# Patient Record
Sex: FEMALE | Race: WHITE | Employment: OTHER | URBAN - METROPOLITAN AREA
[De-identification: names, ages, dates, MRNs, and addresses within clinical notes are randomized per-mention and may not be internally consistent; named-entity substitution may affect disease eponyms.]

---

## 2020-08-19 ENCOUNTER — VIRTUAL VISIT (OUTPATIENT)
Dept: FAMILY MEDICINE CLINIC | Age: 80
End: 2020-08-19
Payer: MEDICARE

## 2020-08-19 DIAGNOSIS — I10 BENIGN ESSENTIAL HTN: ICD-10-CM

## 2020-08-19 DIAGNOSIS — E78.00 HYPERCHOLESTEREMIA: ICD-10-CM

## 2020-08-19 DIAGNOSIS — Z79.4 CONTROLLED TYPE 2 DIABETES MELLITUS WITHOUT COMPLICATION, WITH LONG-TERM CURRENT USE OF INSULIN (HCC): Primary | ICD-10-CM

## 2020-08-19 DIAGNOSIS — I25.10 CORONARY ARTERY DISEASE INVOLVING NATIVE HEART WITHOUT ANGINA PECTORIS, UNSPECIFIED VESSEL OR LESION TYPE: ICD-10-CM

## 2020-08-19 DIAGNOSIS — E11.9 CONTROLLED TYPE 2 DIABETES MELLITUS WITHOUT COMPLICATION, WITH LONG-TERM CURRENT USE OF INSULIN (HCC): ICD-10-CM

## 2020-08-19 DIAGNOSIS — E11.9 CONTROLLED TYPE 2 DIABETES MELLITUS WITHOUT COMPLICATION, WITH LONG-TERM CURRENT USE OF INSULIN (HCC): Primary | ICD-10-CM

## 2020-08-19 DIAGNOSIS — Z79.4 CONTROLLED TYPE 2 DIABETES MELLITUS WITHOUT COMPLICATION, WITH LONG-TERM CURRENT USE OF INSULIN (HCC): ICD-10-CM

## 2020-08-19 PROCEDURE — 99203 OFFICE O/P NEW LOW 30 MIN: CPT | Performed by: NURSE PRACTITIONER

## 2020-08-19 PROCEDURE — G8427 DOCREV CUR MEDS BY ELIG CLIN: HCPCS | Performed by: NURSE PRACTITIONER

## 2020-08-19 PROCEDURE — 1101F PT FALLS ASSESS-DOCD LE1/YR: CPT | Performed by: NURSE PRACTITIONER

## 2020-08-19 PROCEDURE — G8400 PT W/DXA NO RESULTS DOC: HCPCS | Performed by: NURSE PRACTITIONER

## 2020-08-19 PROCEDURE — 1090F PRES/ABSN URINE INCON ASSESS: CPT | Performed by: NURSE PRACTITIONER

## 2020-08-19 PROCEDURE — G8432 DEP SCR NOT DOC, RNG: HCPCS | Performed by: NURSE PRACTITIONER

## 2020-08-19 RX ORDER — CLONIDINE HYDROCHLORIDE 0.2 MG/1
0.2 TABLET ORAL 2 TIMES DAILY
COMMUNITY
End: 2021-02-01 | Stop reason: SDUPTHER

## 2020-08-19 RX ORDER — LISINOPRIL 5 MG/1
5 TABLET ORAL DAILY
COMMUNITY
End: 2020-10-19 | Stop reason: SDUPTHER

## 2020-08-19 RX ORDER — ALBUTEROL SULFATE 0.63 MG/3ML
0.63 SOLUTION RESPIRATORY (INHALATION)
COMMUNITY
End: 2022-06-09 | Stop reason: ALTCHOICE

## 2020-08-19 RX ORDER — MONTELUKAST SODIUM 5 MG/1
10 TABLET, CHEWABLE ORAL
COMMUNITY
End: 2020-10-19 | Stop reason: SDUPTHER

## 2020-08-19 RX ORDER — GUAIFENESIN 100 MG/5ML
81 LIQUID (ML) ORAL DAILY
COMMUNITY

## 2020-08-19 RX ORDER — ALLOPURINOL 100 MG/1
100 TABLET ORAL DAILY
COMMUNITY
End: 2021-02-01 | Stop reason: SDUPTHER

## 2020-08-19 RX ORDER — CHOLECALCIFEROL (VITAMIN D3) 50 MCG
CAPSULE ORAL
COMMUNITY
End: 2022-06-09 | Stop reason: ALTCHOICE

## 2020-08-19 RX ORDER — FUROSEMIDE 20 MG/1
20 TABLET ORAL DAILY
COMMUNITY
End: 2020-09-01 | Stop reason: SDUPTHER

## 2020-08-19 RX ORDER — FLUTICASONE PROPIONATE AND SALMETEROL 250; 50 UG/1; UG/1
1 POWDER RESPIRATORY (INHALATION) EVERY 12 HOURS
COMMUNITY
End: 2020-08-26 | Stop reason: SDUPTHER

## 2020-08-19 RX ORDER — INSULIN GLARGINE 100 [IU]/ML
28 INJECTION, SOLUTION SUBCUTANEOUS
COMMUNITY
End: 2020-09-23 | Stop reason: SDUPTHER

## 2020-08-19 RX ORDER — SPIRONOLACTONE 25 MG/1
25 TABLET ORAL DAILY
COMMUNITY
End: 2021-02-13 | Stop reason: SDUPTHER

## 2020-08-19 NOTE — PROGRESS NOTES
Chief Complaint   Patient presents with   [facility].     Pt has a vv to establish care    1. Have you been to the ER, urgent care clinic since your last visit? Hospitalized since your last visit? No    2. Have you seen or consulted any other health care providers outside of the [address] [place] [address] since your last visit? Include any pap smears or colon screening.  No     Pt has no other concerns

## 2020-08-19 NOTE — PROGRESS NOTES
Idania Mehta is a 79 y.o. female who was seen by synchronous (real-time) audio-video technology on 8/19/2020 for Establish Care      I spent at least 25 minutes on this visit with this established patient. Subjective:   Patient is new to the office  Moved from [facility] to live with daughter  pmh DM with insulin, CAD with cath 2014 due to silent MI, no stents required, and hypercholesterol  Med list reviewed, no refills needed at this time  She is also seeing Onc for blood disorder and will need referral for both specialties  Thinks last A1C in 7 range  The FamHx, SocHx, MedHx, Medication, and Allergy lists have been reviewed and updated in the chart. Prior to Admission medications    Medication Sig Start Date End Date Taking? Authorizing Provider   allopurinoL (ZYLOPRIM) 100 mg tablet Take 100 mg by mouth daily. Yes Provider, Historical   atorvastatin calcium (ATORVASTATIN PO) Take 40 mg by mouth. Yes Provider, Historical   lisinopriL (PRINIVIL, ZESTRIL) 5 mg tablet Take 5 mg by mouth daily. Yes Provider, Historical   spironolactone (ALDACTONE) 25 mg tablet Take 25 mg by mouth daily. Yes Provider, Historical   montelukast (SINGULAIR) 5 mg chewable tablet Take 10 mg by mouth nightly. Yes Provider, Historical   furosemide (Lasix) 20 mg tablet Take 20 mg by mouth daily. Yes Provider, Historical   sotalol HCl (SOTALOL PO) Take 80 mg by mouth. Yes Provider, Historical   cloNIDine HCL (CATAPRES) 0.2 mg tablet Take 0.2 mg by mouth two (2) times a day. Yes Provider, Historical   aspirin 81 mg chewable tablet Take 81 mg by mouth daily. Yes Provider, Historical   fluticasone propion-salmeteroL (Advair Diskus) 250-50 mcg/dose diskus inhaler Take 1 Puff by inhalation every twelve (12) hours. Yes Provider, Historical   albuterol (ACCUNEB) 0.63 mg/3 mL nebulizer solution 0.63 mg by Nebulization route every six (6) hours as needed for Wheezing.    Yes Provider, Historical   NOVOLOG U-100 INSULIN ASPART SC 22 Units by SubCUTAneous route. Yes Provider, Historical   exenatide microspheres (Bydureon) 2 mg serr 2 mg by SubCUTAneous route every seven (7) days. Yes Provider, Historical   insulin glargine (Lantus Solostar U-100 Insulin) 100 unit/mL (3 mL) inpn 28 Units by SubCUTAneous route. At night   Yes Provider, Historical   B.infantis-B.ani-B.long-B.bifi (Probiotic 4X) 10-15 mg TbEC Take  by mouth. Yes Provider, Historical     There are no active problems to display for this patient. Current Outpatient Medications   Medication Sig Dispense Refill    allopurinoL (ZYLOPRIM) 100 mg tablet Take 100 mg by mouth daily.  atorvastatin calcium (ATORVASTATIN PO) Take 40 mg by mouth.  lisinopriL (PRINIVIL, ZESTRIL) 5 mg tablet Take 5 mg by mouth daily.  spironolactone (ALDACTONE) 25 mg tablet Take 25 mg by mouth daily.  montelukast (SINGULAIR) 5 mg chewable tablet Take 10 mg by mouth nightly.  furosemide (Lasix) 20 mg tablet Take 20 mg by mouth daily.  sotalol HCl (SOTALOL PO) Take 80 mg by mouth.  cloNIDine HCL (CATAPRES) 0.2 mg tablet Take 0.2 mg by mouth two (2) times a day.  aspirin 81 mg chewable tablet Take 81 mg by mouth daily.  fluticasone propion-salmeteroL (Advair Diskus) 250-50 mcg/dose diskus inhaler Take 1 Puff by inhalation every twelve (12) hours.  albuterol (ACCUNEB) 0.63 mg/3 mL nebulizer solution 0.63 mg by Nebulization route every six (6) hours as needed for Wheezing.  NOVOLOG U-100 INSULIN ASPART SC 22 Units by SubCUTAneous route.  exenatide microspheres (Bydureon) 2 mg serr 2 mg by SubCUTAneous route every seven (7) days.  insulin glargine (Lantus Solostar U-100 Insulin) 100 unit/mL (3 mL) inpn 28 Units by SubCUTAneous route. At night      B.infantis-B.ani-B.long-B.bifi (Probiotic 4X) 10-15 mg TbEC Take  by mouth. History reviewed. No pertinent family history.   Social History     Tobacco Use    Smoking status: Never Smoker    Smokeless tobacco: Never Used   Substance Use Topics    Alcohol use: Never     Frequency: Never       ROS    Objective:   No flowsheet data found. [INSTRUCTIONS:  \"[x]\" Indicates a positive item  \"[]\" Indicates a negative item  -- DELETE ALL ITEMS NOT EXAMINED]    Constitutional: [x] Appears well-developed and well-nourished [x] No apparent distress      [] Abnormal -     Mental status: [x] Alert and awake  [x] Oriented to person/place/time [x] Able to follow commands    [] Abnormal -     Eyes:   EOM    [x]  Normal    [] Abnormal -   Sclera  [x]  Normal    [] Abnormal -          Discharge [x]  None visible   [] Abnormal -     HENT: [x] Normocephalic, atraumatic  [] Abnormal -   [x] Mouth/Throat: Mucous membranes are moist    External Ears [x] Normal  [] Abnormal -    Neck: [x] No visualized mass [] Abnormal -     Pulmonary/Chest: [x] Respiratory effort normal   [x] No visualized signs of difficulty breathing or respiratory distress        [] Abnormal -      Musculoskeletal:   [x] Normal gait with no signs of ataxia         [x] Normal range of motion of neck        [] Abnormal -     Neurological:        [x] No Facial Asymmetry (Cranial nerve 7 motor function) (limited exam due to video visit)          [x] No gaze palsy        [] Abnormal -          Skin:        [x] No significant exanthematous lesions or discoloration noted on facial skin         [] Abnormal -            Psychiatric:       [x] Normal Affect [] Abnormal -        [x] No Hallucinations    Other pertinent observable physical exam findings:- none    Diagnoses and all orders for this visit:    1. Controlled type 2 diabetes mellitus without complication, with long-term current use of insulin (HCC)  -     HEMOGLOBIN A1C WITH EAG; Future  -     MICROALBUMIN, UR, RAND W/ MICROALB/CREAT RATIO; Future    2. Benign essential HTN  -     METABOLIC PANEL, COMPREHENSIVE; Future  -     CBC WITH AUTOMATED DIFF; Future    3.  Coronary artery disease involving native heart without angina pectoris, unspecified vessel or lesion type  -     LIPID PANEL; Future  -     METABOLIC PANEL, COMPREHENSIVE; Future  -     CBC WITH AUTOMATED DIFF; Future    4. Hypercholesteremia  -     LIPID PANEL; Future      All labs updated today to get an idea of control   Will have in office next visit and will discuss med changes/adjustments pending results  Will refer to cardio as well given CAD pmh  And Onc referral as well            We discussed the expected course, resolution and complications of the diagnosis(es) in detail. Medication risks, benefits, costs, interactions, and alternatives were discussed as indicated. I advised her to contact the office if her condition worsens, changes or fails to improve as anticipated. She expressed understanding with the diagnosis(es) and plan. Rowena Tejeda, who was evaluated through a patient-initiated, synchronous (real-time) audio-video encounter, and/or her healthcare decision maker, is aware that it is a billable service, with coverage as determined by her insurance carrier. She provided verbal consent to proceed: Yes, and patient identification was verified. It was conducted pursuant to the emergency declaration under the [address] [place] [address] authority and the [employer] Act. A caregiver was present when appropriate. Ability to conduct physical exam was limited. I was at home. The patient was at home.       Shirlene Samson NP

## 2020-08-20 ENCOUNTER — HOSPITAL ENCOUNTER (OUTPATIENT)
Dept: LAB | Age: 80
Discharge: HOME | End: 2020-08-20
Payer: MEDICARE

## 2020-08-20 PROCEDURE — 80061 LIPID PANEL: CPT

## 2020-08-20 PROCEDURE — 82043 UR ALBUMIN QUANTITATIVE: CPT

## 2020-08-20 PROCEDURE — 83036 HEMOGLOBIN GLYCOSYLATED A1C: CPT

## 2020-08-20 PROCEDURE — 80053 COMPREHEN METABOLIC PANEL: CPT

## 2020-08-20 PROCEDURE — 85025 COMPLETE CBC W/AUTO DIFF WBC: CPT

## 2020-08-25 ENCOUNTER — DOCUMENTATION ONLY (OUTPATIENT)
Dept: FAMILY MEDICINE CLINIC | Age: 80
End: 2020-08-25

## 2020-08-26 RX ORDER — FLUTICASONE PROPIONATE AND SALMETEROL 250; 50 UG/1; UG/1
1 POWDER RESPIRATORY (INHALATION) EVERY 12 HOURS
Qty: 3 INHALER | Refills: 3 | Status: SHIPPED | OUTPATIENT
Start: 2020-08-26 | End: 2020-08-31 | Stop reason: SDUPTHER

## 2020-08-27 LAB
ALBUMIN SERPL-MCNC: 3.8 G/DL (ref 3.7–4.7)
ALBUMIN/CREAT UR: 6 MG/G CREAT (ref 0–29)
ALBUMIN/GLOB SERPL: 1.4 {RATIO} (ref 1.2–2.2)
ALP SERPL-CCNC: 115 IU/L (ref 39–117)
ALT SERPL-CCNC: 15 IU/L (ref 0–32)
AST SERPL-CCNC: 14 IU/L (ref 0–40)
BASOPHILS # BLD AUTO: 0.1 X10E3/UL (ref 0–0.2)
BASOPHILS NFR BLD AUTO: 1 %
BILIRUB SERPL-MCNC: 0.6 MG/DL (ref 0–1.2)
BUN SERPL-MCNC: 26 MG/DL (ref 8–27)
BUN/CREAT SERPL: 21 (ref 12–28)
CALCIUM SERPL-MCNC: 9.6 MG/DL (ref 8.7–10.3)
CHLORIDE SERPL-SCNC: 97 MMOL/L (ref 96–106)
CHOLEST SERPL-MCNC: 169 MG/DL (ref 100–199)
CO2 SERPL-SCNC: 25 MMOL/L (ref 20–29)
CREAT SERPL-MCNC: 1.26 MG/DL (ref 0.57–1)
CREAT UR-MCNC: 126.2 MG/DL
EOSINOPHIL # BLD AUTO: 0.4 X10E3/UL (ref 0–0.4)
EOSINOPHIL NFR BLD AUTO: 3 %
ERYTHROCYTE [DISTWIDTH] IN BLOOD BY AUTOMATED COUNT: 12.9 % (ref 11.7–15.4)
EST. AVERAGE GLUCOSE BLD GHB EST-MCNC: 166 MG/DL
GLOBULIN SER CALC-MCNC: 2.8 G/DL (ref 1.5–4.5)
GLUCOSE SERPL-MCNC: 172 MG/DL (ref 65–99)
HBA1C MFR BLD: 7.4 % (ref 4.8–5.6)
HCT VFR BLD AUTO: 37.9 % (ref 34–46.6)
HDLC SERPL-MCNC: 45 MG/DL
HGB BLD-MCNC: 12.6 G/DL (ref 11.1–15.9)
IMM GRANULOCYTES # BLD AUTO: 0.1 X10E3/UL (ref 0–0.1)
IMM GRANULOCYTES NFR BLD AUTO: 1 %
INTERPRETATION, 910389: NORMAL
INTERPRETATION: NORMAL
LDLC SERPL CALC-MCNC: 93 MG/DL (ref 0–99)
LYMPHOCYTES # BLD AUTO: 2.1 X10E3/UL (ref 0.7–3.1)
LYMPHOCYTES NFR BLD AUTO: 18 %
Lab: NORMAL
MCH RBC QN AUTO: 31.7 PG (ref 26.6–33)
MCHC RBC AUTO-ENTMCNC: 33.2 G/DL (ref 31.5–35.7)
MCV RBC AUTO: 96 FL (ref 79–97)
MICROALBUMIN UR-MCNC: 7.2 UG/ML
MONOCYTES # BLD AUTO: 0.8 X10E3/UL (ref 0.1–0.9)
MONOCYTES NFR BLD AUTO: 7 %
NEUTROPHILS # BLD AUTO: 8.2 X10E3/UL (ref 1.4–7)
NEUTROPHILS NFR BLD AUTO: 70 %
PDF IMAGE, 910387: NORMAL
PLATELET # BLD AUTO: 301 X10E3/UL (ref 150–450)
POTASSIUM SERPL-SCNC: 5.3 MMOL/L (ref 3.5–5.2)
PROT SERPL-MCNC: 6.6 G/DL (ref 6–8.5)
RBC # BLD AUTO: 3.97 X10E6/UL (ref 3.77–5.28)
SODIUM SERPL-SCNC: 135 MMOL/L (ref 134–144)
TRIGL SERPL-MCNC: 155 MG/DL (ref 0–149)
VLDLC SERPL CALC-MCNC: 31 MG/DL (ref 5–40)
WBC # BLD AUTO: 11.6 X10E3/UL (ref 3.4–10.8)

## 2020-08-31 RX ORDER — FLUTICASONE PROPIONATE AND SALMETEROL 250; 50 UG/1; UG/1
1 POWDER RESPIRATORY (INHALATION) EVERY 12 HOURS
Qty: 3 INHALER | Refills: 3 | Status: SHIPPED | OUTPATIENT
Start: 2020-08-31 | End: 2020-09-01 | Stop reason: SDUPTHER

## 2020-08-31 NOTE — PROGRESS NOTES
Hey there, your labs look like you are dehydrated. I dont have labs to compare to so I want to recheck your electrolytes and kidney functions in 2 weeks, please call the office for an appointment. Your sugar is stable but could be slightly lower, watch the diet for pasta, bread, rice and potatoes.  [place]

## 2020-09-01 RX ORDER — FUROSEMIDE 20 MG/1
20 TABLET ORAL DAILY
Qty: 90 TAB | Refills: 1 | Status: SHIPPED | OUTPATIENT
Start: 2020-09-01 | End: 2020-09-01 | Stop reason: SDUPTHER

## 2020-09-01 RX ORDER — FUROSEMIDE 20 MG/1
20 TABLET ORAL DAILY
Qty: 90 TAB | Refills: 1 | Status: SHIPPED | OUTPATIENT
Start: 2020-09-01 | End: 2021-02-12 | Stop reason: SDUPTHER

## 2020-09-01 RX ORDER — FLUTICASONE PROPIONATE AND SALMETEROL 250; 50 UG/1; UG/1
1 POWDER RESPIRATORY (INHALATION) EVERY 12 HOURS
Qty: 3 INHALER | Refills: 3 | Status: SHIPPED | OUTPATIENT
Start: 2020-09-01 | End: 2021-10-26

## 2020-09-23 ENCOUNTER — OFFICE VISIT (OUTPATIENT)
Dept: FAMILY MEDICINE CLINIC | Age: 80
End: 2020-09-23
Payer: MEDICARE

## 2020-09-23 VITALS
HEIGHT: 63 IN | TEMPERATURE: 98.1 F | OXYGEN SATURATION: 96 % | RESPIRATION RATE: 14 BRPM | BODY MASS INDEX: 40.22 KG/M2 | DIASTOLIC BLOOD PRESSURE: 63 MMHG | SYSTOLIC BLOOD PRESSURE: 102 MMHG | HEART RATE: 68 BPM | WEIGHT: 227 LBS

## 2020-09-23 DIAGNOSIS — D47.2 MONOCLONAL GAMMOPATHY OF UNKNOWN SIGNIFICANCE (MGUS): ICD-10-CM

## 2020-09-23 DIAGNOSIS — N18.2 CRF (CHRONIC RENAL FAILURE), STAGE 2 (MILD): Primary | ICD-10-CM

## 2020-09-23 DIAGNOSIS — I25.10 CORONARY ARTERY DISEASE INVOLVING NATIVE HEART WITHOUT ANGINA PECTORIS, UNSPECIFIED VESSEL OR LESION TYPE: ICD-10-CM

## 2020-09-23 PROCEDURE — 1101F PT FALLS ASSESS-DOCD LE1/YR: CPT | Performed by: NURSE PRACTITIONER

## 2020-09-23 PROCEDURE — G8432 DEP SCR NOT DOC, RNG: HCPCS | Performed by: NURSE PRACTITIONER

## 2020-09-23 PROCEDURE — G8536 NO DOC ELDER MAL SCRN: HCPCS | Performed by: NURSE PRACTITIONER

## 2020-09-23 PROCEDURE — 1090F PRES/ABSN URINE INCON ASSESS: CPT | Performed by: NURSE PRACTITIONER

## 2020-09-23 PROCEDURE — G8427 DOCREV CUR MEDS BY ELIG CLIN: HCPCS | Performed by: NURSE PRACTITIONER

## 2020-09-23 PROCEDURE — G8400 PT W/DXA NO RESULTS DOC: HCPCS | Performed by: NURSE PRACTITIONER

## 2020-09-23 PROCEDURE — 99214 OFFICE O/P EST MOD 30 MIN: CPT | Performed by: NURSE PRACTITIONER

## 2020-09-23 PROCEDURE — G8417 CALC BMI ABV UP PARAM F/U: HCPCS | Performed by: NURSE PRACTITIONER

## 2020-09-23 PROCEDURE — G0463 HOSPITAL OUTPT CLINIC VISIT: HCPCS | Performed by: NURSE PRACTITIONER

## 2020-09-23 RX ORDER — INSULIN GLARGINE 100 [IU]/ML
28 INJECTION, SOLUTION SUBCUTANEOUS
Qty: 15 PEN | Refills: 3 | Status: SHIPPED | OUTPATIENT
Start: 2020-09-23 | End: 2021-04-04

## 2020-09-23 NOTE — PROGRESS NOTES
Chief Complaint   Patient presents with   [address]     Pt in office today to establish care  -labs    1. Have you been to the ER, urgent care clinic since your last visit? Hospitalized since your last visit? No    2. Have you seen or consulted any other health care providers outside of the [address] [place] [address] since your last visit? Include any pap smears or colon screening.  No     Pt has no other concerns

## 2020-09-24 LAB
ALBUMIN SERPL-MCNC: 3.2 G/DL (ref 3.5–5)
ALBUMIN/GLOB SERPL: 0.9 {RATIO} (ref 1.1–2.2)
ALP SERPL-CCNC: 113 U/L (ref 45–117)
ALT SERPL-CCNC: 24 U/L (ref 12–78)
ANION GAP SERPL CALC-SCNC: 5 MMOL/L (ref 5–15)
AST SERPL-CCNC: 14 U/L (ref 15–37)
BILIRUB SERPL-MCNC: 0.8 MG/DL (ref 0.2–1)
BUN SERPL-MCNC: 25 MG/DL (ref 6–20)
BUN/CREAT SERPL: 20 (ref 12–20)
CALCIUM SERPL-MCNC: 9.5 MG/DL (ref 8.5–10.1)
CHLORIDE SERPL-SCNC: 101 MMOL/L (ref 97–108)
CO2 SERPL-SCNC: 30 MMOL/L (ref 21–32)
CREAT SERPL-MCNC: 1.22 MG/DL (ref 0.55–1.02)
GLOBULIN SER CALC-MCNC: 3.7 G/DL (ref 2–4)
GLUCOSE SERPL-MCNC: 158 MG/DL (ref 65–100)
POTASSIUM SERPL-SCNC: 4.6 MMOL/L (ref 3.5–5.1)
PROT SERPL-MCNC: 6.9 G/DL (ref 6.4–8.2)
SODIUM SERPL-SCNC: 136 MMOL/L (ref 136–145)

## 2020-09-24 NOTE — PROGRESS NOTES
HISTORY OF PRESENT ILLNESS  Madelyn Briceno is a 79 y.o. female. HPI  New patient to the office  Recent move to live with daughter  She is managed by hem/onc for MGUS, needs referral to see someone here  Also followed by cardio in [facility] for previous MI  No complaints today, did have renal functions that were elevated and followed as well  The FamHx, SocHx, MedHx, Medication, and Allergy lists have been reviewed and updated in the chart. ROS  A comprehensive review of system was obtained and negative except findings in the HPI    Visit Vitals  /63 (BP 1 Location: Left arm, BP Patient Position: Sitting)   Pulse 68   Temp 98.1 °F (36.7 °C) (Oral)   Resp 14   Ht 5' 3\" (1.6 m)   Wt 227 lb (103 kg)   SpO2 96%   BMI 40.21 kg/m²     Physical Exam  Vitals signs and nursing note reviewed. Constitutional:       Appearance: She is well-developed. Comments:      Neck:      Vascular: No JVD. Cardiovascular:      Rate and Rhythm: Normal rate and regular rhythm. Heart sounds: No murmur. No friction rub. No gallop. Pulmonary:      Effort: Pulmonary effort is normal. No respiratory distress. Breath sounds: Normal breath sounds. No wheezing. Skin:     General: Skin is warm. Neurological:      Mental Status: She is alert and oriented to person, place, and time. ASSESSMENT and PLAN  Encounter Diagnoses   Name Primary?     CRF (chronic renal failure), stage 2 (mild) Yes    Coronary artery disease involving native heart without angina pectoris, unspecified vessel or lesion type     Monoclonal gammopathy of unknown significance (MGUS)      Orders Placed This Encounter    METABOLIC PANEL, COMPREHENSIVE    [facility]    [facility]    exenatide microspheres (Bydureon) 2 mg serr    insulin glargine (Lantus Solostar U-100 Insulin) 100 unit/mL (3 mL) inpn     Refills updated  Referrals given as well  Updated renal functions from visit 3 weeks ago on VV  I have discussed the diagnosis with the patient and the intended plan as seen in the above orders. The patient has received an after-visit summary and questions were answered concerning future plans. Patient conveyed understanding of the plan at the time of the visit.     DALE Blanchard, ANP  9/23/2020

## 2020-09-28 NOTE — PROGRESS NOTES
[facility] at [address], [address] [facility] [address] [facility]  Octavio Petit: [phone]  F: [phone]      Reason for Visit:   Fabien Bentley is a 79 y.o. female who is seen in consultation at the request of Cadence Land for evaluation of MGUS. History of Present Illness:   Fabien Bentley is a pleasant 79 y.o. female who presents today for evaluation of MGUS. She was diagnosed with this around 2011 and has been following with hematology there. She has not received any treatment, just surveillance. She recently relocated to this area. I have some limited records from her prior hematologist which indicate a bone marrow biopsy was completed 8/2011 which demonstrated clonal plasma cells of 6-9%, not meeting criteria for myeloma. She was followed with surveillance and there notes indicate no progression, though they did not send me any labs despite several requests. She reports feeling fairly well. Energy a bit low, chronic. No recent or recurring infections. No fevers, chills, night sweats, unintentional weight loss, adenopathy. No bone pain, other than some arthritis in her knee. No family history of hematologic issues. She is accompanied by her daughter today. Past Medical History:   Diagnosis Date    Diabetes ([address]     Heart attack ([address] 2014    Monoclonal gammopathies       Past Surgical History:   Procedure Laterality Date    BREAST SURGERY PROCEDURE UNLISTED        Social History     Tobacco Use    Smoking status: Never Smoker    Smokeless tobacco: Never Used   Substance Use Topics    Alcohol use: Never     Frequency: Never      No family history on file. Current Outpatient Medications   Medication Sig    exenatide microspheres (Bydureon) 2 mg serr 2 mg by SubCUTAneous route every seven (7) days.  insulin glargine (Lantus Solostar U-100 Insulin) 100 unit/mL (3 mL) inpn 28 Units by SubCUTAneous route nightly.  At night    furosemide (Lasix) 20 mg tablet Take 1 Tab by mouth daily.  fluticasone propion-salmeteroL (Advair Diskus) 250-50 mcg/dose diskus inhaler Take 1 Puff by inhalation every twelve (12) hours.  allopurinoL (ZYLOPRIM) 100 mg tablet Take 100 mg by mouth daily.  atorvastatin calcium (ATORVASTATIN PO) Take 40 mg by mouth.  lisinopriL (PRINIVIL, ZESTRIL) 5 mg tablet Take 5 mg by mouth daily.  spironolactone (ALDACTONE) 25 mg tablet Take 25 mg by mouth daily.  montelukast (SINGULAIR) 5 mg chewable tablet Take 10 mg by mouth nightly.  sotalol HCl (SOTALOL PO) Take 80 mg by mouth.  cloNIDine HCL (CATAPRES) 0.2 mg tablet Take 0.2 mg by mouth two (2) times a day.  aspirin 81 mg chewable tablet Take 81 mg by mouth daily.  albuterol (ACCUNEB) 0.63 mg/3 mL nebulizer solution 0.63 mg by Nebulization route every six (6) hours as needed for Wheezing.  NOVOLOG U-100 INSULIN ASPART SC 22 Units by SubCUTAneous route.  B.infantis-B.ani-B.long-B.bifi (Probiotic 4X) 10-15 mg TbEC Take  by mouth. No current facility-administered medications for this visit. Allergies   Allergen Reactions    Codeine Unknown (comments)    Motrin [Ibuprofen] Hives        Review of Systems: A complete review of systems was obtained, negative except as described above. Physical Exam:     Visit Vitals  BP (!) 127/54 (BP 1 Location: Left arm, BP Patient Position: Sitting)   Pulse 79   Temp 97 °F (36.1 °C) (Temporal)   Resp 16   Ht 5' 3\" (1.6 m)   Wt 230 lb (104.3 kg)   SpO2 98%   BMI 40.74 kg/m²     General: No distress  Eyes: PERRLA, anicteric sclerae  HENT: Atraumatic, OP clear  Neck: Supple  Lymphatic: No cervical, supraclavicular, or inguinal adenopathy  Respiratory: CTAB, normal respiratory effort  CV: Normal rate, regular rhythm, no murmurs, no peripheral edema  GI: Soft, nontender, nondistended, no masses, no hepatomegaly, no splenomegaly  MS: Walks with a cane. Normal gait and station.  Digits without clubbing or cyanosis. Skin: No rashes, ecchymoses, or petechiae. Normal temperature, turgor, and texture. Psych: Alert, oriented, appropriate affect, normal judgment/insight    Results:     Lab Results   Component Value Date/Time    WBC 11.6 (H) 08/20/2020 08:22 AM    HGB 12.6 08/20/2020 08:22 AM    HCT 37.9 08/20/2020 08:22 AM    PLATELET 158 86/79/3788 08:22 AM    MCV 96 08/20/2020 08:22 AM    ABS. NEUTROPHILS 8.2 (H) 08/20/2020 08:22 AM     Lab Results   Component Value Date/Time    Sodium 136 09/23/2020 11:00 AM    Potassium 4.6 09/23/2020 11:00 AM    Chloride 101 09/23/2020 11:00 AM    CO2 30 09/23/2020 11:00 AM    Glucose 158 (H) 09/23/2020 11:00 AM    BUN 25 (H) 09/23/2020 11:00 AM    Creatinine 1.22 (H) 09/23/2020 11:00 AM    GFR est AA 52 (L) 09/23/2020 11:00 AM    GFR est non-AA 43 (L) 09/23/2020 11:00 AM    Calcium 9.5 09/23/2020 11:00 AM     Lab Results   Component Value Date/Time    Bilirubin, total 0.8 09/23/2020 11:00 AM    ALT (SGPT) 24 09/23/2020 11:00 AM    Alk. phosphatase 113 09/23/2020 11:00 AM    Protein, total 6.9 09/23/2020 11:00 AM    Albumin 3.2 (L) 09/23/2020 11:00 AM    Globulin 3.7 09/23/2020 11:00 AM       Records reviewed and summarized above. Assessment:   1) MGUS  S/p bone marrow biopsy 8/2011 which reportedly demonstrated 6-9% plasma cells, not meeting criteria for multiple myeloma. She has been followed with surveillance since that time. I unfortunately have not received labs from her prior hematologist, but we will send another request.  I will go ahead and check labs now, though old labs will be needed for a comparison. If labs remain stable, I recommend monitoring yearly. 2) CKD  Secondary to DM. Monitor. 3) DM  PCP managing    4) CAD  She will be seeing cardiology soon to establish care locally.     Plan:     · Labs today: CBC, CMP, LD, SPEP, ELIU, SFLC, UPEP  · Request outside labs and bone marrow biopsy report  · Labs again in 1 year: CBC, renal function panel, SPEP, SFLC, UPEP  · Return to see me in 1 year, or sooner if needed    I appreciate the opportunity to participate in Ms. Vaibhav Zuñiga's care.     Signed By: Abdoul Dockery MD

## 2020-09-29 NOTE — PROGRESS NOTES
Your kidney functions have improved from 1 month ago. We will recheck in 3 months. No changes at this time.  Paul Mahmood

## 2020-10-07 ENCOUNTER — OFFICE VISIT (OUTPATIENT)
Dept: ONCOLOGY | Age: 80
End: 2020-10-07
Payer: MEDICARE

## 2020-10-07 VITALS
RESPIRATION RATE: 16 BRPM | WEIGHT: 230 LBS | SYSTOLIC BLOOD PRESSURE: 127 MMHG | OXYGEN SATURATION: 98 % | HEIGHT: 63 IN | HEART RATE: 79 BPM | DIASTOLIC BLOOD PRESSURE: 54 MMHG | TEMPERATURE: 97 F | BODY MASS INDEX: 40.75 KG/M2

## 2020-10-07 DIAGNOSIS — D47.2 MGUS (MONOCLONAL GAMMOPATHY OF UNKNOWN SIGNIFICANCE): Primary | ICD-10-CM

## 2020-10-07 DIAGNOSIS — D47.2 MGUS (MONOCLONAL GAMMOPATHY OF UNKNOWN SIGNIFICANCE): ICD-10-CM

## 2020-10-07 DIAGNOSIS — E66.01 OBESITY, MORBID (HCC): ICD-10-CM

## 2020-10-07 LAB
ALBUMIN SERPL-MCNC: 3.4 G/DL (ref 3.5–5)
ALBUMIN/GLOB SERPL: 0.9 {RATIO} (ref 1.1–2.2)
ALP SERPL-CCNC: 126 U/L (ref 45–117)
ALT SERPL-CCNC: 28 U/L (ref 12–78)
ANION GAP SERPL CALC-SCNC: 7 MMOL/L (ref 5–15)
AST SERPL-CCNC: 20 U/L (ref 15–37)
BASOPHILS # BLD: 0.1 K/UL (ref 0–0.1)
BASOPHILS NFR BLD: 1 % (ref 0–1)
BILIRUB SERPL-MCNC: 0.6 MG/DL (ref 0.2–1)
BUN SERPL-MCNC: 25 MG/DL (ref 6–20)
BUN/CREAT SERPL: 20 (ref 12–20)
CALCIUM SERPL-MCNC: 9.2 MG/DL (ref 8.5–10.1)
CHLORIDE SERPL-SCNC: 102 MMOL/L (ref 97–108)
CO2 SERPL-SCNC: 28 MMOL/L (ref 21–32)
CREAT SERPL-MCNC: 1.25 MG/DL (ref 0.55–1.02)
DIFFERENTIAL METHOD BLD: ABNORMAL
EOSINOPHIL # BLD: 0.3 K/UL (ref 0–0.4)
EOSINOPHIL NFR BLD: 2 % (ref 0–7)
ERYTHROCYTE [DISTWIDTH] IN BLOOD BY AUTOMATED COUNT: 13.6 % (ref 11.5–14.5)
GLOBULIN SER CALC-MCNC: 3.8 G/DL (ref 2–4)
GLUCOSE SERPL-MCNC: 132 MG/DL (ref 65–100)
HCT VFR BLD AUTO: 40.2 % (ref 35–47)
HGB BLD-MCNC: 12.6 G/DL (ref 11.5–16)
IMM GRANULOCYTES # BLD AUTO: 0.1 K/UL (ref 0–0.04)
IMM GRANULOCYTES NFR BLD AUTO: 1 % (ref 0–0.5)
LDH SERPL L TO P-CCNC: 165 U/L (ref 81–246)
LYMPHOCYTES # BLD: 2.8 K/UL (ref 0.8–3.5)
LYMPHOCYTES NFR BLD: 21 % (ref 12–49)
MCH RBC QN AUTO: 32.3 PG (ref 26–34)
MCHC RBC AUTO-ENTMCNC: 31.3 G/DL (ref 30–36.5)
MCV RBC AUTO: 103.1 FL (ref 80–99)
MONOCYTES # BLD: 1 K/UL (ref 0–1)
MONOCYTES NFR BLD: 8 % (ref 5–13)
NEUTS SEG # BLD: 8.9 K/UL (ref 1.8–8)
NEUTS SEG NFR BLD: 67 % (ref 32–75)
NRBC # BLD: 0 K/UL (ref 0–0.01)
NRBC BLD-RTO: 0 PER 100 WBC
PLATELET # BLD AUTO: 290 K/UL (ref 150–400)
PMV BLD AUTO: 11 FL (ref 8.9–12.9)
POTASSIUM SERPL-SCNC: 4.8 MMOL/L (ref 3.5–5.1)
PROT SERPL-MCNC: 7.2 G/DL (ref 6.4–8.2)
RBC # BLD AUTO: 3.9 M/UL (ref 3.8–5.2)
SODIUM SERPL-SCNC: 137 MMOL/L (ref 136–145)
WBC # BLD AUTO: 13 K/UL (ref 3.6–11)

## 2020-10-07 PROCEDURE — G8417 CALC BMI ABV UP PARAM F/U: HCPCS | Performed by: INTERNAL MEDICINE

## 2020-10-07 PROCEDURE — 1090F PRES/ABSN URINE INCON ASSESS: CPT | Performed by: INTERNAL MEDICINE

## 2020-10-07 PROCEDURE — G0463 HOSPITAL OUTPT CLINIC VISIT: HCPCS | Performed by: INTERNAL MEDICINE

## 2020-10-07 PROCEDURE — G8427 DOCREV CUR MEDS BY ELIG CLIN: HCPCS | Performed by: INTERNAL MEDICINE

## 2020-10-07 PROCEDURE — 99204 OFFICE O/P NEW MOD 45 MIN: CPT | Performed by: INTERNAL MEDICINE

## 2020-10-07 PROCEDURE — G8400 PT W/DXA NO RESULTS DOC: HCPCS | Performed by: INTERNAL MEDICINE

## 2020-10-07 PROCEDURE — 1101F PT FALLS ASSESS-DOCD LE1/YR: CPT | Performed by: INTERNAL MEDICINE

## 2020-10-07 PROCEDURE — G8536 NO DOC ELDER MAL SCRN: HCPCS | Performed by: INTERNAL MEDICINE

## 2020-10-07 PROCEDURE — G8432 DEP SCR NOT DOC, RNG: HCPCS | Performed by: INTERNAL MEDICINE

## 2020-10-14 ENCOUNTER — TELEPHONE (OUTPATIENT)
Dept: ONCOLOGY | Age: 80
End: 2020-10-14

## 2020-10-14 NOTE — TELEPHONE ENCOUNTER
[address] at [place]  [phone]    Outside records reviewed:    Bone marrow biopsy 2011: Normocellular marrow with 6-9% kappa monotypic plasmacytosis. 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 Cytogenetics normal.    2018  Creatinine: 1.0  Calcium: 9.6  WBC: 9.6  HGB: 12.1  PLT: 275  MCV: 100.8  SPEP: M-spike 0.7  Ig  ELIU: IgG monoclonal protein with kappa light chain specificity    2019  SPEP: M-spike 0.7    2019  SPEP: M-spike 0.7  SFLC ratio: 1.52    2020  SPEP: 0.7      Current labs reviewed:  Lab Results   Component Value Date/Time    WBC 13.0 (H) 10/07/2020 03:21 PM    HGB 12.6 10/07/2020 03:21 PM    HCT 40.2 10/07/2020 03:21 PM    PLATELET 420 2149 03:21 PM    .1 (H) 10/07/2020 03:21 PM    ABS. NEUTROPHILS 8.9 (H) 10/07/2020 03:21 PM     Lab Results   Component Value Date/Time    Sodium 137 10/07/2020 03:21 PM    Potassium 4.8 10/07/2020 03:21 PM    Chloride 102 10/07/2020 03:21 PM    CO2 28 10/07/2020 03:21 PM    Glucose 132 (H) 10/07/2020 03:21 PM    BUN 25 (H) 10/07/2020 03:21 PM    Creatinine 1.25 (H) 10/07/2020 03:21 PM    GFR est AA 50 (L) 10/07/2020 03:21 PM    GFR est non-AA 41 (L) 10/07/2020 03:21 PM    Calcium 9.2 10/07/2020 03:21 PM     Lab Results   Component Value Date/Time    Bilirubin, total 0.6 10/07/2020 03:21 PM    ALT (SGPT) 28 10/07/2020 03:21 PM    Alk. phosphatase 126 (H) 10/07/2020 03:21 PM    Protein, total 6.8 10/07/2020 03:21 PM    Protein, total 7.2 10/07/2020 03:21 PM    Albumin 3.4 (L) 10/07/2020 03:21 PM    Globulin 3.8 10/07/2020 03:21 PM     M-SPIKE  Recent Labs     10/07/20  1521   PE6T 0.8*       Free Kappa Light Chains  Recent Labs     10/07/20  1521   KLFL1L 23.6*       Free Lambda Light Chains  Recent Labs     10/07/20  1521   KLFL2L 16.9       Light Chain Ratio  Recent Labs     10/07/20  1521   KLFL3L 1.40         Her labs are overall stable.   I recommend we continue to monitor and repeat labs in 1 year, as discussed at her appt.

## 2020-10-14 NOTE — TELEPHONE ENCOUNTER
[address] [facility] [place]  [phone]    10/14/20- Informed patient of stable lab results, per Dr. Shyann Thornton. She verbalized understanding, no further questions or concerns.

## 2020-10-16 LAB
ALBUMIN 24H MFR UR ELPH: NORMAL %
ALPHA1 GLOB 24H MFR UR ELPH: NORMAL
ALPHA2 GLOB 24H MFR UR ELPH: NORMAL
B-GLOBULIN MFR UR ELPH: NORMAL
GAMMA GLOB 24H MFR UR ELPH: NORMAL
INTERPRETATION UR IFE-IMP: NORMAL
PROT UR-MCNC: NORMAL

## 2020-10-19 RX ORDER — MONTELUKAST SODIUM 5 MG/1
10 TABLET, CHEWABLE ORAL
Qty: 90 TAB | Refills: 3 | Status: SHIPPED | OUTPATIENT
Start: 2020-10-19 | End: 2021-03-31

## 2020-10-19 RX ORDER — LISINOPRIL 5 MG/1
5 TABLET ORAL DAILY
Qty: 90 TAB | Refills: 3 | Status: SHIPPED | OUTPATIENT
Start: 2020-10-19 | End: 2021-10-22 | Stop reason: SDUPTHER

## 2020-10-21 ENCOUNTER — OFFICE VISIT (OUTPATIENT)
Dept: CARDIOLOGY CLINIC | Age: 80
End: 2020-10-21
Payer: MEDICARE

## 2020-10-21 ENCOUNTER — DOCUMENTATION ONLY (OUTPATIENT)
Dept: FAMILY MEDICINE CLINIC | Age: 80
End: 2020-10-21

## 2020-10-21 VITALS
DIASTOLIC BLOOD PRESSURE: 82 MMHG | HEIGHT: 63 IN | SYSTOLIC BLOOD PRESSURE: 126 MMHG | HEART RATE: 73 BPM | WEIGHT: 232.2 LBS | BODY MASS INDEX: 41.14 KG/M2 | OXYGEN SATURATION: 96 %

## 2020-10-21 DIAGNOSIS — I25.10 CORONARY ARTERY DISEASE INVOLVING NATIVE HEART WITHOUT ANGINA PECTORIS, UNSPECIFIED VESSEL OR LESION TYPE: Primary | ICD-10-CM

## 2020-10-21 DIAGNOSIS — I10 HYPERTENSION, UNSPECIFIED TYPE: ICD-10-CM

## 2020-10-21 PROCEDURE — 99204 OFFICE O/P NEW MOD 45 MIN: CPT | Performed by: SPECIALIST

## 2020-10-21 PROCEDURE — 1090F PRES/ABSN URINE INCON ASSESS: CPT | Performed by: SPECIALIST

## 2020-10-21 PROCEDURE — G0463 HOSPITAL OUTPT CLINIC VISIT: HCPCS | Performed by: SPECIALIST

## 2020-10-21 PROCEDURE — 93010 ELECTROCARDIOGRAM REPORT: CPT | Performed by: SPECIALIST

## 2020-10-21 PROCEDURE — 93005 ELECTROCARDIOGRAM TRACING: CPT | Performed by: SPECIALIST

## 2020-10-21 NOTE — PROGRESS NOTES
Pt dropped off DMV form to be completed please. Left in bin up front. Phone number to call when complete is [phone]. Thanks.

## 2020-10-21 NOTE — PROGRESS NOTES
Records requested from:  Dr. Margaret Soares  [phone]    Orders for echo in 6 mos per Dr. Adali Sutherland VO  Dx: CAD

## 2020-10-21 NOTE — PROGRESS NOTES
Fernando Weaver MD. [facility]              Patient: Irineo Zuñiga  : 1940      Today's Date: 10/21/2020            HISTORY OF PRESENT ILLNESS:     History of Present Illness:  Referred for CAD. Moved from [place] in . She has history of MI in  - was placed on Sotalol in  in [place]. Denies any recent problems with chest pain or arm pain. No palpitations or heart racing. She feels fine cardiac wise. PAST MEDICAL HISTORY:     Past Medical History:   Diagnosis Date    CAD (coronary artery disease)     CKD (chronic kidney disease)     [facility]     Dyslipidemia     Heart attack ([facility]     Had cath and treated medically     HTN (hypertension)     Monoclonal gammopathies     Obesity     TARIQ on CPAP     Tachycardia     placed on Sotalol at [place]        Past Surgical History:   Procedure Laterality Date    BREAST SURGERY PROCEDURE UNLISTED             MEDICATIONS:     Current Outpatient Medications   Medication Sig Dispense Refill    lisinopriL (PRINIVIL, ZESTRIL) 5 mg tablet Take 1 Tab by mouth daily. 90 Tab 3    montelukast (SINGULAIR) 5 mg chewable tablet Take 2 Tabs by mouth nightly. 90 Tab 3    exenatide microspheres (Bydureon) 2 mg serr 2 mg by SubCUTAneous route every seven (7) days. 12 Each 3    insulin glargine (Lantus Solostar U-100 Insulin) 100 unit/mL (3 mL) inpn 28 Units by SubCUTAneous route nightly. At night 15 Pen 3    furosemide (Lasix) 20 mg tablet Take 1 Tab by mouth daily. 90 Tab 1    fluticasone propion-salmeteroL (Advair Diskus) 250-50 mcg/dose diskus inhaler Take 1 Puff by inhalation every twelve (12) hours. 3 Inhaler 3    allopurinoL (ZYLOPRIM) 100 mg tablet Take 100 mg by mouth daily.  atorvastatin calcium (ATORVASTATIN PO) Take 40 mg by mouth.  spironolactone (ALDACTONE) 25 mg tablet Take 25 mg by mouth daily.  sotalol HCl (SOTALOL PO) Take 80 mg by mouth.  2.5 twice a day      cloNIDine HCL (CATAPRES) 0.2 mg tablet Take 0.2 mg by mouth two (2) times a day.  aspirin 81 mg chewable tablet Take 81 mg by mouth daily.  albuterol (ACCUNEB) 0.63 mg/3 mL nebulizer solution 0.63 mg by Nebulization route every six (6) hours as needed for Wheezing.  NOVOLOG U-100 INSULIN ASPART SC 22 Units by SubCUTAneous route.  B.infantis-B.ani-B.long-B.bifi (Probiotic 4X) 10-15 mg TbEC Take  by mouth. Allergies   Allergen Reactions    Codeine Unknown (comments)    Motrin [Ibuprofen] Hives           SOCIAL HISTORY:     Social History     Tobacco Use    Smoking status: Former Smoker     Last attempt to quit: 1977     Years since quittin.8    Smokeless tobacco: Never Used   Substance Use Topics    Alcohol use: Not Currently     Frequency: Never    Drug use: Never         FAMILY HISTORY:     Family History   Problem Relation Age of Onset    Heart Attack Sister     Heart Attack Mother                REVIEW OF SYMPTOMS:     Review of Symptoms:  Constitutional: Negative for fever, chills  HEENT: Negative for nosebleeds, tinnitus, and vision changes. Respiratory: Negative for cough, wheezing  Cardiovascular: Negative for orthopnea, claudication, syncope, and PND. Gastrointestinal: Negative for abdominal pain, diarrhea, melena. Genitourinary: Negative for dysuria  Musculoskeletal: Negative for myalgias. Skin: Negative for rash  Heme: No problems bleeding. Neurological: Negative for speech change and focal weakness. PHYSICAL EXAM:     Physical Exam:  Visit Vitals  /82 (BP 1 Location: Left arm, BP Patient Position: Sitting)   Pulse 73   Ht 5' 3\" (1.6 m)   Wt 232 lb 3.2 oz (105.3 kg)   SpO2 96%   BMI 41.13 kg/m²     Patient appears generally well, mood and affect are appropriate and pleasant. HEENT:  Hearing intact, non-icteric, normocephalic, atraumatic. Neck Exam: Supple, No JVD or carotid bruits. Lung Exam: Clear to auscultation, even breath sounds.    Cardiac Exam: Regular rate and rhythm with no murmur or rub  Abdomen: Soft, non-tender, normal bowel sounds. No bruits or masses. Obese  Extremities: Moves all ext well. No lower extremity edema. MSKTL: Overall good ROM ext  Skin: No significant rashes  Vascular: 2+ dorsalis pedis pulses bilaterally. Psych: Appropriate affect  Neuro - Grossly intact          LABS / OTHER STUDIES:     Lab Results   Component Value Date/Time    Sodium 137 10/07/2020 03:21 PM    Potassium 4.8 10/07/2020 03:21 PM    Chloride 102 10/07/2020 03:21 PM    CO2 28 10/07/2020 03:21 PM    Anion gap 7 10/07/2020 03:21 PM    Glucose 132 (H) 10/07/2020 03:21 PM    BUN 25 (H) 10/07/2020 03:21 PM    Creatinine 1.25 (H) 10/07/2020 03:21 PM    BUN/Creatinine ratio 20 10/07/2020 03:21 PM    GFR est AA 50 (L) 10/07/2020 03:21 PM    GFR est non-AA 41 (L) 10/07/2020 03:21 PM    Calcium 9.2 10/07/2020 03:21 PM    Bilirubin, total 0.6 10/07/2020 03:21 PM    Alk.  phosphatase 126 (H) 10/07/2020 03:21 PM    Protein, total 7.2 10/07/2020 03:21 PM    Albumin 3.4 (L) 10/07/2020 03:21 PM    Globulin 3.8 10/07/2020 03:21 PM    A-G Ratio 0.9 (L) 10/07/2020 03:21 PM    ALT (SGPT) 28 10/07/2020 03:21 PM    AST (SGOT) 20 10/07/2020 03:21 PM     Lab Results   Component Value Date/Time    WBC 13.0 (H) 10/07/2020 03:21 PM    HGB 12.6 10/07/2020 03:21 PM    HCT 40.2 10/07/2020 03:21 PM    PLATELET 323 53/48/5392 03:21 PM    .1 (H) 10/07/2020 03:21 PM       Lab Results   Component Value Date/Time    Cholesterol, total 169 08/20/2020 08:22 AM    HDL Cholesterol 45 08/20/2020 08:22 AM    LDL, calculated 93 08/20/2020 08:22 AM    VLDL, calculated 31 08/20/2020 08:22 AM    Triglyceride 155 (H) 08/20/2020 08:22 AM               CARDIAC DIAGNOSTICS:     Cardiac Evaluation Includes:    EKG 10/21/20 - NSR, PRWP         ASSESSMENT AND PLAN:     Assessment and Plan:  1) CAD   - MI in 2014-- distal lesion treated medically per patient   - Will get prior records to review     2) \"Tachycardia\"   - Unsure why Sotalol was started in 2020 in [place]   - will get records to review   - she denies recent heart racing problems     3) HTN  - BP OK - continue meds     4) Dyslipidemia  - cont statin   - prior lipids OK     5) CKD    6) See me in 6 months with an echo. Patient expressed understanding of the plan - questions were answered. Moved from [place] to [place] to be close family (daughter and son). Lives with her  (ex [employer]). Mitesh Sanz MD, [address], [address]  [address] [place] [address] Drive.  [address] [place] [address], [facility]. [address].  [place], [address] [place] [address]  Ph: [phone]   Ph [phone]      ADDENDUM   11/4/2020  Prior records reviewed (from [facility]) and sent to scanning     EKG 6/13/19 - NSR, PRWP   Lexiscan Cardiolite 12/16/16 - no significant ischemia, LVEF 64%  Event Monitor 1/31-3/1/19 - sinus, Normal study -  No Afib   Echo 4/3/19 - mod LVH, LVEF 60-65%, mod-severe LAE, mod JENA, RV mod dilated, Mild AS (ANGELIC 1.4 cm2, mean PG 26 mmHg)    Labs 4/20 - Cr 1.4, K 4.4,

## 2020-10-21 NOTE — PROGRESS NOTES
Mercedes Kelly is a 79 y.o. female    Chief Complaint   Patient presents with    New Patient    Coronary Artery Disease    Hypertension    Cholesterol Problem       Chest pain No    SOB patient states sob is normal     Dizziness No    Swelling patient states normal swelling in her ankles    Refills No    Visit Vitals  /82 (BP 1 Location: Left arm, BP Patient Position: Sitting)   Ht 5' 3\" (1.6 m)   Wt 232 lb 3.2 oz (105.3 kg)   SpO2 96%   BMI 41.13 kg/m²       1. Have you been to the ER, urgent care clinic since your last visit? Hospitalized since your last visit? No    2. Have you seen or consulted any other health care providers outside of the [address] [place] [address] since your last visit? Include any pap smears or colon screening.   No

## 2020-10-22 ENCOUNTER — TELEPHONE (OUTPATIENT)
Dept: FAMILY MEDICINE CLINIC | Age: 80
End: 2020-10-22

## 2021-02-01 RX ORDER — CLONIDINE HYDROCHLORIDE 0.2 MG/1
0.2 TABLET ORAL 2 TIMES DAILY
Qty: 180 TAB | Refills: 3 | Status: SHIPPED | OUTPATIENT
Start: 2021-02-01 | End: 2021-10-27 | Stop reason: SDUPTHER

## 2021-02-01 RX ORDER — ALLOPURINOL 100 MG/1
100 TABLET ORAL DAILY
Qty: 90 TAB | Refills: 3 | Status: SHIPPED | OUTPATIENT
Start: 2021-02-01 | End: 2022-01-04

## 2021-02-13 RX ORDER — FUROSEMIDE 20 MG/1
20 TABLET ORAL DAILY
Qty: 90 TAB | Refills: 1 | Status: SHIPPED | OUTPATIENT
Start: 2021-02-13 | End: 2021-07-21

## 2021-02-13 RX ORDER — ATORVASTATIN CALCIUM 40 MG/1
40 TABLET, FILM COATED ORAL DAILY
Qty: 90 TAB | Refills: 3 | Status: SHIPPED | OUTPATIENT
Start: 2021-02-13 | End: 2022-01-18

## 2021-02-13 RX ORDER — SPIRONOLACTONE 25 MG/1
25 TABLET ORAL DAILY
Qty: 90 TAB | Refills: 3 | Status: SHIPPED | OUTPATIENT
Start: 2021-02-13 | End: 2022-01-17

## 2021-03-31 RX ORDER — MONTELUKAST SODIUM 5 MG/1
TABLET, CHEWABLE ORAL
Qty: 180 TAB | Refills: 3 | Status: SHIPPED | OUTPATIENT
Start: 2021-03-31 | End: 2022-03-07

## 2021-04-01 ENCOUNTER — DOCUMENTATION ONLY (OUTPATIENT)
Dept: FAMILY MEDICINE CLINIC | Age: 81
End: 2021-04-01

## 2021-04-04 RX ORDER — INSULIN GLARGINE 100 [IU]/ML
INJECTION, SOLUTION SUBCUTANEOUS
Qty: 30 ML | Refills: 3 | Status: SHIPPED | OUTPATIENT
Start: 2021-04-04 | End: 2021-09-13 | Stop reason: ALTCHOICE

## 2021-04-08 ENCOUNTER — TELEPHONE (OUTPATIENT)
Dept: FAMILY MEDICINE CLINIC | Age: 81
End: 2021-04-08

## 2021-04-08 RX ORDER — EXENATIDE 2 MG/.85ML
2 INJECTION, SUSPENSION, EXTENDED RELEASE SUBCUTANEOUS
Qty: 12 EACH | Refills: 3 | Status: SHIPPED | OUTPATIENT
Start: 2021-04-08 | End: 2021-10-22 | Stop reason: SDUPTHER

## 2021-04-08 NOTE — TELEPHONE ENCOUNTER
[facility] states that [facility] told her that Byduren 2mg was changed from 2mg seer & now is Byduren 2mg cise. Would like to have 2mg cise 90 day supply to [facility].

## 2021-04-16 ENCOUNTER — TELEPHONE (OUTPATIENT)
Dept: FAMILY MEDICINE CLINIC | Age: 81
End: 2021-04-16

## 2021-04-16 NOTE — TELEPHONE ENCOUNTER
----- Message from Mary Torrez sent at 4/16/2021  2:49 PM EDT -----  Regarding: FADI Jaeger/Telephone  General Message/Vendor Calls    Caller's first and last name: Pt       Reason for call: She states that  med is needing to have a rx for glucose meter and medical records showing that the pt checks her blood sugar level 4 times a day faxed to them to [phone]       Callback required yes/no and why: yes       Best contact number(s): [phone]       Details to clarify the request:N/A      Mary Torrez

## 2021-04-18 NOTE — TELEPHONE ENCOUNTER
Please let her know this was already done by fax that they sent to us. She needs to see if it was received by them. If not she needs to let us know.  Mendel Santee

## 2021-04-19 NOTE — TELEPHONE ENCOUNTER
----- Message from Karlie Ireland sent at 4/19/2021 11:01 AM EDT -----  Regarding: FADI Jaeger/ Telephone  Contact: [phone]  Patient return call    Caller's first and last name and relationship (if not the patient):pt      Best contact number(s):[phone]      Whose call is being returned:unknown      Details to clarify the request:Missed call, Returning call      Karlie Ireland

## 2021-04-19 NOTE — TELEPHONE ENCOUNTER
Spoke with pt informed this was done.  Encouraged to call and check on status if they have not received to give us a call back

## 2021-04-20 ENCOUNTER — TELEPHONE (OUTPATIENT)
Dept: FAMILY MEDICINE CLINIC | Age: 81
End: 2021-04-20

## 2021-04-20 NOTE — TELEPHONE ENCOUNTER
[facility]- sent over paperwork that needs to be comleted and a request for office notes for patient's glucose supplies . [facility] ILCHX:[phone].  Fax: [phone]

## 2021-04-21 ENCOUNTER — TRANSCRIBE ORDER (OUTPATIENT)
Dept: SCHEDULING | Age: 81
End: 2021-04-21

## 2021-04-21 DIAGNOSIS — Z12.31 SCREENING MAMMOGRAM FOR HIGH-RISK PATIENT: Primary | ICD-10-CM

## 2021-04-22 ENCOUNTER — DOCUMENTATION ONLY (OUTPATIENT)
Dept: FAMILY MEDICINE CLINIC | Age: 81
End: 2021-04-22

## 2021-04-26 ENCOUNTER — ANCILLARY PROCEDURE (OUTPATIENT)
Dept: CARDIOLOGY CLINIC | Age: 81
End: 2021-04-26
Payer: MEDICARE

## 2021-04-26 ENCOUNTER — OFFICE VISIT (OUTPATIENT)
Dept: CARDIOLOGY CLINIC | Age: 81
End: 2021-04-26
Payer: MEDICARE

## 2021-04-26 VITALS
WEIGHT: 227 LBS | HEIGHT: 63 IN | DIASTOLIC BLOOD PRESSURE: 62 MMHG | SYSTOLIC BLOOD PRESSURE: 118 MMHG | BODY MASS INDEX: 40.22 KG/M2

## 2021-04-26 VITALS
WEIGHT: 227 LBS | SYSTOLIC BLOOD PRESSURE: 118 MMHG | OXYGEN SATURATION: 98 % | HEART RATE: 70 BPM | BODY MASS INDEX: 40.22 KG/M2 | DIASTOLIC BLOOD PRESSURE: 62 MMHG | HEIGHT: 63 IN

## 2021-04-26 DIAGNOSIS — I25.10 CORONARY ARTERY DISEASE INVOLVING NATIVE HEART WITHOUT ANGINA PECTORIS, UNSPECIFIED VESSEL OR LESION TYPE: ICD-10-CM

## 2021-04-26 DIAGNOSIS — I10 ESSENTIAL HYPERTENSION: ICD-10-CM

## 2021-04-26 DIAGNOSIS — I25.10 CORONARY ARTERY DISEASE INVOLVING NATIVE HEART WITHOUT ANGINA PECTORIS, UNSPECIFIED VESSEL OR LESION TYPE: Primary | ICD-10-CM

## 2021-04-26 DIAGNOSIS — E78.5 DYSLIPIDEMIA: ICD-10-CM

## 2021-04-26 DIAGNOSIS — I10 HYPERTENSION, UNSPECIFIED TYPE: ICD-10-CM

## 2021-04-26 LAB
ECHO AO ROOT DIAM: 3.3 CM
ECHO AV AREA PEAK VELOCITY: 4.13 CM2
ECHO AV AREA VTI: 3.99 CM2
ECHO AV AREA/BSA PEAK VELOCITY: 2 CM2/M2
ECHO AV AREA/BSA VTI: 2 CM2/M2
ECHO AV MEAN GRADIENT: 1.95 MMHG
ECHO AV PEAK GRADIENT: 3.04 MMHG
ECHO AV PEAK VELOCITY: 87.24 CM/S
ECHO AV VTI: 21.34 CM
ECHO LA AREA 4C: 26.36 CM2
ECHO LA MAJOR AXIS: 4.38 CM
ECHO LA MINOR AXIS: 2.15 CM
ECHO LA VOL 4C: 84.81 ML (ref 22–52)
ECHO LA VOLUME INDEX A4C: 41.56 ML/M2 (ref 16–28)
ECHO LV E' LATERAL VELOCITY: 5.92 CM/S
ECHO LV E' SEPTAL VELOCITY: 5.01 CM/S
ECHO LV INTERNAL DIMENSION DIASTOLIC: 4.42 CM (ref 3.9–5.3)
ECHO LV INTERNAL DIMENSION SYSTOLIC: 2.99 CM
ECHO LV IVSD: 1.06 CM (ref 0.6–0.9)
ECHO LV MASS 2D: 170.1 G (ref 67–162)
ECHO LV MASS INDEX 2D: 83.4 G/M2 (ref 43–95)
ECHO LV POSTERIOR WALL DIASTOLIC: 1.14 CM (ref 0.6–0.9)
ECHO LVOT DIAM: 2.15 CM
ECHO LVOT PEAK GRADIENT: 3.96 MMHG
ECHO LVOT PEAK VELOCITY: 99.48 CM/S
ECHO LVOT SV: 85 ML
ECHO LVOT VTI: 23.48 CM
ECHO MV A VELOCITY: 51.14 CM/S
ECHO MV AREA PHT: 3.1 CM2
ECHO MV AREA VTI: 2.46 CM2
ECHO MV E DECELERATION TIME (DT): 244.7 MS
ECHO MV E VELOCITY: 116.96 CM/S
ECHO MV E/A RATIO: 2.29
ECHO MV E/E' LATERAL: 19.76
ECHO MV E/E' RATIO (AVERAGED): 21.55
ECHO MV E/E' SEPTAL: 23.35
ECHO MV MAX VELOCITY: 154.18 CM/S
ECHO MV MEAN GRADIENT: 2.48 MMHG
ECHO MV PEAK GRADIENT: 9.51 MMHG
ECHO MV PRESSURE HALF TIME (PHT): 70.96 MS
ECHO MV VTI: 34.56 CM
ECHO RV TAPSE: 1.88 CM (ref 1.5–2)

## 2021-04-26 PROCEDURE — 93306 TTE W/DOPPLER COMPLETE: CPT | Performed by: SPECIALIST

## 2021-04-26 PROCEDURE — G8417 CALC BMI ABV UP PARAM F/U: HCPCS | Performed by: SPECIALIST

## 2021-04-26 PROCEDURE — G8400 PT W/DXA NO RESULTS DOC: HCPCS | Performed by: SPECIALIST

## 2021-04-26 PROCEDURE — 99214 OFFICE O/P EST MOD 30 MIN: CPT | Performed by: SPECIALIST

## 2021-04-26 PROCEDURE — G8754 DIAS BP LESS 90: HCPCS | Performed by: SPECIALIST

## 2021-04-26 PROCEDURE — G8427 DOCREV CUR MEDS BY ELIG CLIN: HCPCS | Performed by: SPECIALIST

## 2021-04-26 PROCEDURE — G8752 SYS BP LESS 140: HCPCS | Performed by: SPECIALIST

## 2021-04-26 PROCEDURE — G8536 NO DOC ELDER MAL SCRN: HCPCS | Performed by: SPECIALIST

## 2021-04-26 PROCEDURE — G8432 DEP SCR NOT DOC, RNG: HCPCS | Performed by: SPECIALIST

## 2021-04-26 PROCEDURE — G0463 HOSPITAL OUTPT CLINIC VISIT: HCPCS | Performed by: SPECIALIST

## 2021-04-26 PROCEDURE — 1101F PT FALLS ASSESS-DOCD LE1/YR: CPT | Performed by: SPECIALIST

## 2021-04-26 PROCEDURE — 1090F PRES/ABSN URINE INCON ASSESS: CPT | Performed by: SPECIALIST

## 2021-04-26 NOTE — PROGRESS NOTES
Cristela Darden MD. [facility]              Patient: Tiffany Zuñiga  : 1940      Today's Date: 2021            HISTORY OF PRESENT ILLNESS:     History of Present Illness:  Says she feels well. No CP. Breathing OK. Some SOB every now and then due to asthma. Walks with a cane. No dizziness. PAST MEDICAL HISTORY:     Past Medical History:   Diagnosis Date    CAD (coronary artery disease)     CKD (chronic kidney disease)     [facility]     Dyslipidemia     Heart attack ([facility]     Had cath and treated medically     HTN (hypertension)     Monoclonal gammopathies     Obesity     TARIQ on CPAP     Tachycardia     placed on Sotalol at [place]        Past Surgical History:   Procedure Laterality Date    [place] BREAST SURGERY PROCEDURE UNLISTED               MEDICATIONS:     Current Outpatient Medications   Medication Sig Dispense Refill    OTHER CURTURELL FIBER PILLS      exenatide microspheres (Bydureon BCise) 2 mg/0.85 mL atIn 2 mg by SubCUTAneous route every seven (7) days. 12 Each 3    Lantus Solostar U-100 Insulin 100 unit/mL (3 mL) inpn INJECT 28 UNITS UNDER THE SKIN NIGHTLY 30 mL 3    montelukast (SINGULAIR) 5 mg chewable tablet CHEW 2 TABLETS NIGHTLY 180 Tab 3    furosemide (Lasix) 20 mg tablet Take 1 Tab by mouth daily. 90 Tab 1    spironolactone (ALDACTONE) 25 mg tablet Take 1 Tab by mouth daily. 90 Tab 3    atorvastatin (LIPITOR) 40 mg tablet Take 1 Tab by mouth daily. 90 Tab 3    allopurinoL (ZYLOPRIM) 100 mg tablet Take 1 Tab by mouth daily. 90 Tab 3    cloNIDine HCL (CATAPRES) 0.2 mg tablet Take 1 Tab by mouth two (2) times a day. 180 Tab 3    lisinopriL (PRINIVIL, ZESTRIL) 5 mg tablet Take 1 Tab by mouth daily. 90 Tab 3    fluticasone propion-salmeteroL (Advair Diskus) 250-50 mcg/dose diskus inhaler Take 1 Puff by inhalation every twelve (12) hours. 3 Inhaler 3    sotalol HCl (SOTALOL PO) Take 80 mg by mouth.  2.5 twice a day      aspirin 81 mg chewable tablet Take 81 mg by mouth daily.  albuterol (ACCUNEB) 0.63 mg/3 mL nebulizer solution 0.63 mg by Nebulization route every six (6) hours as needed for Wheezing.  NOVOLOG U-100 INSULIN ASPART SC 22 Units by SubCUTAneous route.  B.infantis-B.ani-B.long-B.bifi (Probiotic 4X) 10-15 mg TbEC Take  by mouth. Allergies   Allergen Reactions    Codeine Unknown (comments)    Motrin [Ibuprofen] Hives             SOCIAL HISTORY:     Social History     Tobacco Use    Smoking status: Former Smoker     Quit date: 1977     Years since quittin.3    Smokeless tobacco: Never Used   Substance Use Topics    Alcohol use: Not Currently     Frequency: Never    Drug use: Never         FAMILY HISTORY:     Family History   Problem Relation Age of Onset    Heart Attack Sister     Heart Attack Mother             REVIEW OF SYMPTOMS:      Review of Symptoms:  Constitutional: Negative for fever, chills  HEENT: Negative for nosebleeds, tinnitus, and vision changes. Respiratory: Negative for cough, wheezing  Cardiovascular: Negative for orthopnea, claudication, syncope, and PND. Gastrointestinal: Negative for abdominal pain, diarrhea, melena. Genitourinary: Negative for dysuria  Musculoskeletal: Negative for myalgias. Skin: Negative for rash  Heme: No problems bleeding. Neurological: Negative for speech change and focal weakness.                  PHYSICAL EXAM:      Physical Exam:  Visit Vitals  /62 (BP 1 Location: Left upper arm, BP Patient Position: Sitting, BP Cuff Size: Adult)   Pulse 70   Ht 5' 3\" (1.6 m)   Wt 227 lb (103 kg)   SpO2 98%   BMI 40.21 kg/m²       Patient appears generally well, mood and affect are appropriate and pleasant. HEENT:  Hearing intact, non-icteric, normocephalic, atraumatic. Neck Exam: Supple, No JVD or carotid bruits. Lung Exam: Clear to auscultation, even breath sounds.    Cardiac Exam: Regular rate and rhythm with no murmur or rub  Abdomen: Soft, non-tender, normal bowel sounds. No bruits or masses. Obese  Extremities: Moves all ext well. No lower extremity edema. MSKTL: Overall good ROM ext  Skin: No significant rashes  Vascular: 2+ dorsalis pedis pulses bilaterally. Psych: Appropriate affect  Neuro - Grossly intact              LABS / OTHER STUDIES:      Lab Results   Component Value Date/Time    Sodium 137 10/07/2020 03:21 PM    Potassium 4.8 10/07/2020 03:21 PM    Chloride 102 10/07/2020 03:21 PM    CO2 28 10/07/2020 03:21 PM    Anion gap 7 10/07/2020 03:21 PM    Glucose 132 (H) 10/07/2020 03:21 PM    BUN 25 (H) 10/07/2020 03:21 PM    Creatinine 1.25 (H) 10/07/2020 03:21 PM    BUN/Creatinine ratio 20 10/07/2020 03:21 PM    GFR est AA 50 (L) 10/07/2020 03:21 PM    GFR est non-AA 41 (L) 10/07/2020 03:21 PM    Calcium 9.2 10/07/2020 03:21 PM    Bilirubin, total 0.6 10/07/2020 03:21 PM    Alk.  phosphatase 126 (H) 10/07/2020 03:21 PM    Protein, total 7.2 10/07/2020 03:21 PM    Albumin 3.4 (L) 10/07/2020 03:21 PM    Globulin 3.8 10/07/2020 03:21 PM    A-G Ratio 0.9 (L) 10/07/2020 03:21 PM    ALT (SGPT) 28 10/07/2020 03:21 PM    AST (SGOT) 20 10/07/2020 03:21 PM     Lab Results   Component Value Date/Time    WBC 13.0 (H) 10/07/2020 03:21 PM    HGB 12.6 10/07/2020 03:21 PM    HCT 40.2 10/07/2020 03:21 PM    PLATELET 749 53/53/2322 03:21 PM    .1 (H) 10/07/2020 03:21 PM       Lab Results   Component Value Date/Time    Cholesterol, total 169 08/20/2020 08:22 AM    HDL Cholesterol 45 08/20/2020 08:22 AM    LDL, calculated 93 08/20/2020 08:22 AM    VLDL, calculated 31 08/20/2020 08:22 AM    Triglyceride 155 (H) 08/20/2020 08:22 AM         Labs 4/20 - Cr 1.4, K 4.4,               CARDIAC DIAGNOSTICS:      Cardiac Evaluation Includes:       EKG 6/13/19 - NSR, PRWP   EKG 10/21/20 - NSR, PRWP        Lexiscan Cardiolite 12/16/16 ([place]) - no significant ischemia, LVEF 64%  Event Monitor 1/31-3/1/19 ([place])  - sinus, Normal study -  No Afib   Echo 4/3/19 ([place]) - mod LVH, LVEF 60-65%, mod-severe LAE, mod JENA, RV mod dilated, Mild AS (ANGELIC 1.4 cm2, mean PG 26 mmHg)      Echo 4/26/21 - TDS, LVEF 55-60%, LAE, severe MAC, AV sclerosis             ASSESSMENT AND PLAN:      Assessment and Plan:  1) CAD   - MI in 2014-- distal lesion treated medically per patient   - Doing well without angina   - cont ASA, statin      2) \"Tachycardia\"   - Unsure why Sotalol was started in 2020 in [place] - she says HR would race (palpitations)  so Cardiologist started Sotalol   - Event Monitor 1/31-3/1/19 ([place])  - sinus, Normal study -  No Afib   - she denies recent heart racing problems   - Since she has been stable on Sotalol and feeling better, will continue Sotalol.    3) HTN  - BP OK - continue meds      4) Dyslipidemia  - cont statin   - prior lipids OK      5) CKD     6) See me in 6 months (her preference). Patient expressed understanding of the plan - questions were answered. Moved from [place] to [facility] to be close family (daughter and son). Lives with her  (ex [employer]).  has dementia.             Bianca Valiente MD, [address]     [address]  [address], [address]                 [place], [address] [place] [address]  Ph: [phone]                               Ph [phone]

## 2021-04-26 NOTE — PROGRESS NOTES
Cesar Lion is a 79 y.o. female    Visit Vitals  /62 (BP 1 Location: Left upper arm, BP Patient Position: Sitting, BP Cuff Size: Adult)   Pulse 70   Ht 5' 3\" (1.6 m)   Wt 227 lb (103 kg)   SpO2 98%   BMI 40.21 kg/m²       Chief Complaint   Patient presents with    Coronary Artery Disease    Hypertension       Chest pain NO  SOB NO  Dizziness NO  Swelling NO  Recent hospital visit NO  Refills NO

## 2021-04-28 RX ORDER — INSULIN ASPART 100 [IU]/ML
INJECTION, SOLUTION INTRAVENOUS; SUBCUTANEOUS
Qty: 30 ML | Refills: 3 | Status: SHIPPED | OUTPATIENT
Start: 2021-04-28 | End: 2021-05-17

## 2021-04-29 ENCOUNTER — DOCUMENTATION ONLY (OUTPATIENT)
Dept: FAMILY MEDICINE CLINIC | Age: 81
End: 2021-04-29

## 2021-05-02 RX ORDER — NAPHAZOLINE HYDROCHLORIDE AND POLYETHYLENE GLYCOL 300 .1; 2 MG/ML; MG/ML
SOLUTION/ DROPS OPHTHALMIC
Qty: 200 SYRINGE | Refills: 11 | Status: SHIPPED | OUTPATIENT
Start: 2021-05-02 | End: 2022-06-09 | Stop reason: ALTCHOICE

## 2021-05-05 ENCOUNTER — TELEPHONE (OUTPATIENT)
Dept: FAMILY MEDICINE CLINIC | Age: 81
End: 2021-05-05

## 2021-05-05 NOTE — TELEPHONE ENCOUNTER
Spoke with pt and scheduled an apt for her to come in for blood glucose visit.  Form is on providers book shelf

## 2021-05-13 ENCOUNTER — DOCUMENTATION ONLY (OUTPATIENT)
Dept: FAMILY MEDICINE CLINIC | Age: 81
End: 2021-05-13

## 2021-05-17 ENCOUNTER — DOCUMENTATION ONLY (OUTPATIENT)
Dept: FAMILY MEDICINE CLINIC | Age: 81
End: 2021-05-17

## 2021-05-17 ENCOUNTER — OFFICE VISIT (OUTPATIENT)
Dept: FAMILY MEDICINE CLINIC | Age: 81
End: 2021-05-17
Payer: MEDICARE

## 2021-05-17 VITALS
OXYGEN SATURATION: 97 % | SYSTOLIC BLOOD PRESSURE: 101 MMHG | WEIGHT: 229 LBS | BODY MASS INDEX: 40.57 KG/M2 | TEMPERATURE: 97.9 F | HEIGHT: 63 IN | HEART RATE: 66 BPM | DIASTOLIC BLOOD PRESSURE: 63 MMHG | RESPIRATION RATE: 18 BRPM

## 2021-05-17 DIAGNOSIS — I10 BENIGN ESSENTIAL HTN: ICD-10-CM

## 2021-05-17 DIAGNOSIS — E11.9 CONTROLLED TYPE 2 DIABETES MELLITUS WITHOUT COMPLICATION, WITH LONG-TERM CURRENT USE OF INSULIN (HCC): ICD-10-CM

## 2021-05-17 DIAGNOSIS — E78.00 HYPERCHOLESTEREMIA: ICD-10-CM

## 2021-05-17 DIAGNOSIS — N18.2 CRF (CHRONIC RENAL FAILURE), STAGE 2 (MILD): Primary | ICD-10-CM

## 2021-05-17 DIAGNOSIS — Z79.4 CONTROLLED TYPE 2 DIABETES MELLITUS WITHOUT COMPLICATION, WITH LONG-TERM CURRENT USE OF INSULIN (HCC): ICD-10-CM

## 2021-05-17 PROCEDURE — G8754 DIAS BP LESS 90: HCPCS | Performed by: NURSE PRACTITIONER

## 2021-05-17 PROCEDURE — 99214 OFFICE O/P EST MOD 30 MIN: CPT | Performed by: NURSE PRACTITIONER

## 2021-05-17 PROCEDURE — G0463 HOSPITAL OUTPT CLINIC VISIT: HCPCS | Performed by: NURSE PRACTITIONER

## 2021-05-17 PROCEDURE — G8400 PT W/DXA NO RESULTS DOC: HCPCS | Performed by: NURSE PRACTITIONER

## 2021-05-17 PROCEDURE — 1090F PRES/ABSN URINE INCON ASSESS: CPT | Performed by: NURSE PRACTITIONER

## 2021-05-17 PROCEDURE — 1101F PT FALLS ASSESS-DOCD LE1/YR: CPT | Performed by: NURSE PRACTITIONER

## 2021-05-17 PROCEDURE — G8752 SYS BP LESS 140: HCPCS | Performed by: NURSE PRACTITIONER

## 2021-05-17 PROCEDURE — G8432 DEP SCR NOT DOC, RNG: HCPCS | Performed by: NURSE PRACTITIONER

## 2021-05-17 PROCEDURE — G8417 CALC BMI ABV UP PARAM F/U: HCPCS | Performed by: NURSE PRACTITIONER

## 2021-05-17 PROCEDURE — G8427 DOCREV CUR MEDS BY ELIG CLIN: HCPCS | Performed by: NURSE PRACTITIONER

## 2021-05-17 PROCEDURE — G8536 NO DOC ELDER MAL SCRN: HCPCS | Performed by: NURSE PRACTITIONER

## 2021-05-17 RX ORDER — INSULIN ASPART 100 [IU]/ML
INJECTION, SOLUTION INTRAVENOUS; SUBCUTANEOUS
Qty: 30 ML | Refills: 3 | COMMUNITY
Start: 2021-05-17 | End: 2021-08-23

## 2021-05-17 NOTE — PROGRESS NOTES
HISTORY OF PRESENT ILLNESS  Shaan Khoury is a 79 y.o. female. HPI  Cardiovascular Review:  The patient has hypertension and hyperlipidemia. Diet and Lifestyle: generally follows a low fat low cholesterol diet, generally follows a low sodium diet, sedentary, nonsmoker  Home BP Monitoring: is not measured at home. Pertinent ROS: taking medications as instructed, no medication side effects noted, no TIA's, no chest pain on exertion, no dyspnea on exertion, no swelling of ankles. Diabetes Mellitus:  She has diabetes mellitus, and  hypertension and hyperlipidemia. Diabetic ROS - medication compliance: compliant all of the time, diabetic diet compliance: compliant most of the time, home glucose monitoring: she is requesting a [employer] meter because she is doing 5 shots a day (4 Novolog and 1 Lantus) daily along with Bcise once a week, her avg sugar is 150-200 and has to check her sugar 5 times a day. .   Lab review: orders written for new lab studies as appropriate; see orders. Patient Active Problem List    Diagnosis Date Noted    CAD (coronary artery disease)     CKD (chronic kidney disease)     [facility]     Dyslipidemia     HTN (hypertension)     Obesity, morbid ([facility] 10/07/2020    Heart attack ([facility] 2014     Current Outpatient Medications   Medication Sig Dispense Refill    insulin aspart U-100 (NovoLOG U-100 Insulin aspart) 100 unit/mL injection 22 Units by SubCUTAneous route with each meal and bedtime plus sliding scale if needed 30 mL 3    insulin syringe-needle U-100 (BD Insulin Syringe) 1 mL 29 gauge x 1/2\" syrg Use with insulin daily dx: E11.9 200 Syringe 11    OTHER CURTURELL FIBER PILLS      exenatide microspheres (Bydureon BCise) 2 mg/0.85 mL atIn 2 mg by SubCUTAneous route every seven (7) days.  12 Each 3    Lantus Solostar U-100 Insulin 100 unit/mL (3 mL) inpn INJECT 28 UNITS UNDER THE SKIN NIGHTLY 30 mL 3    montelukast (SINGULAIR) 5 mg chewable tablet CHEW 2 TABLETS NIGHTLY 180 Tab 3    furosemide (Lasix) 20 mg tablet Take 1 Tab by mouth daily. 90 Tab 1    spironolactone (ALDACTONE) 25 mg tablet Take 1 Tab by mouth daily. 90 Tab 3    atorvastatin (LIPITOR) 40 mg tablet Take 1 Tab by mouth daily. 90 Tab 3    allopurinoL (ZYLOPRIM) 100 mg tablet Take 1 Tab by mouth daily. 90 Tab 3    cloNIDine HCL (CATAPRES) 0.2 mg tablet Take 1 Tab by mouth two (2) times a day. 180 Tab 3    lisinopriL (PRINIVIL, ZESTRIL) 5 mg tablet Take 1 Tab by mouth daily. 90 Tab 3    fluticasone propion-salmeteroL (Advair Diskus) 250-50 mcg/dose diskus inhaler Take 1 Puff by inhalation every twelve (12) hours. 3 Inhaler 3    sotalol HCl (SOTALOL PO) Take 80 mg by mouth. 2.5 twice a day      aspirin 81 mg chewable tablet Take 81 mg by mouth daily.  albuterol (ACCUNEB) 0.63 mg/3 mL nebulizer solution 0.63 mg by Nebulization route every six (6) hours as needed for Wheezing.  B.infantis-B.ani-B.long-B.bifi (Probiotic 4X) 10-15 mg TbEC Take  by mouth. Family History   Problem Relation Age of Onset    Heart Attack Sister     Heart Attack Mother      Social History     Tobacco Use    Smoking status: Former Smoker     Quit date: 1977     Years since quittin.4    Smokeless tobacco: Never Used   Substance Use Topics    Alcohol use: Not Currently     Frequency: Never         ROS  A comprehensive review of system was obtained and negative except findings in the HPI    Visit Vitals  /63 (BP 1 Location: Left arm, BP Patient Position: Sitting, BP Cuff Size: Adult)   Pulse 66   Temp 97.9 °F (36.6 °C) (Oral)   Resp 18   Ht 5' 3\" (1.6 m)   Wt 229 lb (103.9 kg)   SpO2 97%   BMI 40.57 kg/m²     Physical Exam  Vitals signs and nursing note reviewed. Constitutional:       Appearance: She is well-developed. Comments:      Neck:      Vascular: No JVD. Cardiovascular:      Rate and Rhythm: Normal rate and regular rhythm. Heart sounds: No murmur. No friction rub. No gallop. Pulmonary:      Effort: Pulmonary effort is normal. No respiratory distress. Breath sounds: Normal breath sounds. No wheezing. Skin:     General: Skin is warm. Neurological:      Mental Status: She is alert and oriented to person, place, and time. ASSESSMENT and PLAN  Encounter Diagnoses   Name Primary?  CRF (chronic renal failure), stage 2 (mild) Yes    Controlled type 2 diabetes mellitus without complication, with long-term current use of insulin (HCC)     Benign essential HTN     Hypercholesteremia      Orders Placed This Encounter    LIPID PANEL    HEMOGLOBIN A1C WITH EAG    MICROALBUMIN, UR, RAND W/ MICROALB/CREAT RATIO    METABOLIC PANEL, COMPREHENSIVE    insulin aspart U-100 (NovoLOG U-100 Insulin aspart) 100 unit/mL injection     Given that she is on 5 shots a day I will complete her Pharmacy request for the [facility] updated as well    Follow-up and Dispositions    · Return for well exam with fasting labs after 8/20/21. I have discussed the diagnosis with the patient and the intended plan as seen in the above orders. The patient has received an after-visit summary and questions were answered concerning future plans. Patient conveyed understanding of the plan at the time of the visit.     [employer], MSN, ANP  5/17/2021

## 2021-05-17 NOTE — PROGRESS NOTES
Chief Complaint   Patient presents with    Labs     discuss blood sugars     Patient presents in office to discuss getting the Ulysses Kunal for her arm so she does not have to stick herself anymore. Patient has no other concerns. 1. Have you been to the ER, urgent care clinic since your last visit? Hospitalized since your last visit? No    2. Have you seen or consulted any other health care providers outside of the [address] [place] [address] since your last visit? Include any pap smears or colon screening.  No

## 2021-05-18 LAB
ALBUMIN SERPL-MCNC: 3.2 G/DL (ref 3.5–5)
ALBUMIN/GLOB SERPL: 0.9 {RATIO} (ref 1.1–2.2)
ALP SERPL-CCNC: 121 U/L (ref 45–117)
ALT SERPL-CCNC: 26 U/L (ref 12–78)
ANION GAP SERPL CALC-SCNC: 4 MMOL/L (ref 5–15)
AST SERPL-CCNC: 14 U/L (ref 15–37)
BILIRUB SERPL-MCNC: 0.7 MG/DL (ref 0.2–1)
BUN SERPL-MCNC: 26 MG/DL (ref 6–20)
BUN/CREAT SERPL: 25 (ref 12–20)
CALCIUM SERPL-MCNC: 9.3 MG/DL (ref 8.5–10.1)
CHLORIDE SERPL-SCNC: 102 MMOL/L (ref 97–108)
CHOLEST SERPL-MCNC: 169 MG/DL
CO2 SERPL-SCNC: 28 MMOL/L (ref 21–32)
CREAT SERPL-MCNC: 1.04 MG/DL (ref 0.55–1.02)
CREAT UR-MCNC: 29.7 MG/DL
EST. AVERAGE GLUCOSE BLD GHB EST-MCNC: 154 MG/DL
GLOBULIN SER CALC-MCNC: 3.6 G/DL (ref 2–4)
GLUCOSE SERPL-MCNC: 172 MG/DL (ref 65–100)
HBA1C MFR BLD: 7 % (ref 4–5.6)
HDLC SERPL-MCNC: 51 MG/DL
HDLC SERPL: 3.3 {RATIO} (ref 0–5)
LDLC SERPL CALC-MCNC: 85.6 MG/DL (ref 0–100)
MICROALBUMIN UR-MCNC: 0.57 MG/DL
MICROALBUMIN/CREAT UR-RTO: 19 MG/G (ref 0–30)
POTASSIUM SERPL-SCNC: 5 MMOL/L (ref 3.5–5.1)
PROT SERPL-MCNC: 6.8 G/DL (ref 6.4–8.2)
SODIUM SERPL-SCNC: 134 MMOL/L (ref 136–145)
TRIGL SERPL-MCNC: 162 MG/DL (ref ?–150)
VLDLC SERPL CALC-MCNC: 32.4 MG/DL

## 2021-05-25 ENCOUNTER — HOSPITAL ENCOUNTER (OUTPATIENT)
Dept: MAMMOGRAPHY | Age: 81
Discharge: HOME | End: 2021-05-25
Attending: NURSE PRACTITIONER
Payer: MEDICARE

## 2021-05-25 DIAGNOSIS — Z12.31 SCREENING MAMMOGRAM FOR HIGH-RISK PATIENT: ICD-10-CM

## 2021-05-25 PROCEDURE — 77067 SCR MAMMO BI INCL CAD: CPT

## 2021-07-21 RX ORDER — FUROSEMIDE 20 MG/1
TABLET ORAL
Qty: 90 TABLET | Refills: 3 | Status: SHIPPED | OUTPATIENT
Start: 2021-07-21 | End: 2022-07-25

## 2021-08-23 RX ORDER — INSULIN ASPART 100 [IU]/ML
22 INJECTION, SOLUTION INTRAVENOUS; SUBCUTANEOUS
Qty: 30 PEN | Refills: 3 | Status: SHIPPED | OUTPATIENT
Start: 2021-08-23

## 2021-09-13 ENCOUNTER — OFFICE VISIT (OUTPATIENT)
Dept: FAMILY MEDICINE CLINIC | Age: 81
End: 2021-09-13
Payer: MEDICARE

## 2021-09-13 VITALS
WEIGHT: 228 LBS | DIASTOLIC BLOOD PRESSURE: 54 MMHG | SYSTOLIC BLOOD PRESSURE: 126 MMHG | HEART RATE: 77 BPM | TEMPERATURE: 98.2 F | BODY MASS INDEX: 40.4 KG/M2 | OXYGEN SATURATION: 96 % | RESPIRATION RATE: 16 BRPM | HEIGHT: 63 IN

## 2021-09-13 DIAGNOSIS — Z79.4 CONTROLLED TYPE 2 DIABETES MELLITUS WITHOUT COMPLICATION, WITH LONG-TERM CURRENT USE OF INSULIN (HCC): ICD-10-CM

## 2021-09-13 DIAGNOSIS — E11.40 CONTROLLED TYPE 2 DIABETES MELLITUS WITH DIABETIC NEUROPATHY, WITH LONG-TERM CURRENT USE OF INSULIN (HCC): ICD-10-CM

## 2021-09-13 DIAGNOSIS — I10 BENIGN ESSENTIAL HTN: ICD-10-CM

## 2021-09-13 DIAGNOSIS — Z79.4 CONTROLLED TYPE 2 DIABETES MELLITUS WITH DIABETIC NEUROPATHY, WITH LONG-TERM CURRENT USE OF INSULIN (HCC): ICD-10-CM

## 2021-09-13 DIAGNOSIS — E11.9 CONTROLLED TYPE 2 DIABETES MELLITUS WITHOUT COMPLICATION, WITH LONG-TERM CURRENT USE OF INSULIN (HCC): ICD-10-CM

## 2021-09-13 DIAGNOSIS — N18.2 CRF (CHRONIC RENAL FAILURE), STAGE 2 (MILD): ICD-10-CM

## 2021-09-13 DIAGNOSIS — Z00.00 WELL ADULT EXAM: Primary | ICD-10-CM

## 2021-09-13 DIAGNOSIS — E66.01 OBESITY, CLASS III, BMI 40-49.9 (MORBID OBESITY) (HCC): ICD-10-CM

## 2021-09-13 DIAGNOSIS — E78.00 HYPERCHOLESTEREMIA: ICD-10-CM

## 2021-09-13 PROCEDURE — G8427 DOCREV CUR MEDS BY ELIG CLIN: HCPCS | Performed by: NURSE PRACTITIONER

## 2021-09-13 PROCEDURE — G8536 NO DOC ELDER MAL SCRN: HCPCS | Performed by: NURSE PRACTITIONER

## 2021-09-13 PROCEDURE — G0439 PPPS, SUBSEQ VISIT: HCPCS | Performed by: NURSE PRACTITIONER

## 2021-09-13 PROCEDURE — 1101F PT FALLS ASSESS-DOCD LE1/YR: CPT | Performed by: NURSE PRACTITIONER

## 2021-09-13 PROCEDURE — G8752 SYS BP LESS 140: HCPCS | Performed by: NURSE PRACTITIONER

## 2021-09-13 PROCEDURE — G8400 PT W/DXA NO RESULTS DOC: HCPCS | Performed by: NURSE PRACTITIONER

## 2021-09-13 PROCEDURE — G8754 DIAS BP LESS 90: HCPCS | Performed by: NURSE PRACTITIONER

## 2021-09-13 PROCEDURE — G8432 DEP SCR NOT DOC, RNG: HCPCS | Performed by: NURSE PRACTITIONER

## 2021-09-13 PROCEDURE — 3051F HG A1C>EQUAL 7.0%<8.0%: CPT | Performed by: NURSE PRACTITIONER

## 2021-09-13 PROCEDURE — 99214 OFFICE O/P EST MOD 30 MIN: CPT | Performed by: NURSE PRACTITIONER

## 2021-09-13 PROCEDURE — G8417 CALC BMI ABV UP PARAM F/U: HCPCS | Performed by: NURSE PRACTITIONER

## 2021-09-13 PROCEDURE — G0463 HOSPITAL OUTPT CLINIC VISIT: HCPCS | Performed by: NURSE PRACTITIONER

## 2021-09-13 PROCEDURE — 1090F PRES/ABSN URINE INCON ASSESS: CPT | Performed by: NURSE PRACTITIONER

## 2021-09-13 NOTE — PROGRESS NOTES
Chief Complaint   Patient presents with    Follow-up    Labs     Pt being seen for fuv  -labs  -blood sugar    1. Have you been to the ER, urgent care clinic since your last visit? Hospitalized since your last visit? No    2. Have you seen or consulted any other health care providers outside of the [address] [place] [address] since your last visit? Include any pap smears or colon screening.  No     Pt has no other concerns

## 2021-09-13 NOTE — PROGRESS NOTES
This is the Subsequent Medicare Annual Wellness Exam, performed 12 months or more after the Initial AWV or the last Subsequent AWV    I have reviewed the patient's medical history in detail and updated the computerized patient record. She is here today for follow up fasting labs  On Insulin as directed QID and once weekly Bcise  Using Adams meter often, avg 134 over the last 2 weeks, highest reading 150 range  No low blood sugar events noted  Also needs to have labs updated for htn and hypercholesterol    Assessment/Plan   Education and counseling provided:  Are appropriate based on today's review and evaluation    1. Well adult exam  -     LIPID PANEL; Future  -     CBC WITH AUTOMATED DIFF; Future  -     METABOLIC PANEL, COMPREHENSIVE; Future  2. CRF (chronic renal failure), stage 2 (mild) - stable   Lab updated today  -     METABOLIC PANEL, COMPREHENSIVE; Future  3. Controlled type 2 diabetes mellitus without complication, with long-term current use of insulin (HCC) - stable   Reviewed DM log today with her during visit, using Freestyle Twin   Avg sugar 137 over the last 2 weeks  -     HEMOGLOBIN A1C WITH EAG; Future  -     MICROALBUMIN, UR, RAND W/ MICROALB/CREAT RATIO; Future  4. Benign essential HTN -stable   Labs updated  -     CBC WITH AUTOMATED DIFF; Future  -     METABOLIC PANEL, COMPREHENSIVE; Future  5. Hypercholesteremia - stable   Labs updated  -     LIPID PANEL; Future  7.  Obesity, Class III, BMI 40-49.9 (morbid obesity) ([facility])       Depression Risk Factor Screening     3 most recent PHQ Screens 9/13/2021   Little interest or pleasure in doing things Not at all   Feeling down, depressed, irritable, or hopeless Not at all   Total Score PHQ 2 0       Alcohol Risk Screen    Do you average more than 1 drink per night or more than 7 drinks a week:  No    On any one occasion in the past three months have you have had more than 3 drinks containing alcohol:  No        Functional Ability and Level of Safety Hearing: Hearing is good. Activities of Daily Living: The home contains: no safety equipment. Patient does total self care      Ambulation: with no difficulty     Fall Risk:  Fall Risk Assessment, last 12 mths 9/13/2021   Able to walk? Yes   Fall in past 12 months? 0   Do you feel unsteady? 0   Are you worried about falling 0   Number of falls in past 12 months -   Fall with injury?  -      Abuse Screen:  Patient is not abused       Cognitive Screening    Has your family/caregiver stated any concerns about your memory: no     Cognitive Screening: Normal - Mini Cog Test    Health Maintenance Due     Health Maintenance Due   Topic Date Due    Foot Exam Q1  Never done    Eye Exam Retinal or Dilated  Never done    DTaP/Tdap/Td series (1 - Tdap) Never done    Shingrix Vaccine Age 50> (1 of 2) Never done    Bone Densitometry (Dexa) Screening  Never done    Pneumococcal 65+ years (1 of 1 - PPSV23) Never done    Flu Vaccine (1) Never done       Patient Care Team   Patient Care Team:  Eboni Garay NP as PCP - General (Family Medicine)  Eboni Garay NP as PCP [facility] Empaneled Provider  Benjamin Ghotra MD (Hematology and Oncology)    History     Patient Active Problem List   Diagnosis Code    Obesity, morbid ([facility] E66.01    CAD (coronary artery disease) I25.10    CKD (chronic kidney disease) N18.9    Controlled type 2 diabetes mellitus with neurologic complication, with long-term current use of insulin (HCC) E11.49, Z79.4    Dyslipidemia E78.5    Heart attack ([address] I21.9    HTN (hypertension) I10     Past Medical History:   Diagnosis Date    CAD (coronary artery disease)     CKD (chronic kidney disease)     Diabetes ([address]     Dyslipidemia     Heart attack ([address] 2014    Had cath and treated medically     HTN (hypertension)     Monoclonal gammopathies     Obesity     TARIQ on CPAP     Tachycardia     placed on Sotalol at [place]       Past Surgical History:   Procedure Laterality Date  [place] BREAST SURGERY PROCEDURE UNLISTED       Current Outpatient Medications   Medication Sig Dispense Refill    insulin aspart U-100 (NOVOLOG) 100 unit/mL (3 mL) inpn 22 Units by SubCUTAneous route Before breakfast, lunch, dinner and at bedtime. 30 Pen 3    furosemide (LASIX) 20 mg tablet TAKE 1 TABLET DAILY 90 Tablet 3    insulin syringe-needle U-100 (BD Insulin Syringe) 1 mL 29 gauge x 1/2\" syrg Use with insulin daily dx: E11.9 200 Syringe 11    OTHER CURTURELL FIBER PILLS      exenatide microspheres (Bydureon BCise) 2 mg/0.85 mL atIn 2 mg by SubCUTAneous route every seven (7) days. 12 Each 3    montelukast (SINGULAIR) 5 mg chewable tablet CHEW 2 TABLETS NIGHTLY 180 Tab 3    spironolactone (ALDACTONE) 25 mg tablet Take 1 Tab by mouth daily. 90 Tab 3    atorvastatin (LIPITOR) 40 mg tablet Take 1 Tab by mouth daily. 90 Tab 3    allopurinoL (ZYLOPRIM) 100 mg tablet Take 1 Tab by mouth daily. 90 Tab 3    cloNIDine HCL (CATAPRES) 0.2 mg tablet Take 1 Tab by mouth two (2) times a day. 180 Tab 3    lisinopriL (PRINIVIL, ZESTRIL) 5 mg tablet Take 1 Tab by mouth daily. 90 Tab 3    fluticasone propion-salmeteroL (Advair Diskus) 250-50 mcg/dose diskus inhaler Take 1 Puff by inhalation every twelve (12) hours. 3 Inhaler 3    sotalol HCl (SOTALOL PO) Take 80 mg by mouth. 2.5 twice a day      aspirin 81 mg chewable tablet Take 81 mg by mouth daily.  albuterol (ACCUNEB) 0.63 mg/3 mL nebulizer solution 0.63 mg by Nebulization route every six (6) hours as needed for Wheezing.  B.infantis-B.ani-B.long-B.bifi (Probiotic 4X) 10-15 mg TbEC Take  by mouth.        Allergies   Allergen Reactions    Codeine Unknown (comments)    Motrin [Ibuprofen] Hives       Family History   Problem Relation Age of Onset    Heart Attack Sister     Heart Attack Mother      Social History     Tobacco Use    Smoking status: Former Smoker     Quit date: 1977     Years since quittin.7    Smokeless tobacco: Never Used Substance Use Topics    Alcohol use: Not Currently         Kayden Bourne NP

## 2021-09-13 NOTE — PATIENT INSTRUCTIONS
Medicare Wellness Visit, Female     The best way to live healthy is to have a lifestyle where you eat a well-balanced diet, exercise regularly, limit alcohol use, and quit all forms of tobacco/nicotine, if applicable. Regular preventive services are another way to keep healthy. Preventive services (vaccines, screening tests, monitoring & exams) can help personalize your care plan, which helps you manage your own care. Screening tests can find health problems at the earliest stages, when they are easiest to treat. [facility] follows the current, evidence-based guidelines published by the [facility]aker ([facility]) when recommending preventive services for our patients. Because we follow these guidelines, sometimes recommendations change over time as research supports it. (For example, mammograms used to be recommended annually. Even though Medicare will still pay for an annual mammogram, the newer guidelines recommend a mammogram every two years for women of average risk). Of course, you and your doctor may decide to screen more often for some diseases, based on your risk and your co-morbidities (chronic disease you are already diagnosed with). Preventive services for you include:  - Medicare offers their members a free annual wellness visit, which is time for you and your primary care provider to discuss and plan for your preventive service needs. Take advantage of this benefit every year!  -All adults over the age of 72 should receive the recommended pneumonia vaccines. Current USPSTF guidelines recommend a series of two vaccines for the best pneumonia protection.   -All adults should have a flu vaccine yearly and a tetanus vaccine every 10 years.   -All adults age 48 and older should receive the shingles vaccines (series of two vaccines).       -All adults age 38-68 who are overweight should have a diabetes screening test once every three years.   -All adults born between [birth year] and 1965 should be screened once for Hepatitis C.  -Other screening tests and preventive services for persons with diabetes include: an eye exam to screen for diabetic retinopathy, a kidney function test, a foot exam, and stricter control over your cholesterol.   -Cardiovascular screening for adults with routine risk involves an electrocardiogram (ECG) at intervals determined by your doctor.   -Colorectal cancer screenings should be done for adults age 54-65 with no increased risk factors for colorectal cancer. There are a number of acceptable methods of screening for this type of cancer. Each test has its own benefits and drawbacks. Discuss with your doctor what is most appropriate for you during your annual wellness visit. The different tests include: colonoscopy (considered the best screening method), a fecal occult blood test, a fecal DNA test, and sigmoidoscopy.    -A bone mass density test is recommended when a woman turns 65 to screen for osteoporosis. This test is only recommended one time, as a screening. Some providers will use this same test as a disease monitoring tool if you already have osteoporosis. -Breast cancer screenings are recommended every other year for women of normal risk, age 54-69.  -Cervical cancer screenings for women over age 72 are only recommended with certain risk factors.      Here is a list of your current Health Maintenance items (your personalized list of preventive services) with a due date:  Health Maintenance Due   Topic Date Due    Diabetic Foot Care  Never done    Eye Exam  Never done    DTaP/Tdap/Td  (1 - Tdap) Never done    Shingles Vaccine (1 of 2) Never done    Bone Mineral Density   Never done    Pneumococcal Vaccine (1 of 1 - PPSV23) Never done    Yearly Flu Vaccine (1) Never done

## 2021-09-14 LAB
ALBUMIN SERPL-MCNC: 3.9 G/DL (ref 3.7–4.7)
ALBUMIN/CREAT UR: <6 MG/G CREAT (ref 0–29)
ALBUMIN/GLOB SERPL: 1.4 {RATIO} (ref 1.2–2.2)
ALP SERPL-CCNC: 112 IU/L (ref 44–121)
ALT SERPL-CCNC: 18 IU/L (ref 0–32)
AST SERPL-CCNC: 15 IU/L (ref 0–40)
BASOPHILS # BLD AUTO: 0.1 X10E3/UL (ref 0–0.2)
BASOPHILS NFR BLD AUTO: 1 %
BILIRUB SERPL-MCNC: 0.4 MG/DL (ref 0–1.2)
BUN SERPL-MCNC: 30 MG/DL (ref 8–27)
BUN/CREAT SERPL: 28 (ref 12–28)
CALCIUM SERPL-MCNC: 9.9 MG/DL (ref 8.7–10.3)
CHLORIDE SERPL-SCNC: 100 MMOL/L (ref 96–106)
CHOLEST SERPL-MCNC: 183 MG/DL (ref 100–199)
CO2 SERPL-SCNC: 25 MMOL/L (ref 20–29)
CREAT SERPL-MCNC: 1.09 MG/DL (ref 0.57–1)
CREAT UR-MCNC: 48.3 MG/DL
EOSINOPHIL # BLD AUTO: 0.2 X10E3/UL (ref 0–0.4)
EOSINOPHIL NFR BLD AUTO: 2 %
ERYTHROCYTE [DISTWIDTH] IN BLOOD BY AUTOMATED COUNT: 12.8 % (ref 11.7–15.4)
EST. AVERAGE GLUCOSE BLD GHB EST-MCNC: 148 MG/DL
GLOBULIN SER CALC-MCNC: 2.8 G/DL (ref 1.5–4.5)
GLUCOSE SERPL-MCNC: 99 MG/DL (ref 65–99)
HBA1C MFR BLD: 6.8 % (ref 4.8–5.6)
HCT VFR BLD AUTO: 36.2 % (ref 34–46.6)
HDLC SERPL-MCNC: 42 MG/DL
HGB BLD-MCNC: 12.2 G/DL (ref 11.1–15.9)
IMM GRANULOCYTES # BLD AUTO: 0 X10E3/UL (ref 0–0.1)
IMM GRANULOCYTES NFR BLD AUTO: 0 %
IMP & REVIEW OF LAB RESULTS: NORMAL
INTERPRETATION: NORMAL
LDLC SERPL CALC-MCNC: 93 MG/DL (ref 0–99)
LYMPHOCYTES # BLD AUTO: 2.2 X10E3/UL (ref 0.7–3.1)
LYMPHOCYTES NFR BLD AUTO: 22 %
MCH RBC QN AUTO: 32.5 PG (ref 26.6–33)
MCHC RBC AUTO-ENTMCNC: 33.7 G/DL (ref 31.5–35.7)
MCV RBC AUTO: 97 FL (ref 79–97)
MICROALBUMIN UR-MCNC: <3 UG/ML
MONOCYTES # BLD AUTO: 0.8 X10E3/UL (ref 0.1–0.9)
MONOCYTES NFR BLD AUTO: 8 %
NEUTROPHILS # BLD AUTO: 6.6 X10E3/UL (ref 1.4–7)
NEUTROPHILS NFR BLD AUTO: 67 %
PLATELET # BLD AUTO: 267 X10E3/UL (ref 150–450)
POTASSIUM SERPL-SCNC: 4.5 MMOL/L (ref 3.5–5.2)
PROT SERPL-MCNC: 6.7 G/DL (ref 6–8.5)
RBC # BLD AUTO: 3.75 X10E6/UL (ref 3.77–5.28)
SODIUM SERPL-SCNC: 137 MMOL/L (ref 134–144)
TRIGL SERPL-MCNC: 289 MG/DL (ref 0–149)
VLDLC SERPL CALC-MCNC: 48 MG/DL (ref 5–40)
WBC # BLD AUTO: 9.8 X10E3/UL (ref 3.4–10.8)

## 2021-09-15 NOTE — PROGRESS NOTES
Your labs look fantastic!  Don't change any of your meds and recheck in 3 months fasting. Coral Cabrera

## 2021-09-29 ENCOUNTER — TELEPHONE (OUTPATIENT)
Dept: ONCOLOGY | Age: 81
End: 2021-09-29

## 2021-09-29 NOTE — TELEPHONE ENCOUNTER
[facility]  [phone]    09/29/21- Phone call placed to pt to remind pt to have labs drawn prior to her follow up appointment with . Pt verbalized understanding.

## 2021-09-30 LAB
ALBUMIN 24H MFR UR ELPH: 19.4 %
ALBUMIN SERPL ELPH-MCNC: 3.3 G/DL (ref 2.9–4.4)
ALBUMIN SERPL-MCNC: 4 G/DL (ref 3.7–4.7)
ALBUMIN/GLOB SERPL: 1 {RATIO} (ref 0.7–1.7)
ALPHA1 GLOB 24H MFR UR ELPH: 5 %
ALPHA1 GLOB SERPL ELPH-MCNC: 0.3 G/DL (ref 0–0.4)
ALPHA2 GLOB 24H MFR UR ELPH: 7.3 %
ALPHA2 GLOB SERPL ELPH-MCNC: 1 G/DL (ref 0.4–1)
B-GLOBULIN MFR UR ELPH: 27.3 %
B-GLOBULIN SERPL ELPH-MCNC: 0.9 G/DL (ref 0.7–1.3)
BASOPHILS # BLD AUTO: 0 X10E3/UL (ref 0–0.2)
BASOPHILS NFR BLD AUTO: 0 %
BUN SERPL-MCNC: 34 MG/DL (ref 8–27)
BUN/CREAT SERPL: 29 (ref 12–28)
CALCIUM SERPL-MCNC: 9.3 MG/DL (ref 8.7–10.3)
CHLORIDE SERPL-SCNC: 103 MMOL/L (ref 96–106)
CO2 SERPL-SCNC: 24 MMOL/L (ref 20–29)
CREAT SERPL-MCNC: 1.17 MG/DL (ref 0.57–1)
EOSINOPHIL # BLD AUTO: 0.2 X10E3/UL (ref 0–0.4)
EOSINOPHIL NFR BLD AUTO: 2 %
ERYTHROCYTE [DISTWIDTH] IN BLOOD BY AUTOMATED COUNT: 12.7 % (ref 11.7–15.4)
GAMMA GLOB 24H MFR UR ELPH: 40.9 %
GAMMA GLOB SERPL ELPH-MCNC: 1.2 G/DL (ref 0.4–1.8)
GLOBULIN SER-MCNC: 3.4 G/DL (ref 2.2–3.9)
GLUCOSE SERPL-MCNC: 103 MG/DL (ref 65–99)
HCT VFR BLD AUTO: 34.6 % (ref 34–46.6)
HGB BLD-MCNC: 12.8 G/DL (ref 11.1–15.9)
IGA SERPL-MCNC: 126 MG/DL (ref 64–422)
IGG SERPL-MCNC: 1171 MG/DL (ref 586–1602)
IGM SERPL-MCNC: 57 MG/DL (ref 26–217)
IMM GRANULOCYTES # BLD AUTO: 0.1 X10E3/UL (ref 0–0.1)
IMM GRANULOCYTES NFR BLD AUTO: 1 %
INTERPRETATION SERPL IEP-IMP: ABNORMAL
INTERPRETATION UR IFE-IMP: ABNORMAL
KAPPA LC FREE SER-MCNC: 28.8 MG/L (ref 3.3–19.4)
KAPPA LC FREE/LAMBDA FREE SER: 1.73 {RATIO} (ref 0.26–1.65)
LAMBDA LC FREE SERPL-MCNC: 16.6 MG/L (ref 5.7–26.3)
LYMPHOCYTES # BLD AUTO: 2.5 X10E3/UL (ref 0.7–3.1)
LYMPHOCYTES NFR BLD AUTO: 23 %
M PROTEIN 24H MFR UR ELPH: 9.9 %
M PROTEIN SERPL ELPH-MCNC: 0.9 G/DL
MCH RBC QN AUTO: 35.8 PG (ref 26.6–33)
MCHC RBC AUTO-ENTMCNC: 37 G/DL (ref 31.5–35.7)
MCV RBC AUTO: 97 FL (ref 79–97)
MONOCYTES # BLD AUTO: 0.9 X10E3/UL (ref 0.1–0.9)
MONOCYTES NFR BLD AUTO: 9 %
MORPHOLOGY BLD-IMP: ABNORMAL
NEUTROPHILS # BLD AUTO: 7 X10E3/UL (ref 1.4–7)
NEUTROPHILS NFR BLD AUTO: 65 %
NOTE, 149533: ABNORMAL
PHOSPHATE SERPL-MCNC: 4.1 MG/DL (ref 3–4.3)
PLATELET # BLD AUTO: 267 X10E3/UL (ref 150–450)
PLEASE NOTE:, 149534: ABNORMAL
POTASSIUM SERPL-SCNC: 4.9 MMOL/L (ref 3.5–5.2)
PROT SERPL-MCNC: 6.7 G/DL (ref 6–8.5)
PROT UR-MCNC: 9.9 MG/DL
RBC # BLD AUTO: 3.58 X10E6/UL (ref 3.77–5.28)
SODIUM SERPL-SCNC: 139 MMOL/L (ref 134–144)
WBC # BLD AUTO: 10.7 X10E3/UL (ref 3.4–10.8)

## 2021-09-30 NOTE — PROGRESS NOTES
[facility]  [address] [facility], [address] [place]: [phone]  F: [phone]      Reason for Visit:   Ewelina Silver is a [de-identified] y.o. female who is seen for follow up of MGUS. History of Present Illness:   She reports doing well. No new issues since her last visit. No recent infections. No bone pain. Energy level fair. She is unaccompanied today. Review of systems was obtained and pertinent findings reviewed above. Past medical history, social history, family history, medications, and allergies are located in the electronic medical record. Physical Exam:     Visit Vitals  BP (!) 133/56 (BP 1 Location: Right arm, BP Patient Position: Sitting, BP Cuff Size: Large adult) Comment: . Pulse 64   Temp (!) 96 °F (35.6 °C) (Temporal)   Resp 20   SpO2 99%     General: no distress  Respiratory: normal respiratory effort  CV: no peripheral edema  Skin: no rashes; no ecchymoses; no petechiae      Results:     Lab Results   Component Value Date/Time    WBC 10.7 09/27/2021 04:37 PM    HGB 12.8 09/27/2021 04:37 PM    HCT 34.6 09/27/2021 04:37 PM    PLATELET 948 40/20/2416 04:37 PM    MCV 97 09/27/2021 04:37 PM    ABS. NEUTROPHILS 7.0 09/27/2021 04:37 PM     Lab Results   Component Value Date/Time    Sodium 139 09/27/2021 04:37 PM    Potassium 4.9 09/27/2021 04:37 PM    Chloride 103 09/27/2021 04:37 PM    CO2 24 09/27/2021 04:37 PM    Glucose 103 (H) 09/27/2021 04:37 PM    BUN 34 (H) 09/27/2021 04:37 PM    Creatinine 1.17 (H) 09/27/2021 04:37 PM    GFR est AA 51 (L) 09/27/2021 04:37 PM    GFR est non-AA 44 (L) 09/27/2021 04:37 PM    Calcium 9.3 09/27/2021 04:37 PM     Lab Results   Component Value Date/Time    Bilirubin, total 0.4 09/13/2021 12:00 AM    ALT (SGPT) 18 09/13/2021 12:00 AM    Alk.  phosphatase 112 09/13/2021 12:00 AM    Protein, total 6.7 09/27/2021 04:37 PM    Albumin 4.0 09/27/2021 04:37 PM    Globulin 3.6 05/17/2021 10:02 AM M-SPIKE  Recent Labs     21  1637   PE6T 0.9*       Free Kappa Light Chains  Recent Labs     21  1637   KLFL1L 28.8*       Free Lambda Light Chains  Recent Labs     21  1637   KLFL2L 16.6       Light Chain Ratio  Recent Labs     21  1637   KLFL3L 1.73*       UPEP M-spike  Recent Labs     21  1637   IEU8L 9.9*         Bone marrow biopsy 2011: Normocellular marrow with 6-9% kappa monotypic plasmacytosis. 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 Cytogenetics normal.    2018  Creatinine: 1.0  Calcium: 9.6  WBC: 9.6  HGB: 12.1  PLT: 275  MCV: 100.8  SPEP: M-spike 0.7  Ig  ELIU: IgG monoclonal protein with kappa light chain specificity    2019  SPEP: M-spike 0.7    2019  SPEP: M-spike 0.7  SFLC ratio: 1.52    2020  SPEP: 0.7        Assessment:   1) MGUS, IgG  S/p bone marrow biopsy 2011 which noted 6-9% plasma cells, not meeting criteria for multiple myeloma. She is at risk for developing myeloma, so we are following with surveillance. Her M-spike has been slowly creeping up, now 0.9 from 0.8 last year and 0.7 the year prior. No anemia or hypercalcemia. Creatinine fairly stable. I favor continued monitoring for now, with consideration of repeat bone marrow biopsy is gammopathy labs continue to rise over time. 2) CKD  Secondary to DM. Creatinine stable. Monitor.     3) DM  PCP managing      Plan:     · Labs in 1 year: CBC, renal function panel, SPEP, SFLC, UPEP  · Return to see me in 1 year      Signed By: Himnashu Howard MD

## 2021-10-07 ENCOUNTER — OFFICE VISIT (OUTPATIENT)
Dept: ONCOLOGY | Age: 81
End: 2021-10-07
Payer: MEDICARE

## 2021-10-07 VITALS
TEMPERATURE: 96 F | RESPIRATION RATE: 20 BRPM | DIASTOLIC BLOOD PRESSURE: 56 MMHG | HEART RATE: 64 BPM | OXYGEN SATURATION: 99 % | SYSTOLIC BLOOD PRESSURE: 133 MMHG

## 2021-10-07 DIAGNOSIS — D47.2 MGUS (MONOCLONAL GAMMOPATHY OF UNKNOWN SIGNIFICANCE): Primary | ICD-10-CM

## 2021-10-07 PROCEDURE — 99214 OFFICE O/P EST MOD 30 MIN: CPT | Performed by: INTERNAL MEDICINE

## 2021-10-07 PROCEDURE — 1101F PT FALLS ASSESS-DOCD LE1/YR: CPT | Performed by: INTERNAL MEDICINE

## 2021-10-07 PROCEDURE — G8400 PT W/DXA NO RESULTS DOC: HCPCS | Performed by: INTERNAL MEDICINE

## 2021-10-07 PROCEDURE — G0463 HOSPITAL OUTPT CLINIC VISIT: HCPCS | Performed by: INTERNAL MEDICINE

## 2021-10-07 PROCEDURE — G8432 DEP SCR NOT DOC, RNG: HCPCS | Performed by: INTERNAL MEDICINE

## 2021-10-07 PROCEDURE — 1090F PRES/ABSN URINE INCON ASSESS: CPT | Performed by: INTERNAL MEDICINE

## 2021-10-07 PROCEDURE — G8536 NO DOC ELDER MAL SCRN: HCPCS | Performed by: INTERNAL MEDICINE

## 2021-10-07 PROCEDURE — G8417 CALC BMI ABV UP PARAM F/U: HCPCS | Performed by: INTERNAL MEDICINE

## 2021-10-07 PROCEDURE — G8754 DIAS BP LESS 90: HCPCS | Performed by: INTERNAL MEDICINE

## 2021-10-07 PROCEDURE — G8427 DOCREV CUR MEDS BY ELIG CLIN: HCPCS | Performed by: INTERNAL MEDICINE

## 2021-10-07 PROCEDURE — G8752 SYS BP LESS 140: HCPCS | Performed by: INTERNAL MEDICINE

## 2021-10-07 NOTE — PROGRESS NOTES
Sabine Velez is a [de-identified] y.o. female follow up for mgus. 1. Have you been to the ER, urgent care clinic since your last visit? Hospitalized since your last visit?no     2. Have you seen or consulted any other health care providers outside of the [address] [place] [address] since your last visit? Include any pap smears or colon screening.  no

## 2021-10-24 RX ORDER — EXENATIDE 2 MG/.85ML
2 INJECTION, SUSPENSION, EXTENDED RELEASE SUBCUTANEOUS
Qty: 12 EACH | Refills: 3 | Status: SHIPPED | OUTPATIENT
Start: 2021-10-24

## 2021-10-24 RX ORDER — LISINOPRIL 5 MG/1
5 TABLET ORAL DAILY
Qty: 90 TABLET | Refills: 3 | Status: SHIPPED | OUTPATIENT
Start: 2021-10-24 | End: 2022-10-03

## 2021-10-26 RX ORDER — FLUTICASONE PROPIONATE AND SALMETEROL 50; 250 UG/1; UG/1
POWDER RESPIRATORY (INHALATION)
Qty: 60 EACH | Refills: 3 | Status: SHIPPED | OUTPATIENT
Start: 2021-10-26

## 2021-10-27 ENCOUNTER — OFFICE VISIT (OUTPATIENT)
Dept: CARDIOLOGY CLINIC | Age: 81
End: 2021-10-27
Payer: MEDICARE

## 2021-10-27 VITALS
SYSTOLIC BLOOD PRESSURE: 112 MMHG | OXYGEN SATURATION: 98 % | HEART RATE: 78 BPM | DIASTOLIC BLOOD PRESSURE: 58 MMHG | HEIGHT: 63 IN | WEIGHT: 229 LBS | BODY MASS INDEX: 40.57 KG/M2

## 2021-10-27 DIAGNOSIS — E78.5 DYSLIPIDEMIA: ICD-10-CM

## 2021-10-27 DIAGNOSIS — I10 ESSENTIAL HYPERTENSION: Primary | ICD-10-CM

## 2021-10-27 DIAGNOSIS — I35.0 AORTIC VALVE STENOSIS, ETIOLOGY OF CARDIAC VALVE DISEASE UNSPECIFIED: ICD-10-CM

## 2021-10-27 PROCEDURE — 93010 ELECTROCARDIOGRAM REPORT: CPT | Performed by: SPECIALIST

## 2021-10-27 PROCEDURE — G0463 HOSPITAL OUTPT CLINIC VISIT: HCPCS | Performed by: SPECIALIST

## 2021-10-27 PROCEDURE — 1090F PRES/ABSN URINE INCON ASSESS: CPT | Performed by: SPECIALIST

## 2021-10-27 PROCEDURE — G8427 DOCREV CUR MEDS BY ELIG CLIN: HCPCS | Performed by: SPECIALIST

## 2021-10-27 PROCEDURE — G8536 NO DOC ELDER MAL SCRN: HCPCS | Performed by: SPECIALIST

## 2021-10-27 PROCEDURE — G8417 CALC BMI ABV UP PARAM F/U: HCPCS | Performed by: SPECIALIST

## 2021-10-27 PROCEDURE — G8432 DEP SCR NOT DOC, RNG: HCPCS | Performed by: SPECIALIST

## 2021-10-27 PROCEDURE — 99214 OFFICE O/P EST MOD 30 MIN: CPT | Performed by: SPECIALIST

## 2021-10-27 PROCEDURE — G8754 DIAS BP LESS 90: HCPCS | Performed by: SPECIALIST

## 2021-10-27 PROCEDURE — G8752 SYS BP LESS 140: HCPCS | Performed by: SPECIALIST

## 2021-10-27 PROCEDURE — 93005 ELECTROCARDIOGRAM TRACING: CPT | Performed by: SPECIALIST

## 2021-10-27 PROCEDURE — G8400 PT W/DXA NO RESULTS DOC: HCPCS | Performed by: SPECIALIST

## 2021-10-27 PROCEDURE — 1101F PT FALLS ASSESS-DOCD LE1/YR: CPT | Performed by: SPECIALIST

## 2021-10-27 RX ORDER — CLONIDINE HYDROCHLORIDE 0.1 MG/1
0.1 TABLET ORAL 2 TIMES DAILY
Qty: 180 TABLET | Refills: 3 | Status: SHIPPED | OUTPATIENT
Start: 2021-10-27 | End: 2022-09-14

## 2021-10-27 RX ORDER — SOTALOL HYDROCHLORIDE 80 MG/1
TABLET ORAL
Qty: 225 TABLET | Refills: 3 | Status: SHIPPED | OUTPATIENT
Start: 2021-10-27 | End: 2022-04-28

## 2021-10-27 NOTE — PROGRESS NOTES
Heron Ruiz MD. [facility]              Patient: Mer Zuñiga  : 1940      Today's Date: 10/27/2021            HISTORY OF PRESENT ILLNESS:     History of Present Illness:  Says she feels well. No CP. Breathing OK. Walks with a cane. No dizziness. Biggest complaints is seasonal allergies. PAST MEDICAL HISTORY:     Past Medical History:   Diagnosis Date    CAD (coronary artery disease)     CKD (chronic kidney disease)     Diabetes ([address]     Dyslipidemia     Heart attack ([address]     Had cath and treated medically     HTN (hypertension)     Monoclonal gammopathies     Obesity     TARIQ on CPAP     Tachycardia     placed on Sotalol at [place]        Past Surgical History:   Procedure Laterality Date    [place] BREAST SURGERY PROCEDURE UNLISTED               MEDICATIONS:     Current Outpatient Medications   Medication Sig Dispense Refill    Advair Diskus 250-50 mcg/dose diskus inhaler USE 1 INHALATION EVERY 12 HOURS 60 Each 3    lisinopriL (PRINIVIL, ZESTRIL) 5 mg tablet Take 1 Tablet by mouth daily. 90 Tablet 3    exenatide microspheres (Bydureon BCise) 2 mg/0.85 mL atIn 2 mg by SubCUTAneous route every seven (7) days. 12 Each 3    insulin aspart U-100 (NOVOLOG) 100 unit/mL (3 mL) inpn 22 Units by SubCUTAneous route Before breakfast, lunch, dinner and at bedtime. 30 Pen 3    furosemide (LASIX) 20 mg tablet TAKE 1 TABLET DAILY 90 Tablet 3    OTHER CURTURELL FIBER PILLS      montelukast (SINGULAIR) 5 mg chewable tablet CHEW 2 TABLETS NIGHTLY 180 Tab 3    spironolactone (ALDACTONE) 25 mg tablet Take 1 Tab by mouth daily. 90 Tab 3    atorvastatin (LIPITOR) 40 mg tablet Take 1 Tab by mouth daily. 90 Tab 3    allopurinoL (ZYLOPRIM) 100 mg tablet Take 1 Tab by mouth daily. 90 Tab 3    cloNIDine HCL (CATAPRES) 0.2 mg tablet Take 1 Tab by mouth two (2) times a day. 180 Tab 3    sotalol HCl (SOTALOL PO) Take 80 mg by mouth.  2.5 twice a day      aspirin 81 mg chewable tablet Take 81 mg by mouth daily.  albuterol (ACCUNEB) 0.63 mg/3 mL nebulizer solution 0.63 mg by Nebulization route every six (6) hours as needed for Wheezing.  B.infantis-B.ani-B.long-B.bifi (Probiotic 4X) 10-15 mg TbEC Take  by mouth.  insulin syringe-needle U-100 (BD Insulin Syringe) 1 mL 29 gauge x 1/2\" syrg Use with insulin daily dx: E11.9 (Patient not taking: Reported on 10/27/2021) 200 Syringe 11       Allergies   Allergen Reactions    Codeine Unknown (comments)    Motrin [Ibuprofen] Hives             SOCIAL HISTORY:     Social History     Tobacco Use    Smoking status: Former Smoker     Quit date: 1977     Years since quittin.8    Smokeless tobacco: Never Used   Vaping Use    Vaping Use: Never used   Substance Use Topics    Alcohol use: Not Currently    Drug use: Never         FAMILY HISTORY:     Family History   Problem Relation Age of Onset    Heart Attack Sister     Heart Attack Mother             REVIEW OF SYMPTOMS:      Review of Symptoms:  Constitutional: Negative for fever, chills  HEENT: Negative for nosebleeds, tinnitus, and vision changes. Respiratory: Negative for cough, wheezing  Cardiovascular: Negative for orthopnea, claudication, syncope, and PND. Gastrointestinal: Negative for abdominal pain, diarrhea, melena. Genitourinary: Negative for dysuria  Musculoskeletal: Negative for myalgias. Skin: Negative for rash  Heme: No problems bleeding. Neurological: Negative for speech change and focal weakness.                  PHYSICAL EXAM:      Physical Exam:  Visit Vitals  BP (!) 112/58 (BP 1 Location: Left upper arm, BP Patient Position: Sitting, BP Cuff Size: Adult)   Pulse 78   Ht 5' 3\" (1.6 m)   Wt 229 lb (103.9 kg)   SpO2 98%   BMI 40.57 kg/m²       Patient appears generally well, mood and affect are appropriate and pleasant. HEENT:  Hearing intact, non-icteric, normocephalic, atraumatic.    Neck Exam: Supple,    Lung Exam: Clear to auscultation, even breath sounds. Cardiac Exam: Regular rate and rhythm with no murmur or rub  Abdomen: Soft, non-tender,  . Obese  Extremities: Moves all ext well. Mild lower extremity edema. MSKTL: Overall good ROM ext  Skin: No significant rashes  Psych: Appropriate affect  Neuro - Grossly intact              LABS / OTHER STUDIES:      Lab Results   Component Value Date/Time    Sodium 139 09/27/2021 04:37 PM    Potassium 4.9 09/27/2021 04:37 PM    Chloride 103 09/27/2021 04:37 PM    CO2 24 09/27/2021 04:37 PM    Anion gap 4 (L) 05/17/2021 10:02 AM    Glucose 103 (H) 09/27/2021 04:37 PM    BUN 34 (H) 09/27/2021 04:37 PM    Creatinine 1.17 (H) 09/27/2021 04:37 PM    BUN/Creatinine ratio 29 (H) 09/27/2021 04:37 PM    GFR est AA 51 (L) 09/27/2021 04:37 PM    GFR est non-AA 44 (L) 09/27/2021 04:37 PM    Calcium 9.3 09/27/2021 04:37 PM    Bilirubin, total 0.4 09/13/2021 12:00 AM    Alk.  phosphatase 112 09/13/2021 12:00 AM    Protein, total 6.7 09/27/2021 04:37 PM    Albumin 4.0 09/27/2021 04:37 PM    Globulin 3.6 05/17/2021 10:02 AM    A-G Ratio 1.4 09/13/2021 12:00 AM    ALT (SGPT) 18 09/13/2021 12:00 AM    AST (SGOT) 15 09/13/2021 12:00 AM     Lab Results   Component Value Date/Time    WBC 10.7 09/27/2021 04:37 PM    HGB 12.8 09/27/2021 04:37 PM    HCT 34.6 09/27/2021 04:37 PM    PLATELET 764 08/05/9630 04:37 PM    MCV 97 09/27/2021 04:37 PM       Lab Results   Component Value Date/Time    Cholesterol, total 183 09/13/2021 12:00 AM    HDL Cholesterol 42 09/13/2021 12:00 AM    LDL, calculated 93 09/13/2021 12:00 AM    LDL, calculated 85.6 05/17/2021 10:02 AM    VLDL, calculated 48 (H) 09/13/2021 12:00 AM    VLDL, calculated 32.4 05/17/2021 10:02 AM    Triglyceride 289 (H) 09/13/2021 12:00 AM    CHOL/HDL Ratio 3.3 05/17/2021 10:02 AM             CARDIAC DIAGNOSTICS:      Cardiac Evaluation Includes:       EKG 6/13/19 - NSR, PRWP   EKG 10/21/20 - NSR, PRWP   EKG 10/27/21 - NSR, septal Q waves, low voltage        Lexiscan Cardiolite 12/16/16 [facility]) - no significant ischemia, LVEF 64%  Event Monitor 1/31-3/1/19 ([place]  - sinus, Normal study -  No Afib   [place]cho 4/3/19 ([place]) - mod LVH, LVEF 60-65%, mod-severe LAE, mod JENA, RV mod dilated, Mild AS (ANGELIC 1.4 cm2, mean PG 26 mmHg)      Echo 4/26/21 - TDS, LVEF 55-60%, LAE, severe MAC, AV sclerosis             ASSESSMENT AND PLAN:      Assessment and Plan:  1) CAD   - MI in 2014-- distal lesion treated medically per patient   - Doing well without angina   - cont ASA, statin      2) \"Tachycardia\"   - Unsure why Sotalol was started in 2020 in [place] - she says HR would race (palpitations)  so Cardiologist started Sotalol   - Event Monitor 1/31-3/1/19 ([place])  - sinus, Normal study -  No Afib   - she denies recent heart racing problems   - Since she has been stable on Sotalol and feeling better, will continue Sotalol.    3) HTN  - BP runs a little low (-120/60)   ---> will cut back clonidine to 0.1 mg BID and continue other meds      4) Dyslipidemia  - cont statin   - prior lipids OK      5) CKD    6) Mild AS  - recheck echo next year      7) See me in 6 months (her preference). Patient expressed understanding of the plan - questions were answered. Moved from [place] to [place] to be close family (daughter and son). Lives with her  (ex [employer]).  has dementia.             Es Morales MD, [address] [place] [address] Drive.  [address], [address]  Ph: [phone]                               Ph [phone]

## 2021-10-27 NOTE — PROGRESS NOTES
Yudy Valles is a [de-identified] y.o. female    Visit Vitals  BP (!) 112/58 (BP 1 Location: Left upper arm, BP Patient Position: Sitting, BP Cuff Size: Adult)   Pulse 78   Ht 5' 3\" (1.6 m)   Wt 229 lb (103.9 kg)   SpO2 98%   BMI 40.57 kg/m²       Chief Complaint   Patient presents with    Cholesterol Problem    Coronary Artery Disease    Hypertension       Chest pain NO  SOB NO  Dizziness NO  Swelling YES  Recent hospital visit NO  Refills NO  COVID VACCINE STATUS YES  HAD COVID?  NO

## 2022-01-04 RX ORDER — ALLOPURINOL 100 MG/1
TABLET ORAL
Qty: 90 TABLET | Refills: 3 | Status: SHIPPED | OUTPATIENT
Start: 2022-01-04

## 2022-01-06 ENCOUNTER — OFFICE VISIT (OUTPATIENT)
Dept: FAMILY MEDICINE CLINIC | Age: 82
End: 2022-01-06
Payer: MEDICARE

## 2022-01-06 VITALS
OXYGEN SATURATION: 98 % | HEIGHT: 63 IN | RESPIRATION RATE: 16 BRPM | SYSTOLIC BLOOD PRESSURE: 121 MMHG | HEART RATE: 72 BPM | DIASTOLIC BLOOD PRESSURE: 71 MMHG | BODY MASS INDEX: 41.04 KG/M2 | WEIGHT: 231.6 LBS | TEMPERATURE: 97.9 F

## 2022-01-06 DIAGNOSIS — E78.00 HYPERCHOLESTEREMIA: ICD-10-CM

## 2022-01-06 DIAGNOSIS — N18.2 TYPE 2 DIABETES MELLITUS WITH STAGE 2 CHRONIC KIDNEY DISEASE, WITHOUT LONG-TERM CURRENT USE OF INSULIN (HCC): ICD-10-CM

## 2022-01-06 DIAGNOSIS — E11.9 CONTROLLED TYPE 2 DIABETES MELLITUS WITHOUT COMPLICATION, WITH LONG-TERM CURRENT USE OF INSULIN (HCC): Primary | ICD-10-CM

## 2022-01-06 DIAGNOSIS — E11.40 CONTROLLED TYPE 2 DIABETES MELLITUS WITH DIABETIC NEUROPATHY, WITH LONG-TERM CURRENT USE OF INSULIN (HCC): ICD-10-CM

## 2022-01-06 DIAGNOSIS — Z79.4 CONTROLLED TYPE 2 DIABETES MELLITUS WITH DIABETIC NEUROPATHY, WITH LONG-TERM CURRENT USE OF INSULIN (HCC): ICD-10-CM

## 2022-01-06 DIAGNOSIS — Z79.4 CONTROLLED TYPE 2 DIABETES MELLITUS WITHOUT COMPLICATION, WITH LONG-TERM CURRENT USE OF INSULIN (HCC): Primary | ICD-10-CM

## 2022-01-06 DIAGNOSIS — E11.22 TYPE 2 DIABETES MELLITUS WITH STAGE 2 CHRONIC KIDNEY DISEASE, WITHOUT LONG-TERM CURRENT USE OF INSULIN (HCC): ICD-10-CM

## 2022-01-06 DIAGNOSIS — E66.01 OBESITY, CLASS III, BMI 40-49.9 (MORBID OBESITY) (HCC): ICD-10-CM

## 2022-01-06 PROCEDURE — 99214 OFFICE O/P EST MOD 30 MIN: CPT | Performed by: NURSE PRACTITIONER

## 2022-01-06 PROCEDURE — G0463 HOSPITAL OUTPT CLINIC VISIT: HCPCS | Performed by: NURSE PRACTITIONER

## 2022-01-06 PROCEDURE — G8400 PT W/DXA NO RESULTS DOC: HCPCS | Performed by: NURSE PRACTITIONER

## 2022-01-06 PROCEDURE — G8752 SYS BP LESS 140: HCPCS | Performed by: NURSE PRACTITIONER

## 2022-01-06 PROCEDURE — G8432 DEP SCR NOT DOC, RNG: HCPCS | Performed by: NURSE PRACTITIONER

## 2022-01-06 PROCEDURE — G8417 CALC BMI ABV UP PARAM F/U: HCPCS | Performed by: NURSE PRACTITIONER

## 2022-01-06 PROCEDURE — G8427 DOCREV CUR MEDS BY ELIG CLIN: HCPCS | Performed by: NURSE PRACTITIONER

## 2022-01-06 PROCEDURE — 1101F PT FALLS ASSESS-DOCD LE1/YR: CPT | Performed by: NURSE PRACTITIONER

## 2022-01-06 PROCEDURE — G8754 DIAS BP LESS 90: HCPCS | Performed by: NURSE PRACTITIONER

## 2022-01-06 PROCEDURE — 1090F PRES/ABSN URINE INCON ASSESS: CPT | Performed by: NURSE PRACTITIONER

## 2022-01-06 PROCEDURE — G8536 NO DOC ELDER MAL SCRN: HCPCS | Performed by: NURSE PRACTITIONER

## 2022-01-06 RX ORDER — ALBUTEROL SULFATE 90 UG/1
2 AEROSOL, METERED RESPIRATORY (INHALATION)
Qty: 3 EACH | Refills: 1 | Status: SHIPPED | OUTPATIENT
Start: 2022-01-06

## 2022-01-06 RX ORDER — METHOCARBAMOL 500 MG/1
500 TABLET, FILM COATED ORAL
Qty: 30 TABLET | Refills: 1 | Status: SHIPPED | OUTPATIENT
Start: 2022-01-06

## 2022-01-06 NOTE — PROGRESS NOTES
HISTORY OF PRESENT ILLNESS  Gaby Stone is a [de-identified] y.o. female. HPI  Chief Complaint   Patient presents with    Follow-up     3 mo, fasting bloodwork    Diabetes     type 2    Hypertension    Cholesterol Problem    Other     chronic renal failure, pain that starts in back and goes down to bottom, would like rx stronger than tylenol    Other     pt requests new rx for nebulizer albuterol      ROS  A comprehensive review of system was obtained and negative except findings in the HPI    Visit Vitals  /71 (BP 1 Location: Right upper arm, BP Patient Position: Sitting, BP Cuff Size: Large adult)   Pulse 72   Temp 97.9 °F (36.6 °C) (Oral)   Resp 16   Ht 5' 3\" (1.6 m)   Wt 231 lb 9.6 oz (105.1 kg)   SpO2 98%   BMI 41.03 kg/m²     Physical Exam  Vitals and nursing note reviewed. Constitutional:       Appearance: She is well-developed. Comments:      Neck:      Vascular: No JVD. Cardiovascular:      Rate and Rhythm: Normal rate and regular rhythm. Heart sounds: No murmur heard. No friction rub. No gallop. Pulmonary:      Effort: Pulmonary effort is normal. No respiratory distress. Breath sounds: Normal breath sounds. No wheezing. Skin:     General: Skin is warm. Neurological:      Mental Status: She is alert and oriented to person, place, and time. ASSESSMENT and PLAN  Encounter Diagnoses   Name Primary?     Controlled type 2 diabetes mellitus without complication, with long-term current use of insulin ([facility]) Yes    Hypercholesteremia     Type 2 diabetes mellitus with stage 2 chronic kidney disease, without long-term current use of insulin ([facility])     Controlled type 2 diabetes mellitus with diabetic neuropathy, with long-term current use of insulin ([facility])     Obesity, Class III, BMI 40-49.9 (morbid obesity) ([address]      Orders Placed This Encounter    HEMOGLOBIN A1C WITH EAG    LIPID PANEL    methocarbamoL (ROBAXIN) 500 mg tablet    albuterol (PROVENTIL HFA, VENTOLIN HFA, PROAIR HFA) 90 mcg/actuation inhaler     All labs updated today  Refills given for albuterol HFA  Given robaxin for sciatica pain prn    I have discussed the diagnosis with the patient and the intended plan as seen in the above orders. The patient has received an after-visit summary and questions were answered concerning future plans. Patient conveyed understanding of the plan at the time of the visit.     DALE Rosenberg, ANP  1/6/2022

## 2022-01-06 NOTE — PROGRESS NOTES
Identified pt with two pt identifiers(name and ). Reviewed record in preparation for visit and have obtained necessary documentation. Chief Complaint   Patient presents with    Follow-up     3 mo, fasting bloodwork    Diabetes     type 2    Hypertension    Cholesterol Problem    Other     chronic renal failure, pain that starts in back and goes down to bottom, would like rx stronger than tylenol    Other     pt requests new rx for nebulizer albuterol         Health Maintenance Due   Topic    Foot Exam Q1     Eye Exam Retinal or Dilated     DTaP/Tdap/Td series (1 - Tdap)    Shingrix Vaccine Age 50> (1 of 2)    Bone Densitometry (Dexa) Screening     Pneumococcal 65+ years (1 of 1 - PPSV23)    Flu Vaccine (1)    COVID-19 Vaccine (3 - Booster for Pfizer series)     Vitals:    22 1027   BP: 121/71   Pulse: 72   Resp: 16   Temp: 97.9 °F (36.6 °C)   TempSrc: Oral   SpO2: 98%   Weight: 231 lb 9.6 oz (105.1 kg)   Height: 5' 3\" (1.6 m)   PainSc:   4   PainLoc: Hip       Pain Scale: 4/10    Coordination of Care Questionnaire:  :     1. Have you been to the ER, urgent care clinic since your last visit? Hospitalized since your last visit? No    2. Have you seen or consulted any other health care providers outside of the [address] [place] [address] since your last visit? Include any pap smears or colon screening.  No

## 2022-01-07 LAB
CHOLEST SERPL-MCNC: 187 MG/DL (ref 100–199)
EST. AVERAGE GLUCOSE BLD GHB EST-MCNC: 166 MG/DL
HBA1C MFR BLD: 7.4 % (ref 4.8–5.6)
HDLC SERPL-MCNC: 44 MG/DL
IMP & REVIEW OF LAB RESULTS: NORMAL
LDLC SERPL CALC-MCNC: 101 MG/DL (ref 0–99)
TRIGL SERPL-MCNC: 246 MG/DL (ref 0–149)
VLDLC SERPL CALC-MCNC: 42 MG/DL (ref 5–40)

## 2022-01-11 NOTE — PROGRESS NOTES
Your sugar and cholesterol have gone way up. You really need to get back on track with diet and recheck in 3 months fasting.  Loco Deng

## 2022-01-17 RX ORDER — SPIRONOLACTONE 25 MG/1
TABLET ORAL
Qty: 90 TABLET | Refills: 3 | Status: SHIPPED | OUTPATIENT
Start: 2022-01-17

## 2022-01-18 RX ORDER — ATORVASTATIN CALCIUM 40 MG/1
TABLET, FILM COATED ORAL
Qty: 90 TABLET | Refills: 3 | Status: SHIPPED | OUTPATIENT
Start: 2022-01-18

## 2022-03-07 RX ORDER — MONTELUKAST SODIUM 5 MG/1
TABLET, CHEWABLE ORAL
Qty: 180 TABLET | Refills: 3 | Status: SHIPPED | OUTPATIENT
Start: 2022-03-07

## 2022-03-08 ENCOUNTER — OFFICE VISIT (OUTPATIENT)
Dept: FAMILY MEDICINE CLINIC | Age: 82
End: 2022-03-08
Payer: MEDICARE

## 2022-03-08 VITALS
DIASTOLIC BLOOD PRESSURE: 71 MMHG | HEIGHT: 63 IN | RESPIRATION RATE: 14 BRPM | BODY MASS INDEX: 40.75 KG/M2 | WEIGHT: 230 LBS | TEMPERATURE: 98.1 F | HEART RATE: 71 BPM | SYSTOLIC BLOOD PRESSURE: 114 MMHG | OXYGEN SATURATION: 97 %

## 2022-03-08 DIAGNOSIS — Z79.4 CONTROLLED TYPE 2 DIABETES MELLITUS WITHOUT COMPLICATION, WITH LONG-TERM CURRENT USE OF INSULIN (HCC): Primary | ICD-10-CM

## 2022-03-08 DIAGNOSIS — E11.22 TYPE 2 DIABETES MELLITUS WITH CHRONIC KIDNEY DISEASE, WITHOUT LONG-TERM CURRENT USE OF INSULIN, UNSPECIFIED CKD STAGE (HCC): ICD-10-CM

## 2022-03-08 DIAGNOSIS — I10 BENIGN ESSENTIAL HTN: ICD-10-CM

## 2022-03-08 DIAGNOSIS — E11.9 CONTROLLED TYPE 2 DIABETES MELLITUS WITHOUT COMPLICATION, WITH LONG-TERM CURRENT USE OF INSULIN (HCC): Primary | ICD-10-CM

## 2022-03-08 DIAGNOSIS — E78.00 HYPERCHOLESTEREMIA: ICD-10-CM

## 2022-03-08 PROCEDURE — 3051F HG A1C>EQUAL 7.0%<8.0%: CPT | Performed by: NURSE PRACTITIONER

## 2022-03-08 PROCEDURE — 1101F PT FALLS ASSESS-DOCD LE1/YR: CPT | Performed by: NURSE PRACTITIONER

## 2022-03-08 PROCEDURE — 1090F PRES/ABSN URINE INCON ASSESS: CPT | Performed by: NURSE PRACTITIONER

## 2022-03-08 PROCEDURE — G8400 PT W/DXA NO RESULTS DOC: HCPCS | Performed by: NURSE PRACTITIONER

## 2022-03-08 PROCEDURE — G8432 DEP SCR NOT DOC, RNG: HCPCS | Performed by: NURSE PRACTITIONER

## 2022-03-08 PROCEDURE — G8536 NO DOC ELDER MAL SCRN: HCPCS | Performed by: NURSE PRACTITIONER

## 2022-03-08 PROCEDURE — G8417 CALC BMI ABV UP PARAM F/U: HCPCS | Performed by: NURSE PRACTITIONER

## 2022-03-08 PROCEDURE — G8754 DIAS BP LESS 90: HCPCS | Performed by: NURSE PRACTITIONER

## 2022-03-08 PROCEDURE — 99214 OFFICE O/P EST MOD 30 MIN: CPT | Performed by: NURSE PRACTITIONER

## 2022-03-08 PROCEDURE — G0463 HOSPITAL OUTPT CLINIC VISIT: HCPCS | Performed by: NURSE PRACTITIONER

## 2022-03-08 PROCEDURE — G8752 SYS BP LESS 140: HCPCS | Performed by: NURSE PRACTITIONER

## 2022-03-08 PROCEDURE — G8427 DOCREV CUR MEDS BY ELIG CLIN: HCPCS | Performed by: NURSE PRACTITIONER

## 2022-03-08 NOTE — PROGRESS NOTES
Chief Complaint   Patient presents with    Diabetes     Pt being seen for [facility]  diabetes to discuss the freestyle xin    1. Have you been to the ER, urgent care clinic since your last visit? Hospitalized since your last visit? No    2. Have you seen or consulted any other health care providers outside of the [address] [place] [address] since your last visit? Include any pap smears or colon screening.  No     Pt has no other concerns

## 2022-03-08 NOTE — PROGRESS NOTES
HISTORY OF PRESENT ILLNESS  Aleyda Has is a [de-identified] y.o. female. HPI  Cardiovascular Review:  The patient has hypertension and hyperlipidemia. Diet and Lifestyle: generally follows a low fat low cholesterol diet, generally follows a low sodium diet, exercises sporadically, nonsmoker  Home BP Monitoring: is not measured at home. Pertinent ROS: taking medications as instructed, no medication side effects noted, no TIA's, no chest pain on exertion, no dyspnea on exertion, no swelling of ankles. Diabetes Mellitus:  She has diabetes mellitus, and  hyperlipidemia. Diabetic ROS - medication compliance: noncompliant much of the time, diabetic diet compliance: noncompliant much of the time, home glucose monitoring: is not performed. Lab review: orders written for new lab studies as appropriate; see orders. Patient Active Problem List    Diagnosis Date Noted    Type 2 diabetes mellitus with chronic kidney disease ([facility] 01/06/2022    CAD (coronary artery disease)     CKD (chronic kidney disease)     Controlled type 2 diabetes mellitus with neurologic complication, with long-term current use of insulin (HCC)     Dyslipidemia     HTN (hypertension)     Obesity, morbid ([facility] 10/07/2020    Heart attack ([facility] 2014     Current Outpatient Medications   Medication Sig Dispense Refill    montelukast (SINGULAIR) 5 mg chewable tablet CHEW 2 TABLETS NIGHTLY 180 Tablet 3    atorvastatin (LIPITOR) 40 mg tablet TAKE 1 TABLET DAILY 90 Tablet 3    spironolactone (ALDACTONE) 25 mg tablet TAKE 1 TABLET DAILY 90 Tablet 3    albuterol (PROVENTIL HFA, VENTOLIN HFA, PROAIR HFA) 90 mcg/actuation inhaler Take 2 Puffs by inhalation every four (4) hours as needed for Wheezing. 3 Each 1    allopurinoL (ZYLOPRIM) 100 mg tablet TAKE 1 TABLET DAILY 90 Tablet 3    sotaloL (BETAPACE) 80 mg tablet 1.5 tablet in AM and 1 tablet in  Tablet 3    cloNIDine HCL (CATAPRES) 0.1 mg tablet Take 1 Tablet by mouth two (2) times a day.  [address] [place] [address] Tablet 3    Advair Diskus 250-50 mcg/dose diskus inhaler USE 1 INHALATION EVERY 12 HOURS 60 Each 3    lisinopriL (PRINIVIL, ZESTRIL) 5 mg tablet Take 1 Tablet by mouth daily. 90 Tablet 3    exenatide microspheres (Bydureon BCise) 2 mg/0.85 mL atIn 2 mg by SubCUTAneous route every seven (7) days. 12 Each 3    insulin aspart U-100 (NOVOLOG) 100 unit/mL (3 mL) inpn 22 Units by SubCUTAneous route Before breakfast, lunch, dinner and at bedtime. 30 Pen 3    furosemide (LASIX) 20 mg tablet TAKE 1 TABLET DAILY 90 Tablet 3    insulin syringe-needle U-100 (BD Insulin Syringe) 1 mL 29 gauge x 1/2\" syrg Use with insulin daily dx: E11.9 200 Syringe 11    OTHER CURTURELL FIBER PILLS      aspirin 81 mg chewable tablet Take 81 mg by mouth daily.  B.infantis-B.ani-B.long-B.bifi (Probiotic 4X) 10-15 mg TbEC Take  by mouth.  methocarbamoL (ROBAXIN) 500 mg tablet Take 1 Tablet by mouth nightly as needed for Muscle Spasm(s). (Patient not taking: Reported on 3/8/2022) 30 Tablet 1    albuterol (ACCUNEB) 0.63 mg/3 mL nebulizer solution 0.63 mg by Nebulization route every six (6) hours as needed for Wheezing. (Patient not taking: Reported on 3/8/2022)       Family History   Problem Relation Age of Onset    Heart Attack Sister     Heart Attack Mother      Social History     Tobacco Use    Smoking status: Former Smoker     Quit date: 1977     Years since quittin.2    Smokeless tobacco: Never Used   Substance Use Topics    Alcohol use: Not Currently           ROS  A comprehensive review of system was obtained and negative except findings in the HPI    Visit Vitals  /71 (BP 1 Location: Right arm, BP Patient Position: Sitting)   Pulse 71   Temp 98.1 °F (36.7 °C) (Oral)   Resp 14   Ht 5' 3\" (1.6 m)   Wt 230 lb (104.3 kg)   SpO2 97%   BMI 40.74 kg/m²     Physical Exam  Vitals and nursing note reviewed. Constitutional:       Appearance: She is well-developed. Comments:      Neck:      Vascular: No JVD. Cardiovascular:      Rate and Rhythm: Normal rate and regular rhythm. Heart sounds: No murmur heard. No friction rub. No gallop. Pulmonary:      Effort: Pulmonary effort is normal. No respiratory distress. Breath sounds: Normal breath sounds. No wheezing. Skin:     General: Skin is warm. Neurological:      Mental Status: She is alert and oriented to person, place, and time. ASSESSMENT and PLAN  Encounter Diagnoses   Name Primary?  Controlled type 2 diabetes mellitus without complication, with long-term current use of insulin (HCC) Yes    Hypercholesteremia     Benign essential HTN     Type 2 diabetes mellitus with chronic kidney disease, without long-term current use of insulin, unspecified CKD stage ([facility]      Orders Placed This Encounter    HEMOGLOBIN A1C WITH EAG    LIPID PANEL    METABOLIC PANEL, COMPREHENSIVE    REFERRAL TO PODIATRY     All labs updated today  Referral to podiatry for DM foot exam and shoes consult    I have discussed the diagnosis with the patient and the intended plan as seen in the above orders. The patient has received an after-visit summary and questions were answered concerning future plans. Patient conveyed understanding of the plan at the time of the visit.     Nanette Rowan, MSN, ANP  3/8/2022

## 2022-03-09 LAB
ALBUMIN SERPL-MCNC: 3.4 G/DL (ref 3.5–5)
ALBUMIN/GLOB SERPL: 1 {RATIO} (ref 1.1–2.2)
ALP SERPL-CCNC: 129 U/L (ref 45–117)
ALT SERPL-CCNC: 25 U/L (ref 12–78)
ANION GAP SERPL CALC-SCNC: 5 MMOL/L (ref 5–15)
AST SERPL-CCNC: 16 U/L (ref 15–37)
BILIRUB SERPL-MCNC: 0.8 MG/DL (ref 0.2–1)
BUN SERPL-MCNC: 28 MG/DL (ref 6–20)
BUN/CREAT SERPL: 19 (ref 12–20)
CALCIUM SERPL-MCNC: 9.5 MG/DL (ref 8.5–10.1)
CHLORIDE SERPL-SCNC: 101 MMOL/L (ref 97–108)
CHOLEST SERPL-MCNC: 190 MG/DL
CO2 SERPL-SCNC: 27 MMOL/L (ref 21–32)
CREAT SERPL-MCNC: 1.48 MG/DL (ref 0.55–1.02)
EST. AVERAGE GLUCOSE BLD GHB EST-MCNC: 154 MG/DL
GLOBULIN SER CALC-MCNC: 3.5 G/DL (ref 2–4)
GLUCOSE SERPL-MCNC: 153 MG/DL (ref 65–100)
HBA1C MFR BLD: 7 % (ref 4–5.6)
HDLC SERPL-MCNC: 52 MG/DL
HDLC SERPL: 3.7 {RATIO} (ref 0–5)
LDLC SERPL CALC-MCNC: 89.6 MG/DL (ref 0–100)
POTASSIUM SERPL-SCNC: 4.8 MMOL/L (ref 3.5–5.1)
PROT SERPL-MCNC: 6.9 G/DL (ref 6.4–8.2)
SODIUM SERPL-SCNC: 133 MMOL/L (ref 136–145)
TRIGL SERPL-MCNC: 242 MG/DL (ref ?–150)
VLDLC SERPL CALC-MCNC: 48.4 MG/DL

## 2022-03-15 NOTE — PROGRESS NOTES
Your labs are stable at this time. Please follow up with Dr. Doris Curry as planned to discuss the [facility] for blood pressure. Your kidney functions are slightly higher than last check. He may want to make some changes.  Nithya Franco

## 2022-03-19 PROBLEM — E11.22 TYPE 2 DIABETES MELLITUS WITH CHRONIC KIDNEY DISEASE (HCC): Status: ACTIVE | Noted: 2022-01-06

## 2022-03-19 PROBLEM — E66.01 OBESITY, MORBID (HCC): Status: ACTIVE | Noted: 2020-10-07

## 2022-04-28 ENCOUNTER — OFFICE VISIT (OUTPATIENT)
Dept: CARDIOLOGY CLINIC | Age: 82
End: 2022-04-28
Payer: MEDICARE

## 2022-04-28 ENCOUNTER — ANCILLARY PROCEDURE (OUTPATIENT)
Dept: CARDIOLOGY CLINIC | Age: 82
End: 2022-04-28
Payer: MEDICARE

## 2022-04-28 VITALS
DIASTOLIC BLOOD PRESSURE: 82 MMHG | OXYGEN SATURATION: 98 % | WEIGHT: 225 LBS | SYSTOLIC BLOOD PRESSURE: 138 MMHG | BODY MASS INDEX: 39.87 KG/M2 | HEART RATE: 67 BPM | HEIGHT: 63 IN

## 2022-04-28 VITALS
BODY MASS INDEX: 39.87 KG/M2 | DIASTOLIC BLOOD PRESSURE: 82 MMHG | SYSTOLIC BLOOD PRESSURE: 140 MMHG | WEIGHT: 225 LBS | HEIGHT: 63 IN

## 2022-04-28 DIAGNOSIS — I10 PRIMARY HYPERTENSION: ICD-10-CM

## 2022-04-28 DIAGNOSIS — I25.10 CORONARY ARTERY DISEASE INVOLVING NATIVE HEART WITHOUT ANGINA PECTORIS, UNSPECIFIED VESSEL OR LESION TYPE: ICD-10-CM

## 2022-04-28 DIAGNOSIS — I10 ESSENTIAL HYPERTENSION: ICD-10-CM

## 2022-04-28 DIAGNOSIS — I35.0 AORTIC VALVE STENOSIS, ETIOLOGY OF CARDIAC VALVE DISEASE UNSPECIFIED: ICD-10-CM

## 2022-04-28 DIAGNOSIS — I35.0 AORTIC VALVE STENOSIS, ETIOLOGY OF CARDIAC VALVE DISEASE UNSPECIFIED: Primary | ICD-10-CM

## 2022-04-28 DIAGNOSIS — R06.09 DOE (DYSPNEA ON EXERTION): ICD-10-CM

## 2022-04-28 LAB
ECHO AO ASC DIAM: 3.5 CM
ECHO AO ASCENDING AORTA INDEX: 1.72 CM/M2
ECHO AO ROOT DIAM: 3.3 CM
ECHO AO ROOT INDEX: 1.63 CM/M2
ECHO AV AREA PEAK VELOCITY: 1.7 CM2
ECHO AV AREA VTI: 1.5 CM2
ECHO AV AREA/BSA PEAK VELOCITY: 0.8 CM2/M2
ECHO AV AREA/BSA VTI: 0.7 CM2/M2
ECHO AV MEAN GRADIENT: 7 MMHG
ECHO AV MEAN VELOCITY: 1.3 M/S
ECHO AV PEAK GRADIENT: 14 MMHG
ECHO AV PEAK VELOCITY: 1.9 M/S
ECHO AV VELOCITY RATIO: 0.47
ECHO AV VTI: 40.3 CM
ECHO EST RA PRESSURE: 3 MMHG
ECHO LA DIAMETER INDEX: 2.17 CM/M2
ECHO LA DIAMETER: 4.4 CM
ECHO LA TO AORTIC ROOT RATIO: 1.33
ECHO LA VOL 2C: 101 ML (ref 22–52)
ECHO LA VOL 4C: 102 ML (ref 22–52)
ECHO LA VOL BP: 105 ML (ref 22–52)
ECHO LA VOL/BSA BIPLANE: 52 ML/M2 (ref 16–34)
ECHO LA VOLUME AREA LENGTH: 111 ML
ECHO LA VOLUME INDEX A2C: 50 ML/M2 (ref 16–34)
ECHO LA VOLUME INDEX A4C: 50 ML/M2 (ref 16–34)
ECHO LA VOLUME INDEX AREA LENGTH: 55 ML/M2 (ref 16–34)
ECHO LV E' LATERAL VELOCITY: 3 CM/S
ECHO LV E' SEPTAL VELOCITY: 3 CM/S
ECHO LV FRACTIONAL SHORTENING: 42 % (ref 28–44)
ECHO LV INTERNAL DIMENSION DIASTOLE INDEX: 2.61 CM/M2
ECHO LV INTERNAL DIMENSION DIASTOLIC: 5.3 CM (ref 3.9–5.3)
ECHO LV INTERNAL DIMENSION SYSTOLIC INDEX: 1.53 CM/M2
ECHO LV INTERNAL DIMENSION SYSTOLIC: 3.1 CM
ECHO LV IVSD: 1.1 CM (ref 0.6–0.9)
ECHO LV MASS 2D: 242 G (ref 67–162)
ECHO LV MASS INDEX 2D: 119.2 G/M2 (ref 43–95)
ECHO LV POSTERIOR WALL DIASTOLIC: 1.2 CM (ref 0.6–0.9)
ECHO LV RELATIVE WALL THICKNESS RATIO: 0.45
ECHO LVOT AREA: 3.5 CM2
ECHO LVOT AV VTI INDEX: 0.44
ECHO LVOT DIAM: 2.1 CM
ECHO LVOT MEAN GRADIENT: 2 MMHG
ECHO LVOT PEAK GRADIENT: 4 MMHG
ECHO LVOT PEAK VELOCITY: 0.9 M/S
ECHO LVOT STROKE VOLUME INDEX: 30.5 ML/M2
ECHO LVOT SV: 62 ML
ECHO LVOT VTI: 17.9 CM
ECHO MV A VELOCITY: 0.55 M/S
ECHO MV AREA PHT: 4.3 CM2
ECHO MV AREA VTI: 1.8 CM2
ECHO MV E DECELERATION TIME (DT): 177 MS
ECHO MV E VELOCITY: 1.01 M/S
ECHO MV E/A RATIO: 1.84
ECHO MV E/E' LATERAL: 33.67
ECHO MV E/E' RATIO (AVERAGED): 33.67
ECHO MV E/E' SEPTAL: 33.67
ECHO MV LVOT VTI INDEX: 1.98
ECHO MV MAX VELOCITY: 1.4 M/S
ECHO MV MEAN GRADIENT: 3 MMHG
ECHO MV MEAN VELOCITY: 0.8 M/S
ECHO MV PEAK GRADIENT: 8 MMHG
ECHO MV PRESSURE HALF TIME (PHT): 51.3 MS
ECHO MV VTI: 35.4 CM
ECHO RV TAPSE: 1.7 CM (ref 1.7–?)

## 2022-04-28 PROCEDURE — G8432 DEP SCR NOT DOC, RNG: HCPCS | Performed by: SPECIALIST

## 2022-04-28 PROCEDURE — G8536 NO DOC ELDER MAL SCRN: HCPCS | Performed by: SPECIALIST

## 2022-04-28 PROCEDURE — G8752 SYS BP LESS 140: HCPCS | Performed by: SPECIALIST

## 2022-04-28 PROCEDURE — 1090F PRES/ABSN URINE INCON ASSESS: CPT | Performed by: SPECIALIST

## 2022-04-28 PROCEDURE — 1101F PT FALLS ASSESS-DOCD LE1/YR: CPT | Performed by: SPECIALIST

## 2022-04-28 PROCEDURE — G8754 DIAS BP LESS 90: HCPCS | Performed by: SPECIALIST

## 2022-04-28 PROCEDURE — G0463 HOSPITAL OUTPT CLINIC VISIT: HCPCS | Performed by: SPECIALIST

## 2022-04-28 PROCEDURE — G8417 CALC BMI ABV UP PARAM F/U: HCPCS | Performed by: SPECIALIST

## 2022-04-28 PROCEDURE — 99214 OFFICE O/P EST MOD 30 MIN: CPT | Performed by: SPECIALIST

## 2022-04-28 PROCEDURE — 93306 TTE W/DOPPLER COMPLETE: CPT | Performed by: SPECIALIST

## 2022-04-28 PROCEDURE — G8427 DOCREV CUR MEDS BY ELIG CLIN: HCPCS | Performed by: SPECIALIST

## 2022-04-28 PROCEDURE — G8400 PT W/DXA NO RESULTS DOC: HCPCS | Performed by: SPECIALIST

## 2022-04-28 RX ORDER — SOTALOL HYDROCHLORIDE 80 MG/1
120 TABLET ORAL DAILY
Qty: 145 TABLET | Refills: 3 | Status: SHIPPED | OUTPATIENT
Start: 2022-04-28

## 2022-04-28 NOTE — PROGRESS NOTES
Ras Do is a [de-identified] y.o. female    Visit Vitals  /82   Pulse 67   Ht 5' 3\" (1.6 m)   Wt 225 lb (102.1 kg)   SpO2 98%   BMI 39.86 kg/m²       Chief Complaint   Patient presents with    Coronary Artery Disease    Chronic Kidney Disease    Hypertension       Chest pain NO  SOB YES, ASTHMA  Dizziness NO  Swelling FEET AND HANDS  Recent hospital visit NO  Refills NO  COVID VACCINE STATUS YES  HAD COVID?  NO

## 2022-04-28 NOTE — PROGRESS NOTES
Marcie Escobedo MD. [facility]              Patient: Elisa Zuñiga  : 1940      Today's Date: 2022            HISTORY OF PRESENT ILLNESS:     History of Present Illness:  Says she feels well. No CP. Walks with a cane. No dizziness. Biggest complaints is seasonal allergies. Some SOB / [employer] she thinks is asthma related. PAST MEDICAL HISTORY:     Past Medical History:   Diagnosis Date    CAD (coronary artery disease)     CKD (chronic kidney disease)     [facility]     Dyslipidemia     Heart attack ([facility]     Had cath and treated medically     HTN (hypertension)     Monoclonal gammopathies     Obesity     TARIQ on CPAP     Tachycardia     placed on Sotalol at [place]        Past Surgical History:   Procedure Laterality Date    MD BREAST SURGERY PROCEDURE UNLISTED               MEDICATIONS:     Current Outpatient Medications   Medication Sig Dispense Refill    montelukast (SINGULAIR) 5 mg chewable tablet CHEW 2 TABLETS NIGHTLY 180 Tablet 3    atorvastatin (LIPITOR) 40 mg tablet TAKE 1 TABLET DAILY 90 Tablet 3    spironolactone (ALDACTONE) 25 mg tablet TAKE 1 TABLET DAILY 90 Tablet 3    methocarbamoL (ROBAXIN) 500 mg tablet Take 1 Tablet by mouth nightly as needed for Muscle Spasm(s). 30 Tablet 1    albuterol (PROVENTIL HFA, VENTOLIN HFA, PROAIR HFA) 90 mcg/actuation inhaler Take 2 Puffs by inhalation every four (4) hours as needed for Wheezing. 3 Each 1    allopurinoL (ZYLOPRIM) 100 mg tablet TAKE 1 TABLET DAILY 90 Tablet 3    sotaloL (BETAPACE) 80 mg tablet 1.5 tablet in AM and 1 tablet in  Tablet 3    cloNIDine HCL (CATAPRES) 0.1 mg tablet Take 1 Tablet by mouth two (2) times a day. 180 Tablet 3    Advair Diskus 250-50 mcg/dose diskus inhaler USE 1 INHALATION EVERY 12 HOURS 60 Each 3    lisinopriL (PRINIVIL, ZESTRIL) 5 mg tablet Take 1 Tablet by mouth daily.  90 Tablet 3    exenatide microspheres (Bydureon BCise) 2 mg/0.85 mL atIn 2 mg by SubCUTAneous route every seven (7) days. 12 Each 3    insulin aspart U-100 (NOVOLOG) 100 unit/mL (3 mL) inpn 22 Units by SubCUTAneous route Before breakfast, lunch, dinner and at bedtime. 30 Pen 3    furosemide (LASIX) 20 mg tablet TAKE 1 TABLET DAILY 90 Tablet 3    OTHER CURTURELL FIBER PILLS      aspirin 81 mg chewable tablet Take 81 mg by mouth daily.  B.infantis-B.ani-B.long-B.bifi (Probiotic 4X) 10-15 mg TbEC Take  by mouth.  insulin syringe-needle U-100 (BD Insulin Syringe) 1 mL 29 gauge x 1/2\" syrg Use with insulin daily dx: E11.9 (Patient not taking: Reported on 2022) 200 Syringe 11    albuterol (ACCUNEB) 0.63 mg/3 mL nebulizer solution 0.63 mg by Nebulization route every six (6) hours as needed for Wheezing. (Patient not taking: Reported on 3/8/2022)         Allergies   Allergen Reactions    Codeine Unknown (comments)    Motrin [Ibuprofen] Hives             SOCIAL HISTORY:     Social History     Tobacco Use    Smoking status: Former Smoker     Quit date: 1977     Years since quittin.3    Smokeless tobacco: Never Used   Vaping Use    Vaping Use: Never used   Substance Use Topics    Alcohol use: Not Currently    Drug use: Never         FAMILY HISTORY:     Family History   Problem Relation Age of Onset    Heart Attack Sister     Heart Attack Mother             REVIEW OF SYMPTOMS:      Review of Symptoms:  Constitutional: Negative for fever, chills  HEENT: Negative for nosebleeds, tinnitus, and vision changes. Respiratory: + wheezing  Cardiovascular: Negative for orthopnea, claudication, syncope, and PND. Gastrointestinal: Negative for abdominal pain,  melena. + chronic diarrhea   Genitourinary: Negative for dysuria  Musculoskeletal: Negative for myalgias. Skin: Negative for rash  Heme: No problems bleeding.   Neurological: Negative for speech change and focal weakness.                  PHYSICAL EXAM:      Physical Exam:  Visit Vitals  /82   Pulse 67   Ht 5' 3\" (1.6 m)   Wt 225 lb (102.1 kg)   SpO2 98%   BMI 39.86 kg/m²       Patient appears generally well, mood and affect are appropriate and pleasant. HEENT:  Hearing intact, non-icteric, normocephalic, atraumatic. Neck Exam: Supple,    Lung Exam: Clear to auscultation, even breath sounds. Cardiac Exam: Regular rate and rhythm with 2/6 systolic murmur   Abdomen: Soft, non-tender,  . Obese  Extremities: Moves all ext well. Mild lower extremity edema. MSKTL: Overall good ROM ext  Skin: No significant rashes  Psych: Appropriate affect  Neuro - Grossly intact              LABS / OTHER STUDIES:      Lab Results   Component Value Date/Time    Sodium 133 (L) 03/08/2022 11:09 AM    Potassium 4.8 03/08/2022 11:09 AM    Chloride 101 03/08/2022 11:09 AM    CO2 27 03/08/2022 11:09 AM    Anion gap 5 03/08/2022 11:09 AM    Glucose 153 (H) 03/08/2022 11:09 AM    BUN 28 (H) 03/08/2022 11:09 AM    Creatinine 1.48 (H) 03/08/2022 11:09 AM    BUN/Creatinine ratio 19 03/08/2022 11:09 AM    GFR est AA 41 (L) 03/08/2022 11:09 AM    GFR est non-AA 34 (L) 03/08/2022 11:09 AM    Calcium 9.5 03/08/2022 11:09 AM    Bilirubin, total 0.8 03/08/2022 11:09 AM    Alk.  phosphatase 129 (H) 03/08/2022 11:09 AM    Protein, total 6.9 03/08/2022 11:09 AM    Albumin 3.4 (L) 03/08/2022 11:09 AM    Globulin 3.5 03/08/2022 11:09 AM    A-G Ratio 1.0 (L) 03/08/2022 11:09 AM    ALT (SGPT) 25 03/08/2022 11:09 AM    AST (SGOT) 16 03/08/2022 11:09 AM     Lab Results   Component Value Date/Time    WBC 10.7 09/27/2021 04:37 PM    HGB 12.8 09/27/2021 04:37 PM    HCT 34.6 09/27/2021 04:37 PM    PLATELET 554 68/26/0955 04:37 PM    MCV 97 09/27/2021 04:37 PM       Lab Results   Component Value Date/Time    Cholesterol, total 190 03/08/2022 11:09 AM    HDL Cholesterol 52 03/08/2022 11:09 AM    LDL, calculated 89.6 03/08/2022 11:09 AM    VLDL, calculated 48.4 03/08/2022 11:09 AM    Triglyceride 242 (H) 03/08/2022 11:09 AM    CHOL/HDL Ratio 3.7 03/08/2022 11:09 AM       CARDIAC DIAGNOSTICS:      Cardiac Evaluation Includes:       EKG 6/13/19 - NSR, PRWP   EKG 10/21/20 - NSR, PRWP   EKG 10/27/21 - NSR, septal Q waves, low voltage        Lexiscan Cardiolite 12/16/16 ([place]) - no significant ischemia, LVEF 64%  Event Monitor 1/31-3/1/19 ([place])  - sinus, Normal study -  No Afib   Echo 4/3/19 ([place]) - mod LVH, LVEF 60-65%, mod-severe LAE, mod JENA, RV mod dilated, Mild AS (ANGELIC 1.4 cm2, mean PG 26 mmHg)      Echo 4/26/21 - TDS, LVEF 55-60%, LAE, severe MAC, AV sclerosis     Echo 4/28/22 - TDS. LVEF 60-65%, grade 1 diastology, AV sclerosis, severe MAC, severe LAE, mild MR          ASSESSMENT AND PLAN:      Assessment and Plan:  1) CAD   - MI in 2014-- distal lesion treated medically per patient   - Doing well without angina   - cont ASA, statin   - she has some CADET lately she thinks from asthma (inhaler helps) ---> if symptoms worsen, then could consider a stress test or cath (she prefers to wait)      2) \"Tachycardia\"   - Unsure why Sotalol was started in 2020 in [place] - she says HR would race (palpitations)  so Cardiologist started Sotalol   - Event Monitor 1/31-3/1/19 ([place])  - sinus, Normal study -  No Afib   - she denies recent heart racing problems   - Since she has been stable on Sotalol and feeling better, will continue Sotalol.   - Due to CKD (CrCL < 60), will cut Sotalol to 120 mg every 24 hours.    3) HTN  - BP runs OK at home   - continue current meds       4) Dyslipidemia  - cont statin   - prior lipids OK      5) CKD    6) Mild AS  - check yearly      7) See me in 12 months with an echo then. Patient expressed understanding of the plan - questions were answered. Moved from [place] to [address] to be close family (daughter and son). Lives with her  (ex [employer]).    has dementia.    Donte Mead MD, [facility] city, [address]  [address]  [address], [address]                 [place], [address] [place] [address]  Ph: [phone]                               Ph [phone]       patient

## 2022-04-28 NOTE — PROGRESS NOTES
Orders for See me in 1 year with an echo    per Dr. Victor Hugo Montes [address].   Dx: cad, talia garrett, as

## 2022-06-02 ENCOUNTER — HOSPITAL ENCOUNTER (OUTPATIENT)
Dept: MAMMOGRAPHY | Age: 82
Discharge: HOME | End: 2022-06-02
Attending: NURSE PRACTITIONER
Payer: MEDICARE

## 2022-06-02 ENCOUNTER — TRANSCRIBE ORDER (OUTPATIENT)
Dept: REGISTRATION | Age: 82
End: 2022-06-02

## 2022-06-02 DIAGNOSIS — Z12.31 OTHER SCREENING MAMMOGRAM: Primary | ICD-10-CM

## 2022-06-02 DIAGNOSIS — Z12.31 OTHER SCREENING MAMMOGRAM: ICD-10-CM

## 2022-06-02 PROCEDURE — 77067 SCR MAMMO BI INCL CAD: CPT

## 2022-06-09 ENCOUNTER — OFFICE VISIT (OUTPATIENT)
Dept: FAMILY MEDICINE CLINIC | Age: 82
End: 2022-06-09
Payer: MEDICARE

## 2022-06-09 VITALS
HEART RATE: 74 BPM | RESPIRATION RATE: 16 BRPM | BODY MASS INDEX: 41.11 KG/M2 | WEIGHT: 232 LBS | HEIGHT: 63 IN | TEMPERATURE: 98.3 F | SYSTOLIC BLOOD PRESSURE: 117 MMHG | DIASTOLIC BLOOD PRESSURE: 70 MMHG | OXYGEN SATURATION: 97 %

## 2022-06-09 DIAGNOSIS — E11.9 CONTROLLED TYPE 2 DIABETES MELLITUS WITHOUT COMPLICATION, WITH LONG-TERM CURRENT USE OF INSULIN (HCC): Primary | ICD-10-CM

## 2022-06-09 DIAGNOSIS — E11.22 TYPE 2 DIABETES MELLITUS WITH CHRONIC KIDNEY DISEASE, WITH LONG-TERM CURRENT USE OF INSULIN, UNSPECIFIED CKD STAGE (HCC): ICD-10-CM

## 2022-06-09 DIAGNOSIS — Z79.4 CONTROLLED TYPE 2 DIABETES MELLITUS WITHOUT COMPLICATION, WITH LONG-TERM CURRENT USE OF INSULIN (HCC): Primary | ICD-10-CM

## 2022-06-09 DIAGNOSIS — I10 BENIGN ESSENTIAL HTN: ICD-10-CM

## 2022-06-09 DIAGNOSIS — Z79.4 TYPE 2 DIABETES MELLITUS WITH CHRONIC KIDNEY DISEASE, WITH LONG-TERM CURRENT USE OF INSULIN, UNSPECIFIED CKD STAGE (HCC): ICD-10-CM

## 2022-06-09 DIAGNOSIS — E78.00 HYPERCHOLESTEREMIA: ICD-10-CM

## 2022-06-09 PROCEDURE — G8752 SYS BP LESS 140: HCPCS | Performed by: NURSE PRACTITIONER

## 2022-06-09 PROCEDURE — 99214 OFFICE O/P EST MOD 30 MIN: CPT | Performed by: NURSE PRACTITIONER

## 2022-06-09 PROCEDURE — G8417 CALC BMI ABV UP PARAM F/U: HCPCS | Performed by: NURSE PRACTITIONER

## 2022-06-09 PROCEDURE — G8536 NO DOC ELDER MAL SCRN: HCPCS | Performed by: NURSE PRACTITIONER

## 2022-06-09 PROCEDURE — 3051F HG A1C>EQUAL 7.0%<8.0%: CPT | Performed by: NURSE PRACTITIONER

## 2022-06-09 PROCEDURE — 1123F ACP DISCUSS/DSCN MKR DOCD: CPT | Performed by: NURSE PRACTITIONER

## 2022-06-09 PROCEDURE — G0463 HOSPITAL OUTPT CLINIC VISIT: HCPCS | Performed by: NURSE PRACTITIONER

## 2022-06-09 PROCEDURE — G8427 DOCREV CUR MEDS BY ELIG CLIN: HCPCS | Performed by: NURSE PRACTITIONER

## 2022-06-09 PROCEDURE — G8432 DEP SCR NOT DOC, RNG: HCPCS | Performed by: NURSE PRACTITIONER

## 2022-06-09 PROCEDURE — G8754 DIAS BP LESS 90: HCPCS | Performed by: NURSE PRACTITIONER

## 2022-06-09 PROCEDURE — 1090F PRES/ABSN URINE INCON ASSESS: CPT | Performed by: NURSE PRACTITIONER

## 2022-06-09 PROCEDURE — 1101F PT FALLS ASSESS-DOCD LE1/YR: CPT | Performed by: NURSE PRACTITIONER

## 2022-06-09 PROCEDURE — G8400 PT W/DXA NO RESULTS DOC: HCPCS | Performed by: NURSE PRACTITIONER

## 2022-06-09 NOTE — PROGRESS NOTES
HISTORY OF PRESENT ILLNESS  Sonali Delgadillo is a [de-identified] y.o. female. HPI  Cardiovascular Review:  The patient has hypertension and hyperlipidemia. Diet and Lifestyle: generally follows a low fat low cholesterol diet, generally follows a low sodium diet, exercises sporadically, nonsmoker  Home BP Monitoring: is not measured at home. Pertinent ROS: taking medications as instructed, no medication side effects noted, no TIA's, no chest pain on exertion, no dyspnea on exertion, no swelling of ankles. Diabetes Mellitus:  She has diabetes mellitus, and  hyperlipidemia. Diabetic ROS - medication compliance: compliant all of the time, diabetic diet compliance: compliant most of the time, home glucose monitoring: is not performed. Lab review: orders written for new lab studies as appropriate; see orders. Patient Active Problem List    Diagnosis Date Noted    Type 2 diabetes mellitus with chronic kidney disease ([facility] 01/06/2022    CAD (coronary artery disease)     CKD (chronic kidney disease)     Controlled type 2 diabetes mellitus with neurologic complication, with long-term current use of insulin ([facility]     Dyslipidemia     HTN (hypertension)     Obesity, morbid ([facility] 10/07/2020    Heart attack ([facility] 2014     Current Outpatient Medications   Medication Sig Dispense Refill    sotaloL (BETAPACE) 80 mg tablet Take 1.5 Tablets by mouth daily. 145 Tablet 3    montelukast (SINGULAIR) 5 mg chewable tablet CHEW 2 TABLETS NIGHTLY 180 Tablet 3    atorvastatin (LIPITOR) 40 mg tablet TAKE 1 TABLET DAILY 90 Tablet 3    spironolactone (ALDACTONE) 25 mg tablet TAKE 1 TABLET DAILY 90 Tablet 3    methocarbamoL (ROBAXIN) 500 mg tablet Take 1 Tablet by mouth nightly as needed for Muscle Spasm(s). 30 Tablet 1    albuterol (PROVENTIL HFA, VENTOLIN HFA, PROAIR HFA) 90 mcg/actuation inhaler Take 2 Puffs by inhalation every four (4) hours as needed for Wheezing.  3 Each 1    allopurinoL (ZYLOPRIM) 100 mg tablet TAKE 1 TABLET DAILY 90 Tablet 3    cloNIDine HCL (CATAPRES) 0.1 mg tablet Take 1 Tablet by mouth two (2) times a day. 180 Tablet 3    Advair Diskus 250-50 mcg/dose diskus inhaler USE 1 INHALATION EVERY 12 HOURS 60 Each 3    lisinopriL (PRINIVIL, ZESTRIL) 5 mg tablet Take 1 Tablet by mouth daily. 90 Tablet 3    exenatide microspheres (Bydureon BCise) 2 mg/0.85 mL atIn 2 mg by SubCUTAneous route every seven (7) days. 12 Each 3    insulin aspart U-100 (NOVOLOG) 100 unit/mL (3 mL) inpn 22 Units by SubCUTAneous route Before breakfast, lunch, dinner and at bedtime. 30 Pen 3    furosemide (LASIX) 20 mg tablet TAKE 1 TABLET DAILY 90 Tablet 3    aspirin 81 mg chewable tablet Take 81 mg by mouth daily. Family History   Problem Relation Age of Onset    Heart Attack Sister     Heart Attack Mother      Social History     Tobacco Use    Smoking status: Former Smoker     Quit date: 1977     Years since quittin.4    Smokeless tobacco: Never Used   Substance Use Topics    Alcohol use: Not Currently           ROS  A comprehensive review of system was obtained and negative except findings in the HPI    Visit Vitals  /70 (BP 1 Location: Left upper arm, BP Patient Position: Sitting)   Pulse 74   Temp 98.3 °F (36.8 °C) (Oral)   Resp 16   Ht 5' 3\" (1.6 m)   Wt 232 lb (105.2 kg)   SpO2 97%   BMI 41.10 kg/m²     Physical Exam    ASSESSMENT and PLAN  Encounter Diagnoses   Name Primary?     Controlled type 2 diabetes mellitus without complication, with long-term current use of insulin (HCC) Yes    Hypercholesteremia     Benign essential HTN     Type 2 diabetes mellitus with chronic kidney disease, with long-term current use of insulin, unspecified CKD stage ([facility]      Orders Placed This Encounter    LIPID PANEL    METABOLIC PANEL, COMPREHENSIVE    CBC WITH AUTOMATED DIFF    HEMOGLOBIN A1C WITH EAG     Labs updated today  Follow up Oct for well exam    I have discussed the diagnosis with the patient and the intended plan as seen in the above orders. The patient has received an after-visit summary and questions were answered concerning future plans. Patient conveyed understanding of the plan at the time of the visit.     DALE Reich, ANP  6/9/2022

## 2022-06-09 NOTE — PROGRESS NOTES
Chief Complaint   Patient presents with    Follow-up     3mo fuv    Labs     Pt being seen for fuv  -labs for diabetic check     1. Have you been to the ER, urgent care clinic since your last visit? Hospitalized since your last visit? No    2. Have you seen or consulted any other health care providers outside of the [address] [place] [address] since your last visit? Include any pap smears or colon screening.  No     Pt has no other concerns

## 2022-06-10 LAB
ALBUMIN SERPL-MCNC: 3.3 G/DL (ref 3.5–5)
ALBUMIN/GLOB SERPL: 0.9 {RATIO} (ref 1.1–2.2)
ALP SERPL-CCNC: 120 U/L (ref 45–117)
ALT SERPL-CCNC: 23 U/L (ref 12–78)
ANION GAP SERPL CALC-SCNC: 7 MMOL/L (ref 5–15)
AST SERPL-CCNC: 14 U/L (ref 15–37)
BASOPHILS # BLD: 0.1 K/UL (ref 0–0.1)
BASOPHILS NFR BLD: 1 % (ref 0–1)
BILIRUB SERPL-MCNC: 0.6 MG/DL (ref 0.2–1)
BUN SERPL-MCNC: 27 MG/DL (ref 6–20)
BUN/CREAT SERPL: 22 (ref 12–20)
CALCIUM SERPL-MCNC: 9.6 MG/DL (ref 8.5–10.1)
CHLORIDE SERPL-SCNC: 103 MMOL/L (ref 97–108)
CHOLEST SERPL-MCNC: 160 MG/DL
CO2 SERPL-SCNC: 28 MMOL/L (ref 21–32)
CREAT SERPL-MCNC: 1.22 MG/DL (ref 0.55–1.02)
DIFFERENTIAL METHOD BLD: ABNORMAL
EOSINOPHIL # BLD: 0.2 K/UL (ref 0–0.4)
EOSINOPHIL NFR BLD: 2 % (ref 0–7)
ERYTHROCYTE [DISTWIDTH] IN BLOOD BY AUTOMATED COUNT: 13.8 % (ref 11.5–14.5)
EST. AVERAGE GLUCOSE BLD GHB EST-MCNC: 160 MG/DL
GLOBULIN SER CALC-MCNC: 3.5 G/DL (ref 2–4)
GLUCOSE SERPL-MCNC: 162 MG/DL (ref 65–100)
HBA1C MFR BLD: 7.2 % (ref 4–5.6)
HCT VFR BLD AUTO: 40.4 % (ref 35–47)
HDLC SERPL-MCNC: 47 MG/DL
HDLC SERPL: 3.4 {RATIO} (ref 0–5)
HGB BLD-MCNC: 12.3 G/DL (ref 11.5–16)
IMM GRANULOCYTES # BLD AUTO: 0.1 K/UL (ref 0–0.04)
IMM GRANULOCYTES NFR BLD AUTO: 1 % (ref 0–0.5)
LDLC SERPL CALC-MCNC: 77.4 MG/DL (ref 0–100)
LYMPHOCYTES # BLD: 1.7 K/UL (ref 0.8–3.5)
LYMPHOCYTES NFR BLD: 18 % (ref 12–49)
MCH RBC QN AUTO: 32.1 PG (ref 26–34)
MCHC RBC AUTO-ENTMCNC: 30.4 G/DL (ref 30–36.5)
MCV RBC AUTO: 105.5 FL (ref 80–99)
MONOCYTES # BLD: 0.8 K/UL (ref 0–1)
MONOCYTES NFR BLD: 8 % (ref 5–13)
NEUTS SEG # BLD: 6.9 K/UL (ref 1.8–8)
NEUTS SEG NFR BLD: 70 % (ref 32–75)
NRBC # BLD: 0 K/UL (ref 0–0.01)
NRBC BLD-RTO: 0 PER 100 WBC
PLATELET # BLD AUTO: 288 K/UL (ref 150–400)
PMV BLD AUTO: 10.6 FL (ref 8.9–12.9)
POTASSIUM SERPL-SCNC: 4.9 MMOL/L (ref 3.5–5.1)
PROT SERPL-MCNC: 6.8 G/DL (ref 6.4–8.2)
RBC # BLD AUTO: 3.83 M/UL (ref 3.8–5.2)
SODIUM SERPL-SCNC: 138 MMOL/L (ref 136–145)
TRIGL SERPL-MCNC: 178 MG/DL (ref ?–150)
VLDLC SERPL CALC-MCNC: 35.6 MG/DL
WBC # BLD AUTO: 9.7 K/UL (ref 3.6–11)

## 2022-06-14 NOTE — PROGRESS NOTES
Hey there, your labs are stable at this time. No changes at this time. Recheck 3 months fasting.  Sarthak Marquez

## 2022-07-14 ENCOUNTER — APPOINTMENT (OUTPATIENT)
Dept: ULTRASOUND IMAGING | Age: 82
End: 2022-07-14
Attending: EMERGENCY MEDICINE
Payer: MEDICARE

## 2022-07-14 ENCOUNTER — HOSPITAL ENCOUNTER (EMERGENCY)
Age: 82
Discharge: HOME | End: 2022-07-14
Attending: EMERGENCY MEDICINE
Payer: MEDICARE

## 2022-07-14 VITALS
OXYGEN SATURATION: 100 % | WEIGHT: 225 LBS | BODY MASS INDEX: 39.87 KG/M2 | TEMPERATURE: 97.9 F | RESPIRATION RATE: 24 BRPM | HEART RATE: 77 BPM | DIASTOLIC BLOOD PRESSURE: 37 MMHG | SYSTOLIC BLOOD PRESSURE: 132 MMHG | HEIGHT: 63 IN

## 2022-07-14 DIAGNOSIS — M71.20 SYNOVIAL CYST OF KNEE, UNSPECIFIED LATERALITY: ICD-10-CM

## 2022-07-14 DIAGNOSIS — R21 RASH AND OTHER NONSPECIFIC SKIN ERUPTION: Primary | ICD-10-CM

## 2022-07-14 LAB
BASOPHILS # BLD: 0.1 K/UL (ref 0–0.1)
BASOPHILS NFR BLD: 1 % (ref 0–1)
COMMENT, HOLDF: NORMAL
DIFFERENTIAL METHOD BLD: ABNORMAL
EOSINOPHIL # BLD: 0.2 K/UL (ref 0–0.4)
EOSINOPHIL NFR BLD: 2 % (ref 0–7)
ERYTHROCYTE [DISTWIDTH] IN BLOOD BY AUTOMATED COUNT: 13.5 % (ref 11.5–14.5)
HCT VFR BLD AUTO: 37.5 % (ref 35–47)
HGB BLD-MCNC: 12.5 G/DL (ref 11.5–16)
IMM GRANULOCYTES # BLD AUTO: 0.1 K/UL (ref 0–0.04)
IMM GRANULOCYTES NFR BLD AUTO: 1 % (ref 0–0.5)
INR PPP: 1.1 (ref 0.9–1.1)
LYMPHOCYTES # BLD: 2 K/UL (ref 0.8–3.5)
LYMPHOCYTES NFR BLD: 20 % (ref 12–49)
MCH RBC QN AUTO: 32.1 PG (ref 26–34)
MCHC RBC AUTO-ENTMCNC: 33.3 G/DL (ref 30–36.5)
MCV RBC AUTO: 96.2 FL (ref 80–99)
MONOCYTES # BLD: 0.9 K/UL (ref 0–1)
MONOCYTES NFR BLD: 9 % (ref 5–13)
NEUTS SEG # BLD: 7.1 K/UL (ref 1.8–8)
NEUTS SEG NFR BLD: 69 % (ref 32–75)
NRBC # BLD: 0 K/UL (ref 0–0.01)
NRBC BLD-RTO: 0 PER 100 WBC
PLATELET # BLD AUTO: 236 K/UL (ref 150–400)
PMV BLD AUTO: 10.1 FL (ref 8.9–12.9)
PROTHROMBIN TIME: 14.4 SEC (ref 11.9–14.6)
RBC # BLD AUTO: 3.9 M/UL (ref 3.8–5.2)
SAMPLES BEING HELD,HOLD: NORMAL
WBC # BLD AUTO: 10.3 K/UL (ref 3.6–11)

## 2022-07-14 PROCEDURE — 85025 COMPLETE CBC W/AUTO DIFF WBC: CPT

## 2022-07-14 PROCEDURE — 93971 EXTREMITY STUDY: CPT

## 2022-07-14 PROCEDURE — 85610 PROTHROMBIN TIME: CPT

## 2022-07-14 PROCEDURE — 74011250637 HC RX REV CODE- 250/637: Performed by: EMERGENCY MEDICINE

## 2022-07-14 PROCEDURE — 36415 COLL VENOUS BLD VENIPUNCTURE: CPT

## 2022-07-14 PROCEDURE — 99284 EMERGENCY DEPT VISIT MOD MDM: CPT

## 2022-07-14 RX ORDER — B.COAGUL,SUBTILIS/INULIN/VIT C 1B CELL-1G
1 TABLET,CHEWABLE ORAL DAILY
COMMUNITY

## 2022-07-14 RX ORDER — DIPHENHYDRAMINE HCL 25 MG
25 CAPSULE ORAL
Status: COMPLETED | OUTPATIENT
Start: 2022-07-14 | End: 2022-07-14

## 2022-07-14 RX ADMIN — DIPHENHYDRAMINE HYDROCHLORIDE 25 MG: 25 CAPSULE ORAL at 12:40

## 2022-07-14 NOTE — ED TRIAGE NOTES
Pt arrives in the ED ambulatory with complaints of rash to bilateral lower extremities x 1 week with left ankle swelling. Pt denies any use of new products including no new medications.

## 2022-07-14 NOTE — ED PROVIDER NOTES
81 yoF no PPMHx presenting to ED for rash. Rash appeared 1 week ago, one spot. Rash is red, speckled and pruritic. No signs of bleeding. Pt was worried this may be related to seasonal allergies. Takes ASA 81 mg daily. No benadryl taken at home. Past Medical History:   Diagnosis Date    CAD (coronary artery disease)     CKD (chronic kidney disease)     [facility]     Dyslipidemia     Heart attack ([facility] 2014    Had cath and treated medically     HTN (hypertension)     Monoclonal gammopathies     Obesity     TARIQ on CPAP     Tachycardia     placed on Sotalol at [place]        Past Surgical History:   Procedure Laterality Date    HX BREAST BIOPSY Left     several bxs on left, all benign    SC BREAST SURGERY PROCEDURE UNLISTED           Family History:   Problem Relation Age of Onset    Heart Attack Sister     Heart Attack Mother        Social History     Socioeconomic History    Marital status:      Spouse name: Not on file    Number of children: Not on file    Years of education: Not on file    Highest education level: Not on file   Occupational History    Not on file   Tobacco Use    Smoking status: Former Smoker     Quit date: 1977     Years since quittin.5    Smokeless tobacco: Never Used   Vaping Use    Vaping Use: Never used   Substance and Sexual Activity    Alcohol use: Not Currently    Drug use: Never    Sexual activity: Not Currently   Other Topics Concern    Not on file   Social History Narrative    Not on file     Social Determinants of Health     Financial Resource Strain:     Difficulty of Paying Living Expenses: Not on file   Food Insecurity:     Worried About Running Out of Food in the Last Year: Not on file    [place] of Food in the Last Year: Not on file   Transportation Needs:     Lack of Transportation (Medical): Not on file    Lack of Transportation (Non-Medical):  Not on file   Physical Activity:     Days of Exercise per Week: Not on file    Minutes of Exercise per Session: Not on file   Stress:     Feeling of Stress : Not on file   Social Connections:     Frequency of Communication with Friends and Family: Not on file    Frequency of Social Gatherings with Friends and Family: Not on file    Attends Confucianist Services: Not on file    Active Member of Clubs or Organizations: Not on file    Attends Club or Organization Meetings: Not on file    Marital Status: Not on file   Intimate Partner Violence:     Fear of Current or Ex-Partner: Not on file    Emotionally Abused: Not on file    Physically Abused: Not on file    Sexually Abused: Not on file   Housing Stability:     Unable to Pay for Housing in the Last Year: Not on file    Number of [place] in the Last Year: Not on file    Unstable Housing in the Last Year: Not on file         ALLERGIES: Codeine and Motrin [ibuprofen]    Review of Systems   Constitutional: Negative for fever. Respiratory: Negative for shortness of breath. Cardiovascular: Negative for chest pain. Gastrointestinal: Negative for abdominal pain. Skin: Positive for rash. Negative for wound. Allergic/Immunologic: Negative for immunocompromised state. Hematological: Does not bruise/bleed easily. Psychiatric/Behavioral: Negative for confusion. Vitals:    07/14/22 1149 07/14/22 1202   BP: (!) 171/66    Pulse: 77    Resp: 24    Temp: 97.9 °F (36.6 °C)    SpO2: 96% 97%   Weight: 102.1 kg (225 lb)    Height: 5' 3\" (1.6 m)             Physical Exam  Vitals reviewed. Constitutional:       General: She is not in acute distress. Appearance: She is well-developed. HENT:      Head: Normocephalic and atraumatic. Eyes:      General: No scleral icterus. Neck:      Trachea: No tracheal deviation. Cardiovascular:      Rate and Rhythm: Normal rate. Pulmonary:      Effort: Pulmonary effort is normal. No respiratory distress. Abdominal:      General: There is no distension.    Skin: General: Skin is warm and dry. Neurological:      Mental Status: She is alert and oriented to person, place, and time. MDM  Number of Diagnoses or Management Options  Rash and other nonspecific skin eruption  Synovial cyst of knee, unspecified laterality  Diagnosis management comments: 81-year-old female presenting with an unidentified rash to the bilateral lower extremities. Given petechial appearance, will check platelets and coags for bleeding disorder. Most likely autoimmune the given appearance and lack of other symptoms. We will have her take Benadryl for itching and have her follow-up with primary care if it does not resolve spontaneously. ED Course as of 07/14/22 1940   Thu Jul 14, 2022   1419 PLATELET: 832  No thrombocytopenia or other lab abnormalities. [SL]   1444 Benadryl helped with itching. Discussed taking 25/50mg at home.   [SL]   1447 Pt reports swelling in L ankle.    [SL]   26 DUPLEX LOWER EXT VENOUS LEFT  Neg for DVT, +Baker's Cyst [SL]      ED Course User Index  [SL] Ethel Cruz MD       Procedures

## 2022-07-18 ENCOUNTER — NURSE TRIAGE (OUTPATIENT)
Dept: OTHER | Facility: CLINIC | Age: 82
End: 2022-07-18

## 2022-07-18 NOTE — TELEPHONE ENCOUNTER
Received call from Baylee at [facility] with Red Flag Complaint. Subjective: Caller states \"I had a rash, went to ER and was given benadryl. The spots are almost gone My blood pressure was all over the place and needed to see my MD\"     Current Symptoms: low diastolic blood pressure. 130/34.  130/43,  160/34 or 43. Current b/p-143/77, heart 77    Denies dizziness, chest pain, SOB worse than normal, headache. Onset: 4 days ago; waxing and waning    Associated Symptoms: NA    Pain Severity: 0/10; Temperature: n/a     What has been tried: n/a    LMP: NA Pregnant: NA    Recommended disposition: See PCP within 3 Days    Care advice provided, patient verbalizes understanding; denies any other questions or concerns; instructed to call back for any new or worsening symptoms. Patient/Caller agrees with recommended disposition; writer provided warm transfer to [facility] at [facility] for appointment scheduling    Attention Provider: Thank you for allowing me to participate in the care of your patient. The patient was connected to triage in response to information provided to the [facility]. Please do not respond through this encounter as the response is not directed to a shared pool.       Reason for Disposition   Diastolic BP < 50 mm Hg    Protocols used: BLOOD PRESSURE - LOW-ADULT-OH

## 2022-07-25 RX ORDER — FUROSEMIDE 20 MG/1
TABLET ORAL
Qty: 90 TABLET | Refills: 3 | Status: SHIPPED | OUTPATIENT
Start: 2022-07-25

## 2022-08-02 ENCOUNTER — OFFICE VISIT (OUTPATIENT)
Dept: FAMILY MEDICINE CLINIC | Age: 82
End: 2022-08-02
Payer: MEDICARE

## 2022-08-02 VITALS
SYSTOLIC BLOOD PRESSURE: 107 MMHG | HEIGHT: 63 IN | RESPIRATION RATE: 14 BRPM | TEMPERATURE: 98.5 F | HEART RATE: 73 BPM | WEIGHT: 226 LBS | DIASTOLIC BLOOD PRESSURE: 63 MMHG | OXYGEN SATURATION: 95 % | BODY MASS INDEX: 40.04 KG/M2

## 2022-08-02 DIAGNOSIS — I10 BENIGN ESSENTIAL HTN: Primary | ICD-10-CM

## 2022-08-02 PROCEDURE — 99213 OFFICE O/P EST LOW 20 MIN: CPT | Performed by: NURSE PRACTITIONER

## 2022-08-02 PROCEDURE — G8400 PT W/DXA NO RESULTS DOC: HCPCS | Performed by: NURSE PRACTITIONER

## 2022-08-02 PROCEDURE — G8536 NO DOC ELDER MAL SCRN: HCPCS | Performed by: NURSE PRACTITIONER

## 2022-08-02 PROCEDURE — 1101F PT FALLS ASSESS-DOCD LE1/YR: CPT | Performed by: NURSE PRACTITIONER

## 2022-08-02 PROCEDURE — G8427 DOCREV CUR MEDS BY ELIG CLIN: HCPCS | Performed by: NURSE PRACTITIONER

## 2022-08-02 PROCEDURE — G8752 SYS BP LESS 140: HCPCS | Performed by: NURSE PRACTITIONER

## 2022-08-02 PROCEDURE — G0463 HOSPITAL OUTPT CLINIC VISIT: HCPCS | Performed by: NURSE PRACTITIONER

## 2022-08-02 PROCEDURE — G8432 DEP SCR NOT DOC, RNG: HCPCS | Performed by: NURSE PRACTITIONER

## 2022-08-02 PROCEDURE — G8754 DIAS BP LESS 90: HCPCS | Performed by: NURSE PRACTITIONER

## 2022-08-02 PROCEDURE — 1123F ACP DISCUSS/DSCN MKR DOCD: CPT | Performed by: NURSE PRACTITIONER

## 2022-08-02 PROCEDURE — G8417 CALC BMI ABV UP PARAM F/U: HCPCS | Performed by: NURSE PRACTITIONER

## 2022-08-02 PROCEDURE — 1090F PRES/ABSN URINE INCON ASSESS: CPT | Performed by: NURSE PRACTITIONER

## 2022-08-02 NOTE — PROGRESS NOTES
Chief Complaint   Patient presents with    Blood Pressure Check     Pt being seen for fuv from the hospital  -pt states that her bp states that her bp was all over the place  -pt states she had broken out on her leg    1. Have you been to the ER, urgent care clinic since your last visit? Hospitalized since your last visit? [facility]     2. Have you seen or consulted any other health care providers outside of the [address] [place] [address] since your last visit? Include any pap smears or colon screening.  No    Pt has no other concerns

## 2022-08-02 NOTE — PROGRESS NOTES
HISTORY OF PRESENT ILLNESS  Zuly Monae is a 81 y.o. female. HPI  Pt being seen for fuv from the hospital  -pt states that her bp states that her bp was all over the place  -pt states she had broken out on her leg  She has been stressed with  and alzheimers    ROS  A comprehensive review of system was obtained and negative except findings in the HPI    Visit Vitals  /63 (BP 1 Location: Right arm, BP Patient Position: Sitting)   Pulse 73   Temp 98.5 °F (36.9 °C) (Oral)   Resp 14   Ht 5' 3\" (1.6 m)   Wt 226 lb (102.5 kg)   SpO2 95%   BMI 40.03 kg/m²     Physical Exam  Vitals and nursing note reviewed. Constitutional:       Appearance: Normal appearance. She is well-developed. Comments:      Neck:      Vascular: No JVD. Cardiovascular:      Rate and Rhythm: Normal rate and regular rhythm. Heart sounds: Normal heart sounds. No murmur heard. No friction rub. No gallop. Pulmonary:      Effort: Pulmonary effort is normal. No respiratory distress. Breath sounds: Normal breath sounds. No wheezing. Skin:     General: Skin is warm. Neurological:      Mental Status: She is alert and oriented to person, place, and time. ASSESSMENT and PLAN  Encounter Diagnoses   Name Primary? Benign essential HTN Yes     No orders of the defined types were placed in this encounter. No changes at this time  Bp in good range  Follow-up and Dispositions    Return for well exam and fasting labs after 9/13. I have discussed the diagnosis with the patient and the intended plan as seen in the above orders. The patient has received an after-visit summary and questions were answered concerning future plans. Patient conveyed understanding of the plan at the time of the visit.     Jacky Nieves, MSN, ANP  8/2/2022

## 2022-09-14 RX ORDER — CLONIDINE HYDROCHLORIDE 0.1 MG/1
TABLET ORAL
Qty: 180 TABLET | Refills: 2 | Status: SHIPPED | OUTPATIENT
Start: 2022-09-14

## 2022-09-14 NOTE — TELEPHONE ENCOUNTER
Refill per VO of Dr. Lorenzo Pill  Last appt: 4/28/22  Future Appointments   Date Time Provider Mo [place]arg[place]   9/15/2022 11:00 AM Radhika Bull NP [facility]   10/11/2022 11:00 AM Cameron Ghotra MD [facility]   [facility]   5/1/2023 11:40 AM [facility]       Requested Prescriptions     Signed Prescriptions Disp Refills    cloNIDine HCL (CATAPRES) 0.1 mg tablet 180 Tablet 2     Sig: TAKE 1 TABLET TWICE A DAY     Authorizing Provider: Adan Prado     Ordering User: Jaylon Linares

## 2022-09-15 ENCOUNTER — OFFICE VISIT (OUTPATIENT)
Dept: FAMILY MEDICINE CLINIC | Age: 82
End: 2022-09-15
Payer: MEDICARE

## 2022-09-15 VITALS
RESPIRATION RATE: 18 BRPM | OXYGEN SATURATION: 97 % | WEIGHT: 223 LBS | HEIGHT: 63 IN | TEMPERATURE: 98.1 F | SYSTOLIC BLOOD PRESSURE: 105 MMHG | DIASTOLIC BLOOD PRESSURE: 63 MMHG | BODY MASS INDEX: 39.51 KG/M2 | HEART RATE: 60 BPM

## 2022-09-15 DIAGNOSIS — Z79.4 CONTROLLED TYPE 2 DIABETES MELLITUS WITHOUT COMPLICATION, WITH LONG-TERM CURRENT USE OF INSULIN (HCC): ICD-10-CM

## 2022-09-15 DIAGNOSIS — E11.9 CONTROLLED TYPE 2 DIABETES MELLITUS WITHOUT COMPLICATION, WITH LONG-TERM CURRENT USE OF INSULIN (HCC): ICD-10-CM

## 2022-09-15 DIAGNOSIS — Z00.00 WELL ADULT EXAM: Primary | ICD-10-CM

## 2022-09-15 DIAGNOSIS — N18.30 STAGE 3 CHRONIC KIDNEY DISEASE, UNSPECIFIED WHETHER STAGE 3A OR 3B CKD (HCC): ICD-10-CM

## 2022-09-15 DIAGNOSIS — E78.00 HYPERCHOLESTEREMIA: ICD-10-CM

## 2022-09-15 LAB
CHOLEST SERPL-MCNC: 144 MG/DL
EST. AVERAGE GLUCOSE BLD GHB EST-MCNC: 160 MG/DL
HBA1C MFR BLD: 7.2 % (ref 4–5.6)
HDLC SERPL-MCNC: 50 MG/DL
HDLC SERPL: 2.9 {RATIO} (ref 0–5)
LDLC SERPL CALC-MCNC: 66.2 MG/DL (ref 0–100)
TRIGL SERPL-MCNC: 139 MG/DL (ref ?–150)
VLDLC SERPL CALC-MCNC: 27.8 MG/DL

## 2022-09-15 PROCEDURE — G8754 DIAS BP LESS 90: HCPCS | Performed by: NURSE PRACTITIONER

## 2022-09-15 PROCEDURE — 1101F PT FALLS ASSESS-DOCD LE1/YR: CPT | Performed by: NURSE PRACTITIONER

## 2022-09-15 PROCEDURE — 1123F ACP DISCUSS/DSCN MKR DOCD: CPT | Performed by: NURSE PRACTITIONER

## 2022-09-15 PROCEDURE — G8400 PT W/DXA NO RESULTS DOC: HCPCS | Performed by: NURSE PRACTITIONER

## 2022-09-15 PROCEDURE — G8427 DOCREV CUR MEDS BY ELIG CLIN: HCPCS | Performed by: NURSE PRACTITIONER

## 2022-09-15 PROCEDURE — G0463 HOSPITAL OUTPT CLINIC VISIT: HCPCS | Performed by: NURSE PRACTITIONER

## 2022-09-15 PROCEDURE — G8536 NO DOC ELDER MAL SCRN: HCPCS | Performed by: NURSE PRACTITIONER

## 2022-09-15 PROCEDURE — G8417 CALC BMI ABV UP PARAM F/U: HCPCS | Performed by: NURSE PRACTITIONER

## 2022-09-15 PROCEDURE — 3051F HG A1C>EQUAL 7.0%<8.0%: CPT | Performed by: NURSE PRACTITIONER

## 2022-09-15 PROCEDURE — 99213 OFFICE O/P EST LOW 20 MIN: CPT | Performed by: NURSE PRACTITIONER

## 2022-09-15 PROCEDURE — G8752 SYS BP LESS 140: HCPCS | Performed by: NURSE PRACTITIONER

## 2022-09-15 PROCEDURE — G8432 DEP SCR NOT DOC, RNG: HCPCS | Performed by: NURSE PRACTITIONER

## 2022-09-15 PROCEDURE — 1090F PRES/ABSN URINE INCON ASSESS: CPT | Performed by: NURSE PRACTITIONER

## 2022-09-15 PROCEDURE — G0439 PPPS, SUBSEQ VISIT: HCPCS | Performed by: NURSE PRACTITIONER

## 2022-09-15 NOTE — PATIENT INSTRUCTIONS
Medicare Wellness Visit, Female     The best way to live healthy is to have a lifestyle where you eat a well-balanced diet, exercise regularly, limit alcohol use, and quit all forms of tobacco/nicotine, if applicable. Regular preventive services are another way to keep healthy. Preventive services (vaccines, screening tests, monitoring & exams) can help personalize your care plan, which helps you manage your own care. Screening tests can find health problems at the earliest stages, when they are easiest to treat. [facility] follows the current, evidence-based guidelines published by the [facility] Nico Whitaker ([facility]) when recommending preventive services for our patients. Because we follow these guidelines, sometimes recommendations change over time as research supports it. (For example, mammograms used to be recommended annually. Even though Medicare will still pay for an annual mammogram, the newer guidelines recommend a mammogram every two years for women of average risk). Of course, you and your doctor may decide to screen more often for some diseases, based on your risk and your co-morbidities (chronic disease you are already diagnosed with). Preventive services for you include:  - Medicare offers their members a free annual wellness visit, which is time for you and your primary care provider to discuss and plan for your preventive service needs. Take advantage of this benefit every year!  -All adults over the age of 72 should receive the recommended pneumonia vaccines. Current USPSTF guidelines recommend a series of two vaccines for the best pneumonia protection.   -All adults should have a flu vaccine yearly and a tetanus vaccine every 10 years.   -All adults age 48 and older should receive the shingles vaccines (series of two vaccines).       -All adults age 38-68 who are overweight should have a diabetes screening test once every three years.   -All adults born between [birth year] and 1965 should be screened once for Hepatitis C.  -Other screening tests and preventive services for persons with diabetes include: an eye exam to screen for diabetic retinopathy, a kidney function test, a foot exam, and stricter control over your cholesterol.   -Cardiovascular screening for adults with routine risk involves an electrocardiogram (ECG) at intervals determined by your doctor.   -Colorectal cancer screenings should be done for adults age 54-65 with no increased risk factors for colorectal cancer. There are a number of acceptable methods of screening for this type of cancer. Each test has its own benefits and drawbacks. Discuss with your doctor what is most appropriate for you during your annual wellness visit. The different tests include: colonoscopy (considered the best screening method), a fecal occult blood test, a fecal DNA test, and sigmoidoscopy.    -A bone mass density test is recommended when a woman turns 65 to screen for osteoporosis. This test is only recommended one time, as a screening. Some providers will use this same test as a disease monitoring tool if you already have osteoporosis. -Breast cancer screenings are recommended every other year for women of normal risk, age 54-69.  -Cervical cancer screenings for women over age 72 are only recommended with certain risk factors.      Here is a list of your current Health Maintenance items (your personalized list of preventive services) with a due date:  Health Maintenance Due   Topic Date Due    Pneumococcal Vaccine (1 - PCV) Never done    Diabetic Foot Care  Never done    DTaP/Tdap/Td  (1 - Tdap) Never done    Shingles Vaccine (1 of 2) Never done    Bone Mineral Density   Never done    COVID-19 Vaccine (4 - Booster for Pfizer series) 05/17/2022    Yearly Flu Vaccine (1) Never done    Albumin Urine Test  09/13/2022

## 2022-09-15 NOTE — PROGRESS NOTES
Chief Complaint   Patient presents with    Annual Wellness Visit    Labs     Pt being seen for wellness visit   -labs    1. Have you been to the ER, urgent care clinic since your last visit? Hospitalized since your last visit? No    2. Have you seen or consulted any other health care providers outside of the [address] [place] [address] since your last visit? Include any pap smears or colon screening.  No    Pt has no other concerns

## 2022-09-15 NOTE — PROGRESS NOTES
This is the Subsequent Medicare Annual Wellness Exam, performed 12 months or more after the Initial AWV or the last Subsequent AWV    I have reviewed the patient's medical history in detail and updated the computerized patient record. She is also here for follow up labs for DM and chol  Sugar avg is 130, no hypoglycemic events  Bp is in good range, 121/60's  Assessment/Plan   Education and counseling provided:  Are appropriate based on today's review and evaluation    1. Controlled type 2 diabetes mellitus without complication, with long-term current use of insulin (HCC)  -     HEMOGLOBIN A1C WITH EAG; Future  2. Stage 3 chronic kidney disease, unspecified whether stage 3a or 3b CKD ([facility]  3. Hypercholesteremia  -     LIPID PANEL; Future       Depression Risk Factor Screening     3 most recent PHQ Screens 9/12/2022   Little interest or pleasure in doing things Several days   Feeling down, depressed, irritable, or hopeless Several days   Total Score PHQ 2 2       Alcohol & Drug Abuse Risk Screen   Do you average more than 1 drink per night or more than 7 drinks a week?: (P) No  On any one occasion in the past three months have you had more than 3 drinks containing alcohol?: (P) No          Functional Ability and Level of Safety   Hearing:  Hearing: (P) additional comments below  Hearing comments: (P) Don't hear so good at times    Activities of Daily Living: The home contains: (P) handrails, grab bars  Functional ADLs: (P) Patient does total self care   Ambulation:  Patient ambulates: (P) with mild difficulty  How far the patient can walk with difficulty: (P) Walk in stores and around block  Walking is difficult due to: (P) shortness of breath  Walking aids: (P) cane   Fall Risk:  Fall Risk Assessment, last 12 mths 9/15/2022   Able to walk? Yes   Fall in past 12 months? 0   Do you feel unsteady?  0   Are you worried about falling 0   Is the gait abnormal? -   Number of falls in past 12 months -   Fall with injury?  -     Abuse Screen:  Do you ever feel afraid of your partner?: (P) No  Are you in a relationship with someone who physically or mentally threatens you?: (P) No  Is it safe for you to go home?: (P) Yes      Cognitive Screening   Has your family/caregiver stated any concerns about your memory?: (P) No   Cognitive Screening: Normal - Mini Cog Test    Health Maintenance Due     Health Maintenance Due   Topic Date Due    Pneumococcal 65+ years (1 - PCV) Never done    Foot Exam Q1  Never done    DTaP/Tdap/Td series (1 - Tdap) Never done    Shingrix Vaccine Age 50> (1 of 2) Never done    Bone Densitometry (Dexa) Screening  Never done    COVID-19 Vaccine (4 - Booster for Pfizer series) 05/17/2022    Flu Vaccine (1) Never done    MICROALBUMIN Q1  09/13/2022    Medicare Yearly Exam  09/14/2022       Patient Care Team   Patient Care Team:  Kylie Islas NP as PCP - General (Family Medicine)  Kylie Islas NP as PCP - [facility]  Braden Ghotra MD (Hematology and Oncology)    History     Patient Active Problem List   Diagnosis Code    Obesity, morbid ([address] E66.01    CAD (coronary artery disease) I25.10    CKD (chronic kidney disease) N18.9    Controlled type 2 diabetes mellitus with neurologic complication, with long-term current use of insulin ([address] E11.49, Z79.4    Dyslipidemia E78.5    Heart attack ([address] I21.9    HTN (hypertension) I10    Type 2 diabetes mellitus with chronic kidney disease ([address] E11.22    Chronic renal disease, stage III N18.30     Past Medical History:   Diagnosis Date    CAD (coronary artery disease)     CKD (chronic kidney disease)     Diabetes ([address]     Dyslipidemia     Heart attack ([address] 2014    Had cath and treated medically     HTN (hypertension)     Monoclonal gammopathies     Obesity     TARIQ on CPAP     Tachycardia     placed on Sotalol at [place]       Past Surgical History:   Procedure Laterality Date    HX BREAST BIOPSY Left     several bxs on left, all benign SC BREAST SURGERY PROCEDURE UNLISTED       Current Outpatient Medications   Medication Sig Dispense Refill    cloNIDine HCL (CATAPRES) 0.1 mg tablet TAKE 1 TABLET TWICE A  Tablet 2    furosemide (LASIX) 20 mg tablet TAKE 1 TABLET DAILY 90 Tablet 3    zmmqwxxznzfjrk-hpzmb-Q-neot (Culturelle IBS Complete Supprt) 5,500 mg pwpk Take 1 Scoop by mouth daily. sotaloL (BETAPACE) 80 mg tablet Take 1.5 Tablets by mouth daily. 145 Tablet 3    montelukast (SINGULAIR) 5 mg chewable tablet CHEW 2 TABLETS NIGHTLY 180 Tablet 3    atorvastatin (LIPITOR) 40 mg tablet TAKE 1 TABLET DAILY 90 Tablet 3    spironolactone (ALDACTONE) 25 mg tablet TAKE 1 TABLET DAILY 90 Tablet 3    albuterol (PROVENTIL HFA, VENTOLIN HFA, PROAIR HFA) 90 mcg/actuation inhaler Take 2 Puffs by inhalation every four (4) hours as needed for Wheezing. 3 Each 1    allopurinoL (ZYLOPRIM) 100 mg tablet TAKE 1 TABLET DAILY 90 Tablet 3    Advair Diskus 250-50 mcg/dose diskus inhaler USE 1 INHALATION EVERY 12 HOURS 60 Each 3    lisinopriL (PRINIVIL, ZESTRIL) 5 mg tablet Take 1 Tablet by mouth daily. 90 Tablet 3    exenatide microspheres (Bydureon BCise) 2 mg/0.85 mL atIn 2 mg by SubCUTAneous route every seven (7) days. 12 Each 3    insulin aspart U-100 (NOVOLOG) 100 unit/mL (3 mL) inpn 22 Units by SubCUTAneous route Before breakfast, lunch, dinner and at bedtime. 30 Pen 3    aspirin 81 mg chewable tablet Take 81 mg by mouth daily. methocarbamoL (ROBAXIN) 500 mg tablet Take 1 Tablet by mouth nightly as needed for Muscle Spasm(s).  (Patient not taking: No sig reported) 30 Tablet 1     Allergies   Allergen Reactions    Codeine Unknown (comments)    Motrin [Ibuprofen] Hives       Family History   Problem Relation Age of Onset    Heart Attack Sister     Heart Attack Mother      Social History     Tobacco Use    Smoking status: Former     Types: Cigarettes     Quit date: 1977     Years since quittin.7    Smokeless tobacco: Never   Substance Use Topics    Alcohol use: Not Currently         Sandi Canavan, NP

## 2022-10-03 ENCOUNTER — TELEPHONE (OUTPATIENT)
Dept: ONCOLOGY | Age: 82
End: 2022-10-03

## 2022-10-03 RX ORDER — LISINOPRIL 5 MG/1
TABLET ORAL
Qty: 90 TABLET | Refills: 3 | Status: SHIPPED | OUTPATIENT
Start: 2022-10-03

## 2022-10-03 NOTE — TELEPHONE ENCOUNTER
[address] at [facility]  [phone]    10/03/22- Phone call placed to pt to remind pt to have labs drawn prior to his follow up appointment with . Pt verbalized understanding.

## 2022-10-04 LAB
ALBUMIN SERPL ELPH-MCNC: 3.4 G/DL (ref 2.9–4.4)
ALBUMIN SERPL-MCNC: 3.9 G/DL (ref 3.6–4.6)
ALBUMIN/GLOB SERPL: 1.1 {RATIO} (ref 0.7–1.7)
ALPHA1 GLOB SERPL ELPH-MCNC: 0.2 G/DL (ref 0–0.4)
ALPHA2 GLOB SERPL ELPH-MCNC: 1 G/DL (ref 0.4–1)
B-GLOBULIN SERPL ELPH-MCNC: 0.9 G/DL (ref 0.7–1.3)
BASOPHILS # BLD AUTO: 0.1 X10E3/UL (ref 0–0.2)
BASOPHILS NFR BLD AUTO: 1 %
BUN SERPL-MCNC: 22 MG/DL (ref 8–27)
BUN/CREAT SERPL: 16 (ref 12–28)
CALCIUM SERPL-MCNC: 9.8 MG/DL (ref 8.7–10.3)
CHLORIDE SERPL-SCNC: 101 MMOL/L (ref 96–106)
CO2 SERPL-SCNC: 24 MMOL/L (ref 20–29)
CREAT SERPL-MCNC: 1.36 MG/DL (ref 0.57–1)
EGFR: 39 ML/MIN/1.73
EOSINOPHIL # BLD AUTO: 0.2 X10E3/UL (ref 0–0.4)
EOSINOPHIL NFR BLD AUTO: 2 %
ERYTHROCYTE [DISTWIDTH] IN BLOOD BY AUTOMATED COUNT: 12.1 % (ref 11.7–15.4)
GAMMA GLOB SERPL ELPH-MCNC: 1.1 G/DL (ref 0.4–1.8)
GLOBULIN SER-MCNC: 3.2 G/DL (ref 2.2–3.9)
GLUCOSE SERPL-MCNC: 142 MG/DL (ref 70–99)
HCT VFR BLD AUTO: 38 % (ref 34–46.6)
HGB BLD-MCNC: 13.2 G/DL (ref 11.1–15.9)
IGA SERPL-MCNC: 123 MG/DL (ref 64–422)
IGG SERPL-MCNC: 1228 MG/DL (ref 586–1602)
IGM SERPL-MCNC: 54 MG/DL (ref 26–217)
IMM GRANULOCYTES # BLD AUTO: 0 X10E3/UL (ref 0–0.1)
IMM GRANULOCYTES NFR BLD AUTO: 0 %
INTERPRETATION SERPL IEP-IMP: ABNORMAL
KAPPA LC FREE SER-MCNC: 26.2 MG/L (ref 3.3–19.4)
KAPPA LC FREE/LAMBDA FREE SER: 1.61 {RATIO} (ref 0.26–1.65)
LAMBDA LC FREE SERPL-MCNC: 16.3 MG/L (ref 5.7–26.3)
LYMPHOCYTES # BLD AUTO: 1.8 X10E3/UL (ref 0.7–3.1)
LYMPHOCYTES NFR BLD AUTO: 18 %
M PROTEIN SERPL ELPH-MCNC: 0.8 G/DL
MCH RBC QN AUTO: 32.5 PG (ref 26.6–33)
MCHC RBC AUTO-ENTMCNC: 34.7 G/DL (ref 31.5–35.7)
MCV RBC AUTO: 94 FL (ref 79–97)
MONOCYTES # BLD AUTO: 0.8 X10E3/UL (ref 0.1–0.9)
MONOCYTES NFR BLD AUTO: 8 %
NEUTROPHILS # BLD AUTO: 7 X10E3/UL (ref 1.4–7)
NEUTROPHILS NFR BLD AUTO: 71 %
PHOSPHATE SERPL-MCNC: 4.5 MG/DL (ref 3–4.3)
PLATELET # BLD AUTO: 270 X10E3/UL (ref 150–450)
PLEASE NOTE:, 149534: ABNORMAL
POTASSIUM SERPL-SCNC: 5.4 MMOL/L (ref 3.5–5.2)
PROT SERPL-MCNC: 6.6 G/DL (ref 6–8.5)
RBC # BLD AUTO: 4.06 X10E6/UL (ref 3.77–5.28)
SODIUM SERPL-SCNC: 140 MMOL/L (ref 134–144)
SPECIMEN STATUS REPORT, ROLRST: NORMAL
WBC # BLD AUTO: 9.9 X10E3/UL (ref 3.4–10.8)

## 2022-10-10 NOTE — PROGRESS NOTES
[facility] at [place] [address]  [address] [facility], [facility] [address] [facility]  Narinder Marine: [phone]  F: [phone]      Reason for Visit:   Eddi Hudson is a 81 y.o. female who is seen for follow up of MGUS. History of Present Illness:   She reports considerable stress, caring for her  with dementia. She denies any recent new medical problems. No infections. Energy level fair. No pain. She is unaccompanied today. Review of systems was obtained and pertinent findings reviewed above. Past medical history, social history, family history, medications, and allergies are located in the electronic medical record. Physical Exam:     Visit Vitals  BP (!) 132/54 (BP 1 Location: Right arm, BP Patient Position: Sitting, BP Cuff Size: Large adult)   Pulse 70   Temp (!) 96.4 °F (35.8 °C) (Temporal)   Resp 16   SpO2 97%       General: no distress  Respiratory: normal respiratory effort  CV: no peripheral edema  Skin: no rashes; no ecchymoses; no petechiae      Results:     Lab Results   Component Value Date/Time    WBC 9.9 09/29/2022 04:38 PM    HGB 13.2 09/29/2022 04:38 PM    HCT 38.0 09/29/2022 04:38 PM    PLATELET 343 23/41/6199 04:38 PM    MCV 94 09/29/2022 04:38 PM    ABS. NEUTROPHILS 7.0 09/29/2022 04:38 PM     Lab Results   Component Value Date/Time    Sodium 140 09/29/2022 04:38 PM    Potassium 5.4 (H) 09/29/2022 04:38 PM    Chloride 101 09/29/2022 04:38 PM    CO2 24 09/29/2022 04:38 PM    Glucose 142 (H) 09/29/2022 04:38 PM    BUN 22 09/29/2022 04:38 PM    Creatinine 1.36 (H) 09/29/2022 04:38 PM    GFR est AA 51 (L) 06/09/2022 10:45 AM    GFR est non-AA 42 (L) 06/09/2022 10:45 AM    Calcium 9.8 09/29/2022 04:38 PM     Lab Results   Component Value Date/Time    Bilirubin, total 0.6 06/09/2022 10:45 AM    ALT (SGPT) 23 06/09/2022 10:45 AM    Alk.  phosphatase 120 (H) 06/09/2022 10:45 AM    Protein, total 6.6 09/29/2022 04:38 PM    Albumin 3.9 09/29/2022 04:38 PM Globulin 3.5 2022 10:45 AM     M-SPIKE  Recent Labs     22  1638   PE6T 0.8*       Free Kappa Light Chains  Recent Labs     22  1638   KLFL1L 26.2*       Free Lambda Light Chains  Recent Labs     22  1638   KLFL2L 16.3       Light Chain Ratio  Recent Labs     22  1638   KLFL3L 1.61       UPEP M-spike  No results for input(s): IEU8L, UPE6T in the last 7224 hours. Bone marrow biopsy 2011: Normocellular marrow with 6-9% kappa monotypic plasmacytosis. 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 Cytogenetics normal.    2018  Creatinine: 1.0  Calcium: 9.6  WBC: 9.6  HGB: 12.1  PLT: 275  MCV: 100.8  SPEP: M-spike 0.7  Ig  ELIU: IgG monoclonal protein with kappa light chain specificity    2019  SPEP: M-spike 0.7    2019  SPEP: M-spike 0.7  SFLC ratio: 1.52    2020  SPEP: M-spike 0.7    2021  SPEP: M-spike 0.9    2022  SPEP: M-spike 0.8      Assessment:   1) MGUS, IgG  S/p bone marrow biopsy 2011 which noted 6-9% plasma cells, not meeting criteria for multiple myeloma. She is at risk for developing myeloma, so we are following with surveillance. Her M-spike is back down a bit to 0.8 from 0.9. No anemia or hypercalcemia. Creatinine fairly stable. We will continue to monitor for now, with consideration of repeat bone marrow biopsy is gammopathy labs continue to rise over time. 2) CKD  Secondary to DM. Creatinine stable. Monitor.     3) DM  PCP managing      Plan:     Labs in 1 year: CBC, renal function panel, SPEP, SFLC, UPEP  Return to see me in 1 year      Signed By: Kati Hernández MD

## 2022-10-11 ENCOUNTER — OFFICE VISIT (OUTPATIENT)
Dept: ONCOLOGY | Age: 82
End: 2022-10-11
Payer: MEDICARE

## 2022-10-11 VITALS
RESPIRATION RATE: 16 BRPM | HEART RATE: 70 BPM | OXYGEN SATURATION: 97 % | TEMPERATURE: 96.4 F | DIASTOLIC BLOOD PRESSURE: 54 MMHG | SYSTOLIC BLOOD PRESSURE: 132 MMHG

## 2022-10-11 DIAGNOSIS — D47.2 MGUS (MONOCLONAL GAMMOPATHY OF UNKNOWN SIGNIFICANCE): Primary | ICD-10-CM

## 2022-10-11 PROCEDURE — G8432 DEP SCR NOT DOC, RNG: HCPCS | Performed by: INTERNAL MEDICINE

## 2022-10-11 PROCEDURE — G0463 HOSPITAL OUTPT CLINIC VISIT: HCPCS | Performed by: INTERNAL MEDICINE

## 2022-10-11 PROCEDURE — G8536 NO DOC ELDER MAL SCRN: HCPCS | Performed by: INTERNAL MEDICINE

## 2022-10-11 PROCEDURE — 1101F PT FALLS ASSESS-DOCD LE1/YR: CPT | Performed by: INTERNAL MEDICINE

## 2022-10-11 PROCEDURE — 1123F ACP DISCUSS/DSCN MKR DOCD: CPT | Performed by: INTERNAL MEDICINE

## 2022-10-11 PROCEDURE — G8427 DOCREV CUR MEDS BY ELIG CLIN: HCPCS | Performed by: INTERNAL MEDICINE

## 2022-10-11 PROCEDURE — 1090F PRES/ABSN URINE INCON ASSESS: CPT | Performed by: INTERNAL MEDICINE

## 2022-10-11 PROCEDURE — G8400 PT W/DXA NO RESULTS DOC: HCPCS | Performed by: INTERNAL MEDICINE

## 2022-10-11 PROCEDURE — G8752 SYS BP LESS 140: HCPCS | Performed by: INTERNAL MEDICINE

## 2022-10-11 PROCEDURE — G8754 DIAS BP LESS 90: HCPCS | Performed by: INTERNAL MEDICINE

## 2022-10-11 PROCEDURE — G8417 CALC BMI ABV UP PARAM F/U: HCPCS | Performed by: INTERNAL MEDICINE

## 2022-10-11 PROCEDURE — 99214 OFFICE O/P EST MOD 30 MIN: CPT | Performed by: INTERNAL MEDICINE

## 2022-10-11 NOTE — PROGRESS NOTES
Fili Nolen is a 81 y.o. female follow up for mgus. 1. Have you been to the ER, urgent care clinic since your last visit? Hospitalized since your last visit?no     2. Have you seen or consulted any other health care providers outside of the [address] [place] [address] since your last visit? Include any pap smears or colon screening.  no

## 2022-11-10 RX ORDER — EXENATIDE 2 MG/.85ML
INJECTION, SUSPENSION, EXTENDED RELEASE SUBCUTANEOUS
Qty: 10.2 ML | Refills: 3 | Status: SHIPPED | OUTPATIENT
Start: 2022-11-10 | End: 2022-11-18

## 2022-11-18 RX ORDER — DULAGLUTIDE 1.5 MG/.5ML
1.5 INJECTION, SOLUTION SUBCUTANEOUS
Qty: 12 EACH | Refills: 3 | Status: SHIPPED | OUTPATIENT
Start: 2022-11-18

## 2022-11-25 ENCOUNTER — DOCUMENTATION ONLY (OUTPATIENT)
Dept: FAMILY MEDICINE CLINIC | Age: 82
End: 2022-11-25

## 2022-12-06 ENCOUNTER — DOCUMENTATION ONLY (OUTPATIENT)
Dept: FAMILY MEDICINE CLINIC | Age: 82
End: 2022-12-06

## 2022-12-11 ENCOUNTER — APPOINTMENT (OUTPATIENT)
Dept: GENERAL RADIOLOGY | Age: 82
End: 2022-12-11
Attending: EMERGENCY MEDICINE
Payer: MEDICARE

## 2022-12-11 ENCOUNTER — HOSPITAL ENCOUNTER (EMERGENCY)
Age: 82
Discharge: HOME | End: 2022-12-11
Attending: EMERGENCY MEDICINE
Payer: MEDICARE

## 2022-12-11 VITALS
HEIGHT: 63 IN | DIASTOLIC BLOOD PRESSURE: 85 MMHG | RESPIRATION RATE: 20 BRPM | TEMPERATURE: 97.7 F | OXYGEN SATURATION: 99 % | WEIGHT: 217 LBS | SYSTOLIC BLOOD PRESSURE: 123 MMHG | HEART RATE: 93 BPM | BODY MASS INDEX: 38.45 KG/M2

## 2022-12-11 DIAGNOSIS — S63.501A SPRAIN OF RIGHT WRIST, INITIAL ENCOUNTER: Primary | ICD-10-CM

## 2022-12-11 PROCEDURE — 99283 EMERGENCY DEPT VISIT LOW MDM: CPT

## 2022-12-11 PROCEDURE — 73080 X-RAY EXAM OF ELBOW: CPT

## 2022-12-11 PROCEDURE — 74011250637 HC RX REV CODE- 250/637: Performed by: EMERGENCY MEDICINE

## 2022-12-11 PROCEDURE — 73110 X-RAY EXAM OF WRIST: CPT

## 2022-12-11 RX ORDER — ACETAMINOPHEN 500 MG
1000 TABLET ORAL ONCE
Status: COMPLETED | OUTPATIENT
Start: 2022-12-11 | End: 2022-12-11

## 2022-12-11 RX ADMIN — ACETAMINOPHEN 1000 MG: 500 TABLET ORAL at 11:20

## 2022-12-11 NOTE — ED TRIAGE NOTES
PT reports she feel yesterday and scaped knee, last night her R arm started hurting. PT reports R Wrist and 3 fingers hurts.

## 2022-12-11 NOTE — ED NOTES
Bedside shift change report given to Solo De La Garza (oncoming nurse) by Mauricio Dominguez (offgoing nurse). Report included the following information SBAR and ED Summary.
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
## 2022-12-13 RX ORDER — FLUTICASONE PROPIONATE AND SALMETEROL 250; 50 UG/1; UG/1
POWDER RESPIRATORY (INHALATION)
Qty: 60 EACH | Refills: 11 | Status: SHIPPED | OUTPATIENT
Start: 2022-12-13

## 2022-12-19 ENCOUNTER — DOCUMENTATION ONLY (OUTPATIENT)
Dept: FAMILY MEDICINE CLINIC | Age: 82
End: 2022-12-19

## 2022-12-19 ENCOUNTER — OFFICE VISIT (OUTPATIENT)
Dept: FAMILY MEDICINE CLINIC | Age: 82
End: 2022-12-19
Payer: MEDICARE

## 2022-12-19 VITALS
SYSTOLIC BLOOD PRESSURE: 128 MMHG | DIASTOLIC BLOOD PRESSURE: 72 MMHG | OXYGEN SATURATION: 98 % | RESPIRATION RATE: 14 BRPM | WEIGHT: 217 LBS | TEMPERATURE: 97.9 F | HEART RATE: 83 BPM | HEIGHT: 63 IN | BODY MASS INDEX: 38.45 KG/M2

## 2022-12-19 DIAGNOSIS — E78.00 HYPERCHOLESTEREMIA: ICD-10-CM

## 2022-12-19 DIAGNOSIS — E11.9 CONTROLLED TYPE 2 DIABETES MELLITUS WITHOUT COMPLICATION, WITH LONG-TERM CURRENT USE OF INSULIN (HCC): Primary | ICD-10-CM

## 2022-12-19 DIAGNOSIS — Z79.4 CONTROLLED TYPE 2 DIABETES MELLITUS WITHOUT COMPLICATION, WITH LONG-TERM CURRENT USE OF INSULIN (HCC): Primary | ICD-10-CM

## 2022-12-19 DIAGNOSIS — I10 BENIGN ESSENTIAL HTN: ICD-10-CM

## 2022-12-19 PROCEDURE — 3051F HG A1C>EQUAL 7.0%<8.0%: CPT | Performed by: NURSE PRACTITIONER

## 2022-12-19 PROCEDURE — G8432 DEP SCR NOT DOC, RNG: HCPCS | Performed by: NURSE PRACTITIONER

## 2022-12-19 PROCEDURE — G8536 NO DOC ELDER MAL SCRN: HCPCS | Performed by: NURSE PRACTITIONER

## 2022-12-19 PROCEDURE — G8417 CALC BMI ABV UP PARAM F/U: HCPCS | Performed by: NURSE PRACTITIONER

## 2022-12-19 PROCEDURE — 3078F DIAST BP <80 MM HG: CPT | Performed by: NURSE PRACTITIONER

## 2022-12-19 PROCEDURE — G8427 DOCREV CUR MEDS BY ELIG CLIN: HCPCS | Performed by: NURSE PRACTITIONER

## 2022-12-19 PROCEDURE — G8754 DIAS BP LESS 90: HCPCS | Performed by: NURSE PRACTITIONER

## 2022-12-19 PROCEDURE — 99214 OFFICE O/P EST MOD 30 MIN: CPT | Performed by: NURSE PRACTITIONER

## 2022-12-19 PROCEDURE — G8752 SYS BP LESS 140: HCPCS | Performed by: NURSE PRACTITIONER

## 2022-12-19 PROCEDURE — 1090F PRES/ABSN URINE INCON ASSESS: CPT | Performed by: NURSE PRACTITIONER

## 2022-12-19 PROCEDURE — 1123F ACP DISCUSS/DSCN MKR DOCD: CPT | Performed by: NURSE PRACTITIONER

## 2022-12-19 PROCEDURE — G0463 HOSPITAL OUTPT CLINIC VISIT: HCPCS | Performed by: NURSE PRACTITIONER

## 2022-12-19 PROCEDURE — G8400 PT W/DXA NO RESULTS DOC: HCPCS | Performed by: NURSE PRACTITIONER

## 2022-12-19 PROCEDURE — 3074F SYST BP LT 130 MM HG: CPT | Performed by: NURSE PRACTITIONER

## 2022-12-19 PROCEDURE — 1101F PT FALLS ASSESS-DOCD LE1/YR: CPT | Performed by: NURSE PRACTITIONER

## 2022-12-19 NOTE — PROGRESS NOTES
HISTORY OF PRESENT ILLNESS  Ana Maria Lau is a 81 y.o. female. HPI  Cardiovascular Review:  The patient has hypertension and hyperlipidemia. Diet and Lifestyle: generally follows a low fat low cholesterol diet, generally follows a low sodium diet, exercises sporadically, nonsmoker  Home BP Monitoring: is not measured at home. Pertinent ROS: taking medications as instructed, no medication side effects noted, no TIA's, no chest pain on exertion, no dyspnea on exertion, no swelling of ankles. Diabetes Mellitus:  She has diabetes mellitus, and  hypertension and hyperlipidemia. Diabetic ROS - medication compliance: compliant all of the time, diabetic diet compliance: compliant most of the time, home glucose monitoring: is not performed. Lab review: orders written for new lab studies as appropriate; see orders. Patient Active Problem List    Diagnosis Date Noted    Chronic renal disease, stage III 09/15/2022    Type 2 diabetes mellitus with chronic kidney disease ([facility] 01/06/2022    CAD (coronary artery disease)     CKD (chronic kidney disease)     Controlled type 2 diabetes mellitus with neurologic complication, with long-term current use of insulin ([facility]     Dyslipidemia     HTN (hypertension)     Obesity, morbid ([facility] [facility] 10/07/2020    Heart attack ([facility] [address] 2014     Current Outpatient Medications   Medication Sig Dispense Refill    fluticasone propion-salmeteroL (Wixela Inhub) 250-50 mcg/dose diskus inhaler USE 1 INHALATION EVERY 12 HOURS 60 Each 11    dulaglutide (Trulicity) 1.5 NS/4.0 mL sub-q pen 0.5 mL by SubCUTAneous route every seven (7) days. 12 Each 3    lisinopriL (PRINIVIL, ZESTRIL) 5 mg tablet TAKE 1 TABLET DAILY 90 Tablet 3    cloNIDine HCL (CATAPRES) 0.1 mg tablet TAKE 1 TABLET TWICE A  Tablet 2    furosemide (LASIX) 20 mg tablet TAKE 1 TABLET DAILY 90 Tablet 3    bkfblbwwrznmjb-ridqp-P-neot (Culturelle IBS Complete Supprt) 5,500 mg pwpk Take 1 Scoop by mouth daily.       sotaloL (BETAPACE) 80 mg tablet Take 1.5 Tablets by mouth daily. 145 Tablet 3    montelukast (SINGULAIR) 5 mg chewable tablet CHEW 2 TABLETS NIGHTLY 180 Tablet 3    atorvastatin (LIPITOR) 40 mg tablet TAKE 1 TABLET DAILY 90 Tablet 3    spironolactone (ALDACTONE) 25 mg tablet TAKE 1 TABLET DAILY 90 Tablet 3    methocarbamoL (ROBAXIN) 500 mg tablet Take 1 Tablet by mouth nightly as needed for Muscle Spasm(s). 30 Tablet 1    albuterol (PROVENTIL HFA, VENTOLIN HFA, PROAIR HFA) 90 mcg/actuation inhaler Take 2 Puffs by inhalation every four (4) hours as needed for Wheezing. 3 Each 1    allopurinoL (ZYLOPRIM) 100 mg tablet TAKE 1 TABLET DAILY 90 Tablet 3    insulin aspart U-100 (NOVOLOG) 100 unit/mL (3 mL) inpn 22 Units by SubCUTAneous route Before breakfast, lunch, dinner and at bedtime. 30 Pen 3    aspirin 81 mg chewable tablet Take 81 mg by mouth daily. Family History   Problem Relation Age of Onset    Heart Attack Sister     Heart Attack Mother      Social History     Tobacco Use    Smoking status: Former     Types: Cigarettes     Quit date: 1977     Years since quittin.9    Smokeless tobacco: Never   Substance Use Topics    Alcohol use: Not Currently           ROS  A comprehensive review of system was obtained and negative except findings in the HPI    Visit Vitals  /72 (BP 1 Location: Right arm, BP Patient Position: Sitting)   Pulse 83   Temp 97.9 °F (36.6 °C) (Oral)   Resp 14   Ht 5' 3\" (1.6 m)   Wt 217 lb (98.4 kg)   SpO2 98%   BMI 38.44 kg/m²     Physical Exam  Vitals and nursing note reviewed. Constitutional:       Appearance: Normal appearance. She is well-developed. Comments:      Neck:      Vascular: No JVD. Cardiovascular:      Rate and Rhythm: Normal rate and regular rhythm. Heart sounds: Normal heart sounds. No murmur heard. No friction rub. No gallop. Pulmonary:      Effort: Pulmonary effort is normal. No respiratory distress. Breath sounds: Normal breath sounds. No wheezing. Skin:     General: Skin is warm. Neurological:      Mental Status: She is alert and oriented to person, place, and time. ASSESSMENT and PLAN  Encounter Diagnoses   Name Primary? Controlled type 2 diabetes mellitus without complication, with long-term current use of insulin (HCC) Yes    Hypercholesteremia     Benign essential HTN      Orders Placed This Encounter    HEMOGLOBIN A1C WITH EAG    METABOLIC PANEL, COMPREHENSIVE    CBC WITH AUTOMATED DIFF    LIPID PANEL     Recheck 3 months  I have discussed the diagnosis with the patient and the intended plan as seen in the above orders. The patient has received an after-visit summary and questions were answered concerning future plans. Patient conveyed understanding of the plan at the time of the visit.     Carmen Coleman, MSN, ANP  12/19/2022

## 2022-12-19 NOTE — PROGRESS NOTES
Chief Complaint   Patient presents with    Follow-up    Diabetes     Pt being seen for fuv   -diabetes labs    1. Have you been to the ER, urgent care clinic since your last visit? Hospitalized since your last visit? yes    2. Have you seen or consulted any other health care providers outside of the [address] [place] [address] since your last visit? Include any pap smears or colon screening.  No      Pt has no other concerns

## 2022-12-20 LAB
ALBUMIN SERPL-MCNC: 3.2 G/DL (ref 3.5–5)
ALBUMIN/GLOB SERPL: 0.9 {RATIO} (ref 1.1–2.2)
ALP SERPL-CCNC: 125 U/L (ref 45–117)
ALT SERPL-CCNC: 26 U/L (ref 12–78)
ANION GAP SERPL CALC-SCNC: 4 MMOL/L (ref 5–15)
AST SERPL-CCNC: 15 U/L (ref 15–37)
BASOPHILS # BLD: 0.1 K/UL (ref 0–0.1)
BASOPHILS NFR BLD: 1 % (ref 0–1)
BILIRUB SERPL-MCNC: 0.5 MG/DL (ref 0.2–1)
BUN SERPL-MCNC: 31 MG/DL (ref 6–20)
BUN/CREAT SERPL: 28 (ref 12–20)
CALCIUM SERPL-MCNC: 9.3 MG/DL (ref 8.5–10.1)
CHLORIDE SERPL-SCNC: 106 MMOL/L (ref 97–108)
CHOLEST SERPL-MCNC: 176 MG/DL
CO2 SERPL-SCNC: 29 MMOL/L (ref 21–32)
CREAT SERPL-MCNC: 1.12 MG/DL (ref 0.55–1.02)
DIFFERENTIAL METHOD BLD: ABNORMAL
EOSINOPHIL # BLD: 1 K/UL (ref 0–0.4)
EOSINOPHIL NFR BLD: 9 % (ref 0–7)
ERYTHROCYTE [DISTWIDTH] IN BLOOD BY AUTOMATED COUNT: 13.5 % (ref 11.5–14.5)
EST. AVERAGE GLUCOSE BLD GHB EST-MCNC: 151 MG/DL
GLOBULIN SER CALC-MCNC: 3.4 G/DL (ref 2–4)
GLUCOSE SERPL-MCNC: 119 MG/DL (ref 65–100)
HBA1C MFR BLD: 6.9 % (ref 4–5.6)
HCT VFR BLD AUTO: 41.4 % (ref 35–47)
HDLC SERPL-MCNC: 50 MG/DL
HDLC SERPL: 3.5 {RATIO} (ref 0–5)
HGB BLD-MCNC: 13 G/DL (ref 11.5–16)
IMM GRANULOCYTES # BLD AUTO: 0 K/UL (ref 0–0.04)
IMM GRANULOCYTES NFR BLD AUTO: 0 % (ref 0–0.5)
LDLC SERPL CALC-MCNC: 100.6 MG/DL (ref 0–100)
LYMPHOCYTES # BLD: 2.1 K/UL (ref 0.8–3.5)
LYMPHOCYTES NFR BLD: 18 % (ref 12–49)
MCH RBC QN AUTO: 32.9 PG (ref 26–34)
MCHC RBC AUTO-ENTMCNC: 31.4 G/DL (ref 30–36.5)
MCV RBC AUTO: 104.8 FL (ref 80–99)
MONOCYTES # BLD: 0.8 K/UL (ref 0–1)
MONOCYTES NFR BLD: 7 % (ref 5–13)
NEUTS SEG # BLD: 7.4 K/UL (ref 1.8–8)
NEUTS SEG NFR BLD: 65 % (ref 32–75)
NRBC # BLD: 0 K/UL (ref 0–0.01)
NRBC BLD-RTO: 0 PER 100 WBC
PLATELET # BLD AUTO: 261 K/UL (ref 150–400)
PMV BLD AUTO: 11 FL (ref 8.9–12.9)
POTASSIUM SERPL-SCNC: 4.5 MMOL/L (ref 3.5–5.1)
PROT SERPL-MCNC: 6.6 G/DL (ref 6.4–8.2)
RBC # BLD AUTO: 3.95 M/UL (ref 3.8–5.2)
RBC MORPH BLD: ABNORMAL
SODIUM SERPL-SCNC: 139 MMOL/L (ref 136–145)
TRIGL SERPL-MCNC: 127 MG/DL (ref ?–150)
VLDLC SERPL CALC-MCNC: 25.4 MG/DL
WBC # BLD AUTO: 11.4 K/UL (ref 3.6–11)

## 2022-12-21 RX ORDER — ATORVASTATIN CALCIUM 40 MG/1
TABLET, FILM COATED ORAL
Qty: 90 TABLET | Refills: 3 | Status: SHIPPED | OUTPATIENT
Start: 2022-12-21

## 2022-12-21 NOTE — PROGRESS NOTES
Your A1c looks great, your cholesterol is in good range, and your blood count is normal. Recheck 3 months.  Phani Espinoza

## 2022-12-30 RX ORDER — ALLOPURINOL 100 MG/1
TABLET ORAL
Qty: 90 TABLET | Refills: 3 | Status: SHIPPED | OUTPATIENT
Start: 2022-12-30

## 2023-01-12 RX ORDER — SPIRONOLACTONE 25 MG/1
TABLET ORAL
Qty: 90 TABLET | Refills: 3 | Status: SHIPPED | OUTPATIENT
Start: 2023-01-12

## 2023-01-16 RX ORDER — INSULIN ASPART 100 [IU]/ML
INJECTION, SOLUTION INTRAVENOUS; SUBCUTANEOUS
Qty: 90 ML | Refills: 3 | Status: SHIPPED | OUTPATIENT
Start: 2023-01-16

## 2023-02-27 ENCOUNTER — DOCUMENTATION ONLY (OUTPATIENT)
Dept: FAMILY MEDICINE CLINIC | Age: 83
End: 2023-02-27

## 2023-03-21 ENCOUNTER — OFFICE VISIT (OUTPATIENT)
Dept: FAMILY MEDICINE CLINIC | Age: 83
End: 2023-03-21
Payer: MEDICARE

## 2023-03-21 VITALS
RESPIRATION RATE: 20 BRPM | OXYGEN SATURATION: 97 % | WEIGHT: 224 LBS | HEART RATE: 97 BPM | DIASTOLIC BLOOD PRESSURE: 73 MMHG | HEIGHT: 63 IN | TEMPERATURE: 98 F | BODY MASS INDEX: 39.69 KG/M2 | SYSTOLIC BLOOD PRESSURE: 114 MMHG

## 2023-03-21 DIAGNOSIS — Z79.4 CONTROLLED TYPE 2 DIABETES MELLITUS WITHOUT COMPLICATION, WITH LONG-TERM CURRENT USE OF INSULIN (HCC): Primary | ICD-10-CM

## 2023-03-21 DIAGNOSIS — E78.00 HYPERCHOLESTEREMIA: ICD-10-CM

## 2023-03-21 DIAGNOSIS — N18.31 STAGE 3A CHRONIC KIDNEY DISEASE (HCC): ICD-10-CM

## 2023-03-21 DIAGNOSIS — E11.9 CONTROLLED TYPE 2 DIABETES MELLITUS WITHOUT COMPLICATION, WITH LONG-TERM CURRENT USE OF INSULIN (HCC): Primary | ICD-10-CM

## 2023-03-21 DIAGNOSIS — J30.1 SEASONAL ALLERGIC RHINITIS DUE TO POLLEN: ICD-10-CM

## 2023-03-21 PROBLEM — E11.22 TYPE 2 DIABETES MELLITUS WITH CHRONIC KIDNEY DISEASE (HCC): Status: RESOLVED | Noted: 2022-01-06 | Resolved: 2023-03-21

## 2023-03-21 LAB
CHOLEST SERPL-MCNC: 177 MG/DL
EST. AVERAGE GLUCOSE BLD GHB EST-MCNC: 148 MG/DL
HBA1C MFR BLD: 6.8 % (ref 4–5.6)
HDLC SERPL-MCNC: 52 MG/DL
HDLC SERPL: 3.4 (ref 0–5)
LDLC SERPL CALC-MCNC: 93.2 MG/DL (ref 0–100)
TRIGL SERPL-MCNC: 159 MG/DL (ref ?–150)
VLDLC SERPL CALC-MCNC: 31.8 MG/DL

## 2023-03-21 PROCEDURE — 1090F PRES/ABSN URINE INCON ASSESS: CPT | Performed by: NURSE PRACTITIONER

## 2023-03-21 PROCEDURE — 3078F DIAST BP <80 MM HG: CPT | Performed by: NURSE PRACTITIONER

## 2023-03-21 PROCEDURE — G0463 HOSPITAL OUTPT CLINIC VISIT: HCPCS | Performed by: NURSE PRACTITIONER

## 2023-03-21 PROCEDURE — G8417 CALC BMI ABV UP PARAM F/U: HCPCS | Performed by: NURSE PRACTITIONER

## 2023-03-21 PROCEDURE — G8432 DEP SCR NOT DOC, RNG: HCPCS | Performed by: NURSE PRACTITIONER

## 2023-03-21 PROCEDURE — 1123F ACP DISCUSS/DSCN MKR DOCD: CPT | Performed by: NURSE PRACTITIONER

## 2023-03-21 PROCEDURE — G8536 NO DOC ELDER MAL SCRN: HCPCS | Performed by: NURSE PRACTITIONER

## 2023-03-21 PROCEDURE — G8427 DOCREV CUR MEDS BY ELIG CLIN: HCPCS | Performed by: NURSE PRACTITIONER

## 2023-03-21 PROCEDURE — 1101F PT FALLS ASSESS-DOCD LE1/YR: CPT | Performed by: NURSE PRACTITIONER

## 2023-03-21 PROCEDURE — 99214 OFFICE O/P EST MOD 30 MIN: CPT | Performed by: NURSE PRACTITIONER

## 2023-03-21 PROCEDURE — 3074F SYST BP LT 130 MM HG: CPT | Performed by: NURSE PRACTITIONER

## 2023-03-21 PROCEDURE — G8400 PT W/DXA NO RESULTS DOC: HCPCS | Performed by: NURSE PRACTITIONER

## 2023-03-21 RX ORDER — LEVOCETIRIZINE DIHYDROCHLORIDE 5 MG/1
5 TABLET, FILM COATED ORAL DAILY
Qty: 90 TABLET | Refills: 3 | Status: SHIPPED | OUTPATIENT
Start: 2023-03-21

## 2023-03-21 NOTE — PROGRESS NOTES
HISTORY OF PRESENT ILLNESS  Tonya Whalen is a 81 y.o. female. HPI  Cardiovascular Review:  The patient has hyperlipidemia. Diet and Lifestyle: generally follows a low fat low cholesterol diet, generally follows a low sodium diet, exercises sporadically, nonsmoker  Home BP Monitoring: is not measured at home. Pertinent ROS: taking medications as instructed, no medication side effects noted, no TIA's, no chest pain on exertion, no dyspnea on exertion, no swelling of ankles. Diabetes Mellitus:  She has diabetes mellitus, and  hyperlipidemia. Diabetic ROS - medication compliance: compliant all of the time, diabetic diet compliance: compliant most of the time, home glucose monitoring: fasting values range 140 or less. Lab review: orders written for new lab studies as appropriate; see orders. Seasonal allergies: On singulair but still getting runny nose and cough    Patient Active Problem List    Diagnosis Date Noted    Chronic renal disease, stage III 09/15/2022    CAD (coronary artery disease)     CKD (chronic kidney disease)     Dyslipidemia     HTN (hypertension)     Obesity, morbid ([facility] 10/07/2020    Heart attack ([facility] [address] [facility] 2014     Current Outpatient Medications   Medication Sig Dispense Refill    levocetirizine (XYZAL) 5 mg tablet Take 1 Tablet by mouth daily. 90 Tablet 3    NovoLOG FlexPen U-100 Insulin 100 unit/mL (3 mL) inpn INJECT 22 UNITS UNDER THE SKIN BEFORE BREAKFAST, LUNCH, DINNER AND AT BEDTIME 90 mL 3    spironolactone (ALDACTONE) 25 mg tablet TAKE 1 TABLET DAILY 90 Tablet 3    allopurinoL (ZYLOPRIM) 100 mg tablet TAKE 1 TABLET DAILY 90 Tablet 3    atorvastatin (LIPITOR) 40 mg tablet TAKE 1 TABLET DAILY 90 Tablet 3    fluticasone propion-salmeteroL (Wixela Inhub) 250-50 mcg/dose diskus inhaler USE 1 INHALATION EVERY 12 HOURS 60 Each 11    dulaglutide (Trulicity) 1.5 TW/9.9 mL sub-q pen 0.5 mL by SubCUTAneous route every seven (7) days.  12 Each 3    lisinopriL (PRINIVIL, ZESTRIL) 5 mg tablet TAKE 1 TABLET DAILY 90 Tablet 3    cloNIDine HCL (CATAPRES) 0.1 mg tablet TAKE 1 TABLET TWICE A  Tablet 2    furosemide (LASIX) 20 mg tablet TAKE 1 TABLET DAILY 90 Tablet 3    mocsftpabxyviy-gikhg-C-neot (Culturelle IBS Complete Supprt) 5,500 mg pwpk Take 1 Scoop by mouth daily. sotaloL (BETAPACE) 80 mg tablet Take 1.5 Tablets by mouth daily. 145 Tablet 3    montelukast (SINGULAIR) 5 mg chewable tablet CHEW 2 TABLETS NIGHTLY 180 Tablet 3    albuterol (PROVENTIL HFA, VENTOLIN HFA, PROAIR HFA) 90 mcg/actuation inhaler Take 2 Puffs by inhalation every four (4) hours as needed for Wheezing. 3 Each 1    aspirin 81 mg chewable tablet Take 81 mg by mouth daily. methocarbamoL (ROBAXIN) 500 mg tablet Take 1 Tablet by mouth nightly as needed for Muscle Spasm(s). (Patient not taking: Reported on 3/21/2023) 30 Tablet 1     Family History   Problem Relation Age of Onset    Heart Attack Sister     Heart Attack Mother      Social History     Tobacco Use    Smoking status: Former     Types: Cigarettes     Quit date: 1977     Years since quittin.2    Smokeless tobacco: Never   Substance Use Topics    Alcohol use: Not Currently           ROS  A comprehensive review of system was obtained and negative except findings in the HPI    Visit Vitals  /73 (BP 1 Location: Left upper arm, BP Patient Position: Sitting)   Pulse 97   Temp 98 °F (36.7 °C) (Oral)   Resp 20   Ht 5' 3\" (1.6 m)   Wt 224 lb (101.6 kg)   SpO2 97%   BMI 39.68 kg/m²     Physical Exam  Vitals and nursing note reviewed. Constitutional:       Appearance: Normal appearance. She is well-developed. Comments:      Neck:      Vascular: No JVD. Cardiovascular:      Rate and Rhythm: Normal rate and regular rhythm. Heart sounds: Normal heart sounds. No murmur heard. No friction rub. No gallop. Pulmonary:      Effort: Pulmonary effort is normal. No respiratory distress. Breath sounds: Normal breath sounds. No wheezing.    Skin: General: Skin is warm. Neurological:      Mental Status: She is alert and oriented to person, place, and time. ASSESSMENT and PLAN  Encounter Diagnoses   Name Primary? Controlled type 2 diabetes mellitus without complication, with long-term current use of insulin (HCC) Yes    Hypercholesteremia     Stage 3a chronic kidney disease (HCC)     Seasonal allergic rhinitis due to pollen      Orders Placed This Encounter    HEMOGLOBIN A1C WITH EAG    LIPID PANEL    levocetirizine (XYZAL) 5 mg tablet     Labs updated today  Start xyzal 5mg a day  Dev plan with results  I have discussed the diagnosis with the patient and the intended plan as seen in the above orders. The patient has received an after-visit summary and questions were answered concerning future plans. Patient conveyed understanding of the plan at the time of the visit.     Almita Fisher, MSN, ANP  3/21/2023

## 2023-03-21 NOTE — PROGRESS NOTES
Chief Complaint   Patient presents with    Follow-up    Diabetes    Labs     Pt being seen for v  Diabetes  -labs    Pt wants to discuss her allergies  -pt states that her nose is raw and states that it wont stop running  -pt states that her current meds are not working    1. Have you been to the ER, urgent care clinic since your last visit? Hospitalized since your last visit? No    2. Have you seen or consulted any other health care providers outside of the [address] [place] [address] since your last visit? Include any pap smears or colon screening.  No    Pt has no other concerns

## 2023-04-21 DIAGNOSIS — D47.2 MGUS (MONOCLONAL GAMMOPATHY OF UNKNOWN SIGNIFICANCE): Primary | ICD-10-CM

## 2023-04-23 DIAGNOSIS — D47.2 MGUS (MONOCLONAL GAMMOPATHY OF UNKNOWN SIGNIFICANCE): Primary | ICD-10-CM

## 2023-04-24 DIAGNOSIS — D47.2 MGUS (MONOCLONAL GAMMOPATHY OF UNKNOWN SIGNIFICANCE): Primary | ICD-10-CM

## 2023-05-01 ENCOUNTER — TELEPHONE (OUTPATIENT)
Dept: CARDIOLOGY CLINIC | Age: 83
End: 2023-05-01

## 2023-05-01 ENCOUNTER — ANCILLARY PROCEDURE (OUTPATIENT)
Dept: CARDIOLOGY CLINIC | Age: 83
End: 2023-05-01
Payer: MEDICARE

## 2023-05-01 ENCOUNTER — OFFICE VISIT (OUTPATIENT)
Dept: CARDIOLOGY CLINIC | Age: 83
End: 2023-05-01
Payer: MEDICARE

## 2023-05-01 VITALS
BODY MASS INDEX: 39.69 KG/M2 | DIASTOLIC BLOOD PRESSURE: 72 MMHG | WEIGHT: 224 LBS | SYSTOLIC BLOOD PRESSURE: 112 MMHG | HEIGHT: 63 IN

## 2023-05-01 VITALS
DIASTOLIC BLOOD PRESSURE: 76 MMHG | WEIGHT: 224.8 LBS | HEART RATE: 88 BPM | OXYGEN SATURATION: 99 % | SYSTOLIC BLOOD PRESSURE: 110 MMHG | BODY MASS INDEX: 39.83 KG/M2 | HEIGHT: 63 IN

## 2023-05-01 DIAGNOSIS — I25.10 CORONARY ARTERY DISEASE INVOLVING NATIVE HEART WITHOUT ANGINA PECTORIS, UNSPECIFIED VESSEL OR LESION TYPE: ICD-10-CM

## 2023-05-01 DIAGNOSIS — R06.09 DOE (DYSPNEA ON EXERTION): ICD-10-CM

## 2023-05-01 DIAGNOSIS — I35.0 AORTIC VALVE STENOSIS, ETIOLOGY OF CARDIAC VALVE DISEASE UNSPECIFIED: ICD-10-CM

## 2023-05-01 DIAGNOSIS — I10 ESSENTIAL HYPERTENSION: Primary | ICD-10-CM

## 2023-05-01 DIAGNOSIS — I48.91 ATRIAL FIBRILLATION, UNSPECIFIED TYPE (HCC): ICD-10-CM

## 2023-05-01 LAB
ECHO AO ASC DIAM: 3.5 CM
ECHO AO ASCENDING AORTA INDEX: 1.72 CM/M2
ECHO AO ROOT DIAM: 3.4 CM
ECHO AO ROOT INDEX: 1.67 CM/M2
ECHO AV MEAN GRADIENT: 7 MMHG
ECHO AV MEAN VELOCITY: 1.3 M/S
ECHO AV PEAK GRADIENT: 12 MMHG
ECHO AV PEAK VELOCITY: 1.8 M/S
ECHO AV VELOCITY RATIO: 0.33
ECHO AV VTI: 34.7 CM
ECHO LA DIAMETER INDEX: 2.02 CM/M2
ECHO LA DIAMETER: 4.1 CM
ECHO LA TO AORTIC ROOT RATIO: 1.21
ECHO LA VOL 2C: 73 ML (ref 22–52)
ECHO LA VOL 4C: 67 ML (ref 22–52)
ECHO LA VOL BP: 72 ML (ref 22–52)
ECHO LA VOL/BSA BIPLANE: 35 ML/M2 (ref 16–34)
ECHO LA VOLUME AREA LENGTH: 77 ML
ECHO LA VOLUME INDEX A2C: 36 ML/M2 (ref 16–34)
ECHO LA VOLUME INDEX A4C: 33 ML/M2 (ref 16–34)
ECHO LA VOLUME INDEX AREA LENGTH: 38 ML/M2 (ref 16–34)
ECHO LV FRACTIONAL SHORTENING: 32 % (ref 28–44)
ECHO LV INTERNAL DIMENSION DIASTOLE INDEX: 1.67 CM/M2
ECHO LV INTERNAL DIMENSION DIASTOLIC: 3.4 CM (ref 3.9–5.3)
ECHO LV INTERNAL DIMENSION SYSTOLIC INDEX: 1.13 CM/M2
ECHO LV INTERNAL DIMENSION SYSTOLIC: 2.3 CM
ECHO LV IVSD: 1.2 CM (ref 0.6–0.9)
ECHO LV MASS 2D: 114 G (ref 67–162)
ECHO LV MASS INDEX 2D: 56.2 G/M2 (ref 43–95)
ECHO LV POSTERIOR WALL DIASTOLIC: 1 CM (ref 0.6–0.9)
ECHO LV RELATIVE WALL THICKNESS RATIO: 0.59
ECHO LVOT AV VTI INDEX: 0.42
ECHO LVOT MEAN GRADIENT: 1 MMHG
ECHO LVOT PEAK GRADIENT: 2 MMHG
ECHO LVOT PEAK VELOCITY: 0.6 M/S
ECHO LVOT VTI: 14.7 CM
ECHO MV LVOT VTI INDEX: 1.97
ECHO MV MAX VELOCITY: 1.3 M/S
ECHO MV MEAN GRADIENT: 3 MMHG
ECHO MV MEAN VELOCITY: 0.8 M/S
ECHO MV PEAK GRADIENT: 7 MMHG
ECHO MV VTI: 29 CM
ECHO RV FREE WALL PEAK S': 10 CM/S
ECHO RV TAPSE: 1.2 CM (ref 1.7–?)

## 2023-05-01 PROCEDURE — 99214 OFFICE O/P EST MOD 30 MIN: CPT | Performed by: SPECIALIST

## 2023-05-01 PROCEDURE — 3078F DIAST BP <80 MM HG: CPT | Performed by: SPECIALIST

## 2023-05-01 PROCEDURE — 1090F PRES/ABSN URINE INCON ASSESS: CPT | Performed by: SPECIALIST

## 2023-05-01 PROCEDURE — 3074F SYST BP LT 130 MM HG: CPT | Performed by: SPECIALIST

## 2023-05-01 PROCEDURE — 93010 ELECTROCARDIOGRAM REPORT: CPT | Performed by: SPECIALIST

## 2023-05-01 PROCEDURE — 1101F PT FALLS ASSESS-DOCD LE1/YR: CPT | Performed by: SPECIALIST

## 2023-05-01 PROCEDURE — 93306 TTE W/DOPPLER COMPLETE: CPT | Performed by: SPECIALIST

## 2023-05-01 PROCEDURE — 93005 ELECTROCARDIOGRAM TRACING: CPT | Performed by: SPECIALIST

## 2023-05-01 PROCEDURE — 1123F ACP DISCUSS/DSCN MKR DOCD: CPT | Performed by: SPECIALIST

## 2023-05-01 PROCEDURE — G0463 HOSPITAL OUTPT CLINIC VISIT: HCPCS | Performed by: SPECIALIST

## 2023-05-01 PROCEDURE — G8427 DOCREV CUR MEDS BY ELIG CLIN: HCPCS | Performed by: SPECIALIST

## 2023-05-01 PROCEDURE — G8417 CALC BMI ABV UP PARAM F/U: HCPCS | Performed by: SPECIALIST

## 2023-05-01 PROCEDURE — G8536 NO DOC ELDER MAL SCRN: HCPCS | Performed by: SPECIALIST

## 2023-05-01 PROCEDURE — G8400 PT W/DXA NO RESULTS DOC: HCPCS | Performed by: SPECIALIST

## 2023-05-01 PROCEDURE — G8432 DEP SCR NOT DOC, RNG: HCPCS | Performed by: SPECIALIST

## 2023-05-01 NOTE — PROGRESS NOTES
Lora Patino is a 81 y.o. female    Vitals:    05/01/23 1121   Height: 5' 3\" (1.6 m)       Chief Complaint   Patient presents with    Follow-up     Annual; Echo w [facility] Sages    Hypertension       Chest pain NO  SOB ASTHMA  Dizziness NO  Swelling NO  Recent hospital visit NO  Refills NO  COVID VACCINE YES  HAD COVID?  NO

## 2023-05-01 NOTE — PROGRESS NOTES
Semaj Palma MD. [facility]              Patient: Samantha Zuñiga  : 1940      Today's Date: 2023            HISTORY OF PRESENT ILLNESS:     History of Present Illness:  Says she feels well. No CP. Walks with a rolling walker. No dizziness. Biggest complaints is seasonal allergies. Some SOB / Francenia Sharath she thinks is asthma related. PAST MEDICAL HISTORY:     Past Medical History:   Diagnosis Date    CAD (coronary artery disease)     CKD (chronic kidney disease)     Diabetes ([facility]     Dyslipidemia     Heart attack ([facility]     Had cath and treated medically     HTN (hypertension)     Monoclonal gammopathies     Obesity     TARIQ on CPAP     Tachycardia     placed on Sotalol at [place]        Past Surgical History:   Procedure Laterality Date    HX BREAST BIOPSY Left     several bxs on left, all benign    ND UNLISTED PROCEDURE BREAST               MEDICATIONS:     Current Outpatient Medications   Medication Sig Dispense Refill    levocetirizine (XYZAL) 5 mg tablet Take 1 Tablet by mouth daily. 90 Tablet 3    NovoLOG FlexPen U-100 Insulin 100 unit/mL (3 mL) inpn INJECT 22 UNITS UNDER THE SKIN BEFORE BREAKFAST, LUNCH, DINNER AND AT BEDTIME 90 mL 3    spironolactone (ALDACTONE) 25 mg tablet TAKE 1 TABLET DAILY 90 Tablet 3    allopurinoL (ZYLOPRIM) 100 mg tablet TAKE 1 TABLET DAILY 90 Tablet 3    atorvastatin (LIPITOR) 40 mg tablet TAKE 1 TABLET DAILY 90 Tablet 3    fluticasone propion-salmeteroL (Wixela Inhub) 250-50 mcg/dose diskus inhaler USE 1 INHALATION EVERY 12 HOURS 60 Each 11    dulaglutide (Trulicity) 1.5 PE/3.8 mL sub-q pen 0.5 mL by SubCUTAneous route every seven (7) days.  12 Each 3    lisinopriL (PRINIVIL, ZESTRIL) 5 mg tablet TAKE 1 TABLET DAILY 90 Tablet 3    cloNIDine HCL (CATAPRES) 0.1 mg tablet TAKE 1 TABLET TWICE A  Tablet 2    furosemide (LASIX) 20 mg tablet TAKE 1 TABLET DAILY 90 Tablet 3    rnbbhmuctuhfob-nerdi-R-neot (Culturelle IBS Complete Supprt) 5,500 mg pwpk Take 1 Scoop by mouth daily. sotaloL (BETAPACE) 80 mg tablet Take 1.5 Tablets by mouth daily. 145 Tablet 3    albuterol (PROVENTIL HFA, VENTOLIN HFA, PROAIR HFA) 90 mcg/actuation inhaler Take 2 Puffs by inhalation every four (4) hours as needed for Wheezing. 3 Each 1    aspirin 81 mg chewable tablet Take 1 Tablet by mouth daily. montelukast (SINGULAIR) 5 mg chewable tablet CHEW 2 TABLETS NIGHTLY 180 Tablet 3    methocarbamoL (ROBAXIN) 500 mg tablet Take 1 Tablet by mouth nightly as needed for Muscle Spasm(s). (Patient not taking: Reported on 3/21/2023) 30 Tablet 1       Allergies   Allergen Reactions    Codeine Unknown (comments)    Motrin [Ibuprofen] Hives             SOCIAL HISTORY:     Social History     Tobacco Use    Smoking status: Former     Types: Cigarettes     Quit date: 1977     Years since quittin.3    Smokeless tobacco: Never   Vaping Use    Vaping Use: Never used   Substance Use Topics    Alcohol use: Not Currently    Drug use: Never         FAMILY HISTORY:     Family History   Problem Relation Age of Onset    Heart Attack Sister     Heart Attack Mother             REVIEW OF SYMPTOMS:      Review of Symptoms:  Constitutional: Negative for fever, chills  HEENT: Negative for nosebleeds, tinnitus, and vision changes. Respiratory: + wheezing  Cardiovascular: Negative for orthopnea, claudication, syncope, and PND. Gastrointestinal: Negative for abdominal pain,  melena. + chronic diarrhea   Genitourinary: Negative for dysuria  Musculoskeletal: Negative for myalgias. Skin: Negative for rash  Heme: No problems bleeding. Neurological: Negative for speech change and focal weakness.                   PHYSICAL EXAM:      Physical Exam:  Visit Vitals  /76 (BP 1 Location: Left upper arm, BP Patient Position: Sitting, BP Cuff Size: Adult)   Pulse 88   Ht 5' 3\" (1.6 m)   Wt 224 lb 12.8 oz (102 kg)   SpO2 99%   BMI 39.82 kg/m²       Patient appears generally well, mood and affect are appropriate and pleasant. HEENT:  Hearing intact, non-icteric, normocephalic, atraumatic. Neck Exam: Supple,    Lung Exam: Clear to auscultation, even breath sounds. Cardiac Exam: Irregular - distant   Abdomen: Soft, non-tender,  . Obese  Extremities: Moves all ext well. Mild bilat lower extremity edema. MSKTL: Overall good ROM ext  Skin: No significant rashes  Psych: Appropriate affect  Neuro - Grossly intact              LABS / OTHER STUDIES:      Lab Results   Component Value Date/Time    Sodium 139 12/19/2022 11:49 AM    Potassium 4.5 12/19/2022 11:49 AM    Chloride 106 12/19/2022 11:49 AM    CO2 29 12/19/2022 11:49 AM    Anion gap 4 (L) 12/19/2022 11:49 AM    Glucose 119 (H) 12/19/2022 11:49 AM    BUN 31 (H) 12/19/2022 11:49 AM    Creatinine 1.12 (H) 12/19/2022 11:49 AM    BUN/Creatinine ratio 28 (H) 12/19/2022 11:49 AM    GFR est AA 51 (L) 06/09/2022 10:45 AM    GFR est non-AA 42 (L) 06/09/2022 10:45 AM    Calcium 9.3 12/19/2022 11:49 AM    Bilirubin, total 0.5 12/19/2022 11:49 AM    Alk.  phosphatase 125 (H) 12/19/2022 11:49 AM    Protein, total 6.6 12/19/2022 11:49 AM    Albumin 3.2 (L) 12/19/2022 11:49 AM    Globulin 3.4 12/19/2022 11:49 AM    A-G Ratio 0.9 (L) 12/19/2022 11:49 AM    ALT (SGPT) 26 12/19/2022 11:49 AM    AST (SGOT) 15 12/19/2022 11:49 AM     Lab Results   Component Value Date/Time    WBC 11.4 (H) 12/19/2022 11:49 AM    HGB 13.0 12/19/2022 11:49 AM    HCT 41.4 12/19/2022 11:49 AM    PLATELET 801 85/31/3276 11:49 AM    .8 (H) 12/19/2022 11:49 AM       Lab Results   Component Value Date/Time    Cholesterol, total 177 03/21/2023 11:00 AM    HDL Cholesterol 52 03/21/2023 11:00 AM    LDL, calculated 93.2 03/21/2023 11:00 AM    VLDL, calculated 31.8 03/21/2023 11:00 AM    Triglyceride 159 (H) 03/21/2023 11:00 AM    CHOL/HDL Ratio 3.4 03/21/2023 11:00 AM             CARDIAC DIAGNOSTICS:      Cardiac Evaluation Includes:       EKG 6/13/19 - NSR, PRWP   EKG 10/21/20 - NSR, PRWP   EKG 10/27/21 - NSR, septal Q waves, low voltage   EKG 5/1/23 - Afib, low voltage, PRWP        Lexiscan Cardiolite 12/16/16 ([place]) - no significant ischemia, LVEF 64%  Event Monitor 1/31-3/1/19 ([place])  - sinus, Normal study -  No Afib   Echo 4/3/19 ([place]) - mod LVH, LVEF 60-65%, mod-severe LAE, mod JENA, RV mod dilated, Mild AS (ANGELIC 1.4 cm2, mean PG 26 mmHg)      Echo 4/26/21 - TDS, LVEF 55-60%, LAE, severe MAC, AV sclerosis     Echo 4/28/22 - TDS. LVEF 60-65%, grade 1 diastology, AV sclerosis, severe MAC, severe LAE, mild MR    Echo 5/1/23 - LVEF 60-65%    Aortic Valve: Tricuspid valve. Mildly calcified cusp. Mild regurgitation. Mild stenosis of the aortic valve. AV mean gradient is 7 mmHg. AV peak gradient is 12 mmHg. AV peak velocity is 1.8 m/s. Mitral Valve: Mildly thickened leaflet. Severe annular calcification of the mitral valve. No stenosis noted. MV mean gradient is 3 mmHg. Left Atrium: Left atrium is mildly dilated. LA Vol Index A/L is 38 mL/m2. Aorta: Normal sized aortic root. Mildly dilated ascending aorta. Ao Ascending diameter is 3.5 cm.           ASSESSMENT AND PLAN:      Assessment and Plan:  1) Atrial fibrillation   - Newly discovered 5/1/23 - she feels fine and onset is unknown   - Will start Eliquis 5 mg BID   - Will continue Sotalol for now   - Will refer to EP to help guide further management     2) CAD   - MI in 2014-- distal lesion treated medically per patient   - Doing well without angina   - cont ASA, statin   - she has some CADET lately she thinks from asthma (inhaler helps) ---> if symptoms worsen, then could consider a stress test or cath (she prefers to wait)      3) \"Tachycardia\" History   - Unsure why Sotalol was started in 2020 in [place] - she says HR would race (palpitations)  so Cardiologist started Sotalol   - Event Monitor 1/31-3/1/19 ([place])  - sinus, Normal study -  No Afib   - she denies recent heart racing problems   - Since she has been stable on Sotalol and feeling better, will continue Sotalol     4) HTN  - BP runs OK at home   - continue current meds       5) Dyslipidemia  - cont statin   - prior lipids OK      6) CKD    7) Mild AS  - check yearly      8) TARIQ on CPAP     7) See EP. See me in 6 months    Moved from [place] to [place] to be close family (daughter and son).  passed in 2023. Jhony Sherwood MD, [address]  [address] [facility], [address]      [place] [address]  [address]  [address]  Ph: [phone]                               Ph [phone]

## 2023-05-10 ENCOUNTER — TRANSCRIBE ORDERS (OUTPATIENT)
Facility: HOSPITAL | Age: 83
End: 2023-05-10

## 2023-05-10 DIAGNOSIS — Z12.31 SCREENING MAMMOGRAM FOR BREAST CANCER: Primary | ICD-10-CM

## 2023-05-24 RX ORDER — INSULIN ASPART 100 [IU]/ML
INJECTION, SOLUTION INTRAVENOUS; SUBCUTANEOUS
COMMUNITY
Start: 2023-01-16 | End: 2023-06-22

## 2023-05-24 RX ORDER — SOTALOL HYDROCHLORIDE 80 MG/1
120 TABLET ORAL DAILY
COMMUNITY
Start: 2022-04-28 | End: 2023-06-20 | Stop reason: ALTCHOICE

## 2023-05-24 RX ORDER — FUROSEMIDE 20 MG/1
1 TABLET ORAL DAILY
COMMUNITY
Start: 2022-07-25 | End: 2023-06-28

## 2023-05-24 RX ORDER — ALLOPURINOL 100 MG/1
1 TABLET ORAL DAILY
COMMUNITY
Start: 2022-12-30

## 2023-05-24 RX ORDER — ALBUTEROL SULFATE 90 UG/1
2 AEROSOL, METERED RESPIRATORY (INHALATION) EVERY 4 HOURS PRN
COMMUNITY
Start: 2022-01-06

## 2023-05-24 RX ORDER — LEVOCETIRIZINE DIHYDROCHLORIDE 5 MG/1
5 TABLET, FILM COATED ORAL DAILY
COMMUNITY
Start: 2023-03-21

## 2023-05-24 RX ORDER — SPIRONOLACTONE 25 MG/1
1 TABLET ORAL DAILY
COMMUNITY
Start: 2023-01-12

## 2023-05-24 RX ORDER — ATORVASTATIN CALCIUM 40 MG/1
1 TABLET, FILM COATED ORAL DAILY
COMMUNITY
Start: 2022-12-21

## 2023-05-24 RX ORDER — LISINOPRIL 5 MG/1
1 TABLET ORAL DAILY
COMMUNITY
Start: 2022-10-03

## 2023-05-24 RX ORDER — FLUTICASONE PROPIONATE AND SALMETEROL 250; 50 UG/1; UG/1
POWDER RESPIRATORY (INHALATION)
COMMUNITY
Start: 2022-12-13

## 2023-05-24 RX ORDER — MONTELUKAST SODIUM 5 MG/1
TABLET, CHEWABLE ORAL
COMMUNITY
Start: 2022-03-07 | End: 2023-06-20

## 2023-05-24 RX ORDER — CLONIDINE HYDROCHLORIDE 0.1 MG/1
1 TABLET ORAL 2 TIMES DAILY
COMMUNITY
Start: 2022-09-14 | End: 2023-06-09

## 2023-05-24 RX ORDER — METHOCARBAMOL 500 MG/1
500 TABLET, FILM COATED ORAL
COMMUNITY
Start: 2022-01-06 | End: 2023-06-20

## 2023-05-24 RX ORDER — DULAGLUTIDE 1.5 MG/.5ML
1.5 INJECTION, SOLUTION SUBCUTANEOUS
COMMUNITY
Start: 2022-11-18 | End: 2023-07-24

## 2023-06-01 ENCOUNTER — TELEPHONE (OUTPATIENT)
Age: 83
End: 2023-06-01

## 2023-06-05 ENCOUNTER — HOSPITAL ENCOUNTER (OUTPATIENT)
Facility: HOSPITAL | Age: 83
Discharge: HOME | End: 2023-06-08
Payer: MEDICARE

## 2023-06-05 DIAGNOSIS — Z12.31 SCREENING MAMMOGRAM FOR BREAST CANCER: ICD-10-CM

## 2023-06-05 PROCEDURE — 77063 BREAST TOMOSYNTHESIS BI: CPT

## 2023-06-09 RX ORDER — CLONIDINE HYDROCHLORIDE 0.1 MG/1
TABLET ORAL
Qty: 180 TABLET | Refills: 3 | Status: SHIPPED | OUTPATIENT
Start: 2023-06-09

## 2023-06-09 NOTE — TELEPHONE ENCOUNTER
Refill per VO of Dr. Mars Precise  Last appt: 5/1/2023    Future Appointments   Date Time Provider Damian Snelli   6/20/2023  2:40 PM MD PARMINDER Mei [facility]   6/22/2023 10:00 AM Christel Thomas APRN - NP IFP BS [facility]   10/11/2023  1:30 PM Irina Julian MD ONCSF BS [facility]   11/8/2023  3:40 PM MD ANIYAH Dejesus [facility]       Requested Prescriptions     Signed Prescriptions Disp Refills    cloNIDine (CATAPRES) 0.1 MG tablet 180 tablet 3     Sig: TAKE 1 TABLET TWICE A DAY     Authorizing Provider: Delma Hernandez     Ordering User: Thomas Penn

## 2023-06-19 PROBLEM — I48.0 PAF (PAROXYSMAL ATRIAL FIBRILLATION) (HCC): Status: ACTIVE | Noted: 2023-06-19

## 2023-06-19 PROBLEM — Z79.01 ANTICOAGULATED: Status: ACTIVE | Noted: 2023-06-19

## 2023-06-19 PROBLEM — Z79.899 HIGH RISK MEDICATION USE: Status: ACTIVE | Noted: 2023-06-19

## 2023-06-20 ENCOUNTER — OFFICE VISIT (OUTPATIENT)
Age: 83
End: 2023-06-20
Payer: MEDICARE

## 2023-06-20 VITALS
HEIGHT: 63 IN | DIASTOLIC BLOOD PRESSURE: 70 MMHG | RESPIRATION RATE: 13 BRPM | SYSTOLIC BLOOD PRESSURE: 118 MMHG | BODY MASS INDEX: 40.57 KG/M2 | HEART RATE: 96 BPM | WEIGHT: 229 LBS | OXYGEN SATURATION: 100 %

## 2023-06-20 DIAGNOSIS — Z79.899 HIGH RISK MEDICATION USE: ICD-10-CM

## 2023-06-20 DIAGNOSIS — E78.5 DYSLIPIDEMIA: ICD-10-CM

## 2023-06-20 DIAGNOSIS — I10 PRIMARY HYPERTENSION: ICD-10-CM

## 2023-06-20 DIAGNOSIS — E66.01 OBESITY, MORBID (HCC): ICD-10-CM

## 2023-06-20 DIAGNOSIS — I48.0 PAF (PAROXYSMAL ATRIAL FIBRILLATION) (HCC): Primary | ICD-10-CM

## 2023-06-20 DIAGNOSIS — Z79.01 ANTICOAGULATED: ICD-10-CM

## 2023-06-20 PROCEDURE — G8417 CALC BMI ABV UP PARAM F/U: HCPCS | Performed by: INTERNAL MEDICINE

## 2023-06-20 PROCEDURE — 1123F ACP DISCUSS/DSCN MKR DOCD: CPT | Performed by: INTERNAL MEDICINE

## 2023-06-20 PROCEDURE — 1036F TOBACCO NON-USER: CPT | Performed by: INTERNAL MEDICINE

## 2023-06-20 PROCEDURE — G8427 DOCREV CUR MEDS BY ELIG CLIN: HCPCS | Performed by: INTERNAL MEDICINE

## 2023-06-20 PROCEDURE — G8400 PT W/DXA NO RESULTS DOC: HCPCS | Performed by: INTERNAL MEDICINE

## 2023-06-20 PROCEDURE — 99205 OFFICE O/P NEW HI 60 MIN: CPT | Performed by: INTERNAL MEDICINE

## 2023-06-20 PROCEDURE — 3078F DIAST BP <80 MM HG: CPT | Performed by: INTERNAL MEDICINE

## 2023-06-20 PROCEDURE — 1090F PRES/ABSN URINE INCON ASSESS: CPT | Performed by: INTERNAL MEDICINE

## 2023-06-20 PROCEDURE — 93005 ELECTROCARDIOGRAM TRACING: CPT | Performed by: INTERNAL MEDICINE

## 2023-06-20 PROCEDURE — 3074F SYST BP LT 130 MM HG: CPT | Performed by: INTERNAL MEDICINE

## 2023-06-20 PROCEDURE — 93010 ELECTROCARDIOGRAM REPORT: CPT | Performed by: INTERNAL MEDICINE

## 2023-06-20 RX ORDER — AMIODARONE HYDROCHLORIDE 200 MG/1
TABLET ORAL
Qty: 42 TABLET | Refills: 3 | Status: SHIPPED | OUTPATIENT
Start: 2023-06-20 | End: 2023-06-22 | Stop reason: ALTCHOICE

## 2023-06-20 RX ORDER — AMIODARONE HYDROCHLORIDE 200 MG/1
TABLET ORAL
Qty: 42 TABLET | Refills: 3 | Status: SHIPPED | OUTPATIENT
Start: 2023-06-20 | End: 2023-06-20

## 2023-06-20 NOTE — PROGRESS NOTES
Assessment/Plan:   1. PAF (paroxysmal atrial fibrillation) ([facility]  2. Primary hypertension  3. Dyslipidemia  4. Obesity, morbid ([facility]  5. Anticoagulated  6. High risk medication use        PAF:   -Diagnosed in 05/2023, unknown onset. -ECG today shows AF 81 bpm.  -OK to continue sotalol 80 mg po bid.  -Check BMP & Mg; target K>4 & Mg>2. She will require recheck of labs q 6 months, ECG q 6 months for QTc monitoring. High risk medication monitoring:  -ECG today shows no significant QTc prolongation.  -Reviewed risks for side effects/toxicity associated with sotalol, discussed need for serial monitoring of labs & ECG.  -Check BMP & Mg as noted above & supplement as indicated for targets as noted above. HTN:  -BP controlled. -Continue current clonidine, lisinopril, & spironolactone as previously prescribed. Anticoagulation:  -Continue Eliquis for thromboembolic prophylaxis. -Denies bleeding issues. -Knows to call clinic for BRBPR, melena, hematuria, or hemoptysis. Obesity:  -Advised weight loss. -Recommend reduced calorie diet, increased physical activity as able. Subjective:       Brandon Campos is a 81 y.o. patient who presents for follow up of PAF. She was kindly referred by Dr. Amaury Yan. Newly diagnosed on 05/01/2023, but date of onset unknown. On sotalol for years due to irregular HR & palpitations, but she's unsure which arrhythmia that was. Recently started Eliquis, denies bleeding issues. She notes RANKIN with minimal activity, worsening lower extremity edema. ECG today shows AF 81 bpm with QTc 442 ms. Echo in 05/2023 showed normal LVEF with mildly dilated LA, mild AS/AR. BP controlled. Anticoagulated with Eliquis. Denies bleeding issues. Previous:  She moved from [place] to [place] to be close to family (daughter and son).  passed in 2023.       I independently review internal and external records of most recent labs, EKGs, event monitors or Holters, and imaging

## 2023-06-20 NOTE — PROGRESS NOTES
Cardiac Electrophysiology OFFICE Consultation Note     Primary Cardiologist: Dr. Amaury Yan    Assessment/Plan:   1. PAF (paroxysmal atrial fibrillation) ([facility])  -     EKG 12 Lead  -     CBC; Future  -     Basic Metabolic Panel; Future  2. Primary hypertension  3. Dyslipidemia  4. Obesity, morbid ([address]  5. Anticoagulated  6. High risk medication use       Persistent atrial fibrillation:   Diagnosed in 05/2023, unknown onset. Initially paroxysmal but now has progressed to persistent atrial fibrillation. Duration unknown but echocardiogram demonstrated mild left atrial dilation indicating reasonable degree of remodeling, still adequate candidate for ablation therapy  -ECG today shows AF 81 bpm.  -I spoke to the patient in great detail today regarding various options including rate control alone, alternative antiarrhythmic therapy, versus ablation therapy for definitive management of A-fib. I did explain that success rate would decrease in the setting of persistent atrial fibrillation for unclear duration  -- The implication regarding the diagnosis of AF was explained to the patient in great detail including the associated risk of CVA, AF-mediated cardiomyopathy, and progression into persistent/chronic AF.  - I have discussed options including continued medical therapy and ablation in detail. I have discussed the risks of left atrial ablation including vascular complications, infection, MI, death, stroke, cardiac tamponade, esophageal fistula, phrenic nerve paralysis, PV stenosis and need for a 2nd procedure with the pt. Patient reports complete understanding of discussions and recommendations and wishes to proceed with the procedure.   -- Schedule for AFib/Pulmonary Vein Isolation ablation next available  - hold Eliquis on the AM of procedure  - obtain blood work prior to procedure (BMP, CBC)  -Stop sotalol today, will allow for 1 week washout.  - Start amiodarone 1 week at 400 mg twice daily for 2 weeks then down to

## 2023-06-20 NOTE — PATIENT INSTRUCTIONS
Stop Sotalol today  In 1 week, start Amiodarone 400 mg twice a day x2 weeks, then down to 200 mg daily  Schedule Afib ablation   Hold Eliquis on the morning of the procedure    You are scheduled for the following procedure with Dr. Katelyn Castro:  for Afib Ablation at [facility], [address], [place], [address]       PLEASE be aware that your procedure date/time is tentative and subject to change due to emergency cases. Procedure date/time:    TuesdaySeptember 19, 2023 at  9:30 am - please arrive by  7:30 am    ARRIVAL time:  (You will need  to be discharged home with.)       [X]  [facility] procedures: arrive to check in on 1st floor near [address] entrance two hours prior to your procedure. Pre-procedure Labs:    PRE-PROCEDURE LABS NEEDED: Yes   -  please have labs done after 8/19/23 and before 9/12/23   Forms for labwork may be given at appointment. If not, they will be mailed to you. Labs should be completed within 5-7 days of procedure. These can be done at any [facility] or [facility].         [address], [address] [place] [address],4Th Floor  Zechariah Choudhury [address]  Monday-Friday 730A-430P (closed [id number])  [phone]    [address] [place] [address] Kerrie. [address], [address]  Monday-Friday 8:00AM - 5:00 PM   [phone]    Heart [facility]ascular [facility] Rosey Figueroa  Monday-Friday 7A-5P  [address], [address] [facility], [address] [facility]  Zecahriah Espinoza [address]  Monday-Friday 244E-300V  [phone] (closed 1-2P)    [facility] , [address] [facility] [address],8Th Floor [address]  [place], [address]  Monday-Friday 730A-430P ([id number])  [phone]    [address]  [address], [address]  Monday-Friday 7A-430P  [address] [facility]

## 2023-06-22 ENCOUNTER — OFFICE VISIT (OUTPATIENT)
Age: 83
End: 2023-06-22
Payer: MEDICARE

## 2023-06-22 VITALS
BODY MASS INDEX: 40.75 KG/M2 | OXYGEN SATURATION: 98 % | RESPIRATION RATE: 14 BRPM | HEART RATE: 76 BPM | WEIGHT: 230 LBS | TEMPERATURE: 97.8 F | DIASTOLIC BLOOD PRESSURE: 72 MMHG | HEIGHT: 63 IN | SYSTOLIC BLOOD PRESSURE: 119 MMHG

## 2023-06-22 DIAGNOSIS — N18.31 TYPE 2 DIABETES MELLITUS WITH STAGE 3A CHRONIC KIDNEY DISEASE, WITH LONG-TERM CURRENT USE OF INSULIN (HCC): Primary | ICD-10-CM

## 2023-06-22 DIAGNOSIS — Z79.4 TYPE 2 DIABETES MELLITUS WITH STAGE 3A CHRONIC KIDNEY DISEASE, WITH LONG-TERM CURRENT USE OF INSULIN (HCC): Primary | ICD-10-CM

## 2023-06-22 DIAGNOSIS — E11.22 TYPE 2 DIABETES MELLITUS WITH STAGE 3A CHRONIC KIDNEY DISEASE, WITH LONG-TERM CURRENT USE OF INSULIN (HCC): Primary | ICD-10-CM

## 2023-06-22 DIAGNOSIS — I48.0 PAF (PAROXYSMAL ATRIAL FIBRILLATION) (HCC): ICD-10-CM

## 2023-06-22 DIAGNOSIS — H61.22 LEFT EAR IMPACTED CERUMEN: ICD-10-CM

## 2023-06-22 DIAGNOSIS — E78.00 PURE HYPERCHOLESTEROLEMIA, UNSPECIFIED: ICD-10-CM

## 2023-06-22 DIAGNOSIS — N18.31 STAGE 3A CHRONIC KIDNEY DISEASE (CKD) (HCC): ICD-10-CM

## 2023-06-22 LAB
ANION GAP SERPL CALC-SCNC: 6 MMOL/L (ref 5–15)
BUN SERPL-MCNC: 31 MG/DL (ref 6–20)
BUN/CREAT SERPL: 24 (ref 12–20)
CALCIUM SERPL-MCNC: 9.2 MG/DL (ref 8.5–10.1)
CHLORIDE SERPL-SCNC: 104 MMOL/L (ref 97–108)
CHOLEST SERPL-MCNC: 185 MG/DL
CO2 SERPL-SCNC: 25 MMOL/L (ref 21–32)
CREAT SERPL-MCNC: 1.27 MG/DL (ref 0.55–1.02)
ERYTHROCYTE [DISTWIDTH] IN BLOOD BY AUTOMATED COUNT: 13.5 % (ref 11.5–14.5)
EST. AVERAGE GLUCOSE BLD GHB EST-MCNC: 140 MG/DL
GLUCOSE SERPL-MCNC: 171 MG/DL (ref 65–100)
HBA1C MFR BLD: 6.5 % (ref 4–5.6)
HCT VFR BLD AUTO: 40.8 % (ref 35–47)
HDLC SERPL-MCNC: 49 MG/DL
HDLC SERPL: 3.8 (ref 0–5)
HGB BLD-MCNC: 12.8 G/DL (ref 11.5–16)
LDLC SERPL CALC-MCNC: 97 MG/DL (ref 0–100)
MCH RBC QN AUTO: 31.3 PG (ref 26–34)
MCHC RBC AUTO-ENTMCNC: 31.4 G/DL (ref 30–36.5)
MCV RBC AUTO: 99.8 FL (ref 80–99)
NRBC # BLD: 0 K/UL (ref 0–0.01)
NRBC BLD-RTO: 0 PER 100 WBC
PLATELET # BLD AUTO: 273 K/UL (ref 150–400)
PMV BLD AUTO: 10.5 FL (ref 8.9–12.9)
POTASSIUM SERPL-SCNC: 4.9 MMOL/L (ref 3.5–5.1)
RBC # BLD AUTO: 4.09 M/UL (ref 3.8–5.2)
SODIUM SERPL-SCNC: 135 MMOL/L (ref 136–145)
TRIGL SERPL-MCNC: 195 MG/DL
VLDLC SERPL CALC-MCNC: 39 MG/DL
WBC # BLD AUTO: 8.5 K/UL (ref 3.6–11)

## 2023-06-22 PROCEDURE — 3078F DIAST BP <80 MM HG: CPT | Performed by: NURSE PRACTITIONER

## 2023-06-22 PROCEDURE — 1036F TOBACCO NON-USER: CPT | Performed by: NURSE PRACTITIONER

## 2023-06-22 PROCEDURE — G8400 PT W/DXA NO RESULTS DOC: HCPCS | Performed by: NURSE PRACTITIONER

## 2023-06-22 PROCEDURE — G8417 CALC BMI ABV UP PARAM F/U: HCPCS | Performed by: NURSE PRACTITIONER

## 2023-06-22 PROCEDURE — G8427 DOCREV CUR MEDS BY ELIG CLIN: HCPCS | Performed by: NURSE PRACTITIONER

## 2023-06-22 PROCEDURE — 99214 OFFICE O/P EST MOD 30 MIN: CPT | Performed by: NURSE PRACTITIONER

## 2023-06-22 PROCEDURE — 1123F ACP DISCUSS/DSCN MKR DOCD: CPT | Performed by: NURSE PRACTITIONER

## 2023-06-22 PROCEDURE — 1090F PRES/ABSN URINE INCON ASSESS: CPT | Performed by: NURSE PRACTITIONER

## 2023-06-22 PROCEDURE — 3044F HG A1C LEVEL LT 7.0%: CPT | Performed by: NURSE PRACTITIONER

## 2023-06-22 PROCEDURE — 3074F SYST BP LT 130 MM HG: CPT | Performed by: NURSE PRACTITIONER

## 2023-06-22 PROCEDURE — PBSHW REMOVAL IMPACTED CERUMEN IRRIGATION/LVG UNILAT: Performed by: NURSE PRACTITIONER

## 2023-06-22 PROCEDURE — 69209 REMOVE IMPACTED EAR WAX UNI: CPT | Performed by: NURSE PRACTITIONER

## 2023-06-22 RX ORDER — INSULIN ASPART 100 [IU]/ML
INJECTION, SOLUTION INTRAVENOUS; SUBCUTANEOUS
Qty: 5 ADJUSTABLE DOSE PRE-FILLED PEN SYRINGE | Refills: 0 | COMMUNITY
Start: 2023-06-22

## 2023-06-22 SDOH — ECONOMIC STABILITY: FOOD INSECURITY: WITHIN THE PAST 12 MONTHS, YOU WORRIED THAT YOUR FOOD WOULD RUN OUT BEFORE YOU GOT MONEY TO BUY MORE.: NEVER TRUE

## 2023-06-22 SDOH — ECONOMIC STABILITY: INCOME INSECURITY: HOW HARD IS IT FOR YOU TO PAY FOR THE VERY BASICS LIKE FOOD, HOUSING, MEDICAL CARE, AND HEATING?: NOT HARD AT ALL

## 2023-06-22 SDOH — ECONOMIC STABILITY: FOOD INSECURITY: WITHIN THE PAST 12 MONTHS, THE FOOD YOU BOUGHT JUST DIDN'T LAST AND YOU DIDN'T HAVE MONEY TO GET MORE.: NEVER TRUE

## 2023-06-22 SDOH — ECONOMIC STABILITY: HOUSING INSECURITY
IN THE LAST 12 MONTHS, WAS THERE A TIME WHEN YOU DID NOT HAVE A STEADY PLACE TO SLEEP OR SLEPT IN A SHELTER (INCLUDING NOW)?: NO

## 2023-06-22 ASSESSMENT — PATIENT HEALTH QUESTIONNAIRE - PHQ9
SUM OF ALL RESPONSES TO PHQ QUESTIONS 1-9: 0
SUM OF ALL RESPONSES TO PHQ9 QUESTIONS 1 & 2: 0
2. FEELING DOWN, DEPRESSED OR HOPELESS: 0
1. LITTLE INTEREST OR PLEASURE IN DOING THINGS: 0
SUM OF ALL RESPONSES TO PHQ QUESTIONS 1-9: 0

## 2023-06-22 NOTE — PROGRESS NOTES
Subjective:      Patient ID: Adlofo Laws is a 81 y.o. female. Diabetes    Hypertension    Atrial Fibrillation    Patient is here today for follow up chol and sugar  Due for labs  Recent visit and labs done with Dr. Darius Nair for afib with plan to do ablation in Sept  She is also having left ear hearing loss  Went for hearing aid and told that she needed cerumen flush    Review of Systems  A comprehensive review of system was obtained and negative except findings in the HPI    /72   Pulse 76   Temp 97.8 °F (36.6 °C) (Oral)   Resp 14   Ht 5' 3\" (1.6 m)   Wt 230 lb (104.3 kg)   SpO2 98%   BMI 40.74 kg/m²   Objective:   Physical Exam  Vitals and nursing note reviewed. Constitutional:       General: She is not in acute distress. Appearance: Normal appearance. HENT:      Ears:      Comments: Left canal cerumen impacted  Cardiovascular:      Rate and Rhythm: Normal rate and regular rhythm. Pulmonary:      Effort: Pulmonary effort is normal. No respiratory distress. Breath sounds: Normal breath sounds. No wheezing or rhonchi. Musculoskeletal:         General: No swelling. Neurological:      General: No focal deficit present. Mental Status: She is alert and oriented to person, place, and time. Psychiatric:         Mood and Affect: Mood normal.       Assessment / Plan:      Stephanie Merino was seen today for diabetes, hypertension, lab collection, atrial fibrillation and cerumen impaction. Diagnoses and all orders for this visit:    Type 2 diabetes mellitus with stage 3a chronic kidney disease, with long-term current use of insulin ([facility])  -     Hemoglobin A1C; Future    Pure hypercholesterolemia, unspecified  -     Lipid Panel;  Future  -     Lipid Panel    Left ear impacted cerumen  -     REMOVAL IMPACTED CERUMEN IRRIGATION/LVG UNILAT    Stage 3a chronic kidney disease (CKD) ([facility])    PAF (paroxysmal atrial fibrillation) ([facility])  -     Basic Metabolic Panel  -     CBC      Las updated

## 2023-06-22 NOTE — PROGRESS NOTES
.cc  Chief Complaint   Patient presents with    Diabetes    Hypertension     Ankle swelling    Lab Collection     Fasting today    Atrial Fibrillation     Dr Leatha Houston    Cerumen Impaction     Left ear     1. Have you been to the ER, urgent care clinic since your last visit? Hospitalized since your last visit? No    2. Have you seen or consulted any other health care providers outside of the [address] [place] [address] since your last visit? Include any pap smears or colon screening.  No

## 2023-06-23 ENCOUNTER — TELEPHONE (OUTPATIENT)
Age: 83
End: 2023-06-23

## 2023-06-23 NOTE — TELEPHONE ENCOUNTER
Spoke with Pt of  Afib Ablation w/Dr. Avis Au at [address]. For 9/19/23 At 9:30am  arrive at 7:30am Pt aware that they need a  NPO from [address] the night before. Check in at the first floor Outpt. Reg. Desk.  Pt is to have Labs done on 8/19/23-.9/12/23 Medications:  Hold Eliquis day of procedure   VO by Dr Govind Sagastume.

## 2023-06-26 ENCOUNTER — PATIENT MESSAGE (OUTPATIENT)
Age: 83
End: 2023-06-26

## 2023-06-28 RX ORDER — AMIODARONE HYDROCHLORIDE 200 MG/1
TABLET ORAL
COMMUNITY
Start: 2023-06-22

## 2023-06-28 RX ORDER — FUROSEMIDE 20 MG/1
TABLET ORAL
Qty: 90 TABLET | Refills: 3 | Status: SHIPPED | OUTPATIENT
Start: 2023-06-28

## 2023-07-06 ENCOUNTER — TELEPHONE (OUTPATIENT)
Age: 83
End: 2023-07-06

## 2023-07-06 NOTE — TELEPHONE ENCOUNTER
Dept of [place] form dropped off to be completed and it was placed in box up front. Please call when ready for .       # [phone]

## 2023-07-10 ENCOUNTER — TELEPHONE (OUTPATIENT)
Age: 83
End: 2023-07-10

## 2023-07-10 NOTE — TELEPHONE ENCOUNTER
Patient states that she would like a letter stating that she is going into assisted living. She states that she is on a wait list and needs the letter for when she can move. She states that she would like to pick it up when she picks up the [facility] form.  Her number is [phone]

## 2023-07-12 NOTE — TELEPHONE ENCOUNTER
Called and spoke with patient. Pt id x 2. Pt states the letter needs to be completed asap, she's currently on the waiting list for assisted living. She needs the letter to say that Ethanmary Sales recommends that she go into assisted living due to her medical conditions. She would like to  the letter when she picks up the [place] aid form.

## 2023-07-13 ENCOUNTER — TELEPHONE (OUTPATIENT)
Age: 83
End: 2023-07-13

## 2023-07-13 ENCOUNTER — CLINICAL DOCUMENTATION (OUTPATIENT)
Age: 83
End: 2023-07-13

## 2023-07-18 ENCOUNTER — CLINICAL DOCUMENTATION (OUTPATIENT)
Age: 83
End: 2023-07-18

## 2023-07-19 ENCOUNTER — TELEPHONE (OUTPATIENT)
Age: 83
End: 2023-07-19

## 2023-07-19 NOTE — TELEPHONE ENCOUNTER
Spoke with pt in re to forms from [address] [place] [address] in re to c-pap.  Pt is aware that provider cant fill this out and she states she will call her cardio dr as this was done in [place]

## 2023-07-24 RX ORDER — DULAGLUTIDE 1.5 MG/.5ML
INJECTION, SOLUTION SUBCUTANEOUS
Qty: 6 ML | Refills: 3 | Status: SHIPPED | OUTPATIENT
Start: 2023-07-24

## 2023-08-30 ENCOUNTER — CLINICAL DOCUMENTATION (OUTPATIENT)
Age: 83
End: 2023-08-30

## 2023-09-06 ENCOUNTER — PREP FOR PROCEDURE (OUTPATIENT)
Age: 83
End: 2023-09-06

## 2023-09-06 RX ORDER — SODIUM CHLORIDE 9 MG/ML
INJECTION, SOLUTION INTRAVENOUS PRN
Status: CANCELLED | OUTPATIENT
Start: 2023-09-06

## 2023-09-06 RX ORDER — SODIUM CHLORIDE 0.9 % (FLUSH) 0.9 %
5-40 SYRINGE (ML) INJECTION PRN
Status: CANCELLED | OUTPATIENT
Start: 2023-09-06

## 2023-09-06 RX ORDER — SODIUM CHLORIDE 0.9 % (FLUSH) 0.9 %
5-40 SYRINGE (ML) INJECTION EVERY 12 HOURS SCHEDULED
Status: CANCELLED | OUTPATIENT
Start: 2023-09-06

## 2023-09-12 LAB
ALBUMIN SERPL-MCNC: 4.2 G/DL (ref 3.7–4.7)
BASOPHILS # BLD AUTO: 0.1 X10E3/UL (ref 0–0.2)
BASOPHILS # BLD AUTO: 0.1 X10E3/UL (ref 0–0.2)
BASOPHILS NFR BLD AUTO: 1 %
BASOPHILS NFR BLD AUTO: 1 %
BUN SERPL-MCNC: 49 MG/DL (ref 8–27)
BUN SERPL-MCNC: 51 MG/DL (ref 8–27)
BUN/CREAT SERPL: 29 (ref 12–28)
BUN/CREAT SERPL: 31 (ref 12–28)
CALCIUM SERPL-MCNC: 9.6 MG/DL (ref 8.7–10.3)
CALCIUM SERPL-MCNC: 9.6 MG/DL (ref 8.7–10.3)
CHLORIDE SERPL-SCNC: 97 MMOL/L (ref 96–106)
CHLORIDE SERPL-SCNC: 98 MMOL/L (ref 96–106)
CO2 SERPL-SCNC: 21 MMOL/L (ref 20–29)
CO2 SERPL-SCNC: 21 MMOL/L (ref 20–29)
CREAT SERPL-MCNC: 1.65 MG/DL (ref 0.57–1)
CREAT SERPL-MCNC: 1.69 MG/DL (ref 0.57–1)
EGFRCR SERPLBLD CKD-EPI 2021: 30 ML/MIN/1.73
EGFRCR SERPLBLD CKD-EPI 2021: 31 ML/MIN/1.73
EOSINOPHIL # BLD AUTO: 0.2 X10E3/UL (ref 0–0.4)
EOSINOPHIL # BLD AUTO: 0.2 X10E3/UL (ref 0–0.4)
EOSINOPHIL NFR BLD AUTO: 2 %
EOSINOPHIL NFR BLD AUTO: 2 %
ERYTHROCYTE [DISTWIDTH] IN BLOOD BY AUTOMATED COUNT: 13.2 % (ref 11.7–15.4)
ERYTHROCYTE [DISTWIDTH] IN BLOOD BY AUTOMATED COUNT: 13.2 % (ref 11.7–15.4)
GLUCOSE SERPL-MCNC: 124 MG/DL (ref 70–99)
GLUCOSE SERPL-MCNC: 127 MG/DL (ref 70–99)
HCT VFR BLD AUTO: 40.1 % (ref 34–46.6)
HCT VFR BLD AUTO: 40.2 % (ref 34–46.6)
HGB BLD-MCNC: 13.5 G/DL (ref 11.1–15.9)
HGB BLD-MCNC: 13.6 G/DL (ref 11.1–15.9)
IMM GRANULOCYTES # BLD AUTO: 0 X10E3/UL (ref 0–0.1)
IMM GRANULOCYTES # BLD AUTO: 0.1 X10E3/UL (ref 0–0.1)
IMM GRANULOCYTES NFR BLD AUTO: 0 %
IMM GRANULOCYTES NFR BLD AUTO: 1 %
LYMPHOCYTES # BLD AUTO: 1.7 X10E3/UL (ref 0.7–3.1)
LYMPHOCYTES # BLD AUTO: 1.8 X10E3/UL (ref 0.7–3.1)
LYMPHOCYTES NFR BLD AUTO: 19 %
LYMPHOCYTES NFR BLD AUTO: 19 %
MCH RBC QN AUTO: 32.2 PG (ref 26.6–33)
MCH RBC QN AUTO: 32.4 PG (ref 26.6–33)
MCHC RBC AUTO-ENTMCNC: 33.6 G/DL (ref 31.5–35.7)
MCHC RBC AUTO-ENTMCNC: 33.9 G/DL (ref 31.5–35.7)
MCV RBC AUTO: 95 FL (ref 79–97)
MCV RBC AUTO: 96 FL (ref 79–97)
MONOCYTES # BLD AUTO: 0.9 X10E3/UL (ref 0.1–0.9)
MONOCYTES # BLD AUTO: 1 X10E3/UL (ref 0.1–0.9)
MONOCYTES NFR BLD AUTO: 10 %
MONOCYTES NFR BLD AUTO: 10 %
NEUTROPHILS # BLD AUTO: 6.3 X10E3/UL (ref 1.4–7)
NEUTROPHILS # BLD AUTO: 6.4 X10E3/UL (ref 1.4–7)
NEUTROPHILS NFR BLD AUTO: 67 %
NEUTROPHILS NFR BLD AUTO: 68 %
PHOSPHATE SERPL-MCNC: 5.2 MG/DL (ref 3–4.3)
PLATELET # BLD AUTO: 288 X10E3/UL (ref 150–450)
PLATELET # BLD AUTO: 298 X10E3/UL (ref 150–450)
POTASSIUM SERPL-SCNC: 4.3 MMOL/L (ref 3.5–5.2)
POTASSIUM SERPL-SCNC: 4.3 MMOL/L (ref 3.5–5.2)
RBC # BLD AUTO: 4.17 X10E6/UL (ref 3.77–5.28)
RBC # BLD AUTO: 4.23 X10E6/UL (ref 3.77–5.28)
REPORT: NORMAL
SODIUM SERPL-SCNC: 136 MMOL/L (ref 134–144)
SODIUM SERPL-SCNC: 137 MMOL/L (ref 134–144)
SPECIMEN STATUS REPORT: NORMAL
WBC # BLD AUTO: 9.2 X10E3/UL (ref 3.4–10.8)
WBC # BLD AUTO: 9.4 X10E3/UL (ref 3.4–10.8)

## 2023-09-13 LAB
ALBUMIN 24H MFR UR ELPH: 0 %
ALPHA1 GLOB 24H MFR UR ELPH: 0 %
ALPHA2 GLOB 24H MFR UR ELPH: 0 %
B-GLOBULIN MFR UR ELPH: 0 %
GAMMA GLOB 24H MFR UR ELPH: 0 %
INTERPRETATION UR IFE-IMP: NORMAL
Lab: NORMAL
M PROTEIN 24H MFR UR ELPH: NORMAL %
PROT UR-MCNC: 8.4 MG/DL

## 2023-09-15 ENCOUNTER — TELEPHONE (OUTPATIENT)
Age: 83
End: 2023-09-15

## 2023-09-15 NOTE — TELEPHONE ENCOUNTER
Spoke to pt to change her time from 10am to 8am w/Dr. Avis Au for Afib Ablation at [facility]

## 2023-09-18 LAB
ALBUMIN SERPL ELPH-MCNC: 3.6 G/DL (ref 2.9–4.4)
ALBUMIN/GLOB SERPL: 1.1 {RATIO} (ref 0.7–1.7)
ALPHA1 GLOB SERPL ELPH-MCNC: 0.3 G/DL (ref 0–0.4)
ALPHA2 GLOB SERPL ELPH-MCNC: 1 G/DL (ref 0.4–1)
B-GLOBULIN SERPL ELPH-MCNC: 1 G/DL (ref 0.7–1.3)
GAMMA GLOB SERPL ELPH-MCNC: 1.1 G/DL (ref 0.4–1.8)
GLOBULIN SER-MCNC: 3.4 G/DL (ref 2.2–3.9)
IGA SERPL-MCNC: 123 MG/DL (ref 64–422)
IGG SERPL-MCNC: 1240 MG/DL (ref 586–1602)
IGM SERPL-MCNC: 55 MG/DL (ref 26–217)
INTERPRETATION SERPL IEP-IMP: ABNORMAL
KAPPA LC FREE SER-MCNC: 34 MG/L (ref 3.3–19.4)
KAPPA LC FREE/LAMBDA FREE SER: 1.79 {RATIO} (ref 0.26–1.65)
LABORATORY COMMENT REPORT: ABNORMAL
LAMBDA LC FREE SERPL-MCNC: 19 MG/L (ref 5.7–26.3)
M PROTEIN SERPL ELPH-MCNC: 0.8 G/DL
PROT SERPL-MCNC: 7 G/DL (ref 6–8.5)

## 2023-09-19 ENCOUNTER — HOSPITAL ENCOUNTER (OUTPATIENT)
Facility: HOSPITAL | Age: 83
Discharge: HOME | End: 2023-09-19
Attending: INTERNAL MEDICINE | Admitting: INTERNAL MEDICINE
Payer: MEDICARE

## 2023-09-19 ENCOUNTER — ANESTHESIA EVENT (OUTPATIENT)
Facility: HOSPITAL | Age: 83
End: 2023-09-19
Payer: MEDICARE

## 2023-09-19 ENCOUNTER — CLINICAL DOCUMENTATION (OUTPATIENT)
Age: 83
End: 2023-09-19

## 2023-09-19 ENCOUNTER — ANESTHESIA (OUTPATIENT)
Facility: HOSPITAL | Age: 83
End: 2023-09-19
Payer: MEDICARE

## 2023-09-19 VITALS
HEIGHT: 63 IN | HEART RATE: 62 BPM | RESPIRATION RATE: 17 BRPM | WEIGHT: 235 LBS | BODY MASS INDEX: 41.64 KG/M2 | DIASTOLIC BLOOD PRESSURE: 58 MMHG | SYSTOLIC BLOOD PRESSURE: 141 MMHG | TEMPERATURE: 97.6 F | OXYGEN SATURATION: 99 %

## 2023-09-19 DIAGNOSIS — I10 PRIMARY HYPERTENSION: ICD-10-CM

## 2023-09-19 DIAGNOSIS — I48.91 ATRIAL FIBRILLATION, UNSPECIFIED TYPE (HCC): ICD-10-CM

## 2023-09-19 DIAGNOSIS — I48.0 PAF (PAROXYSMAL ATRIAL FIBRILLATION) (HCC): Primary | ICD-10-CM

## 2023-09-19 LAB
ABO + RH BLD: NORMAL
ACT BLD: 335 SECS (ref 79–138)
ACT BLD: 365 SECS (ref 79–138)
ACT BLD: 372 SECS (ref 79–138)
BLOOD GROUP ANTIBODIES SERPL: NORMAL
ECHO BSA: 2.18 M2
EKG ATRIAL RATE: 61 BPM
EKG DIAGNOSIS: NORMAL
EKG P AXIS: 44 DEGREES
EKG P-R INTERVAL: 232 MS
EKG Q-T INTERVAL: 494 MS
EKG QRS DURATION: 96 MS
EKG QTC CALCULATION (BAZETT): 497 MS
EKG R AXIS: 0 DEGREES
EKG T AXIS: 39 DEGREES
EKG VENTRICULAR RATE: 61 BPM
GLUCOSE BLD STRIP.AUTO-MCNC: 155 MG/DL (ref 65–117)
GLUCOSE BLD STRIP.AUTO-MCNC: 162 MG/DL (ref 65–117)
SERVICE CMNT-IMP: ABNORMAL
SERVICE CMNT-IMP: ABNORMAL
SPECIMEN EXP DATE BLD: NORMAL

## 2023-09-19 PROCEDURE — 93662 INTRACARDIAC ECG (ICE): CPT | Performed by: INTERNAL MEDICINE

## 2023-09-19 PROCEDURE — 6360000002 HC RX W HCPCS: Performed by: NURSE ANESTHETIST, CERTIFIED REGISTERED

## 2023-09-19 PROCEDURE — 93656 COMPRE EP EVAL ABLTJ ATR FIB: CPT | Performed by: INTERNAL MEDICINE

## 2023-09-19 PROCEDURE — C1894 INTRO/SHEATH, NON-LASER: HCPCS | Performed by: INTERNAL MEDICINE

## 2023-09-19 PROCEDURE — 2709999900 HC NON-CHARGEABLE SUPPLY: Performed by: INTERNAL MEDICINE

## 2023-09-19 PROCEDURE — 93655 ICAR CATH ABLTJ DSCRT ARRHYT: CPT | Performed by: INTERNAL MEDICINE

## 2023-09-19 PROCEDURE — 2580000003 HC RX 258: Performed by: NURSE ANESTHETIST, CERTIFIED REGISTERED

## 2023-09-19 PROCEDURE — 86850 RBC ANTIBODY SCREEN: CPT

## 2023-09-19 PROCEDURE — 93623 PRGRMD STIMJ&PACG IV RX NFS: CPT | Performed by: INTERNAL MEDICINE

## 2023-09-19 PROCEDURE — 86900 BLOOD TYPING SEROLOGIC ABO: CPT

## 2023-09-19 PROCEDURE — 93622 COMP EP EVAL L VENTR PAC&REC: CPT | Performed by: INTERNAL MEDICINE

## 2023-09-19 PROCEDURE — 2720000010 HC SURG SUPPLY STERILE: Performed by: INTERNAL MEDICINE

## 2023-09-19 PROCEDURE — 2500000003 HC RX 250 WO HCPCS: Performed by: NURSE ANESTHETIST, CERTIFIED REGISTERED

## 2023-09-19 PROCEDURE — C1769 GUIDE WIRE: HCPCS | Performed by: INTERNAL MEDICINE

## 2023-09-19 PROCEDURE — 82962 GLUCOSE BLOOD TEST: CPT

## 2023-09-19 PROCEDURE — 92960 CARDIOVERSION ELECTRIC EXT: CPT | Performed by: INTERNAL MEDICINE

## 2023-09-19 PROCEDURE — 85347 COAGULATION TIME ACTIVATED: CPT

## 2023-09-19 PROCEDURE — 3700000001 HC ADD 15 MINUTES (ANESTHESIA): Performed by: INTERNAL MEDICINE

## 2023-09-19 PROCEDURE — 93613 INTRACARDIAC EPHYS 3D MAPG: CPT | Performed by: INTERNAL MEDICINE

## 2023-09-19 PROCEDURE — 93005 ELECTROCARDIOGRAM TRACING: CPT | Performed by: NURSE PRACTITIONER

## 2023-09-19 PROCEDURE — 2580000003 HC RX 258: Performed by: NURSE PRACTITIONER

## 2023-09-19 PROCEDURE — 86901 BLOOD TYPING SEROLOGIC RH(D): CPT

## 2023-09-19 PROCEDURE — C1760 CLOSURE DEV, VASC: HCPCS | Performed by: INTERNAL MEDICINE

## 2023-09-19 PROCEDURE — 76937 US GUIDE VASCULAR ACCESS: CPT | Performed by: INTERNAL MEDICINE

## 2023-09-19 PROCEDURE — 93657 TX L/R ATRIAL FIB ADDL: CPT | Performed by: INTERNAL MEDICINE

## 2023-09-19 PROCEDURE — C1759 CATH, INTRA ECHOCARDIOGRAPHY: HCPCS | Performed by: INTERNAL MEDICINE

## 2023-09-19 PROCEDURE — C1732 CATH, EP, DIAG/ABL, 3D/VECT: HCPCS | Performed by: INTERNAL MEDICINE

## 2023-09-19 PROCEDURE — 3700000000 HC ANESTHESIA ATTENDED CARE: Performed by: INTERNAL MEDICINE

## 2023-09-19 PROCEDURE — 36415 COLL VENOUS BLD VENIPUNCTURE: CPT

## 2023-09-19 PROCEDURE — C1766 INTRO/SHEATH,STRBLE,NON-PEEL: HCPCS | Performed by: INTERNAL MEDICINE

## 2023-09-19 RX ORDER — PHENYLEPHRINE HCL IN 0.9% NACL 0.4MG/10ML
SYRINGE (ML) INTRAVENOUS PRN
Status: DISCONTINUED | OUTPATIENT
Start: 2023-09-19 | End: 2023-09-19 | Stop reason: SDUPTHER

## 2023-09-19 RX ORDER — SODIUM CHLORIDE 9 MG/ML
INJECTION, SOLUTION INTRAVENOUS PRN
Status: DISCONTINUED | OUTPATIENT
Start: 2023-09-19 | End: 2023-09-19 | Stop reason: HOSPADM

## 2023-09-19 RX ORDER — ACETAMINOPHEN 325 MG/1
650 TABLET ORAL EVERY 4 HOURS PRN
Status: DISCONTINUED | OUTPATIENT
Start: 2023-09-19 | End: 2023-09-19 | Stop reason: HOSPADM

## 2023-09-19 RX ORDER — PANTOPRAZOLE SODIUM 40 MG/1
40 TABLET, DELAYED RELEASE ORAL 2 TIMES DAILY
Qty: 60 TABLET | Refills: 0 | Status: SHIPPED | OUTPATIENT
Start: 2023-09-19

## 2023-09-19 RX ORDER — HEPARIN SODIUM 1000 [USP'U]/ML
INJECTION, SOLUTION INTRAVENOUS; SUBCUTANEOUS PRN
Status: DISCONTINUED | OUTPATIENT
Start: 2023-09-19 | End: 2023-09-19 | Stop reason: SDUPTHER

## 2023-09-19 RX ORDER — DEXAMETHASONE SODIUM PHOSPHATE 4 MG/ML
INJECTION, SOLUTION INTRA-ARTICULAR; INTRALESIONAL; INTRAMUSCULAR; INTRAVENOUS; SOFT TISSUE PRN
Status: DISCONTINUED | OUTPATIENT
Start: 2023-09-19 | End: 2023-09-19 | Stop reason: SDUPTHER

## 2023-09-19 RX ORDER — SODIUM CHLORIDE 0.9 % (FLUSH) 0.9 %
5-40 SYRINGE (ML) INJECTION EVERY 12 HOURS SCHEDULED
Status: DISCONTINUED | OUTPATIENT
Start: 2023-09-19 | End: 2023-09-19 | Stop reason: HOSPADM

## 2023-09-19 RX ORDER — SUCRALFATE 1 G/1
1 TABLET ORAL
Qty: 120 TABLET | Refills: 0 | Status: SHIPPED | OUTPATIENT
Start: 2023-09-19

## 2023-09-19 RX ORDER — ROCURONIUM BROMIDE 10 MG/ML
INJECTION, SOLUTION INTRAVENOUS PRN
Status: DISCONTINUED | OUTPATIENT
Start: 2023-09-19 | End: 2023-09-19 | Stop reason: SDUPTHER

## 2023-09-19 RX ORDER — SUCCINYLCHOLINE CHLORIDE 20 MG/ML
INJECTION INTRAMUSCULAR; INTRAVENOUS PRN
Status: DISCONTINUED | OUTPATIENT
Start: 2023-09-19 | End: 2023-09-19 | Stop reason: SDUPTHER

## 2023-09-19 RX ORDER — SODIUM CHLORIDE 0.9 % (FLUSH) 0.9 %
5-40 SYRINGE (ML) INJECTION PRN
Status: DISCONTINUED | OUTPATIENT
Start: 2023-09-19 | End: 2023-09-19 | Stop reason: HOSPADM

## 2023-09-19 RX ORDER — HEPARIN SODIUM 10000 [USP'U]/100ML
INJECTION, SOLUTION INTRAVENOUS CONTINUOUS PRN
Status: DISCONTINUED | OUTPATIENT
Start: 2023-09-19 | End: 2023-09-19 | Stop reason: SDUPTHER

## 2023-09-19 RX ORDER — PROTAMINE SULFATE 10 MG/ML
INJECTION, SOLUTION INTRAVENOUS PRN
Status: DISCONTINUED | OUTPATIENT
Start: 2023-09-19 | End: 2023-09-19 | Stop reason: SDUPTHER

## 2023-09-19 RX ORDER — ONDANSETRON 2 MG/ML
INJECTION INTRAMUSCULAR; INTRAVENOUS PRN
Status: DISCONTINUED | OUTPATIENT
Start: 2023-09-19 | End: 2023-09-19 | Stop reason: SDUPTHER

## 2023-09-19 RX ADMIN — PROTAMINE SULFATE 50 MG: 10 INJECTION, SOLUTION INTRAVENOUS at 10:08

## 2023-09-19 RX ADMIN — HEPARIN SODIUM 1000 UNITS: 1000 INJECTION, SOLUTION INTRAVENOUS; SUBCUTANEOUS at 08:47

## 2023-09-19 RX ADMIN — ROCURONIUM BROMIDE 5 MG: 10 SOLUTION INTRAVENOUS at 07:57

## 2023-09-19 RX ADMIN — Medication 120 MCG: at 07:58

## 2023-09-19 RX ADMIN — HEPARIN SODIUM 1300 UNITS/HR: 10000 INJECTION, SOLUTION INTRAVENOUS at 08:24

## 2023-09-19 RX ADMIN — PHENYLEPHRINE HYDROCHLORIDE 50 MCG/MIN: 10 INJECTION INTRAVENOUS at 07:58

## 2023-09-19 RX ADMIN — HEPARIN SODIUM 13000 UNITS: 1000 INJECTION, SOLUTION INTRAVENOUS; SUBCUTANEOUS at 08:24

## 2023-09-19 RX ADMIN — LIDOCAINE HYDROCHLORIDE 60 MG: 20 INJECTION, SOLUTION EPIDURAL; INFILTRATION; INTRACAUDAL; PERINEURAL at 07:57

## 2023-09-19 RX ADMIN — PROPOFOL 150 MG: 10 INJECTION, EMULSION INTRAVENOUS at 07:57

## 2023-09-19 RX ADMIN — SODIUM CHLORIDE: 9 INJECTION, SOLUTION INTRAVENOUS at 06:40

## 2023-09-19 RX ADMIN — ONDANSETRON HYDROCHLORIDE 4 MG: 2 INJECTION, SOLUTION INTRAMUSCULAR; INTRAVENOUS at 10:06

## 2023-09-19 RX ADMIN — SODIUM CHLORIDE: 9 INJECTION, SOLUTION INTRAVENOUS at 08:45

## 2023-09-19 RX ADMIN — PROPOFOL 50 MG: 10 INJECTION, EMULSION INTRAVENOUS at 08:20

## 2023-09-19 RX ADMIN — DEXAMETHASONE SODIUM PHOSPHATE 4 MG: 4 INJECTION, SOLUTION INTRAMUSCULAR; INTRAVENOUS at 08:08

## 2023-09-19 RX ADMIN — ISOPROTERENOL HYDROCHLORIDE 10 MCG/MIN: 0.2 INJECTION, SOLUTION INTRAMUSCULAR; INTRAVENOUS at 09:54

## 2023-09-19 RX ADMIN — SUCCINYLCHOLINE CHLORIDE 160 MG: 20 INJECTION, SOLUTION INTRAMUSCULAR; INTRAVENOUS at 07:58

## 2023-09-19 ASSESSMENT — PAIN - FUNCTIONAL ASSESSMENT: PAIN_FUNCTIONAL_ASSESSMENT: NONE - DENIES PAIN

## 2023-09-19 NOTE — PROGRESS NOTES
EP LAB to Recovery Room Report    Procedure: A-Fib Ablation with Flutter ablation    MD: EFREM Jaimes MD  Pre-procedure heart rhythm: A-Fib Rhythm   Verbal Report given to Recovery Nurse on patient being transferred to Recovery Room for routine post-op. Patient stable upon transfer to . Pt had general sedation with Anesthesia team, who managed MAR, vitals, and airway. Vitals, mental status, MAR, procedural summary discussed with recovery RN. Patient cardioverted x1 during procedure. Post-procedure heart rhythm: Normal Sinus  Rhythm      Sheaths:    Right femoral vein 15fr sheath removed at 1012 am, closed with perclose. Left femoral vein 8 fr sheath removed at 1017 am, closed with perclose. Left femoral vein 11 fr sheath removed at 1020 am, closed with perclose.

## 2023-09-19 NOTE — PROGRESS NOTES
Cardiac Cath Lab Procedure Area Arrival Note:    Siomara Le arrived to Cardiac Cath Lab, Procedure Area. Patient identifiers verified with NAME and DATE OF BIRTH. Procedure verified with patient. Consent forms verified. Allergies verified. Patient informed of procedure and plan of care. Questions answered with review. Patient voiced understanding of procedure and plan of care. Patient on cardiac monitor, non-invasive blood pressure, SPO2 monitor. On stretcher to room 3 for an EP study and a fib ablation. Patient under care of anesthesia for General Anesthesia. Patient status doing well without problems. Patient is A&Ox 4. Patient reports no complaints. Patient medicated during procedure with orders obtained and verified by Dr. Kiersten Ruvalcaba. Refer to patients Cardiac Cath Lab PROCEDURE REPORT for vital signs, assessment, status, and response during procedure, printed at end of case. Printed report on chart or scanned into chart.

## 2023-09-19 NOTE — H&P
Cardiac Electrophysiology H&P Note     Primary Cardiologist: Dr. Jose Mix    Interval H&P:  No significant interval changes since patient was last seen in clinic. I have discussed options including continued medical therapy and ablation in detail. I have discussed the risks of left atrial ablation including vascular complications, infection, MI, death, stroke, cardiac tamponade, esophageal fistula, phrenic nerve paralysis, PV stenosis and need for a 2nd procedure with the pt. Patient reports complete understanding of discussions and recommendations and wishes to proceed with the procedure.          _________________________________  An Dayanna Turner MD, [facility], [facility]  Cardiac Electrophysiology  [facility] [place]      Assessment/Plan:   1. PAF (paroxysmal atrial fibrillation) (HCC)  -     EKG 12 Lead  -     CBC; Future  -     Basic Metabolic Panel; Future  2. Primary hypertension  3. Dyslipidemia  4. Obesity, morbid ([address])  5. Anticoagulated  6. High risk medication use       Persistent atrial fibrillation:   Diagnosed in 05/2023, unknown onset. Initially paroxysmal but now has progressed to persistent atrial fibrillation. Duration unknown but echocardiogram demonstrated mild left atrial dilation indicating reasonable degree of remodeling, still adequate candidate for ablation therapy  -ECG today shows AF 81 bpm.  -I spoke to the patient in great detail today regarding various options including rate control alone, alternative antiarrhythmic therapy, versus ablation therapy for definitive management of A-fib.   I did explain that success rate would decrease in the setting of persistent atrial fibrillation for unclear duration  -- The implication regarding the diagnosis of AF was explained to the patient in great detail including the associated risk of CVA, AF-mediated cardiomyopathy, and progression into persistent/chronic AF.  - I have discussed options including continued medical therapy and

## 2023-09-19 NOTE — ANESTHESIA PRE PROCEDURE
Department of Anesthesiology  Preprocedure Note       Name:  Yue Alicea   Age:  81 y.o.  :  1940                                          MRN:  [id number]         Date:  2023      Surgeon: Tabitha Blake):  Marcelina Holt MD    Procedure: Procedure(s):  Ablation A-fib w complete ep study    Medications prior to admission:   Prior to Admission medications    Medication Sig Start Date End Date Taking?  Authorizing Provider   Coenzyme Q10 (COQ10 PO) Take 1 capsule by mouth daily   Yes Historical Provider, MD   TRULICITY 1.5 UH/7.4KL SC injection INJECT 0.5 ML UNDER THE SKIN EVERY 7 DAYS 23   BUBBA Martinez NP   furosemide (LASIX) 20 MG tablet TAKE 1 TABLET DAILY 23   BUBBA Martinez NP   amiodarone (CORDARONE) 200 MG tablet  23   Historical Provider, MD   insulin aspart (NOVOLOG FLEXPEN) 100 UNIT/ML injection pen 22 units + sliding scale before meals three times a day 23   BUBBA Martinez NP   cloNIDine (CATAPRES) 0.1 MG tablet TAKE 1 TABLET TWICE A DAY 23   Evelia Christie MD   albuterol sulfate HFA (PROVENTIL;VENTOLIN;PROAIR) 108 (90 Base) MCG/ACT inhaler Inhale 2 puffs into the lungs every 4 hours as needed 22   Ar Automatic Reconciliation   allopurinol (ZYLOPRIM) 100 MG tablet Take 1 tablet by mouth daily 22   Ar Automatic Reconciliation   apixaban (ELIQUIS) 5 MG TABS tablet Take 1 tablet by mouth 2 times daily 23   Ar Automatic Reconciliation   atorvastatin (LIPITOR) 40 MG tablet Take 1 tablet by mouth daily 22   Ar Automatic Reconciliation   fluticasone-salmeterol (ADVAIR) 250-50 MCG/ACT AEPB diskus inhaler USE 1 INHALATION EVERY 12 HOURS 22   Ar Automatic Reconciliation   levocetirizine (XYZAL) 5 MG tablet Take 1 tablet by mouth daily 3/21/23   Ar Automatic Reconciliation   lisinopril (PRINIVIL;ZESTRIL) 5 MG tablet Take 1 tablet by mouth daily 10/3/22   Ar Automatic Reconciliation   spironolactone (ALDACTONE) 25 MG tablet Take 1

## 2023-09-19 NOTE — PROCEDURES
RADIOFREQUENCY ATRIAL FIBRILLATION ABLATION  POSTERIOR WALL ISOLATION ABLATION  ATYPICAL AFLUTTER ABLATION  SUBSTRATE MODIFICATION ABLATION    Procedure Date: 09/19/23   Lab Physician: Marcelina Haji MD    Indications:  82 YO woman with a history of  morbid obesity, HTN, HLD, history of long-standing persistent atrial fibrillation on Amiodarone now referred for Afib ablation. SHEATH INSERTION  All sheaths were placed using the modified Seldinger technique with ultrasound guided assistance  Right Femoral Vein: 8.5Fr Agilis sheath  Left Femoral Vein: 8Fr and a 11F sheath    CATHETER INSERTION  Catheters were advanced to the following positions using fluoroscopic guidance:  Coronary Sinus: : Biosense Bidirectional Decapolar  LA: BiosSpin[employer] Studio OctaRay Mapping catheter  Ablation: Biosense ST/SF D/F ablation catheter  ICE in RA/RV: BiosSpin[employer] Studio Intracardiac Ultrasound    PROCEDURE NARRATIVE:  EPS and RFA were discussed with the patient in great detail. The risks of bleeding, infection, vascular injury, pulmonary embolus, cardiac perforation, heart block necessitating pacemaker insertion, stroke, MI and death were among those discussed. Alternatives were reviewed including the option of no therapy. All questions were answered. The patient agreed to proceed. Written informed consent was obtained from the patient after a full explanation of the risks and benefits of the procedure. The patient was brought to the lab in the fasting state. Continuous electrocardiographic and hemodynamic monitoring was initiated. The patient was prepped and draped in the usual sterile fashion. General anesthesia with intubation and mechanical conventional ventilation was used. Anesthesia staff performed the intubation and monitoring during the case. Esophageal temperature monitoring was performed with the CIRCA esophageal temperature probe throughout the case.   A DV8 esophageal deviator was used with guidance of fluoroscopy and ICE to deviate the

## 2023-09-19 NOTE — DISCHARGE INSTRUCTIONS
Atrial Fibrillation Ablation   Discharge Instructions      You have just had an Atrial Fibrillation Ablation. There were catheters temporarily placed in your heart through a puncture in the veins and/or arteries in your groin. WHAT TO EXPECT     If you have had an Atrial Fibrillation Ablation please be aware that you may experience mild chest pain that will resolve within 24-48 hours. If the chest pain persists or becomes severe, please call the office. Mild to moderate, non-painful, bruising or mild swelling at the puncture site is not un-common, and will resolve in 7 - 14 days, and may extend down your thigh as it heals. Application of Ice to the site may help with any tenderness. You have a small gauze dressing applied to the puncture site in your groin. You may remove this the following morning. It is not uncommon to feel palpitations during the healing phase after your ablation. If you feel as though you are having recurrence of atrial fibrillation lasting longer than 30 minutes, please contact the office. Palpitations/AFIB can occur during the healing phase (1-2 months) post ablation. MEDICATIONS     Take only the medications prescribed to you at discharge. Start sucralfate 1 g before meals and nightly for one month. Start pantoprazole 40 mg twice daily for one month. ACTIVITY     A responsible adult must take you home. Do not drive a car for 24 hours. Rest quietly for the remainder of the day. Do not lift anything greater than 10 pounds for 7 days. Limit bending at the puncture site and use of stairs for at least 2 days. You may remove the bandage and shower the morning after the procedure. Do not take a bath for 3 days. SYMPTOMS THAT NEED TO BE REPORTED IMMEDIATELY     Bleeding at the puncture site. If there is bleeding, lie down and hold firm direct pressure for at least 5 minutes.   If the bleeding does not stop, go to the closest emergency room, or call

## 2023-09-19 NOTE — PROGRESS NOTES
Cardiac Cath Lab Recovery Arrival Note:      Yue Alicea arrived to Cardiac Cath Lab, Recovery Area. Staff introduced to patient. Patient identifiers verified with NAME and DATE OF BIRTH. Procedure verified with patient. Consent forms reviewed and signed by patient or authorized representative and verified. Allergies verified. Patient and family oriented to department. Patient and family informed of procedure and plan of care. Questions answered with review. Patient prepped for procedure, per orders from physician, prior to arrival.    Patient on cardiac monitor, non-invasive blood pressure, SPO2 monitor. On RA. Patient is A&Ox 4. Patient reports no complaints. Patient in stretcher, in low position, with side rails up, call bell within reach, patient instructed to call if assistance as needed. Patient prep in: Fast Track 2. Patient family has pager #   Family in: [place], [phone].    Prep by: Rolf Razo

## 2023-09-22 ENCOUNTER — TELEPHONE (OUTPATIENT)
Age: 83
End: 2023-09-22

## 2023-09-22 NOTE — TELEPHONE ENCOUNTER
----- Message from Wendy Barrett RN sent at 9/21/2023 10:54 AM EDT -----  Regarding: FW: Breathing  Contact: [phone]    ----- Message -----  From: Sreekanth Hernandez  Sent: 9/21/2023  10:52 AM EDT  To: [facility] Clinical Staff  Subject: Breathing                                        I had an afib ablation on Sept 19th I'm having trouble breathing, no pain, feeling pretty good except the breathing. I'm fine sitting but the minute I stand and walk I only take a couple of steps and lose my breathing. Is this normal.i was like that before procedure but I think worse since then. Called patient, ID verified using two patient identifiers. Ms. Boom Antonio states she is feeling so much better today. She woke up with minimal shortness of breath. Her blood pressure and heart rates are stable. She does endorse that she has asthma and she thinks some of her symptoms may be related. She has an appointment with her PCP on 10/9/23. Advised patient that she should continue to monitor her symptoms and if they should worsen or she develops any other symptoms she should not hesitate to call back. Patient verbalized understanding and will call back with any other questions or concerns.

## 2023-10-03 RX ORDER — LISINOPRIL 5 MG/1
5 TABLET ORAL DAILY
Qty: 90 TABLET | Refills: 3 | Status: SHIPPED | OUTPATIENT
Start: 2023-10-03

## 2023-10-06 SDOH — HEALTH STABILITY: PHYSICAL HEALTH: ON AVERAGE, HOW MANY DAYS PER WEEK DO YOU ENGAGE IN MODERATE TO STRENUOUS EXERCISE (LIKE A BRISK WALK)?: 0 DAYS

## 2023-10-06 ASSESSMENT — PATIENT HEALTH QUESTIONNAIRE - PHQ9
SUM OF ALL RESPONSES TO PHQ QUESTIONS 1-9: 1
2. FEELING DOWN, DEPRESSED OR HOPELESS: 0
SUM OF ALL RESPONSES TO PHQ9 QUESTIONS 1 & 2: 1
1. LITTLE INTEREST OR PLEASURE IN DOING THINGS: 1
SUM OF ALL RESPONSES TO PHQ QUESTIONS 1-9: 1

## 2023-10-06 ASSESSMENT — LIFESTYLE VARIABLES
HOW OFTEN DO YOU HAVE SIX OR MORE DRINKS ON ONE OCCASION: 1
HOW OFTEN DO YOU HAVE A DRINK CONTAINING ALCOHOL: 1
HOW MANY STANDARD DRINKS CONTAINING ALCOHOL DO YOU HAVE ON A TYPICAL DAY: PATIENT DOES NOT DRINK
HOW MANY STANDARD DRINKS CONTAINING ALCOHOL DO YOU HAVE ON A TYPICAL DAY: 0
HOW OFTEN DO YOU HAVE A DRINK CONTAINING ALCOHOL: NEVER

## 2023-10-09 ENCOUNTER — OFFICE VISIT (OUTPATIENT)
Age: 83
End: 2023-10-09
Payer: MEDICARE

## 2023-10-09 VITALS
TEMPERATURE: 97.7 F | OXYGEN SATURATION: 98 % | HEIGHT: 63 IN | RESPIRATION RATE: 16 BRPM | HEART RATE: 67 BPM | WEIGHT: 239 LBS | BODY MASS INDEX: 42.35 KG/M2 | DIASTOLIC BLOOD PRESSURE: 78 MMHG | SYSTOLIC BLOOD PRESSURE: 114 MMHG

## 2023-10-09 DIAGNOSIS — E11.9 TYPE 2 DIABETES MELLITUS WITHOUT COMPLICATION, WITHOUT LONG-TERM CURRENT USE OF INSULIN (HCC): ICD-10-CM

## 2023-10-09 DIAGNOSIS — Z00.00 WELL EXAM WITHOUT ABNORMAL FINDINGS OF PATIENT 18 YEARS OF AGE OR OLDER: Primary | ICD-10-CM

## 2023-10-09 DIAGNOSIS — J45.41 MODERATE PERSISTENT ASTHMA WITH ACUTE EXACERBATION: ICD-10-CM

## 2023-10-09 DIAGNOSIS — I48.0 PAF (PAROXYSMAL ATRIAL FIBRILLATION) (HCC): ICD-10-CM

## 2023-10-09 PROCEDURE — 1090F PRES/ABSN URINE INCON ASSESS: CPT | Performed by: NURSE PRACTITIONER

## 2023-10-09 PROCEDURE — G8400 PT W/DXA NO RESULTS DOC: HCPCS | Performed by: NURSE PRACTITIONER

## 2023-10-09 PROCEDURE — G8427 DOCREV CUR MEDS BY ELIG CLIN: HCPCS | Performed by: NURSE PRACTITIONER

## 2023-10-09 PROCEDURE — 3078F DIAST BP <80 MM HG: CPT | Performed by: NURSE PRACTITIONER

## 2023-10-09 PROCEDURE — 99214 OFFICE O/P EST MOD 30 MIN: CPT | Performed by: NURSE PRACTITIONER

## 2023-10-09 PROCEDURE — 3074F SYST BP LT 130 MM HG: CPT | Performed by: NURSE PRACTITIONER

## 2023-10-09 PROCEDURE — 3044F HG A1C LEVEL LT 7.0%: CPT | Performed by: NURSE PRACTITIONER

## 2023-10-09 PROCEDURE — 1036F TOBACCO NON-USER: CPT | Performed by: NURSE PRACTITIONER

## 2023-10-09 PROCEDURE — G0439 PPPS, SUBSEQ VISIT: HCPCS | Performed by: NURSE PRACTITIONER

## 2023-10-09 PROCEDURE — G8484 FLU IMMUNIZE NO ADMIN: HCPCS | Performed by: NURSE PRACTITIONER

## 2023-10-09 PROCEDURE — G8417 CALC BMI ABV UP PARAM F/U: HCPCS | Performed by: NURSE PRACTITIONER

## 2023-10-09 PROCEDURE — 1123F ACP DISCUSS/DSCN MKR DOCD: CPT | Performed by: NURSE PRACTITIONER

## 2023-10-09 RX ORDER — ALBUTEROL SULFATE 90 UG/1
2 AEROSOL, METERED RESPIRATORY (INHALATION) EVERY 4 HOURS PRN
Qty: 3 EACH | Refills: 5 | Status: SHIPPED | OUTPATIENT
Start: 2023-10-09

## 2023-10-09 SDOH — ECONOMIC STABILITY: FOOD INSECURITY: WITHIN THE PAST 12 MONTHS, YOU WORRIED THAT YOUR FOOD WOULD RUN OUT BEFORE YOU GOT MONEY TO BUY MORE.: NEVER TRUE

## 2023-10-09 SDOH — ECONOMIC STABILITY: FOOD INSECURITY: WITHIN THE PAST 12 MONTHS, THE FOOD YOU BOUGHT JUST DIDN'T LAST AND YOU DIDN'T HAVE MONEY TO GET MORE.: NEVER TRUE

## 2023-10-09 SDOH — ECONOMIC STABILITY: INCOME INSECURITY: HOW HARD IS IT FOR YOU TO PAY FOR THE VERY BASICS LIKE FOOD, HOUSING, MEDICAL CARE, AND HEATING?: NOT HARD AT ALL

## 2023-10-11 ENCOUNTER — OFFICE VISIT (OUTPATIENT)
Age: 83
End: 2023-10-11
Payer: MEDICARE

## 2023-10-11 ENCOUNTER — PATIENT MESSAGE (OUTPATIENT)
Age: 83
End: 2023-10-11

## 2023-10-11 VITALS
HEART RATE: 69 BPM | DIASTOLIC BLOOD PRESSURE: 50 MMHG | SYSTOLIC BLOOD PRESSURE: 125 MMHG | OXYGEN SATURATION: 98 % | RESPIRATION RATE: 20 BRPM | TEMPERATURE: 96.8 F

## 2023-10-11 DIAGNOSIS — D47.2 MONOCLONAL GAMMOPATHY: Primary | ICD-10-CM

## 2023-10-11 PROCEDURE — G8417 CALC BMI ABV UP PARAM F/U: HCPCS | Performed by: INTERNAL MEDICINE

## 2023-10-11 PROCEDURE — G8427 DOCREV CUR MEDS BY ELIG CLIN: HCPCS | Performed by: INTERNAL MEDICINE

## 2023-10-11 PROCEDURE — 1090F PRES/ABSN URINE INCON ASSESS: CPT | Performed by: INTERNAL MEDICINE

## 2023-10-11 PROCEDURE — G8484 FLU IMMUNIZE NO ADMIN: HCPCS | Performed by: INTERNAL MEDICINE

## 2023-10-11 PROCEDURE — 99214 OFFICE O/P EST MOD 30 MIN: CPT | Performed by: INTERNAL MEDICINE

## 2023-10-11 PROCEDURE — G8400 PT W/DXA NO RESULTS DOC: HCPCS | Performed by: INTERNAL MEDICINE

## 2023-10-11 PROCEDURE — 1123F ACP DISCUSS/DSCN MKR DOCD: CPT | Performed by: INTERNAL MEDICINE

## 2023-10-11 PROCEDURE — 1036F TOBACCO NON-USER: CPT | Performed by: INTERNAL MEDICINE

## 2023-10-11 RX ORDER — AMIODARONE HYDROCHLORIDE 200 MG/1
200 TABLET ORAL DAILY
Qty: 90 TABLET | Refills: 0 | Status: SHIPPED | OUTPATIENT
Start: 2023-10-11

## 2023-10-11 NOTE — PROGRESS NOTES
Lilliam Quevedo is a 81 y.o. femaleo follow up for mgus. 1. Have you been to the ER, urgent care clinic since your last visit? Hospitalized since your last visit?no    2. Have you seen or consulted any other health care providers outside of the [address] [place] [address] Avenue since your last visit? Include any pap smears or colon screening.  no

## 2023-10-11 NOTE — TELEPHONE ENCOUNTER
Abdomen , soft, nontender, nondistended , no guarding or rigidity , no masses palpable , normal bowel sounds , Liver and Spleen,  no hepatosplenomegaly , liver nontender Requested Prescriptions     Signed Prescriptions Disp Refills    amiodarone (CORDARONE) 200 MG tablet 90 tablet 0     Sig: Take 1 tablet by mouth daily     Authorizing Provider: MI MUNOZ     Ordering User: Lynn Easley     Refill per verbal order Dr. Vida Gamez.    Last visit:9/19/23  Next visit: 11/1/23

## 2023-10-16 NOTE — PROGRESS NOTES
Medicare Annual Wellness Visit    Lula Marino is here for Medicare AWV    Tonya Swift was seen today for medicare awv. Diagnoses and all orders for this visit:    Well exam without abnormal findings of patient 25years of age or older    Moderate persistent asthma with acute exacerbation    PAF (paroxysmal atrial fibrillation) (HCC)    Diabetes Type II without insulin, well controlled    Other orders  -     albuterol sulfate HFA (PROVENTIL;VENTOLIN;PROAIR) 108 (90 Base) MCG/ACT inhaler; Inhale 2 puffs into the lungs every 4 hours as needed for Wheezing or Shortness of Breath      Refills of albuterol given  Reviewed sugar log, no changes at this time  Follow up 3 mo for blood sugar eval  Labs from hospital reviewed at time of visit      Recommendations for Preventive Services Due: see orders and patient instructions/AVS.  Recommended screening schedule for the next 5-10 years is provided to the patient in written form: see Patient Instructions/AVS.     No follow-ups on file. Subjective   The following acute and/or chronic problems were also addressed today:  Patient is also here for follow up recent hospital admission for a fib  Did have ablation, followed by cardio  Also DM check, using CGM for monitoring  Reviewed blood sugar log today and well controlled over all  She is using it several times a day      Patient's complete Health Risk Assessment and screening values have been reviewed and are found in Flowsheets. The following problems were reviewed today and where indicated follow up appointments were made and/or referrals ordered.     Positive Risk Factor Screenings with Interventions:    Fall Risk:  Do you feel unsteady or are you worried about falling? : (!) yes  2 or more falls in past year?: no  Fall with injury in past year?: no     Interventions:    Patient declines any further evaluation or treatment            General HRA Questions:  Select all that apply: (!) New or Increased

## 2023-10-17 ENCOUNTER — CLINICAL DOCUMENTATION (OUTPATIENT)
Age: 83
End: 2023-10-17

## 2023-10-31 NOTE — PROGRESS NOTES
Cardiac Electrophysiology OFFICE Follow-up Note     Primary Cardiologist: Dr. Mary Jo Ortiz    Assessment/Plan:   1. PAF (paroxysmal atrial fibrillation) ([address])  2. Primary hypertension  -     EKG 12 Lead  3. Coronary artery disease involving native coronary artery of native heart without angina pectoris  4. Type 2 diabetes mellitus with stage 3 chronic kidney disease, unspecified whether long term insulin use, unspecified whether stage 3a or 3b CKD (HCC)  5. Stage 3 chronic kidney disease, unspecified whether stage 3a or 3b CKD ([address])  6. Anticoagulated  7. High risk medication use  8. Dyslipidemia         Persistent atrial fibrillation:   Diagnosed in 05/2023, unknown onset. Initially paroxysmal but now has progressed to persistent atrial fibrillation. Duration unknown but echocardiogram demonstrated mild left atrial dilation indicating reasonable degree of remodeling, still adequate candidate for ablation therapy  --S/p PVI, PWI, right gume line atypical AFL ablation, CTI (9/19/23-[facility])  - Stop amiodarone in two weeks  - She has been retaining fluid since the ablation. Increase Lasix to 40 mg daily for one week then reduce back down to 20 mg daily. She is already scheduled for follow up with Dr. Mary Jo Ortiz in one week and volume status can be reassessed at that time. - Follow up with EP NP in 3 months  - Follow up with me in 9 months       High risk medication monitoring: We discussed in great detail regarding high risk medication management and the associated risks of antiarrhythmic therapy. Patient reports full understanding of recommendations.  -Risks associated with Amiodarone including but not limited to risks of thyroid toxicity, liver toxicity, and lung injury was explained to the patient including the need for long term monitoring with labs and imaging.  - being monitor for drug side effects and toxicity. -  on today's EKG  - Stopping amiodarone in 2 weeks as above      HTN:  -BP controlled.   -Continue

## 2023-11-01 ENCOUNTER — OFFICE VISIT (OUTPATIENT)
Age: 83
End: 2023-11-01
Payer: MEDICARE

## 2023-11-01 ENCOUNTER — TELEPHONE (OUTPATIENT)
Age: 83
End: 2023-11-01

## 2023-11-01 VITALS
BODY MASS INDEX: 42.35 KG/M2 | SYSTOLIC BLOOD PRESSURE: 130 MMHG | WEIGHT: 239 LBS | HEIGHT: 63 IN | HEART RATE: 59 BPM | OXYGEN SATURATION: 98 % | DIASTOLIC BLOOD PRESSURE: 60 MMHG

## 2023-11-01 DIAGNOSIS — E11.22 TYPE 2 DIABETES MELLITUS WITH CHRONIC KIDNEY DISEASE (HCC): Primary | ICD-10-CM

## 2023-11-01 DIAGNOSIS — Z79.899 HIGH RISK MEDICATION USE: ICD-10-CM

## 2023-11-01 DIAGNOSIS — I10 PRIMARY HYPERTENSION: ICD-10-CM

## 2023-11-01 DIAGNOSIS — I25.10 CORONARY ARTERY DISEASE INVOLVING NATIVE CORONARY ARTERY OF NATIVE HEART WITHOUT ANGINA PECTORIS: ICD-10-CM

## 2023-11-01 DIAGNOSIS — E11.22 TYPE 2 DIABETES MELLITUS WITH STAGE 3 CHRONIC KIDNEY DISEASE, UNSPECIFIED WHETHER LONG TERM INSULIN USE, UNSPECIFIED WHETHER STAGE 3A OR 3B CKD (HCC): ICD-10-CM

## 2023-11-01 DIAGNOSIS — E66.01 OBESITY, MORBID (HCC): ICD-10-CM

## 2023-11-01 DIAGNOSIS — I48.0 PAF (PAROXYSMAL ATRIAL FIBRILLATION) (HCC): Primary | ICD-10-CM

## 2023-11-01 DIAGNOSIS — N18.30 TYPE 2 DIABETES MELLITUS WITH STAGE 3 CHRONIC KIDNEY DISEASE, UNSPECIFIED WHETHER LONG TERM INSULIN USE, UNSPECIFIED WHETHER STAGE 3A OR 3B CKD (HCC): ICD-10-CM

## 2023-11-01 DIAGNOSIS — E78.5 DYSLIPIDEMIA: ICD-10-CM

## 2023-11-01 DIAGNOSIS — Z79.01 ANTICOAGULATED: ICD-10-CM

## 2023-11-01 DIAGNOSIS — N18.30 STAGE 3 CHRONIC KIDNEY DISEASE, UNSPECIFIED WHETHER STAGE 3A OR 3B CKD (HCC): ICD-10-CM

## 2023-11-01 PROCEDURE — 93005 ELECTROCARDIOGRAM TRACING: CPT | Performed by: INTERNAL MEDICINE

## 2023-11-01 PROCEDURE — 99215 OFFICE O/P EST HI 40 MIN: CPT | Performed by: INTERNAL MEDICINE

## 2023-11-01 RX ORDER — FUROSEMIDE 20 MG/1
TABLET ORAL
Qty: 90 TABLET | Refills: 3
Start: 2023-11-01

## 2023-11-01 NOTE — TELEPHONE ENCOUNTER
Patient called ?  If she can have her referral submitted to another office for appt would like a callback\\      Patient# [phone]

## 2023-11-01 NOTE — PATIENT INSTRUCTIONS
- Stop amiodarone in 2 weeks  - Increase Lasix to 40 mg daily for one week. FU with NP in 3 month  FU with Dr. Adolfo Ferrer in 9 months    Multidisciplinary [facility]  Goal weight loss of 10%.     Referral to [facility]  Dr. Yocasta Bedolla; (p) [phone]; (f) [phone]  Goal blood pressure less than 130/90 mmHg

## 2023-11-01 NOTE — PROGRESS NOTES
Gregorio Mckay is a 81 y.o. female    had concerns including Atrial Fibrillation. Vitals:    11/01/23 1301   BP: 130/60   Site: Right Upper Arm   Position: Sitting   Pulse: 59   SpO2: 98%   Weight: 108.4 kg (239 lb)   Height: 1.6 m (5' 3\")      Pt states her O2 was in the 80's this morning and plus was in the 50's    Chest pain NO    SOB yes a lot     Dizziness NO    Swelling yes both feet, right side leg, ankle and foot     Palpitations NO    Refills NO    Covid Vaccinated yes      1. Have you been to the ER, urgent care clinic since your last visit? Hospitalized since your last visit? NO    2. Have you seen or consulted any other health care providers outside of the [address] [place] [address] since your last visit? Include any pap smears or colon screening.  NO

## 2023-11-02 NOTE — TELEPHONE ENCOUNTER
Returned call to patient @ [phone], ID verified using two patient identifiers. Patient requesting a referral to endocrinologist in [place] closer to her home. She is also requesting a referral to Weight management. New referrals placed per patient request.Opportunities for questions, clarifications, and concerns provided. Patient expressed understanding of all information.

## 2023-11-07 ENCOUNTER — TELEPHONE (OUTPATIENT)
Age: 83
End: 2023-11-07

## 2023-11-07 NOTE — TELEPHONE ENCOUNTER
Called patient regarding referral to our [facility]. Patient stated she lives to far from both locations, [facility] and [facility], so she would not like to proceed with scheduling.

## 2023-11-08 ENCOUNTER — OFFICE VISIT (OUTPATIENT)
Age: 83
End: 2023-11-08
Payer: MEDICARE

## 2023-11-08 VITALS
BODY MASS INDEX: 41.46 KG/M2 | HEART RATE: 70 BPM | SYSTOLIC BLOOD PRESSURE: 120 MMHG | WEIGHT: 234 LBS | OXYGEN SATURATION: 96 % | DIASTOLIC BLOOD PRESSURE: 60 MMHG | HEIGHT: 63 IN

## 2023-11-08 DIAGNOSIS — N18.30 STAGE 3 CHRONIC KIDNEY DISEASE, UNSPECIFIED WHETHER STAGE 3A OR 3B CKD (HCC): ICD-10-CM

## 2023-11-08 DIAGNOSIS — I48.0 PAF (PAROXYSMAL ATRIAL FIBRILLATION) (HCC): ICD-10-CM

## 2023-11-08 DIAGNOSIS — R06.02 SOB (SHORTNESS OF BREATH): Primary | ICD-10-CM

## 2023-11-08 PROCEDURE — G8417 CALC BMI ABV UP PARAM F/U: HCPCS | Performed by: SPECIALIST

## 2023-11-08 PROCEDURE — 3078F DIAST BP <80 MM HG: CPT | Performed by: SPECIALIST

## 2023-11-08 PROCEDURE — 1123F ACP DISCUSS/DSCN MKR DOCD: CPT | Performed by: SPECIALIST

## 2023-11-08 PROCEDURE — G8427 DOCREV CUR MEDS BY ELIG CLIN: HCPCS | Performed by: SPECIALIST

## 2023-11-08 PROCEDURE — 3074F SYST BP LT 130 MM HG: CPT | Performed by: SPECIALIST

## 2023-11-08 PROCEDURE — 99214 OFFICE O/P EST MOD 30 MIN: CPT | Performed by: SPECIALIST

## 2023-11-08 PROCEDURE — G8400 PT W/DXA NO RESULTS DOC: HCPCS | Performed by: SPECIALIST

## 2023-11-08 PROCEDURE — 1036F TOBACCO NON-USER: CPT | Performed by: SPECIALIST

## 2023-11-08 PROCEDURE — 1090F PRES/ABSN URINE INCON ASSESS: CPT | Performed by: SPECIALIST

## 2023-11-08 PROCEDURE — G8484 FLU IMMUNIZE NO ADMIN: HCPCS | Performed by: SPECIALIST

## 2023-11-08 RX ORDER — FUROSEMIDE 20 MG/1
TABLET ORAL
Qty: 180 TABLET | Refills: 3 | Status: SHIPPED | OUTPATIENT
Start: 2023-11-08

## 2023-11-08 RX ORDER — AMIODARONE HYDROCHLORIDE 200 MG/1
200 TABLET ORAL DAILY
COMMUNITY

## 2023-11-08 NOTE — PROGRESS NOTES
Orders for Check an echo - limited study for LVEF for SOB   2 day lexiscan cardiolite for SOB       See NP in 1 month per Dr. Sancho Valverde VO.

## 2023-11-08 NOTE — PROGRESS NOTES
Alexus Addison is a 81 y.o. female    had concerns including Coronary Artery Disease, Atrial Fibrillation, and Hypertension. Vitals:    11/08/23 1531   BP: 120/60   Site: Right Upper Arm   Position: Sitting   Pulse: 70   SpO2: 96%   Weight: 106.1 kg (234 lb)   Height: 1.6 m (5' 3\")        Chest pain NO    SOB yes when standing or with exertion     Dizziness NO    Swelling NO    Palpitations NO    Refills NO    Covid Vaccinated yes      1. Have you been to the ER, urgent care clinic since your last visit? Hospitalized since your last visit? NO    2. Have you seen or consulted any other health care providers outside of the [address] [place] [address] since your last visit? Include any pap smears or colon screening.  NO

## 2023-11-08 NOTE — PROGRESS NOTES
Ruby Ahmadi MD. [facility]          Patient: Yu To  : 1940      Today's Date: 2023        HISTORY OF PRESENT ILLNESS:     History of Present Illness:    Felt more SOB after ablation - was put on lasix - is doing a little better now       PAST MEDICAL HISTORY:     Past Medical History:   Diagnosis Date    CAD (coronary artery disease)     CKD (chronic kidney disease)     Diabetes ([address])     Dyslipidemia     Heart attack ([address]) 2014    Had cath and treated medically     HTN (hypertension)     Monoclonal gammopathies     Obesity     SUZAN on CPAP     Tachycardia     placed on Sotalol at [place]        Past Surgical History:   Procedure Laterality Date    BREAST BIOPSY Left     several bxs on left, all benign    BREAST SURGERY      CARDIAC PROCEDURE N/A 2023    Intracardiac echocardiogram performed by An Tej Arevalo MD at [address] [place] [address] CATH LAB    EP DEVICE PROCEDURE N/A 2023    Ablation A-fib w complete ep study performed by An Tej Arevalo MD at [address] [place] [address] CATH LAB    EP DEVICE PROCEDURE N/A 2023    Drug stimulation performed by An Tej Arevalo MD at [facility] CATH LAB    EP DEVICE PROCEDURE N/A 2023    Ep 3d mapping performed by An Tej Arevalo MD at [address] [facility] N/A 2023    Ablation following A-fib addl performed by An Tej Arevalo MD at [facility] N/A 2023    Ultrasound guided vascular access performed by An Tej Arevalo MD at [address] [place] [address]:    .  Current Outpatient Medications   Medication Sig Dispense Refill    amiodarone (CORDARONE) 200 MG tablet Take 1 tablet by mouth daily Pt states she is on this until 11/15/23 per       furosemide (LASIX) 20 MG tablet Take 40 mg daily for one week, then resume 20 mg daily.  90 tablet 3    albuterol sulfate HFA (PROVENTIL;VENTOLIN;PROAIR) 108 (90 Base) MCG/ACT inhaler Inhale 2 puffs into the lungs every 4 hours as needed for Wheezing or Shortness of Breath

## 2023-11-11 LAB
ALBUMIN SERPL-MCNC: 4.1 G/DL (ref 3.7–4.7)
ALBUMIN/GLOB SERPL: 1.5 {RATIO} (ref 1.2–2.2)
ALP SERPL-CCNC: 130 IU/L (ref 44–121)
ALT SERPL-CCNC: 19 IU/L (ref 0–32)
AST SERPL-CCNC: 16 IU/L (ref 0–40)
BASOPHILS # BLD AUTO: 0.1 X10E3/UL (ref 0–0.2)
BASOPHILS NFR BLD AUTO: 1 %
BILIRUB SERPL-MCNC: 0.6 MG/DL (ref 0–1.2)
BUN SERPL-MCNC: 43 MG/DL (ref 8–27)
BUN/CREAT SERPL: 25 (ref 12–28)
CALCIUM SERPL-MCNC: 9.7 MG/DL (ref 8.7–10.3)
CHLORIDE SERPL-SCNC: 100 MMOL/L (ref 96–106)
CO2 SERPL-SCNC: 24 MMOL/L (ref 20–29)
CREAT SERPL-MCNC: 1.69 MG/DL (ref 0.57–1)
EGFRCR SERPLBLD CKD-EPI 2021: 30 ML/MIN/1.73
EOSINOPHIL # BLD AUTO: 0.2 X10E3/UL (ref 0–0.4)
EOSINOPHIL NFR BLD AUTO: 2 %
ERYTHROCYTE [DISTWIDTH] IN BLOOD BY AUTOMATED COUNT: 12.3 % (ref 11.7–15.4)
GLOBULIN SER CALC-MCNC: 2.8 G/DL (ref 1.5–4.5)
GLUCOSE SERPL-MCNC: 104 MG/DL (ref 70–99)
HCT VFR BLD AUTO: 33.4 % (ref 34–46.6)
HGB BLD-MCNC: 11.4 G/DL (ref 11.1–15.9)
IMM GRANULOCYTES # BLD AUTO: 0.1 X10E3/UL (ref 0–0.1)
IMM GRANULOCYTES NFR BLD AUTO: 1 %
LYMPHOCYTES # BLD AUTO: 1.4 X10E3/UL (ref 0.7–3.1)
LYMPHOCYTES NFR BLD AUTO: 17 %
MCH RBC QN AUTO: 32.6 PG (ref 26.6–33)
MCHC RBC AUTO-ENTMCNC: 34.1 G/DL (ref 31.5–35.7)
MCV RBC AUTO: 95 FL (ref 79–97)
MONOCYTES # BLD AUTO: 0.9 X10E3/UL (ref 0.1–0.9)
MONOCYTES NFR BLD AUTO: 11 %
NEUTROPHILS # BLD AUTO: 5.8 X10E3/UL (ref 1.4–7)
NEUTROPHILS NFR BLD AUTO: 68 %
PLATELET # BLD AUTO: 282 X10E3/UL (ref 150–450)
POTASSIUM SERPL-SCNC: 4.7 MMOL/L (ref 3.5–5.2)
PROT SERPL-MCNC: 6.9 G/DL (ref 6–8.5)
RBC # BLD AUTO: 3.5 X10E6/UL (ref 3.77–5.28)
SODIUM SERPL-SCNC: 139 MMOL/L (ref 134–144)
SPECIMEN STATUS REPORT: NORMAL
TSH SERPL DL<=0.005 MIU/L-ACNC: 2.75 UIU/ML (ref 0.45–4.5)
WBC # BLD AUTO: 8.5 X10E3/UL (ref 3.4–10.8)

## 2023-11-12 LAB — BNP SERPL-MCNC: 67 PG/ML (ref 0–100)

## 2023-11-13 ENCOUNTER — PATIENT MESSAGE (OUTPATIENT)
Age: 83
End: 2023-11-13

## 2023-12-01 ENCOUNTER — ANCILLARY PROCEDURE (OUTPATIENT)
Age: 83
End: 2023-12-01
Payer: MEDICARE

## 2023-12-01 VITALS
DIASTOLIC BLOOD PRESSURE: 70 MMHG | BODY MASS INDEX: 41.46 KG/M2 | WEIGHT: 234 LBS | SYSTOLIC BLOOD PRESSURE: 126 MMHG | HEIGHT: 63 IN | HEART RATE: 62 BPM

## 2023-12-01 VITALS
HEART RATE: 67 BPM | SYSTOLIC BLOOD PRESSURE: 126 MMHG | WEIGHT: 234 LBS | HEIGHT: 63 IN | BODY MASS INDEX: 41.46 KG/M2 | DIASTOLIC BLOOD PRESSURE: 70 MMHG

## 2023-12-01 DIAGNOSIS — R06.02 SOB (SHORTNESS OF BREATH): ICD-10-CM

## 2023-12-01 DIAGNOSIS — I48.0 PAF (PAROXYSMAL ATRIAL FIBRILLATION) (HCC): ICD-10-CM

## 2023-12-01 PROCEDURE — A9500 TC99M SESTAMIBI: HCPCS | Performed by: SPECIALIST

## 2023-12-01 PROCEDURE — 93308 TTE F-UP OR LMTD: CPT | Performed by: SPECIALIST

## 2023-12-01 PROCEDURE — 78452 HT MUSCLE IMAGE SPECT MULT: CPT | Performed by: SPECIALIST

## 2023-12-01 RX ORDER — REGADENOSON 0.08 MG/ML
0.4 INJECTION, SOLUTION INTRAVENOUS
Status: COMPLETED | OUTPATIENT
Start: 2023-12-01 | End: 2023-12-01

## 2023-12-01 RX ORDER — TETRAKIS(2-METHOXYISOBUTYLISOCYANIDE)COPPER(I) TETRAFLUOROBORATE 1 MG/ML
24.4 INJECTION, POWDER, LYOPHILIZED, FOR SOLUTION INTRAVENOUS
Status: COMPLETED | OUTPATIENT
Start: 2023-12-01 | End: 2023-12-01

## 2023-12-01 RX ADMIN — TECHNETIUM TC-99M SESTAMIBI 24.4 MILLICURIE: 1 INJECTION INTRAVENOUS at 08:30

## 2023-12-01 RX ADMIN — REGADENOSON 0.4 MG: 0.08 INJECTION, SOLUTION INTRAVENOUS at 08:31

## 2023-12-03 LAB
ECHO AR MAX VEL PISA: 3.6 M/S
ECHO AV AREA PEAK VELOCITY: 1.7 CM2
ECHO AV AREA VTI: 1.6 CM2
ECHO AV AREA/BSA PEAK VELOCITY: 0.8 CM2/M2
ECHO AV AREA/BSA VTI: 0.8 CM2/M2
ECHO AV MEAN GRADIENT: 12 MMHG
ECHO AV MEAN VELOCITY: 1.6 M/S
ECHO AV PEAK GRADIENT: 21 MMHG
ECHO AV PEAK VELOCITY: 2.3 M/S
ECHO AV REGURGITANT PHT: 426 MILLISECOND
ECHO AV VELOCITY RATIO: 0.48
ECHO AV VTI: 51.6 CM
ECHO BSA: 2.17 M2
ECHO EST RA PRESSURE: 3 MMHG
ECHO LA DIAMETER INDEX: 2.42 CM/M2
ECHO LA DIAMETER: 5 CM
ECHO LA VOL A-L A2C: 81 ML (ref 22–52)
ECHO LA VOL A-L A4C: 74 ML (ref 22–52)
ECHO LA VOL MOD A2C: 76 ML (ref 22–52)
ECHO LA VOL MOD A4C: 70 ML (ref 22–52)
ECHO LA VOLUME AREA LENGTH: 79 ML
ECHO LA VOLUME INDEX A-L A2C: 39 ML/M2 (ref 16–34)
ECHO LA VOLUME INDEX A-L A4C: 36 ML/M2 (ref 16–34)
ECHO LA VOLUME INDEX AREA LENGTH: 38 ML/M2 (ref 16–34)
ECHO LA VOLUME INDEX MOD A2C: 37 ML/M2 (ref 16–34)
ECHO LA VOLUME INDEX MOD A4C: 34 ML/M2 (ref 16–34)
ECHO LV FRACTIONAL SHORTENING: 34 % (ref 28–44)
ECHO LV INTERNAL DIMENSION DIASTOLE INDEX: 1.84 CM/M2
ECHO LV INTERNAL DIMENSION DIASTOLIC: 3.8 CM (ref 3.9–5.3)
ECHO LV INTERNAL DIMENSION SYSTOLIC INDEX: 1.21 CM/M2
ECHO LV INTERNAL DIMENSION SYSTOLIC: 2.5 CM
ECHO LV IVSD: 1 CM (ref 0.6–0.9)
ECHO LV MASS 2D: 117.3 G (ref 67–162)
ECHO LV MASS INDEX 2D: 56.7 G/M2 (ref 43–95)
ECHO LV POSTERIOR WALL DIASTOLIC: 1 CM (ref 0.6–0.9)
ECHO LV RELATIVE WALL THICKNESS RATIO: 0.53
ECHO LVOT AREA: 3.5 CM2
ECHO LVOT AV VTI INDEX: 0.46
ECHO LVOT DIAM: 2.1 CM
ECHO LVOT MEAN GRADIENT: 2 MMHG
ECHO LVOT PEAK GRADIENT: 5 MMHG
ECHO LVOT PEAK VELOCITY: 1.1 M/S
ECHO LVOT STROKE VOLUME INDEX: 39.5 ML/M2
ECHO LVOT SV: 81.7 ML
ECHO LVOT VTI: 23.6 CM
ECHO MV AREA VTI: 2.3 CM2
ECHO MV E VELOCITY: 1.29 M/S
ECHO MV LVOT VTI INDEX: 1.53
ECHO MV MAX VELOCITY: 1.5 M/S
ECHO MV MEAN GRADIENT: 2 MMHG
ECHO MV MEAN VELOCITY: 0.6 M/S
ECHO MV PEAK GRADIENT: 9 MMHG
ECHO MV VTI: 36 CM
ECHO RA AREA 4C: 21.5 CM2
ECHO RA END SYSTOLIC VOLUME APICAL 4 CHAMBER INDEX BSA: 31 ML/M2
ECHO RA VOLUME: 64 ML
ECHO RIGHT VENTRICULAR SYSTOLIC PRESSURE (RVSP): 53 MMHG
ECHO RV INTERNAL DIMENSION: 3.6 CM
ECHO RV TAPSE: 2.2 CM (ref 1.7–?)
ECHO TV REGURGITANT MAX VELOCITY: 3.54 M/S
ECHO TV REGURGITANT PEAK GRADIENT: 50 MMHG

## 2023-12-03 PROCEDURE — 93308 TTE F-UP OR LMTD: CPT | Performed by: SPECIALIST

## 2023-12-04 LAB
ECHO BSA: 2.17 M2
NUC STRESS EJECTION FRACTION: 74 %
STRESS BASELINE DIAS BP: 70 MMHG
STRESS BASELINE HR: 62 BPM
STRESS BASELINE SYS BP: 126 MMHG
STRESS ESTIMATED WORKLOAD: 1 METS
STRESS EXERCISE DUR MIN: 0 MIN
STRESS EXERCISE DUR SEC: 0 SEC
STRESS O2 SAT PEAK: 97 %
STRESS O2 SAT REST: 98 %
STRESS PEAK DIAS BP: 74 MMHG
STRESS PEAK SYS BP: 128 MMHG
STRESS PERCENT HR ACHIEVED: 51 %
STRESS POST PEAK HR: 71 BPM
STRESS RATE PRESSURE PRODUCT: 9088 BPM*MMHG
STRESS ST DEPRESSION: 0 MM
STRESS TARGET HR: 138 BPM
TID: 1.03

## 2023-12-04 PROCEDURE — 93018 CV STRESS TEST I&R ONLY: CPT | Performed by: SPECIALIST

## 2023-12-04 PROCEDURE — 93016 CV STRESS TEST SUPVJ ONLY: CPT | Performed by: SPECIALIST

## 2023-12-04 PROCEDURE — PBSHW PBB SHADOW CHARGE: Performed by: SPECIALIST

## 2023-12-04 PROCEDURE — A9500 TC99M SESTAMIBI: HCPCS | Performed by: SPECIALIST

## 2023-12-04 PROCEDURE — 78452 HT MUSCLE IMAGE SPECT MULT: CPT | Performed by: SPECIALIST

## 2023-12-04 RX ORDER — TETRAKIS(2-METHOXYISOBUTYLISOCYANIDE)COPPER(I) TETRAFLUOROBORATE 1 MG/ML
26 INJECTION, POWDER, LYOPHILIZED, FOR SOLUTION INTRAVENOUS
Status: COMPLETED | OUTPATIENT
Start: 2023-12-04 | End: 2023-12-04

## 2023-12-04 RX ADMIN — TECHNETIUM TC-99M SESTAMIBI 26 MILLICURIE: 1 INJECTION INTRAVENOUS at 08:30

## 2023-12-12 ENCOUNTER — OFFICE VISIT (OUTPATIENT)
Age: 83
End: 2023-12-12
Payer: MEDICARE

## 2023-12-12 VITALS
SYSTOLIC BLOOD PRESSURE: 131 MMHG | TEMPERATURE: 97.8 F | HEART RATE: 62 BPM | RESPIRATION RATE: 16 BRPM | OXYGEN SATURATION: 100 % | WEIGHT: 234 LBS | BODY MASS INDEX: 41.46 KG/M2 | HEIGHT: 63 IN | DIASTOLIC BLOOD PRESSURE: 71 MMHG

## 2023-12-12 DIAGNOSIS — E11.9 TYPE 2 DIABETES MELLITUS WITHOUT COMPLICATION, WITHOUT LONG-TERM CURRENT USE OF INSULIN (HCC): Primary | ICD-10-CM

## 2023-12-12 DIAGNOSIS — E78.00 PURE HYPERCHOLESTEROLEMIA, UNSPECIFIED: ICD-10-CM

## 2023-12-12 LAB
BUN SERPL-MCNC: 58 MG/DL (ref 8–27)
BUN/CREAT SERPL: 32 (ref 12–28)
CALCIUM SERPL-MCNC: 9.1 MG/DL (ref 8.7–10.3)
CHLORIDE SERPL-SCNC: 102 MMOL/L (ref 96–106)
CHOLEST SERPL-MCNC: 185 MG/DL
CO2 SERPL-SCNC: 19 MMOL/L (ref 20–29)
CREAT SERPL-MCNC: 1.84 MG/DL (ref 0.57–1)
EGFRCR SERPLBLD CKD-EPI 2021: 27 ML/MIN/1.73
EST. AVERAGE GLUCOSE BLD GHB EST-MCNC: 120 MG/DL
GLUCOSE SERPL-MCNC: 167 MG/DL (ref 70–99)
HBA1C MFR BLD: 5.8 % (ref 4–5.6)
HDLC SERPL-MCNC: 56 MG/DL
HDLC SERPL: 3.3 (ref 0–5)
LDLC SERPL CALC-MCNC: 103.2 MG/DL (ref 0–100)
POTASSIUM SERPL-SCNC: 4.3 MMOL/L (ref 3.5–5.2)
SODIUM SERPL-SCNC: 138 MMOL/L (ref 134–144)
SPECIMEN STATUS REPORT: NORMAL
TRIGL SERPL-MCNC: 129 MG/DL
VLDLC SERPL CALC-MCNC: 25.8 MG/DL

## 2023-12-12 PROCEDURE — G8400 PT W/DXA NO RESULTS DOC: HCPCS | Performed by: NURSE PRACTITIONER

## 2023-12-12 PROCEDURE — 99213 OFFICE O/P EST LOW 20 MIN: CPT | Performed by: NURSE PRACTITIONER

## 2023-12-12 PROCEDURE — 3078F DIAST BP <80 MM HG: CPT | Performed by: NURSE PRACTITIONER

## 2023-12-12 PROCEDURE — G8484 FLU IMMUNIZE NO ADMIN: HCPCS | Performed by: NURSE PRACTITIONER

## 2023-12-12 PROCEDURE — 1123F ACP DISCUSS/DSCN MKR DOCD: CPT | Performed by: NURSE PRACTITIONER

## 2023-12-12 PROCEDURE — 3044F HG A1C LEVEL LT 7.0%: CPT | Performed by: NURSE PRACTITIONER

## 2023-12-12 PROCEDURE — 1036F TOBACCO NON-USER: CPT | Performed by: NURSE PRACTITIONER

## 2023-12-12 PROCEDURE — 1090F PRES/ABSN URINE INCON ASSESS: CPT | Performed by: NURSE PRACTITIONER

## 2023-12-12 PROCEDURE — G8417 CALC BMI ABV UP PARAM F/U: HCPCS | Performed by: NURSE PRACTITIONER

## 2023-12-12 PROCEDURE — G8428 CUR MEDS NOT DOCUMENT: HCPCS | Performed by: NURSE PRACTITIONER

## 2023-12-12 PROCEDURE — 3075F SYST BP GE 130 - 139MM HG: CPT | Performed by: NURSE PRACTITIONER

## 2023-12-12 RX ORDER — FERROUS SULFATE 325(65) MG
325 TABLET ORAL
COMMUNITY

## 2023-12-12 NOTE — PROGRESS NOTES
Chief Complaint   Patient presents with    Diabetes     Patient presents in office today for diabetes check. No concerns. 1. \"Have you been to the ER, urgent care clinic since your last visit? Hospitalized since your last visit? \" No    2. \"Have you seen or consulted any other health care providers outside of the [address] [place] [address] since your last visit? \" No     3. For patients aged 43-73: Has the patient had a colonoscopy / FIT/ Cologuard? NA - based on age      If the patient is female:    4. For patients aged 43-66: Has the patient had a mammogram within the past 2 years? NA - based on age or sex      11. For patients aged 21-65: Has the patient had a pap smear?  NA - based on age or sex

## 2023-12-13 ENCOUNTER — OFFICE VISIT (OUTPATIENT)
Age: 83
End: 2023-12-13
Payer: MEDICARE

## 2023-12-13 VITALS
HEIGHT: 63 IN | BODY MASS INDEX: 41.64 KG/M2 | HEART RATE: 68 BPM | DIASTOLIC BLOOD PRESSURE: 50 MMHG | WEIGHT: 235 LBS | RESPIRATION RATE: 20 BRPM | OXYGEN SATURATION: 100 % | SYSTOLIC BLOOD PRESSURE: 122 MMHG

## 2023-12-13 DIAGNOSIS — N18.4 TYPE 2 DIABETES MELLITUS WITH STAGE 4 CHRONIC KIDNEY DISEASE, WITH LONG-TERM CURRENT USE OF INSULIN (HCC): Primary | ICD-10-CM

## 2023-12-13 DIAGNOSIS — E11.22 TYPE 2 DIABETES MELLITUS WITH STAGE 4 CHRONIC KIDNEY DISEASE, WITH LONG-TERM CURRENT USE OF INSULIN (HCC): Primary | ICD-10-CM

## 2023-12-13 DIAGNOSIS — Z79.4 TYPE 2 DIABETES MELLITUS WITH STAGE 4 CHRONIC KIDNEY DISEASE, WITH LONG-TERM CURRENT USE OF INSULIN (HCC): Primary | ICD-10-CM

## 2023-12-13 LAB — NT-PROBNP SERPL-MCNC: 629 PG/ML (ref 0–738)

## 2023-12-13 PROCEDURE — 99204 OFFICE O/P NEW MOD 45 MIN: CPT | Performed by: INTERNAL MEDICINE

## 2023-12-13 PROCEDURE — 1090F PRES/ABSN URINE INCON ASSESS: CPT | Performed by: INTERNAL MEDICINE

## 2023-12-13 PROCEDURE — 3078F DIAST BP <80 MM HG: CPT | Performed by: INTERNAL MEDICINE

## 2023-12-13 PROCEDURE — 1123F ACP DISCUSS/DSCN MKR DOCD: CPT | Performed by: INTERNAL MEDICINE

## 2023-12-13 PROCEDURE — G8417 CALC BMI ABV UP PARAM F/U: HCPCS | Performed by: INTERNAL MEDICINE

## 2023-12-13 PROCEDURE — G8484 FLU IMMUNIZE NO ADMIN: HCPCS | Performed by: INTERNAL MEDICINE

## 2023-12-13 PROCEDURE — 3044F HG A1C LEVEL LT 7.0%: CPT | Performed by: INTERNAL MEDICINE

## 2023-12-13 PROCEDURE — 3074F SYST BP LT 130 MM HG: CPT | Performed by: INTERNAL MEDICINE

## 2023-12-13 PROCEDURE — G8400 PT W/DXA NO RESULTS DOC: HCPCS | Performed by: INTERNAL MEDICINE

## 2023-12-13 PROCEDURE — 1036F TOBACCO NON-USER: CPT | Performed by: INTERNAL MEDICINE

## 2023-12-13 PROCEDURE — G8427 DOCREV CUR MEDS BY ELIG CLIN: HCPCS | Performed by: INTERNAL MEDICINE

## 2023-12-13 RX ORDER — INSULIN GLARGINE 100 [IU]/ML
25 INJECTION, SOLUTION SUBCUTANEOUS NIGHTLY
Qty: 15 ADJUSTABLE DOSE PRE-FILLED PEN SYRINGE | Refills: 1 | Status: SHIPPED | OUTPATIENT
Start: 2023-12-13

## 2023-12-13 RX ORDER — INSULIN GLARGINE 100 [IU]/ML
25 INJECTION, SOLUTION SUBCUTANEOUS NIGHTLY
Qty: 15 ADJUSTABLE DOSE PRE-FILLED PEN SYRINGE | Refills: 1 | Status: SHIPPED | OUTPATIENT
Start: 2023-12-13 | End: 2023-12-13 | Stop reason: SDUPTHER

## 2023-12-13 NOTE — PROGRESS NOTES
Blood pressure (!) 122/50, pulse 68, resp. rate 20, height 1.6 m (5' 3\"), weight 106.6 kg (235 lb), SpO2 100 %, not currently breastfeeding.

## 2023-12-13 NOTE — PROGRESS NOTES
Consultation - Diabetes     PCP: BUBBA Schumacher NP    Chief Complaint   Patient presents with    New Patient    Diabetes     Type 2      HPI:  Sara Hager is a 81 y.o. female with  has a past medical history of CAD (coronary artery disease), CKD (chronic kidney disease), Diabetes ([address]), Dyslipidemia, Heart attack ([address]), HTN (hypertension), Monoclonal gammopathies, Obesity, SUZAN on CPAP, and Tachycardia. who is seen in consultation at the request of BUBBA Schumacher NP for evaluation of diabetes. Diagnosed with Type 2 diabetes: age 4X     Complications:  + CAD s/p MI, HFpEF  +PAD   Hx of cataracts per pt, no DR     No personal or family history of MEN or MTC  No personal history of pancreatitis  No known history of thyroid or calcium disorder  No prior hospitalizations for diabetes  No known history of DKA or HHS  No prior foot sores or amputations  No history of severe hypoglycemia    Current DM medications:  Trulicity 1.5 mg qweek  Novolog 22 units + SS with meals     BG trends:  CGM   Target range 95%  Low 2%  High 3%  Avg glucose 123     No high sugar beverages   Activity - limited to ADL's   Poor balance     Full ROS completed this visit:  Negative for weight gain, weight loss, fevers, chills, blurry vision, changes in vision, chest pain, palpitations, leg swelling, shortness of breath, wheezing, snoring, abdominal pain, nausea, vomiting, diarrhea, constipation, frequent urination, dysuria, tremors, fainting, shakes, cold intolerance, hot intolerance, depression, anxiety, foot sores, rash.     LABS/STUDIES:     No results found for: \"VCT5BGMX\"    Lab Results   Component Value Date/Time    LABA1C 5.8 12/12/2023 10:39 AM    LABA1C 6.5 06/22/2023 10:05 AM    LABA1C 6.8 03/21/2023 11:00 AM    CREATININE 1.84 12/11/2023 03:59 PM    LABGLOM 27 12/11/2023 03:59 PM    EGFR 49 12/19/2022 11:49 AM    LDLCALC 103.2 12/12/2023 10:39 AM    MALBCR <6 09/13/2021 12:00 AM       Lab Results

## 2023-12-13 NOTE — ASSESSMENT & PLAN NOTE
12/23 - A1c 5.8%  Egfr 27   Continuous glucose monitor was downloaded, printed reported generated. Time range, 14 days. I independently reviewed and analyzed these findings    Hypoglycemia in evenings  Hypoglycemic in clinic today corrected w/juice     Stop Novolog   Continue Trulicity 1.5 mg once a week  Start Lantus 25 units every nighT    If your fasting glucose levels are more than 200 after 1 week of this medication, increase the Lantus by 5 units    -goal HbA1c is 7% without frequent lows  -goal BS is   -to call if BS is <70 or >300  -bring log and meter to each visit  -I reviewed pertinent lab data, provider notes, imaging and reports in care-everywhere   -discussed diet modifications and to receive 150 min of exercise if possible per week of moderate intensity  -discussed treatment for hypoglycemia with glucose tabs and/or 4oz of juice and wait 15 min to recheck BS and to repeat until BS>80  -counseled patient on long term disease complications   -Does NOT have hypoglycemia awaress   -HIGH risk medication use. Insulin may cause severe, life-threatening hypoglycemia and requires close monitoring and follow up. Patient was educated about nature of high risk medication use.     -DM HM:  ophtho: upcoming 1/2023   Renal: monitor - CKD   feet: following w/podiatrist

## 2023-12-13 NOTE — PATIENT INSTRUCTIONS
Stop Novolog   Continue Trulicity 1.5 mg once a week  Start Lantus 25 units every night    If your fasting glucose levels are more than 200 after 1 week of this medication, increase the Lantus by 5 units    Diabetes general guidelines:   Target glucose  Fastin-130  2 hours after meals: less than 180    Check glucose levels as directed   Have a small snack if glucose is less than 80 at bedtime    Seek urgent care if glucose levels above 300s and not coming down, especially if associated with excessive thirst, urination, confusion or generally feeling unwell. For Blood Glucose < 70 mg/dl treat with 4 ounces of juice or 4 glucose tablets (15 g). Recheck Blood Glucose in 15 minutes. Repeat until blood glucose is > 70.

## 2023-12-14 RX ORDER — FLUTICASONE PROPIONATE AND SALMETEROL 250; 50 UG/1; UG/1
POWDER RESPIRATORY (INHALATION)
Qty: 60 EACH | Refills: 11 | Status: SHIPPED | OUTPATIENT
Start: 2023-12-14

## 2023-12-28 ENCOUNTER — OFFICE VISIT (OUTPATIENT)
Age: 83
End: 2023-12-28
Payer: MEDICARE

## 2023-12-28 VITALS
HEIGHT: 63 IN | HEART RATE: 71 BPM | BODY MASS INDEX: 41.29 KG/M2 | DIASTOLIC BLOOD PRESSURE: 60 MMHG | SYSTOLIC BLOOD PRESSURE: 120 MMHG | OXYGEN SATURATION: 98 % | WEIGHT: 233 LBS

## 2023-12-28 DIAGNOSIS — I48.91 ATRIAL FIBRILLATION, UNSPECIFIED TYPE (HCC): ICD-10-CM

## 2023-12-28 DIAGNOSIS — R94.39 ABNORMAL NUCLEAR STRESS TEST: ICD-10-CM

## 2023-12-28 DIAGNOSIS — R06.02 SOB (SHORTNESS OF BREATH): ICD-10-CM

## 2023-12-28 DIAGNOSIS — Z79.01 ANTICOAGULATED: ICD-10-CM

## 2023-12-28 DIAGNOSIS — I10 PRIMARY HYPERTENSION: Primary | ICD-10-CM

## 2023-12-28 DIAGNOSIS — I25.10 CORONARY ARTERY DISEASE INVOLVING NATIVE CORONARY ARTERY OF NATIVE HEART WITHOUT ANGINA PECTORIS: ICD-10-CM

## 2023-12-28 DIAGNOSIS — N18.30 STAGE 3 CHRONIC KIDNEY DISEASE, UNSPECIFIED WHETHER STAGE 3A OR 3B CKD (HCC): ICD-10-CM

## 2023-12-28 PROCEDURE — 1090F PRES/ABSN URINE INCON ASSESS: CPT

## 2023-12-28 PROCEDURE — 3078F DIAST BP <80 MM HG: CPT

## 2023-12-28 PROCEDURE — 1036F TOBACCO NON-USER: CPT

## 2023-12-28 PROCEDURE — G8400 PT W/DXA NO RESULTS DOC: HCPCS

## 2023-12-28 PROCEDURE — G8428 CUR MEDS NOT DOCUMENT: HCPCS

## 2023-12-28 PROCEDURE — G8417 CALC BMI ABV UP PARAM F/U: HCPCS

## 2023-12-28 PROCEDURE — 1123F ACP DISCUSS/DSCN MKR DOCD: CPT

## 2023-12-28 PROCEDURE — 3074F SYST BP LT 130 MM HG: CPT

## 2023-12-28 PROCEDURE — 99215 OFFICE O/P EST HI 40 MIN: CPT

## 2023-12-28 PROCEDURE — G8484 FLU IMMUNIZE NO ADMIN: HCPCS

## 2023-12-28 RX ORDER — FUROSEMIDE 20 MG/1
TABLET ORAL
Qty: 180 TABLET | Refills: 3 | Status: SHIPPED | OUTPATIENT
Start: 2023-12-28

## 2023-12-28 NOTE — PROGRESS NOTES
Patient: Maciel To  : 1940    Primary Cardiologist: Vilma Andino MD. [facility]  Last Office Visit: 23      Today's Date: 2023        HISTORY OF PRESENT ILLNESS:     History of Present Illness:  Presents today for follow-up. Denies chest pain. Continues with shortness of breath, not any better or worse. Lower extremity edema improved with elevation, lasix, compression stockings. PAST MEDICAL HISTORY:     Past Medical History:   Diagnosis Date    CAD (coronary artery disease)     CKD (chronic kidney disease)     Diabetes ([address])     Dyslipidemia     Heart attack ([address])     Had cath and treated medically     HTN (hypertension)     Monoclonal gammopathies     Obesity     SUZAN on CPAP     Tachycardia     placed on [place] at [place]        Past Surgical History:   Procedure Laterality Date    BREAST BIOPSY Left     several bxs on left, all benign    BREAST SURGERY      CARDIAC PROCEDURE N/A 2023    Intracardiac echocardiogram performed by An Brandt Ramirez MD at [address] [place] [address] CATH LAB    EP DEVICE PROCEDURE N/A 2023    Ablation A-fib w complete ep study performed by An Brandt Ramirez MD at [address] [place] [address] CATH LAB    EP DEVICE PROCEDURE N/A 2023    Drug stimulation performed by An Brandt Ramirez MD at [address] [place] [address] CATH LAB    EP DEVICE PROCEDURE N/A 2023    Ep 3d mapping performed by An Brandt Ramirez MD at [address] [place] [address] CATH LAB    EP DEVICE PROCEDURE N/A 2023    Ablation following A-fib addl performed by An Brandt Ramirez MD at [facility] N/A 2023    Ultrasound guided vascular access performed by An Brandt Ramirez MD at [address] [place] [address]:    .  Current Outpatient Medications   Medication Sig Dispense Refill    furosemide (LASIX) 20 MG tablet Take 20 mg daily.  180 tablet 3    fluticasone-salmeterol (WIXELA INHUB) 250-50 MCG/ACT AEPB diskus inhaler USE 1 INHALATION EVERY 12 HOURS 60 each 11    insulin glargine (LANTUS SOLOSTAR) 100 UNIT/ML

## 2023-12-28 NOTE — H&P (VIEW-ONLY)
for nosebleeds, tinnitus, and vision changes.   Respiratory: Negative for cough, wheezing  Cardiovascular: Negative for orthopnea, claudication, syncope  Gastrointestinal: Negative for abdominal pain, diarrhea, melena.   Genitourinary: Negative for dysuria  Musculoskeletal: Negative for myalgias.   Skin: Negative for rash  Heme: No problems bleeding.  Neurological: Negative for speech change and focal weakness.       PHYSICAL EXAM:     Physical Exam:  /60 (Site: Left Upper Arm, Position: Sitting)   Pulse 71   Ht 1.6 m (5' 3\")   Wt 105.7 kg (233 lb)   SpO2 98%   BMI 41.27 kg/m²     Patient appears generally well, mood and affect are appropriate and pleasant.  HEENT:  Hearing intact, non-icteric, normocephalic, atraumatic.   Neck Exam: Supple  Lung Exam: Clear to auscultation, even breath sounds.   Cardiac Exam: Regular rate and rhythm with no murmur 2/6 systolic murmur   Abdomen: Soft, non-tender, obese   Extremities: Moves all ext well. Mild bilat lower extremity edema.  MSKTL: Overall good ROM ext  Skin: No significant rashes  Psych: Appropriate affect  Neuro - Grossly intact        LABS / OTHER STUDIES:     Lab Results   Component Value Date     12/11/2023    K 4.3 12/11/2023     12/11/2023    CO2 19 (L) 12/11/2023    BUN 58 (H) 12/11/2023    CREATININE 1.84 (H) 12/11/2023    GLUCOSE 167 (H) 12/11/2023    CALCIUM 9.1 12/11/2023    PROT 6.9 11/10/2023    LABALBU 4.1 11/10/2023    BILITOT 0.6 11/10/2023    ALKPHOS 130 (H) 11/10/2023    AST 16 11/10/2023    ALT 19 11/10/2023    LABGLOM 27 (L) 12/11/2023    GFRAA 51 (L) 06/09/2022    AGRATIO 1.5 11/10/2023    GLOB 3.4 12/19/2022       Lab Results   Component Value Date    WBC 8.5 11/10/2023    HGB 11.4 11/10/2023    HCT 33.4 (L) 11/10/2023    MCV 95 11/10/2023     11/10/2023           CARDIAC DIAGNOSTICS:     Cardiac Evaluation Includes:  I reviewed the test results below.     EKG 6/13/19 - NSR, PRWP    EKG 10/21/20 - NSR, PRWP    EKG

## 2023-12-28 NOTE — PROGRESS NOTES
Chief Complaint   Patient presents with    Follow-up     Test result      Vitals:    12/28/23 1515   BP: 120/60   Site: Left Upper Arm   Position: Sitting   Pulse: 71   SpO2: 98%   Weight: 105.7 kg (233 lb)   Height: 1.6 m (5' 3\")         Chest pain denied   SOB- yes     Recent hospital stays denied     Refills denied

## 2024-01-01 RX ORDER — ALLOPURINOL 100 MG/1
100 TABLET ORAL DAILY
Qty: 90 TABLET | Refills: 3 | Status: SHIPPED | OUTPATIENT
Start: 2024-01-01

## 2024-01-03 ENCOUNTER — TELEPHONE (OUTPATIENT)
Age: 84
End: 2024-01-03

## 2024-01-03 DIAGNOSIS — R06.09 DOE (DYSPNEA ON EXERTION): Primary | ICD-10-CM

## 2024-01-03 DIAGNOSIS — R94.39 ABNORMAL STRESS TEST: ICD-10-CM

## 2024-01-03 NOTE — TELEPHONE ENCOUNTER
Spoke with Pt of /[facility] schd. For 01/11/24 At 1pm arrive at 10:30am for IV Hydration Pt aware that they need a  NPO from Midnight the night before. Check in at the second floor Outpt. Reg. Desk. Pt is to have Labs done between 1/3/24-1/8/24 at [facility].   Medications:  Hold Eliquis 48 hrs prior to procedure taking last full dose on 1/8/24  Hold Insulin day of procedure  Hold Lasix day of procedure   VO by /Dr. Lozada/nurse Tyesha ANNA

## 2024-01-06 LAB
BASOPHILS # BLD AUTO: 0 X10E3/UL (ref 0–0.2)
BASOPHILS NFR BLD AUTO: 0 %
BUN SERPL-MCNC: 47 MG/DL (ref 8–27)
BUN/CREAT SERPL: 32 (ref 12–28)
CALCIUM SERPL-MCNC: 9.6 MG/DL (ref 8.7–10.3)
CHLORIDE SERPL-SCNC: 101 MMOL/L (ref 96–106)
CO2 SERPL-SCNC: 22 MMOL/L (ref 20–29)
CREAT SERPL-MCNC: 1.47 MG/DL (ref 0.57–1)
EGFRCR SERPLBLD CKD-EPI 2021: 35 ML/MIN/1.73
EOSINOPHIL # BLD AUTO: 0.2 X10E3/UL (ref 0–0.4)
EOSINOPHIL NFR BLD AUTO: 2 %
ERYTHROCYTE [DISTWIDTH] IN BLOOD BY AUTOMATED COUNT: 12.6 % (ref 11.7–15.4)
GLUCOSE SERPL-MCNC: 119 MG/DL (ref 70–99)
HCT VFR BLD AUTO: 32.2 % (ref 34–46.6)
HGB BLD-MCNC: 11.1 G/DL (ref 11.1–15.9)
IMM GRANULOCYTES # BLD AUTO: 0.1 X10E3/UL (ref 0–0.1)
IMM GRANULOCYTES NFR BLD AUTO: 1 %
LYMPHOCYTES # BLD AUTO: 1.7 X10E3/UL (ref 0.7–3.1)
LYMPHOCYTES NFR BLD AUTO: 17 %
MCH RBC QN AUTO: 32.3 PG (ref 26.6–33)
MCHC RBC AUTO-ENTMCNC: 34.5 G/DL (ref 31.5–35.7)
MCV RBC AUTO: 94 FL (ref 79–97)
MONOCYTES # BLD AUTO: 0.9 X10E3/UL (ref 0.1–0.9)
MONOCYTES NFR BLD AUTO: 9 %
NEUTROPHILS # BLD AUTO: 7.3 X10E3/UL (ref 1.4–7)
NEUTROPHILS NFR BLD AUTO: 71 %
PLATELET # BLD AUTO: 288 X10E3/UL (ref 150–450)
POTASSIUM SERPL-SCNC: 4.7 MMOL/L (ref 3.5–5.2)
RBC # BLD AUTO: 3.44 X10E6/UL (ref 3.77–5.28)
REPORT: NORMAL
SODIUM SERPL-SCNC: 136 MMOL/L (ref 134–144)
SPECIMEN STATUS REPORT: NORMAL
WBC # BLD AUTO: 10.1 X10E3/UL (ref 3.4–10.8)

## 2024-01-08 ENCOUNTER — OFFICE VISIT (OUTPATIENT)
Age: 84
End: 2024-01-08
Payer: MEDICARE

## 2024-01-08 VITALS
SYSTOLIC BLOOD PRESSURE: 131 MMHG | HEART RATE: 68 BPM | WEIGHT: 232.6 LBS | DIASTOLIC BLOOD PRESSURE: 40 MMHG | OXYGEN SATURATION: 98 % | BODY MASS INDEX: 41.21 KG/M2 | TEMPERATURE: 96.6 F | HEIGHT: 63 IN | RESPIRATION RATE: 20 BRPM

## 2024-01-08 DIAGNOSIS — Z79.4 TYPE 2 DIABETES MELLITUS WITH STAGE 4 CHRONIC KIDNEY DISEASE, WITH LONG-TERM CURRENT USE OF INSULIN (HCC): Primary | ICD-10-CM

## 2024-01-08 DIAGNOSIS — E78.5 DYSLIPIDEMIA: ICD-10-CM

## 2024-01-08 DIAGNOSIS — N18.4 TYPE 2 DIABETES MELLITUS WITH STAGE 4 CHRONIC KIDNEY DISEASE, WITH LONG-TERM CURRENT USE OF INSULIN (HCC): Primary | ICD-10-CM

## 2024-01-08 DIAGNOSIS — E11.22 TYPE 2 DIABETES MELLITUS WITH STAGE 4 CHRONIC KIDNEY DISEASE, WITH LONG-TERM CURRENT USE OF INSULIN (HCC): Primary | ICD-10-CM

## 2024-01-08 PROBLEM — E66.01 OBESITY, CLASS III, BMI 40-49.9 (MORBID OBESITY) (HCC): Status: ACTIVE | Noted: 2020-10-07

## 2024-01-08 PROBLEM — E66.813 OBESITY, CLASS III, BMI 40-49.9 (MORBID OBESITY) (HCC): Status: ACTIVE | Noted: 2020-10-07

## 2024-01-08 PROCEDURE — G8484 FLU IMMUNIZE NO ADMIN: HCPCS | Performed by: INTERNAL MEDICINE

## 2024-01-08 PROCEDURE — 1123F ACP DISCUSS/DSCN MKR DOCD: CPT | Performed by: INTERNAL MEDICINE

## 2024-01-08 PROCEDURE — G8417 CALC BMI ABV UP PARAM F/U: HCPCS | Performed by: INTERNAL MEDICINE

## 2024-01-08 PROCEDURE — G8427 DOCREV CUR MEDS BY ELIG CLIN: HCPCS | Performed by: INTERNAL MEDICINE

## 2024-01-08 PROCEDURE — 95251 CONT GLUC MNTR ANALYSIS I&R: CPT | Performed by: INTERNAL MEDICINE

## 2024-01-08 PROCEDURE — 1036F TOBACCO NON-USER: CPT | Performed by: INTERNAL MEDICINE

## 2024-01-08 PROCEDURE — 1090F PRES/ABSN URINE INCON ASSESS: CPT | Performed by: INTERNAL MEDICINE

## 2024-01-08 PROCEDURE — 3078F DIAST BP <80 MM HG: CPT | Performed by: INTERNAL MEDICINE

## 2024-01-08 PROCEDURE — 3075F SYST BP GE 130 - 139MM HG: CPT | Performed by: INTERNAL MEDICINE

## 2024-01-08 PROCEDURE — G8400 PT W/DXA NO RESULTS DOC: HCPCS | Performed by: INTERNAL MEDICINE

## 2024-01-08 PROCEDURE — 99214 OFFICE O/P EST MOD 30 MIN: CPT | Performed by: INTERNAL MEDICINE

## 2024-01-08 RX ORDER — INSULIN GLARGINE 100 [IU]/ML
20 INJECTION, SOLUTION SUBCUTANEOUS NIGHTLY
Qty: 9 ADJUSTABLE DOSE PRE-FILLED PEN SYRINGE | Refills: 3 | Status: ON HOLD | OUTPATIENT
Start: 2024-01-08

## 2024-01-08 NOTE — PATIENT INSTRUCTIONS
The day before your procedure, only take 15 units of Lantus   Stop Trulicity for 1 week before procedure, resume after procedure     Diabetes general guidelines:   Target glucose  Fastin-130  2 hours after meals: less than 180    Check glucose levels as directed   Have a small snack if glucose is less than 80 at bedtime    Seek urgent care if glucose levels above 300s and not coming down, especially if associated with excessive thirst, urination, confusion or generally feeling unwell.     For Blood Glucose < 70 mg/dl treat with 4 ounces of juice or 4 glucose tablets (15 g). Recheck Blood Glucose in 15 minutes. Repeat until blood glucose is > 70.

## 2024-01-08 NOTE — ASSESSMENT & PLAN NOTE
12/23 - A1c 5.8%  Egfr 27   Continuous glucose monitor was downloaded, printed reported generated. Time range, 14 days. I independently reviewed and analyzed these findings    Doing very well  Mild hypoglycemia in mornings     Continue Trulicity 1.5 mg once a week  Decrease Lantus 20 units, decrease further if AM hypoglycemia     I reviewed the benefits of GLP-1 such as improved glycemic control, weight loss, risk reduction in major cardiovascular events such as CAD/ CVA  I reviewed in details the adverse effects of GLP-1/ GLP-GIP agents including nausea, vomiting, diarrhea, bloating, abdominal pain, constipation and the black box warning regarding risk for pancreatitis and medullary thyroid cancer noted in mice. Patient does not have any contraindications to this medication. Patient was also advised to stop this medication for at least 1 week prior to any procedure, or longer if specified by proceduralist.     -goal HbA1c is 8 or less% without frequent lows  -to call if BS is <70 or >300  -bring log and meter to each visit  -I reviewed pertinent lab data, provider notes, imaging and reports in care-everywhere   -discussed diet modifications and to receive 150 min of exercise if possible per week of moderate intensity  -discussed treatment for hypoglycemia with glucose tabs and/or 4oz of juice and wait 15 min to recheck BS and to repeat until BS>80  -counseled patient on long term disease complications   -Does NOT have hypoglycemia awaress   -HIGH risk medication use. Insulin may cause severe, life-threatening hypoglycemia and requires close monitoring and follow up. Patient was educated about nature of high risk medication use.    -DM HM:  ophtho: upcoming 1/2023   Renal: monitor - CKD   feet: following w/podiatrist

## 2024-01-08 NOTE — PROGRESS NOTES
Follow up - Diabetes     PCP: Kylah Villegas APRN - NP    Chief Complaint   Patient presents with    Diabetes     HPI:  Adrienne To is a 83 y.o. female with  has a past medical history of CAD (coronary artery disease), CKD (chronic kidney disease), Diabetes (HCC), Dyslipidemia, Heart attack (HCC), HTN (hypertension), Monoclonal gammopathies, Obesity, SUZAN on CPAP, and Tachycardia. who is seen in consultation at the request of Kylah Villegas APRN - NP for evaluation of diabetes.     Diagnosed with Type 2 diabetes: age 50s     Complications:  + CAD s/p MI, HFpEF  +PAD   Hx of cataracts per pt, no DR     R and L heart cath scheduled for next week for shortness of breath     No personal or family history of MEN or MTC  No personal history of pancreatitis  No known history of thyroid or calcium disorder  No prior hospitalizations for diabetes  No known history of DKA or HHS  No prior foot sores or amputations  No history of severe hypoglycemia    Current DM medications:  Trulicity 1.5 mg once a week  Lantus 25 units qAM     BG trends:  A1c 5.8% 12/2023   CGM  Target 91%  High 8% high   Low 1%    No high sugar beverages   Activity - limited to ADL's   Poor balance     BRIEF ROS   +shortness of breath  Gen: no fevers/chills  CV: no chest pain  Resp: cough   GI: no nausea, emesis, abdominal pain  Neuro: no confusion     LABS/STUDIES:   No results found for: \"[id number]\"    Lab Results   Component Value Date/Time    LABA1C 5.8 12/12/2023 10:39 AM    LABA1C 6.5 06/22/2023 10:05 AM    LABA1C 6.8 03/21/2023 11:00 AM    CREATININE 1.47 01/05/2024 03:52 PM    LABGLOM 35 01/05/2024 03:52 PM    EGFR 49 12/19/2022 11:49 AM    LDLCALC 103.2 12/12/2023 10:39 AM    MALBCR <6 09/13/2021 12:00 AM       Lab Results   Component Value Date/Time    CHOL 185 12/12/2023 10:39 AM    TRIG 129 12/12/2023 10:39 AM    HDL 56 12/12/2023 10:39 AM    LDLCALC 103.2 12/12/2023 10:39 AM     Lab Results   Component Value Date/Time    TSH

## 2024-01-08 NOTE — PROGRESS NOTES
Adrienne To is a 83 y.o. female here for   Chief Complaint   Patient presents with    Diabetes       1. Have you been to the ER, urgent care clinic since your last visit?  Hospitalized since your last visit? -no    2. Have you seen or consulted any other health care providers outside of the [facility] since your last visit?  Include any pap smears or colon screening.-no

## 2024-01-09 ENCOUNTER — DIRECT ADMIT ORDERS (OUTPATIENT)
Facility: HOSPITAL | Age: 84
End: 2024-01-09

## 2024-01-10 RX ORDER — SODIUM CHLORIDE 0.9 % (FLUSH) 0.9 %
5-40 SYRINGE (ML) INJECTION EVERY 12 HOURS SCHEDULED
Status: CANCELLED | OUTPATIENT
Start: 2024-01-11

## 2024-01-10 RX ORDER — SODIUM CHLORIDE 9 MG/ML
INJECTION, SOLUTION INTRAVENOUS CONTINUOUS
Status: CANCELLED | OUTPATIENT
Start: 2024-01-11

## 2024-01-10 RX ORDER — SODIUM CHLORIDE 9 MG/ML
INJECTION, SOLUTION INTRAVENOUS PRN
Status: CANCELLED | OUTPATIENT
Start: 2024-01-11

## 2024-01-10 RX ORDER — SODIUM CHLORIDE 0.9 % (FLUSH) 0.9 %
5-40 SYRINGE (ML) INJECTION PRN
Status: CANCELLED | OUTPATIENT
Start: 2024-01-11

## 2024-01-11 ENCOUNTER — HOSPITAL ENCOUNTER (INPATIENT)
Facility: HOSPITAL | Age: 84
LOS: 6 days | Discharge: NURSING, REHAB OR LONG-TERM CARE | DRG: 982 | End: 2024-01-17
Attending: INTERNAL MEDICINE | Admitting: FAMILY MEDICINE
Payer: MEDICARE

## 2024-01-11 DIAGNOSIS — R06.09 DOE (DYSPNEA ON EXERTION): ICD-10-CM

## 2024-01-11 DIAGNOSIS — R94.39 ABNORMAL STRESS TEST: ICD-10-CM

## 2024-01-11 DIAGNOSIS — I72.4 PSEUDOANEURYSM OF FEMORAL ARTERY (HCC): ICD-10-CM

## 2024-01-11 DIAGNOSIS — M79.604 PAIN OF RIGHT LOWER EXTREMITY: ICD-10-CM

## 2024-01-11 DIAGNOSIS — R55 SYNCOPE, UNSPECIFIED SYNCOPE TYPE: Primary | ICD-10-CM

## 2024-01-11 LAB
ACT BLD: 179 SECS (ref 79–138)
ACT BLD: 212 SECS (ref 79–138)
ACT BLD: 250 SECS (ref 79–138)
ACT BLD: 444 SECS (ref 79–138)
GLUCOSE BLD STRIP.AUTO-MCNC: 124 MG/DL (ref 65–117)
GLUCOSE BLD STRIP.AUTO-MCNC: 176 MG/DL (ref 65–117)
SERVICE CMNT-IMP: ABNORMAL
SERVICE CMNT-IMP: ABNORMAL

## 2024-01-11 PROCEDURE — 2500000003 HC RX 250 WO HCPCS: Performed by: INTERNAL MEDICINE

## 2024-01-11 PROCEDURE — 85347 COAGULATION TIME ACTIVATED: CPT

## 2024-01-11 PROCEDURE — 02703ZZ DILATION OF CORONARY ARTERY, ONE ARTERY, PERCUTANEOUS APPROACH: ICD-10-PCS | Performed by: INTERNAL MEDICINE

## 2024-01-11 PROCEDURE — 2709999900 HC NON-CHARGEABLE SUPPLY: Performed by: INTERNAL MEDICINE

## 2024-01-11 PROCEDURE — C1761 HC CATH TRANSLUM INTRAVASCULAR LITHOTRIPSY CORONARY: HCPCS | Performed by: INTERNAL MEDICINE

## 2024-01-11 PROCEDURE — 99153 MOD SED SAME PHYS/QHP EA: CPT | Performed by: INTERNAL MEDICINE

## 2024-01-11 PROCEDURE — 92924 PRQ TRLUML C ATHRC 1 ART&/BR: CPT | Performed by: INTERNAL MEDICINE

## 2024-01-11 PROCEDURE — 93005 ELECTROCARDIOGRAM TRACING: CPT

## 2024-01-11 PROCEDURE — C1769 GUIDE WIRE: HCPCS | Performed by: INTERNAL MEDICINE

## 2024-01-11 PROCEDURE — 6360000004 HC RX CONTRAST MEDICATION: Performed by: INTERNAL MEDICINE

## 2024-01-11 PROCEDURE — 2580000003 HC RX 258

## 2024-01-11 PROCEDURE — C1725 CATH, TRANSLUMIN NON-LASER: HCPCS | Performed by: INTERNAL MEDICINE

## 2024-01-11 PROCEDURE — 99152 MOD SED SAME PHYS/QHP 5/>YRS: CPT | Performed by: INTERNAL MEDICINE

## 2024-01-11 PROCEDURE — 82962 GLUCOSE BLOOD TEST: CPT

## 2024-01-11 PROCEDURE — 02F03ZZ FRAGMENTATION IN CORONARY ARTERY, ONE ARTERY, PERCUTANEOUS APPROACH: ICD-10-PCS | Performed by: INTERNAL MEDICINE

## 2024-01-11 PROCEDURE — C1894 INTRO/SHEATH, NON-LASER: HCPCS | Performed by: INTERNAL MEDICINE

## 2024-01-11 PROCEDURE — C1753 CATH, INTRAVAS ULTRASOUND: HCPCS | Performed by: INTERNAL MEDICINE

## 2024-01-11 PROCEDURE — 2580000003 HC RX 258: Performed by: INTERNAL MEDICINE

## 2024-01-11 PROCEDURE — 6360000002 HC RX W HCPCS: Performed by: INTERNAL MEDICINE

## 2024-01-11 PROCEDURE — 4A023N8 MEASUREMENT OF CARDIAC SAMPLING AND PRESSURE, BILATERAL, PERCUTANEOUS APPROACH: ICD-10-PCS | Performed by: INTERNAL MEDICINE

## 2024-01-11 PROCEDURE — 82810 BLOOD GASES O2 SAT ONLY: CPT | Performed by: INTERNAL MEDICINE

## 2024-01-11 PROCEDURE — C1887 CATHETER, GUIDING: HCPCS | Performed by: INTERNAL MEDICINE

## 2024-01-11 PROCEDURE — 92920 PRQ TRLUML C ANGIOP 1ART&/BR: CPT | Performed by: INTERNAL MEDICINE

## 2024-01-11 PROCEDURE — 92978 ENDOLUMINL IVUS OCT C 1ST: CPT | Performed by: INTERNAL MEDICINE

## 2024-01-11 PROCEDURE — 2060000000 HC ICU INTERMEDIATE R&B

## 2024-01-11 PROCEDURE — 6370000000 HC RX 637 (ALT 250 FOR IP): Performed by: INTERNAL MEDICINE

## 2024-01-11 PROCEDURE — 93460 R&L HRT ART/VENTRICLE ANGIO: CPT | Performed by: INTERNAL MEDICINE

## 2024-01-11 PROCEDURE — 92972 PERQ TRLUML CORONRY LITHOTRP: CPT | Performed by: INTERNAL MEDICINE

## 2024-01-11 RX ORDER — CLOPIDOGREL 300 MG/1
TABLET, FILM COATED ORAL PRN
Status: DISCONTINUED | OUTPATIENT
Start: 2024-01-11 | End: 2024-01-11 | Stop reason: HOSPADM

## 2024-01-11 RX ORDER — ONDANSETRON 2 MG/ML
4 INJECTION INTRAMUSCULAR; INTRAVENOUS EVERY 6 HOURS PRN
Status: DISCONTINUED | OUTPATIENT
Start: 2024-01-11 | End: 2024-01-17 | Stop reason: HOSPADM

## 2024-01-11 RX ORDER — CLOPIDOGREL BISULFATE 75 MG/1
75 TABLET ORAL DAILY
Qty: 30 TABLET | Refills: 3 | Status: SHIPPED | OUTPATIENT
Start: 2024-01-12

## 2024-01-11 RX ORDER — ASPIRIN 81 MG/1
TABLET, CHEWABLE ORAL PRN
Status: DISCONTINUED | OUTPATIENT
Start: 2024-01-11 | End: 2024-01-11 | Stop reason: HOSPADM

## 2024-01-11 RX ORDER — FENTANYL CITRATE 50 UG/ML
INJECTION, SOLUTION INTRAMUSCULAR; INTRAVENOUS PRN
Status: DISCONTINUED | OUTPATIENT
Start: 2024-01-11 | End: 2024-01-11 | Stop reason: HOSPADM

## 2024-01-11 RX ORDER — MIDAZOLAM HYDROCHLORIDE 1 MG/ML
INJECTION INTRAMUSCULAR; INTRAVENOUS PRN
Status: DISCONTINUED | OUTPATIENT
Start: 2024-01-11 | End: 2024-01-11 | Stop reason: HOSPADM

## 2024-01-11 RX ORDER — ACETAMINOPHEN 325 MG/1
650 TABLET ORAL EVERY 4 HOURS PRN
Status: DISCONTINUED | OUTPATIENT
Start: 2024-01-11 | End: 2024-01-17 | Stop reason: HOSPADM

## 2024-01-11 RX ORDER — VERAPAMIL HYDROCHLORIDE 2.5 MG/ML
INJECTION, SOLUTION INTRAVENOUS PRN
Status: DISCONTINUED | OUTPATIENT
Start: 2024-01-11 | End: 2024-01-11 | Stop reason: HOSPADM

## 2024-01-11 RX ORDER — SODIUM CHLORIDE 9 MG/ML
INJECTION, SOLUTION INTRAVENOUS PRN
Status: DISCONTINUED | OUTPATIENT
Start: 2024-01-11 | End: 2024-01-11 | Stop reason: HOSPADM

## 2024-01-11 RX ORDER — HEPARIN SODIUM 1000 [USP'U]/ML
INJECTION, SOLUTION INTRAVENOUS; SUBCUTANEOUS PRN
Status: DISCONTINUED | OUTPATIENT
Start: 2024-01-11 | End: 2024-01-11 | Stop reason: HOSPADM

## 2024-01-11 RX ORDER — SODIUM CHLORIDE 9 MG/ML
INJECTION, SOLUTION INTRAVENOUS CONTINUOUS
Status: DISCONTINUED | OUTPATIENT
Start: 2024-01-11 | End: 2024-01-14

## 2024-01-11 RX ORDER — 0.9 % SODIUM CHLORIDE 0.9 %
1000 INTRAVENOUS SOLUTION INTRAVENOUS ONCE
Status: COMPLETED | OUTPATIENT
Start: 2024-01-11 | End: 2024-01-11

## 2024-01-11 RX ORDER — SODIUM CHLORIDE 0.9 % (FLUSH) 0.9 %
5-40 SYRINGE (ML) INJECTION EVERY 12 HOURS SCHEDULED
Status: DISCONTINUED | OUTPATIENT
Start: 2024-01-11 | End: 2024-01-11 | Stop reason: HOSPADM

## 2024-01-11 RX ORDER — SODIUM CHLORIDE 0.9 % (FLUSH) 0.9 %
5-40 SYRINGE (ML) INJECTION PRN
Status: DISCONTINUED | OUTPATIENT
Start: 2024-01-11 | End: 2024-01-11 | Stop reason: HOSPADM

## 2024-01-11 RX ORDER — CLOPIDOGREL BISULFATE 75 MG/1
75 TABLET ORAL DAILY
Status: DISCONTINUED | OUTPATIENT
Start: 2024-01-12 | End: 2024-01-17 | Stop reason: HOSPADM

## 2024-01-11 RX ORDER — HEPARIN SODIUM 200 [USP'U]/100ML
INJECTION, SOLUTION INTRAVENOUS PRN
Status: DISCONTINUED | OUTPATIENT
Start: 2024-01-11 | End: 2024-01-11 | Stop reason: HOSPADM

## 2024-01-11 RX ORDER — FUROSEMIDE 10 MG/ML
INJECTION INTRAMUSCULAR; INTRAVENOUS PRN
Status: DISCONTINUED | OUTPATIENT
Start: 2024-01-11 | End: 2024-01-11 | Stop reason: HOSPADM

## 2024-01-11 RX ORDER — SODIUM CHLORIDE 9 MG/ML
INJECTION, SOLUTION INTRAVENOUS CONTINUOUS
Status: DISPENSED | OUTPATIENT
Start: 2024-01-11 | End: 2024-01-12

## 2024-01-11 RX ADMIN — ONDANSETRON 4 MG: 2 INJECTION INTRAMUSCULAR; INTRAVENOUS at 19:29

## 2024-01-11 RX ADMIN — SODIUM CHLORIDE, PRESERVATIVE FREE 10 ML: 5 INJECTION INTRAVENOUS at 19:32

## 2024-01-11 RX ADMIN — SODIUM CHLORIDE 1000 ML: 9 INJECTION, SOLUTION INTRAVENOUS at 23:27

## 2024-01-11 RX ADMIN — SODIUM CHLORIDE: 9 INJECTION, SOLUTION INTRAVENOUS at 23:18

## 2024-01-11 NOTE — DISCHARGE INSTRUCTIONS
home eliquis 5 BID.         Urine Retention resolved: Bladder scan showing retention, straight cath yielded 575cc. Goldstein placed (1/13-16)        Sinus bradycardia resolved, seen on EKG with rate of 56.   HR currently 60s-70s. Given c/f symptomatic bradycardia with recent syncope, cardiology consulted, recommending OP follow up.   - OP follow up with cardiology.      CAD: Underwent cardiac cath on 1/11/24 with Dr. Couch showing significant RCA disease. Was unable to adequately address blockage during this cath, so will be undergoing repeat cath in one month. Followed by Dr. Lozada. Denies chest pain  - continue Lipitor 40mg daily   -continue plavix 75mg daily     pAfib: EKG shows sinus bradycardia  - HOLD Eliquis 5mg BID; Repeat H&H 1/19 for monitoring of Hgb to ensure it does not drop below 7. If Hgb improves >10 can resume home eliquis 5 BID.         HTN: holding home medications while monitoring pressures  - Clonidine 0.1 BID, Lasix 20 daily, Lisinopril 5 daily, aldactone 25 daily           DMII: Chronic. Stable.   - Continue home lantus 20        HFpEF: echo from 12/3/23 - LVEF 60-65%   - resume home lasix 20mg daily   -OP follow up with cardiology      Asthma: Lungs CTAB  - cont home fluticasone-salmeterol)     CKD 3b: BL Cr approx 1.5.   - Monitor OP      FOLLOW-UP CARE RECOMMENDATIONS:    APPOINTMENTS:  Future Appointments   Date Time Provider Department Center   1/23/2024 10:20 AM Cj Lozada MD [facility] BS AMB   1/29/2024  2:45 PM Kylah Villegas APRN - NP [facility] BS AMB   2/5/2024  1:00 PM Rebecca Miller APRN - NP [facility] BS AMB   2/8/2024  9:00 AM Ben Couch MD [facility] BS AMB   3/12/2024 10:00 AM Kylah Villegas APRN - NP [facility] BS AMB   4/15/2024  3:00 PM Estrella Chávez MD [facility] BS AMB   8/21/2024  3:20 PM Marcelina Jaimes MD [facility] BS AMB   10/10/2024  1:30 PM Daniel Castillo MD [facility] BS AMB          It is very important that you keep follow-up appointment(s).  Bring discharge papers, medication list

## 2024-01-11 NOTE — PROCEDURES
BRIEF PROCEDURE NOTE    Date of Procedure: 1/11/2024   Preoperative Diagnosis: Abnormal stress test  Postoperative Diagnosis: Sig RCA dz   Procedure: Left heart cath, LV angiography, coronary angiography  Interventional Cardiologist: Ben Couch MD  Assistant : none  Anesthesia: local + IV sedation  I administered moderate sedation throughout this procedure. An independent trained observer pushed medications at my direction, and monitored the patient’s level of consciousness and physiological status throughout.  Estimated Blood Loss: Minimal    Access: R Radial Access - 6 F sheath   - very small vessel ; Able to wire with versicor but resistance to catheter - 5 FJR4 and stroud 5 F; Angiogram - small vessel ; Switched R CFA - Ultrasound/Fluoroscopic guided micropuncture access - 6 F sheath    Catheters: RCA : JR4                     LCA : JL4    Findings:     Calcified cors    L Main: Long;Med; MLI    LAD:  Med; Tortuous; Prox/Mid 30%; D1 - small to med; MLI    LCflex: Prox - small; Distal very small; OM1 - small; Prox 40%; Ca++    RI - small; Mod dz    RCA: Med to Large; Dominant; Ca++; Mid 70%; PDA - small; MLI; PLB - small to med; MLI    LVEDP: 23  mm Hg    LVEF: Not assessed    No significant gradient across aortic valve.    PCI: JR4 guide  Mid RCA - 70%  Wired with Hector blue  IVUS would not cross lesion - prox Ca++  Kingsley wire with Run through  Pre  dil with 2 by 12 NC balloon  Shock wave 4.0 lithotripsy balloon - would cross lesion partially ; 3 pulses of 10 given; Balloon could not be advanced further. Balloon removed  Pre dil with 2.5 by 15 NC over Hector blue- waist noted in the middle  Shock wave balloon inserted over Hector blue - Crossed lesion partially. 1 pulse of 10 given. Balloon would not advance further  Only PTCA performed  AMY 3 before and after          RHC findings:    RAPm=  9     mmHg  RVSP= 67    mmHg  PAPm= 39       mmHg  PCWP=    mmHg ?36 - wave form appears overwedged  CO=  5.57

## 2024-01-11 NOTE — INTERVAL H&P NOTE
Update History & Physical    The patient's History and Physical of  1/11/24,  was reviewed with the patient and I examined the patient. There was no change.     Plan: The risks, benefits, expected outcome, and alternative to the recommended procedure have been discussed with the patient. Patient understands and wants to proceed with the procedure.     Electronically signed by Ben Couch MD on 1/11/2024 at 12:33 PM

## 2024-01-12 ENCOUNTER — APPOINTMENT (OUTPATIENT)
Facility: HOSPITAL | Age: 84
DRG: 982 | End: 2024-01-12
Attending: INTERNAL MEDICINE
Payer: MEDICARE

## 2024-01-12 DIAGNOSIS — Z95.5 STATUS POST CORONARY ARTERY BALLOON DILATION: Primary | ICD-10-CM

## 2024-01-12 PROBLEM — R55 ATYPICAL SYNCOPE: Status: ACTIVE | Noted: 2024-01-12

## 2024-01-12 PROBLEM — I95.1 SYNCOPE DUE TO ORTHOSTATIC HYPOTENSION: Status: ACTIVE | Noted: 2024-01-12

## 2024-01-12 PROBLEM — M79.604 PAIN OF RIGHT LOWER EXTREMITY: Status: ACTIVE | Noted: 2024-01-12

## 2024-01-12 LAB
ANION GAP SERPL CALC-SCNC: 5 MMOL/L (ref 5–15)
BASOPHILS # BLD: 0 K/UL (ref 0–0.1)
BASOPHILS NFR BLD: 0 % (ref 0–1)
BUN SERPL-MCNC: 43 MG/DL (ref 6–20)
BUN/CREAT SERPL: 32 (ref 12–20)
CALCIUM SERPL-MCNC: 8.5 MG/DL (ref 8.5–10.1)
CHLORIDE SERPL-SCNC: 114 MMOL/L (ref 97–108)
CO2 SERPL-SCNC: 18 MMOL/L (ref 21–32)
CREAT SERPL-MCNC: 1.34 MG/DL (ref 0.55–1.02)
DIFFERENTIAL METHOD BLD: ABNORMAL
ECHO AO ARCH DIAM: 2.4 CM
ECHO AO ASC DIAM: 3.8 CM
ECHO AO ASCENDING AORTA INDEX: 1.87 CM/M2
ECHO AR MAX VEL PISA: 3.2 M/S
ECHO AV AREA PEAK VELOCITY: 1.5 CM2
ECHO AV AREA VTI: 1.7 CM2
ECHO AV AREA/BSA PEAK VELOCITY: 0.7 CM2/M2
ECHO AV AREA/BSA VTI: 0.8 CM2/M2
ECHO AV MEAN GRADIENT: 11 MMHG
ECHO AV MEAN VELOCITY: 1.6 M/S
ECHO AV PEAK GRADIENT: 19 MMHG
ECHO AV PEAK VELOCITY: 2.2 M/S
ECHO AV REGURGITANT PHT: 512.7 MILLISECOND
ECHO AV VELOCITY RATIO: 0.36
ECHO AV VTI: 47.1 CM
ECHO BSA: 2.13 M2
ECHO BSA: 2.13 M2
ECHO LA DIAMETER INDEX: 2.22 CM/M2
ECHO LA DIAMETER: 4.5 CM
ECHO LA VOL A-L A2C: 45 ML (ref 22–52)
ECHO LA VOL A-L A4C: 42 ML (ref 22–52)
ECHO LA VOL BP: 41 ML (ref 22–52)
ECHO LA VOL MOD A2C: 40 ML (ref 22–52)
ECHO LA VOL MOD A4C: 36 ML (ref 22–52)
ECHO LA VOL/BSA BIPLANE: 20 ML/M2 (ref 16–34)
ECHO LA VOLUME AREA LENGTH: 46 ML
ECHO LA VOLUME INDEX A-L A2C: 22 ML/M2 (ref 16–34)
ECHO LA VOLUME INDEX A-L A4C: 21 ML/M2 (ref 16–34)
ECHO LA VOLUME INDEX AREA LENGTH: 23 ML/M2 (ref 16–34)
ECHO LA VOLUME INDEX MOD A2C: 20 ML/M2 (ref 16–34)
ECHO LA VOLUME INDEX MOD A4C: 18 ML/M2 (ref 16–34)
ECHO LV E' LATERAL VELOCITY: 7 CM/S
ECHO LV E' SEPTAL VELOCITY: 5 CM/S
ECHO LV FRACTIONAL SHORTENING: 39 % (ref 28–44)
ECHO LV INTERNAL DIMENSION DIASTOLE INDEX: 2.17 CM/M2
ECHO LV INTERNAL DIMENSION DIASTOLIC: 4.4 CM (ref 3.9–5.3)
ECHO LV INTERNAL DIMENSION SYSTOLIC INDEX: 1.33 CM/M2
ECHO LV INTERNAL DIMENSION SYSTOLIC: 2.7 CM
ECHO LV IVSD: 0.8 CM (ref 0.6–0.9)
ECHO LV MASS 2D: 109.4 G (ref 67–162)
ECHO LV MASS INDEX 2D: 53.9 G/M2 (ref 43–95)
ECHO LV POSTERIOR WALL DIASTOLIC: 0.8 CM (ref 0.6–0.9)
ECHO LV RELATIVE WALL THICKNESS RATIO: 0.36
ECHO LVOT AREA: 4.2 CM2
ECHO LVOT AV VTI INDEX: 0.4
ECHO LVOT DIAM: 2.3 CM
ECHO LVOT MEAN GRADIENT: 1 MMHG
ECHO LVOT PEAK GRADIENT: 3 MMHG
ECHO LVOT PEAK VELOCITY: 0.8 M/S
ECHO LVOT STROKE VOLUME INDEX: 38.5 ML/M2
ECHO LVOT SV: 78.1 ML
ECHO LVOT VTI: 18.8 CM
ECHO MV A VELOCITY: 0.55 M/S
ECHO MV AREA VTI: 3 CM2
ECHO MV E DECELERATION TIME (DT): 238.2 MS
ECHO MV E VELOCITY: 0.88 M/S
ECHO MV E/A RATIO: 1.6
ECHO MV E/E' LATERAL: 12.57
ECHO MV E/E' RATIO (AVERAGED): 15.09
ECHO MV LVOT VTI INDEX: 1.37
ECHO MV MAX VELOCITY: 1.4 M/S
ECHO MV MEAN GRADIENT: 3 MMHG
ECHO MV MEAN VELOCITY: 0.7 M/S
ECHO MV PEAK GRADIENT: 7 MMHG
ECHO MV REGURGITANT PEAK GRADIENT: 108 MMHG
ECHO MV REGURGITANT PEAK VELOCITY: 5.2 M/S
ECHO MV VTI: 25.8 CM
ECHO PULMONARY ARTERY END DIASTOLIC PRESSURE: 7 MMHG
ECHO PV MAX VELOCITY: 1.4 M/S
ECHO PV PEAK GRADIENT: 7 MMHG
ECHO PV REGURGITANT MAX VELOCITY: 1.3 M/S
ECHO RV FREE WALL PEAK S': 14 CM/S
ECHO RV INTERNAL DIMENSION: 3.6 CM
ECHO RV TAPSE: 1.7 CM (ref 1.7–?)
ECHO TV REGURGITANT MAX VELOCITY: 2.1 M/S
ECHO TV REGURGITANT PEAK GRADIENT: 18 MMHG
EKG ATRIAL RATE: 59 BPM
EKG DIAGNOSIS: NORMAL
EKG P AXIS: 71 DEGREES
EKG P-R INTERVAL: 204 MS
EKG Q-T INTERVAL: 472 MS
EKG QRS DURATION: 94 MS
EKG QTC CALCULATION (BAZETT): 467 MS
EKG R AXIS: 163 DEGREES
EKG T AXIS: 69 DEGREES
EKG VENTRICULAR RATE: 59 BPM
EOSINOPHIL # BLD: 0.2 K/UL (ref 0–0.4)
EOSINOPHIL NFR BLD: 2 % (ref 0–7)
ERYTHROCYTE [DISTWIDTH] IN BLOOD BY AUTOMATED COUNT: 14.8 % (ref 11.5–14.5)
GLUCOSE BLD STRIP.AUTO-MCNC: 164 MG/DL (ref 65–117)
GLUCOSE BLD STRIP.AUTO-MCNC: 195 MG/DL (ref 65–117)
GLUCOSE BLD STRIP.AUTO-MCNC: 214 MG/DL (ref 65–117)
GLUCOSE BLD STRIP.AUTO-MCNC: 227 MG/DL (ref 65–117)
GLUCOSE SERPL-MCNC: 161 MG/DL (ref 65–100)
HCT VFR BLD AUTO: 27.1 % (ref 35–47)
HCT VFR BLD AUTO: 28.1 % (ref 35–47)
HGB BLD-MCNC: 8.8 G/DL (ref 11.5–16)
HGB BLD-MCNC: 9.1 G/DL (ref 11.5–16)
IMM GRANULOCYTES # BLD AUTO: 0.1 K/UL (ref 0–0.04)
IMM GRANULOCYTES NFR BLD AUTO: 1 % (ref 0–0.5)
LYMPHOCYTES # BLD: 1.1 K/UL (ref 0.8–3.5)
LYMPHOCYTES NFR BLD: 12 % (ref 12–49)
MCH RBC QN AUTO: 33.2 PG (ref 26–34)
MCHC RBC AUTO-ENTMCNC: 33.6 G/DL (ref 30–36.5)
MCV RBC AUTO: 98.9 FL (ref 80–99)
MONOCYTES # BLD: 0.8 K/UL (ref 0–1)
MONOCYTES NFR BLD: 9 % (ref 5–13)
NEUTS SEG # BLD: 7.1 K/UL (ref 1.8–8)
NEUTS SEG NFR BLD: 76 % (ref 32–75)
NRBC # BLD: 0 K/UL (ref 0–0.01)
NRBC BLD-RTO: 0 PER 100 WBC
NT PRO BNP: 948 PG/ML
PLATELET # BLD AUTO: 239 K/UL (ref 150–400)
PMV BLD AUTO: 10.2 FL (ref 8.9–12.9)
POTASSIUM SERPL-SCNC: 4.7 MMOL/L (ref 3.5–5.1)
RBC # BLD AUTO: 2.74 M/UL (ref 3.8–5.2)
SERVICE CMNT-IMP: ABNORMAL
SODIUM SERPL-SCNC: 137 MMOL/L (ref 136–145)
WBC # BLD AUTO: 9.3 K/UL (ref 3.6–11)

## 2024-01-12 PROCEDURE — B2111ZZ FLUOROSCOPY OF MULTIPLE CORONARY ARTERIES USING LOW OSMOLAR CONTRAST: ICD-10-PCS | Performed by: INTERNAL MEDICINE

## 2024-01-12 PROCEDURE — 2060000000 HC ICU INTERMEDIATE R&B

## 2024-01-12 PROCEDURE — 94640 AIRWAY INHALATION TREATMENT: CPT

## 2024-01-12 PROCEDURE — 85018 HEMOGLOBIN: CPT

## 2024-01-12 PROCEDURE — 2580000003 HC RX 258

## 2024-01-12 PROCEDURE — 93306 TTE W/DOPPLER COMPLETE: CPT

## 2024-01-12 PROCEDURE — 80048 BASIC METABOLIC PNL TOTAL CA: CPT

## 2024-01-12 PROCEDURE — 6370000000 HC RX 637 (ALT 250 FOR IP): Performed by: INTERNAL MEDICINE

## 2024-01-12 PROCEDURE — 85025 COMPLETE CBC W/AUTO DIFF WBC: CPT

## 2024-01-12 PROCEDURE — 97161 PT EVAL LOW COMPLEX 20 MIN: CPT

## 2024-01-12 PROCEDURE — 36415 COLL VENOUS BLD VENIPUNCTURE: CPT

## 2024-01-12 PROCEDURE — 97165 OT EVAL LOW COMPLEX 30 MIN: CPT

## 2024-01-12 PROCEDURE — 6360000002 HC RX W HCPCS

## 2024-01-12 PROCEDURE — B2151ZZ FLUOROSCOPY OF LEFT HEART USING LOW OSMOLAR CONTRAST: ICD-10-PCS | Performed by: INTERNAL MEDICINE

## 2024-01-12 PROCEDURE — 93010 ELECTROCARDIOGRAM REPORT: CPT | Performed by: INTERNAL MEDICINE

## 2024-01-12 PROCEDURE — 83880 ASSAY OF NATRIURETIC PEPTIDE: CPT

## 2024-01-12 PROCEDURE — 93306 TTE W/DOPPLER COMPLETE: CPT | Performed by: INTERNAL MEDICINE

## 2024-01-12 PROCEDURE — 5A09557 ASSISTANCE WITH RESPIRATORY VENTILATION, GREATER THAN 96 CONSECUTIVE HOURS, CONTINUOUS POSITIVE AIRWAY PRESSURE: ICD-10-PCS | Performed by: FAMILY MEDICINE

## 2024-01-12 PROCEDURE — 74174 CTA ABD&PLVS W/CONTRAST: CPT

## 2024-01-12 PROCEDURE — 94660 CPAP INITIATION&MGMT: CPT

## 2024-01-12 PROCEDURE — 6370000000 HC RX 637 (ALT 250 FOR IP)

## 2024-01-12 PROCEDURE — 6360000004 HC RX CONTRAST MEDICATION: Performed by: STUDENT IN AN ORGANIZED HEALTH CARE EDUCATION/TRAINING PROGRAM

## 2024-01-12 PROCEDURE — APPSS30 APP SPLIT SHARED TIME 16-30 MINUTES: Performed by: NURSE PRACTITIONER

## 2024-01-12 PROCEDURE — 2580000003 HC RX 258: Performed by: INTERNAL MEDICINE

## 2024-01-12 PROCEDURE — 70450 CT HEAD/BRAIN W/O DYE: CPT

## 2024-01-12 PROCEDURE — 82962 GLUCOSE BLOOD TEST: CPT

## 2024-01-12 PROCEDURE — 97530 THERAPEUTIC ACTIVITIES: CPT

## 2024-01-12 PROCEDURE — 85014 HEMATOCRIT: CPT

## 2024-01-12 PROCEDURE — 94761 N-INVAS EAR/PLS OXIMETRY MLT: CPT

## 2024-01-12 PROCEDURE — 99222 1ST HOSP IP/OBS MODERATE 55: CPT | Performed by: FAMILY MEDICINE

## 2024-01-12 RX ORDER — SODIUM CHLORIDE 9 MG/ML
INJECTION, SOLUTION INTRAVENOUS PRN
Status: DISCONTINUED | OUTPATIENT
Start: 2024-01-12 | End: 2024-01-17 | Stop reason: HOSPADM

## 2024-01-12 RX ORDER — POTASSIUM CHLORIDE 7.45 MG/ML
10 INJECTION INTRAVENOUS PRN
Status: DISCONTINUED | OUTPATIENT
Start: 2024-01-12 | End: 2024-01-17 | Stop reason: HOSPADM

## 2024-01-12 RX ORDER — POLYETHYLENE GLYCOL 3350 17 G/17G
17 POWDER, FOR SOLUTION ORAL DAILY PRN
Status: DISCONTINUED | OUTPATIENT
Start: 2024-01-12 | End: 2024-01-17 | Stop reason: HOSPADM

## 2024-01-12 RX ORDER — ALBUTEROL SULFATE 2.5 MG/3ML
2.5 SOLUTION RESPIRATORY (INHALATION) EVERY 4 HOURS PRN
Status: DISCONTINUED | OUTPATIENT
Start: 2024-01-12 | End: 2024-01-17 | Stop reason: HOSPADM

## 2024-01-12 RX ORDER — INSULIN LISPRO 100 [IU]/ML
0-4 INJECTION, SOLUTION INTRAVENOUS; SUBCUTANEOUS
Status: DISCONTINUED | OUTPATIENT
Start: 2024-01-12 | End: 2024-01-17 | Stop reason: HOSPADM

## 2024-01-12 RX ORDER — POTASSIUM CHLORIDE 750 MG/1
40 TABLET, FILM COATED, EXTENDED RELEASE ORAL PRN
Status: DISCONTINUED | OUTPATIENT
Start: 2024-01-12 | End: 2024-01-17 | Stop reason: HOSPADM

## 2024-01-12 RX ORDER — FUROSEMIDE 20 MG/1
20 TABLET ORAL DAILY
Status: DISCONTINUED | OUTPATIENT
Start: 2024-01-12 | End: 2024-01-17 | Stop reason: HOSPADM

## 2024-01-12 RX ORDER — SPIRONOLACTONE 25 MG/1
25 TABLET ORAL DAILY
Status: DISCONTINUED | OUTPATIENT
Start: 2024-01-12 | End: 2024-01-17 | Stop reason: HOSPADM

## 2024-01-12 RX ORDER — SODIUM CHLORIDE 0.9 % (FLUSH) 0.9 %
5-40 SYRINGE (ML) INJECTION PRN
Status: DISCONTINUED | OUTPATIENT
Start: 2024-01-12 | End: 2024-01-17 | Stop reason: HOSPADM

## 2024-01-12 RX ORDER — CLONIDINE HYDROCHLORIDE 0.1 MG/1
0.1 TABLET ORAL 2 TIMES DAILY
Status: DISCONTINUED | OUTPATIENT
Start: 2024-01-12 | End: 2024-01-17 | Stop reason: HOSPADM

## 2024-01-12 RX ORDER — LISINOPRIL 5 MG/1
5 TABLET ORAL DAILY
Status: DISCONTINUED | OUTPATIENT
Start: 2024-01-12 | End: 2024-01-15

## 2024-01-12 RX ORDER — INSULIN LISPRO 100 [IU]/ML
0-4 INJECTION, SOLUTION INTRAVENOUS; SUBCUTANEOUS NIGHTLY
Status: DISCONTINUED | OUTPATIENT
Start: 2024-01-12 | End: 2024-01-17 | Stop reason: HOSPADM

## 2024-01-12 RX ORDER — MAGNESIUM SULFATE IN WATER 40 MG/ML
2000 INJECTION, SOLUTION INTRAVENOUS PRN
Status: DISCONTINUED | OUTPATIENT
Start: 2024-01-12 | End: 2024-01-17 | Stop reason: HOSPADM

## 2024-01-12 RX ORDER — SODIUM CHLORIDE 0.9 % (FLUSH) 0.9 %
5-40 SYRINGE (ML) INJECTION EVERY 12 HOURS SCHEDULED
Status: DISCONTINUED | OUTPATIENT
Start: 2024-01-12 | End: 2024-01-17 | Stop reason: HOSPADM

## 2024-01-12 RX ORDER — INSULIN GLARGINE 100 [IU]/ML
20 INJECTION, SOLUTION SUBCUTANEOUS NIGHTLY
Status: DISCONTINUED | OUTPATIENT
Start: 2024-01-12 | End: 2024-01-17 | Stop reason: HOSPADM

## 2024-01-12 RX ORDER — ATORVASTATIN CALCIUM 20 MG/1
40 TABLET, FILM COATED ORAL DAILY
Status: DISCONTINUED | OUTPATIENT
Start: 2024-01-12 | End: 2024-01-17 | Stop reason: HOSPADM

## 2024-01-12 RX ORDER — ALBUTEROL SULFATE 90 UG/1
2 AEROSOL, METERED RESPIRATORY (INHALATION) EVERY 4 HOURS PRN
Status: DISCONTINUED | OUTPATIENT
Start: 2024-01-12 | End: 2024-01-12 | Stop reason: CLARIF

## 2024-01-12 RX ORDER — DEXTROSE MONOHYDRATE 100 MG/ML
INJECTION, SOLUTION INTRAVENOUS CONTINUOUS PRN
Status: DISCONTINUED | OUTPATIENT
Start: 2024-01-12 | End: 2024-01-17 | Stop reason: HOSPADM

## 2024-01-12 RX ORDER — BUDESONIDE AND FORMOTEROL FUMARATE DIHYDRATE 160; 4.5 UG/1; UG/1
2 AEROSOL RESPIRATORY (INHALATION)
Status: DISCONTINUED | OUTPATIENT
Start: 2024-01-12 | End: 2024-01-12 | Stop reason: CLARIF

## 2024-01-12 RX ADMIN — ARFORMOTEROL TARTRATE: 15 SOLUTION RESPIRATORY (INHALATION) at 20:09

## 2024-01-12 RX ADMIN — INSULIN LISPRO 0 UNITS: 100 INJECTION, SOLUTION INTRAVENOUS; SUBCUTANEOUS at 12:52

## 2024-01-12 RX ADMIN — INSULIN GLARGINE 20 UNITS: 100 INJECTION, SOLUTION SUBCUTANEOUS at 21:49

## 2024-01-12 RX ADMIN — CLOPIDOGREL BISULFATE 75 MG: 75 TABLET ORAL at 10:00

## 2024-01-12 RX ADMIN — ATORVASTATIN CALCIUM 40 MG: 20 TABLET, FILM COATED ORAL at 10:00

## 2024-01-12 RX ADMIN — ARFORMOTEROL TARTRATE: 15 SOLUTION RESPIRATORY (INHALATION) at 13:01

## 2024-01-12 RX ADMIN — SODIUM CHLORIDE, PRESERVATIVE FREE 10 ML: 5 INJECTION INTRAVENOUS at 10:00

## 2024-01-12 RX ADMIN — IOPAMIDOL 75 ML: 755 INJECTION, SOLUTION INTRAVENOUS at 13:55

## 2024-01-12 RX ADMIN — SODIUM CHLORIDE: 9 INJECTION, SOLUTION INTRAVENOUS at 17:31

## 2024-01-12 NOTE — CARDIO/PULMONARY
[facility] Cardiac Rehab- Referral Note  Chart review completed.  Results of PCI noted.  PCI patient may meet criteria for outpatient cardiac rehab.  Pt lives in [place], a referral for participation at [facility] Cardiac Rehab Referral is initiated.   Met with pt in room 332.  Daughter present.  Educational handouts reviewed and provided on: PCI procedure, coronary artery disease, coronary artery risk factors, heart healthy eating, and community resources.    The format and benefits of outpatient cardiac rehab was communicated. Reference information on [facility] Cardiac Rehab Program also provided.    Pt is being evaluated by PT/OT.  Pt may require HH or outpatient PT.  Communication will be sent to [facility] cardiac rehab to regarding transfer of referral and to follow the patient.   MASON Lopez RN

## 2024-01-12 NOTE — CONSULTS
[facility] CARDIOLOGY                    Cardiology Care Note     [x]Initial Encounter     []Follow-up    Patient Name: Adrienne To - :1940 - MRN:[id number]  Primary Cardiologist: Cj Lozada MD  Consulting Cardiologist: Ben Couch MD     Reason for encounter: Syncope, hypotension post cath     HPI:       Adrienne To is a 83 y.o. female with PMH significant for CAD, a-fib on Eliquis, HFpEF, HTN, HLD, CKD, and SUZAN on CPAP.     Pt initially presented for outpt cath. While in recover, she was getting up to use the bathroom when she lost consciousness and become hypotensive. Code blue was called despite pt maintaining a pulse and respirations. She regained consciousness shortly after and was alert and oriented. She feels well this morning. Reports she did have some R groin pain overnight that has since resolved. Denies any CP, SOB.     Subjective:      Adrienne To reports none.     Assessment and Plan     Syncope, hypotension: likely vagal episode, currently hemodynamically stable. Check orthostatics today, resume BP meds following if pressure ok     2. CAD: sig RCA disease, plans for repeat PCI with atherectomy in about 4 weeks. Pt had some discomfort to R groin site yesterday, now resolved - site a little firm, but no obvious signs of hematoma although Hgb 9.1 (could also be dilutional), non tender to deep palpation, will discuss if further testing needed     3. Hx of PAF: on Eliquis     4. Chronic HFpEF: recent EF 60-65%    5. HTN: BP stable, can resume home meds on d/c     6. CKD stage III    **Update: pt undergoing orthostatic BP check, when sitting on side of the bed she became lightheaded, pale, clammy and diaphoretic, felt like she might pass out again. Tried twice. No huge drops in BP during this episode (see OT note), will need to remain IP for further workup        ____________________________________________________________    Cardiac testing  23    ECHO (TTE)

## 2024-01-12 NOTE — DISCHARGE SUMMARY
CTA ABDOMEN PELVIS W WO CONTRAST    INDICATION: Eval for bleeding post cardiac cath (per Dr. Couch)    COMPARISON: None.    IV CONTRAST: 75 mL of Isovue-370.    ORAL CONTRAST: None    TECHNIQUE:  Multislice helical CT was performed from the diaphragm to the iliac crest prior  to intravenous contrast administration and from the diaphragm to the symphysis  pubis following intravenous contrast administration. 3-D postprocessing was  performed. Contiguous 5 mm axial images were reconstructed and lung and soft  tissue windows were generated. Coronal and sagittal reformations were generated.  CT dose reduction was achieved through use of a standardized protocol tailored  for this examination and automatic exposure control for dose modulation.    FINDINGS:  LOWER THORAX: Cardiomegaly. Coronary artery calcifications. Aortic valve plane  calcifications. Mitral annulus calcifications.  LIVER: 17 mm nonenhancing right lobe hypodensity (6-32), could reflect a mildly  complex cyst..  BILIARY TREE: Cholecystectomy. CBD is not dilated.  SPLEEN: within normal limits.  PANCREAS: No mass or ductal dilatation.  ADRENALS: Unremarkable.  KIDNEYS: No mass, calculus, or hydronephrosis. Several bilateral cysts; no  dedicated follow-up recommended.  STOMACH: Unremarkable.  SMALL BOWEL: No dilatation or wall thickening.  COLON: No dilatation or wall thickening. Diverticulosis  APPENDIX: Normal.  PERITONEUM: No ascites or pneumoperitoneum.  RETROPERITONEUM: No lymphadenopathy. Atherosclerosis without aneurysm.  REPRODUCTIVE ORGANS: The uterus and adnexa are within normal limits.  URINARY BLADDER: No mass or calculus.  BONES: No destructive bone lesion. Degenerative changes.  ABDOMINAL WALL: No mass or hernia.  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

## 2024-01-12 NOTE — CONSULTS
Patient s/p cardiac cath and right groin hematoma and pseudoaneurysm. CTA shows probably a bilobar pseudoaneurysm, not completely included in the field of view, with large groin/medial thigh hematoma.     No emergent intervention at this time. Recommend pressure on the groin. Please obtain duplex ultrasound to further assess. Would recommend reaching out to vascular surgery, particularly if there is acute change.     Thank you for the opportunity to care for this patient.     Boubacar Infante M.D.  Interventional Radiology  [facility]

## 2024-01-12 NOTE — H&P
Ablation A-fib w complete ep study performed by Marcelina Jaimes MD at [facility] CARDIAC CATH LAB    EP DEVICE PROCEDURE N/A 9/19/2023    Drug stimulation performed by An RADHA Jaimes MD at [facility] CARDIAC CATH LAB    EP DEVICE PROCEDURE N/A 9/19/2023    Ep 3d mapping performed by An RADHA Jaimes MD at [facility] CARDIAC CATH LAB    EP DEVICE PROCEDURE N/A 9/19/2023    Ablation following A-fib addl performed by Marcelina Jaimes MD at [facility] CARDIAC CATH LAB    INVASIVE VASCULAR N/A 9/19/2023    Ultrasound guided vascular access performed by An RADHA Jaimes MD at [facility] CARDIAC CATH LAB       Family History  Family History   Problem Relation Age of Onset    Heart Attack Sister     Heart Attack Mother      Social History  Alcohol history: Not at all  Smoking history: Former smoker, quit in the 1970's  Illicit drug history: Not at all  Living arrangement: patient lives alone.  Ambulates: Assisted walker    Vital Signs  Vitals:    01/11/24 2148   BP: 126/87   Pulse: 63   Resp: 18   Temp: 98.1 °F (36.7 °C)   SpO2: 100%     Physical Exam  General: No acute distress. Alert. Cooperative.   Head: Normocephalic. Atraumatic.   Eyes:              Conjunctiva pink. Sclera white. PERRL.   Ears:              Hearing grossly intact.   Nose:             Septum midline. Mucosa pink. No drainage.   Respiratory: CTAB. No w/r/r/c.   Cardiovascular: Systolic murmur. Normal S1,S2. Pulses 2+ throughout.   GI: + bowel sounds. Nontender. No rebound tenderness or guarding. Nondistended.   Extremities: No LE edema. Distal pulses intact.   Musculoskeletal: Full ROM in all extremities.    Skin: Warm, dry. No rashes.    Neuro: CN II-XII grossly intact. Alert and oriented x4. Communicating well. No confusion. At baseline       Laboratory Data  Recent Results (from the past 8 hour(s))   POCT activated clotting time    Collection Time: 01/11/24  4:13 PM   Result Value Ref Range    Activated Clotting Time 212 (H) 79 - 138 SECS   POCT activated clotting time    Collection Time: 01/11/24  5:06 PM   Result

## 2024-01-12 NOTE — CARE COORDINATION
01/12/24 1101   Service Assessment   Patient Orientation Alert and Oriented;Person;Place;Self;Situation   Cognition Alert   History Provided By Patient   Primary Caregiver Self   Support Systems Children;Family Members   Patient's Healthcare Decision Maker is:   (has ACP at home not on file)   PCP Verified by CM Yes  (BUBBA Munoz)   Last Visit to PCP Within last 3 months   Prior Functional Level Independent in ADLs/IADLs   Current Functional Level Independent in ADLs/IADLs   Can patient return to prior living arrangement Yes   Family able to assist with home care needs: Yes   Would you like for me to discuss the discharge plan with any other family members/significant others, and if so, who? No   Financial Resources Medicaid  ( for Life supplement)   Social/Functional History   Lives With Alone   Type of Home Apartment   Home Layout One level   Home Access Level entry   Bathroom Shower/Tub Tub/Shower unit   Bathroom Toilet Standard   Bathroom Equipment Grab bars in shower;Shower chair;Toilet raiser   Home Equipment Cane;Rollator  (c-pap)   Active  No   Patient's  Info daughter   Discharge Planning   Type of Residence Apartment   Patient expects to be discharged to: Apartment   Services At/After Discharge   Confirm Follow Up Transport Family     Pharmacy: [facility] on [address] and by mail from [facility]  Pt uses a cane or rollator with ambulation and as needed  Family to transport her home at d/c today  No CM needs identified  Pt to follow up OP

## 2024-01-12 NOTE — CONSENT
Informed Consent for Blood Component Transfusion Note    I have discussed with the patient the rationale for blood component transfusion; its benefits in treating or preventing fatigue, organ damage, or death; and its risk which includes mild transfusion reactions, rare risk of blood borne infection, or more serious but rare reactions. I have discussed the alternatives to transfusion, including the risk and consequences of not receiving transfusion. The patient had an opportunity to ask questions and had agreed to proceed with transfusion of blood components.    Electronically signed by Lorena Rodrigeus MD on 1/12/24 at 6:34 PM EST

## 2024-01-13 ENCOUNTER — APPOINTMENT (OUTPATIENT)
Dept: VASCULAR SURGERY | Facility: HOSPITAL | Age: 84
DRG: 982 | End: 2024-01-13
Attending: STUDENT IN AN ORGANIZED HEALTH CARE EDUCATION/TRAINING PROGRAM
Payer: MEDICARE

## 2024-01-13 PROBLEM — S30.1XXA HEMATOMA OF GROIN: Status: ACTIVE | Noted: 2024-01-13

## 2024-01-13 LAB
ABO + RH BLD: NORMAL
ANION GAP SERPL CALC-SCNC: 6 MMOL/L (ref 5–15)
BASOPHILS # BLD: 0.1 K/UL (ref 0–0.1)
BASOPHILS NFR BLD: 1 % (ref 0–1)
BLOOD GROUP ANTIBODIES SERPL: NORMAL
BUN SERPL-MCNC: 46 MG/DL (ref 6–20)
BUN/CREAT SERPL: 27 (ref 12–20)
CALCIUM SERPL-MCNC: 8.5 MG/DL (ref 8.5–10.1)
CHLORIDE SERPL-SCNC: 112 MMOL/L (ref 97–108)
CO2 SERPL-SCNC: 18 MMOL/L (ref 21–32)
CREAT SERPL-MCNC: 1.68 MG/DL (ref 0.55–1.02)
DIFFERENTIAL METHOD BLD: ABNORMAL
EOSINOPHIL # BLD: 0.2 K/UL (ref 0–0.4)
EOSINOPHIL NFR BLD: 2 % (ref 0–7)
ERYTHROCYTE [DISTWIDTH] IN BLOOD BY AUTOMATED COUNT: 14.8 % (ref 11.5–14.5)
GLUCOSE BLD STRIP.AUTO-MCNC: 164 MG/DL (ref 65–117)
GLUCOSE BLD STRIP.AUTO-MCNC: 168 MG/DL (ref 65–117)
GLUCOSE BLD STRIP.AUTO-MCNC: 183 MG/DL (ref 65–117)
GLUCOSE BLD STRIP.AUTO-MCNC: 199 MG/DL (ref 65–117)
GLUCOSE SERPL-MCNC: 169 MG/DL (ref 65–100)
HCT VFR BLD AUTO: 22.8 % (ref 35–47)
HCT VFR BLD AUTO: 26.7 % (ref 35–47)
HGB BLD-MCNC: 7.3 G/DL (ref 11.5–16)
HGB BLD-MCNC: 8.5 G/DL (ref 11.5–16)
IMM GRANULOCYTES # BLD AUTO: 0.1 K/UL (ref 0–0.04)
IMM GRANULOCYTES NFR BLD AUTO: 1 % (ref 0–0.5)
LYMPHOCYTES # BLD: 1.4 K/UL (ref 0.8–3.5)
LYMPHOCYTES NFR BLD: 13 % (ref 12–49)
MCH RBC QN AUTO: 32.7 PG (ref 26–34)
MCHC RBC AUTO-ENTMCNC: 31.8 G/DL (ref 30–36.5)
MCV RBC AUTO: 102.7 FL (ref 80–99)
MONOCYTES # BLD: 1.2 K/UL (ref 0–1)
MONOCYTES NFR BLD: 11 % (ref 5–13)
NEUTS SEG # BLD: 8.1 K/UL (ref 1.8–8)
NEUTS SEG NFR BLD: 72 % (ref 32–75)
NRBC # BLD: 0 K/UL (ref 0–0.01)
NRBC BLD-RTO: 0 PER 100 WBC
PLATELET # BLD AUTO: 233 K/UL (ref 150–400)
PMV BLD AUTO: 10.2 FL (ref 8.9–12.9)
POTASSIUM SERPL-SCNC: 4.1 MMOL/L (ref 3.5–5.1)
RBC # BLD AUTO: 2.6 M/UL (ref 3.8–5.2)
SERVICE CMNT-IMP: ABNORMAL
SODIUM SERPL-SCNC: 136 MMOL/L (ref 136–145)
SPECIMEN EXP DATE BLD: NORMAL
WBC # BLD AUTO: 10.9 K/UL (ref 3.6–11)

## 2024-01-13 PROCEDURE — 6370000000 HC RX 637 (ALT 250 FOR IP)

## 2024-01-13 PROCEDURE — 80048 BASIC METABOLIC PNL TOTAL CA: CPT

## 2024-01-13 PROCEDURE — 2580000003 HC RX 258

## 2024-01-13 PROCEDURE — 6360000002 HC RX W HCPCS

## 2024-01-13 PROCEDURE — 99223 1ST HOSP IP/OBS HIGH 75: CPT | Performed by: SPECIALIST

## 2024-01-13 PROCEDURE — 86901 BLOOD TYPING SEROLOGIC RH(D): CPT

## 2024-01-13 PROCEDURE — 85018 HEMOGLOBIN: CPT

## 2024-01-13 PROCEDURE — 51701 INSERT BLADDER CATHETER: CPT

## 2024-01-13 PROCEDURE — 82962 GLUCOSE BLOOD TEST: CPT

## 2024-01-13 PROCEDURE — 2580000003 HC RX 258: Performed by: INTERNAL MEDICINE

## 2024-01-13 PROCEDURE — 94640 AIRWAY INHALATION TREATMENT: CPT

## 2024-01-13 PROCEDURE — 36415 COLL VENOUS BLD VENIPUNCTURE: CPT

## 2024-01-13 PROCEDURE — 93926 LOWER EXTREMITY STUDY: CPT

## 2024-01-13 PROCEDURE — 51798 US URINE CAPACITY MEASURE: CPT

## 2024-01-13 PROCEDURE — 85014 HEMATOCRIT: CPT

## 2024-01-13 PROCEDURE — 86900 BLOOD TYPING SEROLOGIC ABO: CPT

## 2024-01-13 PROCEDURE — 94761 N-INVAS EAR/PLS OXIMETRY MLT: CPT

## 2024-01-13 PROCEDURE — 85025 COMPLETE CBC W/AUTO DIFF WBC: CPT

## 2024-01-13 PROCEDURE — 86850 RBC ANTIBODY SCREEN: CPT

## 2024-01-13 PROCEDURE — 99232 SBSQ HOSP IP/OBS MODERATE 35: CPT | Performed by: FAMILY MEDICINE

## 2024-01-13 PROCEDURE — 2060000000 HC ICU INTERMEDIATE R&B

## 2024-01-13 PROCEDURE — 94660 CPAP INITIATION&MGMT: CPT

## 2024-01-13 RX ADMIN — SODIUM CHLORIDE, PRESERVATIVE FREE 10 ML: 5 INJECTION INTRAVENOUS at 20:23

## 2024-01-13 RX ADMIN — ARFORMOTEROL TARTRATE: 15 SOLUTION RESPIRATORY (INHALATION) at 21:09

## 2024-01-13 RX ADMIN — INSULIN GLARGINE 20 UNITS: 100 INJECTION, SOLUTION SUBCUTANEOUS at 20:36

## 2024-01-13 RX ADMIN — ARFORMOTEROL TARTRATE: 15 SOLUTION RESPIRATORY (INHALATION) at 08:05

## 2024-01-13 RX ADMIN — SODIUM CHLORIDE: 9 INJECTION, SOLUTION INTRAVENOUS at 06:25

## 2024-01-13 RX ADMIN — SODIUM CHLORIDE, PRESERVATIVE FREE 10 ML: 5 INJECTION INTRAVENOUS at 09:46

## 2024-01-13 RX ADMIN — ATORVASTATIN CALCIUM 40 MG: 20 TABLET, FILM COATED ORAL at 10:57

## 2024-01-14 PROBLEM — I72.4 PSEUDOANEURYSM OF FEMORAL ARTERY (HCC): Status: ACTIVE | Noted: 2024-01-14

## 2024-01-14 LAB
ANION GAP SERPL CALC-SCNC: 5 MMOL/L (ref 5–15)
BASOPHILS # BLD: 0 K/UL (ref 0–0.1)
BASOPHILS NFR BLD: 0 % (ref 0–1)
BUN SERPL-MCNC: 39 MG/DL (ref 6–20)
BUN/CREAT SERPL: 27 (ref 12–20)
CALCIUM SERPL-MCNC: 8.4 MG/DL (ref 8.5–10.1)
CHLORIDE SERPL-SCNC: 115 MMOL/L (ref 97–108)
CO2 SERPL-SCNC: 17 MMOL/L (ref 21–32)
CREAT SERPL-MCNC: 1.43 MG/DL (ref 0.55–1.02)
DIFFERENTIAL METHOD BLD: ABNORMAL
ECHO BSA: 2.13 M2
EOSINOPHIL # BLD: 0.3 K/UL (ref 0–0.4)
EOSINOPHIL NFR BLD: 2 % (ref 0–7)
ERYTHROCYTE [DISTWIDTH] IN BLOOD BY AUTOMATED COUNT: 14.9 % (ref 11.5–14.5)
GLUCOSE BLD STRIP.AUTO-MCNC: 132 MG/DL (ref 65–117)
GLUCOSE BLD STRIP.AUTO-MCNC: 173 MG/DL (ref 65–117)
GLUCOSE BLD STRIP.AUTO-MCNC: 201 MG/DL (ref 65–117)
GLUCOSE BLD STRIP.AUTO-MCNC: 246 MG/DL (ref 65–117)
GLUCOSE SERPL-MCNC: 140 MG/DL (ref 65–100)
HCT VFR BLD AUTO: 23 % (ref 35–47)
HCT VFR BLD AUTO: 23.5 % (ref 35–47)
HGB BLD-MCNC: 7.2 G/DL (ref 11.5–16)
HGB BLD-MCNC: 7.6 G/DL (ref 11.5–16)
IMM GRANULOCYTES # BLD AUTO: 0.1 K/UL (ref 0–0.04)
IMM GRANULOCYTES NFR BLD AUTO: 1 % (ref 0–0.5)
LYMPHOCYTES # BLD: 1.3 K/UL (ref 0.8–3.5)
LYMPHOCYTES NFR BLD: 11 % (ref 12–49)
MCH RBC QN AUTO: 32.9 PG (ref 26–34)
MCHC RBC AUTO-ENTMCNC: 31.3 G/DL (ref 30–36.5)
MCV RBC AUTO: 105 FL (ref 80–99)
MONOCYTES # BLD: 1.2 K/UL (ref 0–1)
MONOCYTES NFR BLD: 10 % (ref 5–13)
NEUTS SEG # BLD: 8.7 K/UL (ref 1.8–8)
NEUTS SEG NFR BLD: 76 % (ref 32–75)
NRBC # BLD: 0 K/UL (ref 0–0.01)
NRBC BLD-RTO: 0 PER 100 WBC
PLATELET # BLD AUTO: 219 K/UL (ref 150–400)
PMV BLD AUTO: 10.2 FL (ref 8.9–12.9)
POTASSIUM SERPL-SCNC: 4.2 MMOL/L (ref 3.5–5.1)
RBC # BLD AUTO: 2.19 M/UL (ref 3.8–5.2)
SERVICE CMNT-IMP: ABNORMAL
SODIUM SERPL-SCNC: 137 MMOL/L (ref 136–145)
VAS RIGHT ATA DIST PSV: 92.7 CM/S
VAS RIGHT POP A DIST PSV: 87 CM/S
VAS RIGHT POP A PROX PSV: 89.6 CM/S
VAS RIGHT POP A PROX VEL RATIO: 0.81
VAS RIGHT PSEUDOANEURYSM AP DIAM: 1.6 CM
VAS RIGHT PSEUDOANEURYSM LONG DIAM: 3.6 CM
VAS RIGHT PSEUDOANEURYSM TR DIAM: 1.7 CM
VAS RIGHT PSEUDOANEURYSM WIDTH: 0.43 CM
VAS RIGHT PTA DIST PSV: 25.5 CM/S
VAS RIGHT SFA DIST PSV: 110.4 CM/S
VAS RIGHT SFA DIST VEL RATIO: 1.24
VAS RIGHT SFA MID PSV: 89.1 CM/S
VAS RIGHT SFA MID VEL RATIO: 0.9
VAS RIGHT SFA PROX PSV: 98 CM/S
WBC # BLD AUTO: 11.6 K/UL (ref 3.6–11)

## 2024-01-14 PROCEDURE — 99232 SBSQ HOSP IP/OBS MODERATE 35: CPT | Performed by: SPECIALIST

## 2024-01-14 PROCEDURE — 85014 HEMATOCRIT: CPT

## 2024-01-14 PROCEDURE — 94761 N-INVAS EAR/PLS OXIMETRY MLT: CPT

## 2024-01-14 PROCEDURE — 94640 AIRWAY INHALATION TREATMENT: CPT

## 2024-01-14 PROCEDURE — 80048 BASIC METABOLIC PNL TOTAL CA: CPT

## 2024-01-14 PROCEDURE — 2060000000 HC ICU INTERMEDIATE R&B

## 2024-01-14 PROCEDURE — 2580000003 HC RX 258

## 2024-01-14 PROCEDURE — 85018 HEMOGLOBIN: CPT

## 2024-01-14 PROCEDURE — 51702 INSERT TEMP BLADDER CATH: CPT

## 2024-01-14 PROCEDURE — 85025 COMPLETE CBC W/AUTO DIFF WBC: CPT

## 2024-01-14 PROCEDURE — 6370000000 HC RX 637 (ALT 250 FOR IP): Performed by: SPECIALIST

## 2024-01-14 PROCEDURE — 6370000000 HC RX 637 (ALT 250 FOR IP)

## 2024-01-14 PROCEDURE — 36415 COLL VENOUS BLD VENIPUNCTURE: CPT

## 2024-01-14 PROCEDURE — 6360000002 HC RX W HCPCS: Performed by: SPECIALIST

## 2024-01-14 PROCEDURE — 6360000002 HC RX W HCPCS

## 2024-01-14 PROCEDURE — 82962 GLUCOSE BLOOD TEST: CPT

## 2024-01-14 PROCEDURE — 99232 SBSQ HOSP IP/OBS MODERATE 35: CPT | Performed by: FAMILY MEDICINE

## 2024-01-14 RX ORDER — LISINOPRIL 5 MG/1
2.5 TABLET ORAL DAILY
Status: DISCONTINUED | OUTPATIENT
Start: 2024-01-14 | End: 2024-01-17 | Stop reason: HOSPADM

## 2024-01-14 RX ORDER — FUROSEMIDE 10 MG/ML
40 INJECTION INTRAMUSCULAR; INTRAVENOUS ONCE
Status: COMPLETED | OUTPATIENT
Start: 2024-01-14 | End: 2024-01-14

## 2024-01-14 RX ADMIN — LISINOPRIL 2.5 MG: 5 TABLET ORAL at 12:34

## 2024-01-14 RX ADMIN — FUROSEMIDE 40 MG: 10 INJECTION, SOLUTION INTRAMUSCULAR; INTRAVENOUS at 12:33

## 2024-01-14 RX ADMIN — INSULIN LISPRO 1 UNITS: 100 INJECTION, SOLUTION INTRAVENOUS; SUBCUTANEOUS at 16:53

## 2024-01-14 RX ADMIN — SODIUM CHLORIDE, PRESERVATIVE FREE 10 ML: 5 INJECTION INTRAVENOUS at 09:03

## 2024-01-14 RX ADMIN — ARFORMOTEROL TARTRATE: 15 SOLUTION RESPIRATORY (INHALATION) at 20:18

## 2024-01-14 RX ADMIN — ARFORMOTEROL TARTRATE: 15 SOLUTION RESPIRATORY (INHALATION) at 07:50

## 2024-01-14 RX ADMIN — ATORVASTATIN CALCIUM 40 MG: 20 TABLET, FILM COATED ORAL at 09:02

## 2024-01-14 NOTE — CONSULTS
Patient with right groin pseudoaneurysm, will plan thrombin injection Monday.       Boubacar Infante M.D.  Interventional Radiology  [facility]

## 2024-01-15 ENCOUNTER — APPOINTMENT (OUTPATIENT)
Facility: HOSPITAL | Age: 84
DRG: 982 | End: 2024-01-15
Attending: STUDENT IN AN ORGANIZED HEALTH CARE EDUCATION/TRAINING PROGRAM
Payer: MEDICARE

## 2024-01-15 PROBLEM — D62 ACUTE BLOOD LOSS ANEMIA: Status: ACTIVE | Noted: 2024-01-15

## 2024-01-15 PROBLEM — D64.9 ANEMIA: Status: ACTIVE | Noted: 2024-01-15

## 2024-01-15 LAB
ANION GAP SERPL CALC-SCNC: 3 MMOL/L (ref 5–15)
BASOPHILS # BLD: 0.1 K/UL (ref 0–0.1)
BASOPHILS NFR BLD: 1 % (ref 0–1)
BUN SERPL-MCNC: 43 MG/DL (ref 6–20)
BUN/CREAT SERPL: 25 (ref 12–20)
CALCIUM SERPL-MCNC: 8.6 MG/DL (ref 8.5–10.1)
CHLORIDE SERPL-SCNC: 114 MMOL/L (ref 97–108)
CO2 SERPL-SCNC: 19 MMOL/L (ref 21–32)
CREAT SERPL-MCNC: 1.69 MG/DL (ref 0.55–1.02)
DIFFERENTIAL METHOD BLD: ABNORMAL
EOSINOPHIL # BLD: 0.4 K/UL (ref 0–0.4)
EOSINOPHIL NFR BLD: 3 % (ref 0–7)
ERYTHROCYTE [DISTWIDTH] IN BLOOD BY AUTOMATED COUNT: 15.1 % (ref 11.5–14.5)
GLUCOSE BLD STRIP.AUTO-MCNC: 141 MG/DL (ref 65–117)
GLUCOSE BLD STRIP.AUTO-MCNC: 154 MG/DL (ref 65–117)
GLUCOSE BLD STRIP.AUTO-MCNC: 164 MG/DL (ref 65–117)
GLUCOSE BLD STRIP.AUTO-MCNC: 263 MG/DL (ref 65–117)
GLUCOSE SERPL-MCNC: 150 MG/DL (ref 65–100)
HCT VFR BLD AUTO: 23.3 % (ref 35–47)
HGB BLD-MCNC: 7.5 G/DL (ref 11.5–16)
IMM GRANULOCYTES # BLD AUTO: 0.1 K/UL (ref 0–0.04)
IMM GRANULOCYTES NFR BLD AUTO: 1 % (ref 0–0.5)
LYMPHOCYTES # BLD: 1.4 K/UL (ref 0.8–3.5)
LYMPHOCYTES NFR BLD: 14 % (ref 12–49)
MCH RBC QN AUTO: 33.2 PG (ref 26–34)
MCHC RBC AUTO-ENTMCNC: 32.2 G/DL (ref 30–36.5)
MCV RBC AUTO: 103.1 FL (ref 80–99)
MONOCYTES # BLD: 1.1 K/UL (ref 0–1)
MONOCYTES NFR BLD: 11 % (ref 5–13)
NEUTS SEG # BLD: 7.2 K/UL (ref 1.8–8)
NEUTS SEG NFR BLD: 70 % (ref 32–75)
NRBC # BLD: 0 K/UL (ref 0–0.01)
NRBC BLD-RTO: 0 PER 100 WBC
PLATELET # BLD AUTO: 246 K/UL (ref 150–400)
PMV BLD AUTO: 10.1 FL (ref 8.9–12.9)
POTASSIUM SERPL-SCNC: 4.2 MMOL/L (ref 3.5–5.1)
RBC # BLD AUTO: 2.26 M/UL (ref 3.8–5.2)
SERVICE CMNT-IMP: ABNORMAL
SODIUM SERPL-SCNC: 136 MMOL/L (ref 136–145)
WBC # BLD AUTO: 10.2 K/UL (ref 3.6–11)

## 2024-01-15 PROCEDURE — APPSS30 APP SPLIT SHARED TIME 16-30 MINUTES: Performed by: NURSE PRACTITIONER

## 2024-01-15 PROCEDURE — 2700000000 HC OXYGEN THERAPY PER DAY

## 2024-01-15 PROCEDURE — 94761 N-INVAS EAR/PLS OXIMETRY MLT: CPT

## 2024-01-15 PROCEDURE — 2709999900 HC NON-CHARGEABLE SUPPLY

## 2024-01-15 PROCEDURE — 36002 PSEUDOANEURYSM INJECTION TRT: CPT | Performed by: STUDENT IN AN ORGANIZED HEALTH CARE EDUCATION/TRAINING PROGRAM

## 2024-01-15 PROCEDURE — 6360000002 HC RX W HCPCS

## 2024-01-15 PROCEDURE — 2500000003 HC RX 250 WO HCPCS: Performed by: STUDENT IN AN ORGANIZED HEALTH CARE EDUCATION/TRAINING PROGRAM

## 2024-01-15 PROCEDURE — 6370000000 HC RX 637 (ALT 250 FOR IP)

## 2024-01-15 PROCEDURE — 3E053GC INTRODUCTION OF OTHER THERAPEUTIC SUBSTANCE INTO PERIPHERAL ARTERY, PERCUTANEOUS APPROACH: ICD-10-PCS | Performed by: FAMILY MEDICINE

## 2024-01-15 PROCEDURE — 85025 COMPLETE CBC W/AUTO DIFF WBC: CPT

## 2024-01-15 PROCEDURE — 2500000003 HC RX 250 WO HCPCS: Performed by: ANESTHESIOLOGY

## 2024-01-15 PROCEDURE — 76937 US GUIDE VASCULAR ACCESS: CPT

## 2024-01-15 PROCEDURE — 94640 AIRWAY INHALATION TREATMENT: CPT

## 2024-01-15 PROCEDURE — 36415 COLL VENOUS BLD VENIPUNCTURE: CPT

## 2024-01-15 PROCEDURE — 80048 BASIC METABOLIC PNL TOTAL CA: CPT

## 2024-01-15 PROCEDURE — 2060000000 HC ICU INTERMEDIATE R&B

## 2024-01-15 PROCEDURE — 82962 GLUCOSE BLOOD TEST: CPT

## 2024-01-15 PROCEDURE — 99232 SBSQ HOSP IP/OBS MODERATE 35: CPT | Performed by: FAMILY MEDICINE

## 2024-01-15 PROCEDURE — 2580000003 HC RX 258

## 2024-01-15 RX ORDER — LIDOCAINE HYDROCHLORIDE 10 MG/ML
INJECTION, SOLUTION EPIDURAL; INFILTRATION; INTRACAUDAL; PERINEURAL PRN
Status: COMPLETED | OUTPATIENT
Start: 2024-01-15 | End: 2024-01-15

## 2024-01-15 RX ADMIN — LIDOCAINE HYDROCHLORIDE 2 ML: 10 INJECTION, SOLUTION EPIDURAL; INFILTRATION; INTRACAUDAL; PERINEURAL at 15:01

## 2024-01-15 RX ADMIN — ATORVASTATIN CALCIUM 40 MG: 20 TABLET, FILM COATED ORAL at 09:03

## 2024-01-15 RX ADMIN — SODIUM CHLORIDE, PRESERVATIVE FREE 10 ML: 5 INJECTION INTRAVENOUS at 19:54

## 2024-01-15 RX ADMIN — SODIUM CHLORIDE, PRESERVATIVE FREE 10 ML: 5 INJECTION INTRAVENOUS at 09:04

## 2024-01-15 RX ADMIN — ARFORMOTEROL TARTRATE: 15 SOLUTION RESPIRATORY (INHALATION) at 08:02

## 2024-01-15 RX ADMIN — THROMBIN, TOPICAL (BOVINE) 250 UNITS: KIT at 15:02

## 2024-01-15 RX ADMIN — ARFORMOTEROL TARTRATE: 15 SOLUTION RESPIRATORY (INHALATION) at 20:39

## 2024-01-15 NOTE — PROCEDURES
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     Plan: Doppler ultrasound of the groin groin tomorrow to ensure continued thrombosis (ordered for you).     Electronically signed by Ann Hart MD on 1/15/2024 at 3:27 PM

## 2024-01-16 ENCOUNTER — APPOINTMENT (OUTPATIENT)
Dept: VASCULAR SURGERY | Facility: HOSPITAL | Age: 84
DRG: 982 | End: 2024-01-16
Attending: STUDENT IN AN ORGANIZED HEALTH CARE EDUCATION/TRAINING PROGRAM
Payer: MEDICARE

## 2024-01-16 DIAGNOSIS — I48.91 ATRIAL FIBRILLATION, UNSPECIFIED TYPE (HCC): Primary | ICD-10-CM

## 2024-01-16 LAB
ANION GAP SERPL CALC-SCNC: 5 MMOL/L (ref 5–15)
BASOPHILS # BLD: 0.1 K/UL (ref 0–0.1)
BASOPHILS NFR BLD: 1 % (ref 0–1)
BUN SERPL-MCNC: 47 MG/DL (ref 6–20)
BUN/CREAT SERPL: 27 (ref 12–20)
CALCIUM SERPL-MCNC: 8.5 MG/DL (ref 8.5–10.1)
CHLORIDE SERPL-SCNC: 111 MMOL/L (ref 97–108)
CO2 SERPL-SCNC: 20 MMOL/L (ref 21–32)
CREAT SERPL-MCNC: 1.74 MG/DL (ref 0.55–1.02)
DIFFERENTIAL METHOD BLD: ABNORMAL
ECHO BSA: 2.13 M2
ECHO BSA: 2.13 M2
EOSINOPHIL # BLD: 0.3 K/UL (ref 0–0.4)
EOSINOPHIL NFR BLD: 3 % (ref 0–7)
ERYTHROCYTE [DISTWIDTH] IN BLOOD BY AUTOMATED COUNT: 15.1 % (ref 11.5–14.5)
GLUCOSE BLD STRIP.AUTO-MCNC: 198 MG/DL (ref 65–117)
GLUCOSE BLD STRIP.AUTO-MCNC: 199 MG/DL (ref 65–117)
GLUCOSE BLD STRIP.AUTO-MCNC: 211 MG/DL (ref 65–117)
GLUCOSE SERPL-MCNC: 173 MG/DL (ref 65–100)
HCT VFR BLD AUTO: 21.6 % (ref 35–47)
HCT VFR BLD AUTO: 23.9 % (ref 35–47)
HGB BLD-MCNC: 7 G/DL (ref 11.5–16)
HGB BLD-MCNC: 7.7 G/DL (ref 11.5–16)
IMM GRANULOCYTES # BLD AUTO: 0.1 K/UL (ref 0–0.04)
IMM GRANULOCYTES NFR BLD AUTO: 1 % (ref 0–0.5)
LYMPHOCYTES # BLD: 1.4 K/UL (ref 0.8–3.5)
LYMPHOCYTES NFR BLD: 13 % (ref 12–49)
MCH RBC QN AUTO: 33 PG (ref 26–34)
MCHC RBC AUTO-ENTMCNC: 32.4 G/DL (ref 30–36.5)
MCV RBC AUTO: 101.9 FL (ref 80–99)
MONOCYTES # BLD: 1.1 K/UL (ref 0–1)
MONOCYTES NFR BLD: 10 % (ref 5–13)
NEUTS SEG # BLD: 7.9 K/UL (ref 1.8–8)
NEUTS SEG NFR BLD: 72 % (ref 32–75)
NRBC # BLD: 0 K/UL (ref 0–0.01)
NRBC BLD-RTO: 0 PER 100 WBC
PLATELET # BLD AUTO: 254 K/UL (ref 150–400)
PMV BLD AUTO: 10 FL (ref 8.9–12.9)
POTASSIUM SERPL-SCNC: 4.3 MMOL/L (ref 3.5–5.1)
RBC # BLD AUTO: 2.12 M/UL (ref 3.8–5.2)
SERVICE CMNT-IMP: ABNORMAL
SODIUM SERPL-SCNC: 136 MMOL/L (ref 136–145)
VAS LEFT ABI: 0.91
VAS LEFT ARM BP: 141 MMHG
VAS LEFT DORSALIS PEDIS BP: 126 MMHG
VAS LEFT PTA BP: 129 MMHG
VAS RIGHT ABI: 0.86
VAS RIGHT ARM BP: 142 MMHG
VAS RIGHT CFA PROX PSV: 201.1 CM/S
VAS RIGHT DORSALIS PEDIS BP: 114 MMHG
VAS RIGHT PTA BP: 122 MMHG
WBC # BLD AUTO: 10.8 K/UL (ref 3.6–11)

## 2024-01-16 PROCEDURE — 85025 COMPLETE CBC W/AUTO DIFF WBC: CPT

## 2024-01-16 PROCEDURE — 97530 THERAPEUTIC ACTIVITIES: CPT

## 2024-01-16 PROCEDURE — 85018 HEMOGLOBIN: CPT

## 2024-01-16 PROCEDURE — 6370000000 HC RX 637 (ALT 250 FOR IP)

## 2024-01-16 PROCEDURE — 6360000002 HC RX W HCPCS

## 2024-01-16 PROCEDURE — 36415 COLL VENOUS BLD VENIPUNCTURE: CPT

## 2024-01-16 PROCEDURE — 94761 N-INVAS EAR/PLS OXIMETRY MLT: CPT

## 2024-01-16 PROCEDURE — 99232 SBSQ HOSP IP/OBS MODERATE 35: CPT | Performed by: FAMILY MEDICINE

## 2024-01-16 PROCEDURE — 94640 AIRWAY INHALATION TREATMENT: CPT

## 2024-01-16 PROCEDURE — 82962 GLUCOSE BLOOD TEST: CPT

## 2024-01-16 PROCEDURE — 2060000000 HC ICU INTERMEDIATE R&B

## 2024-01-16 PROCEDURE — 2580000003 HC RX 258

## 2024-01-16 PROCEDURE — 6370000000 HC RX 637 (ALT 250 FOR IP): Performed by: SPECIALIST

## 2024-01-16 PROCEDURE — 94660 CPAP INITIATION&MGMT: CPT

## 2024-01-16 PROCEDURE — 93926 LOWER EXTREMITY STUDY: CPT

## 2024-01-16 PROCEDURE — 97535 SELF CARE MNGMENT TRAINING: CPT

## 2024-01-16 PROCEDURE — 80048 BASIC METABOLIC PNL TOTAL CA: CPT

## 2024-01-16 PROCEDURE — 85014 HEMATOCRIT: CPT

## 2024-01-16 RX ADMIN — ATORVASTATIN CALCIUM 40 MG: 20 TABLET, FILM COATED ORAL at 08:19

## 2024-01-16 RX ADMIN — SODIUM CHLORIDE, PRESERVATIVE FREE 10 ML: 5 INJECTION INTRAVENOUS at 08:19

## 2024-01-16 RX ADMIN — ARFORMOTEROL TARTRATE: 15 SOLUTION RESPIRATORY (INHALATION) at 19:50

## 2024-01-16 RX ADMIN — LISINOPRIL 2.5 MG: 5 TABLET ORAL at 08:20

## 2024-01-16 RX ADMIN — SODIUM CHLORIDE, PRESERVATIVE FREE 10 ML: 5 INJECTION INTRAVENOUS at 22:02

## 2024-01-16 RX ADMIN — ARFORMOTEROL TARTRATE: 15 SOLUTION RESPIRATORY (INHALATION) at 07:33

## 2024-01-16 NOTE — CARE COORDINATION
01/16/24 7146   Condition of Participation: Discharge Planning   The Plan for Transition of Care is related to the following treatment goals: dyspnea on exertion, abnormal stress test, syncope due to orthostatic hypotension   The Patient and/or Patient Representative was provided with a Choice of Provider? Patient Representative   The Patient and/Or Patient Representative agree with the Discharge Plan? Yes   Freedom of Choice list was provided with basic dialogue that supports the patient's individualized plan of care/goals, treatment preferences, and shares the quality data associated with the providers?  Yes     Family would like a referral to [facility].  One was sent in [facility].

## 2024-01-17 VITALS
DIASTOLIC BLOOD PRESSURE: 49 MMHG | RESPIRATION RATE: 17 BRPM | OXYGEN SATURATION: 98 % | TEMPERATURE: 97.7 F | SYSTOLIC BLOOD PRESSURE: 140 MMHG | BODY MASS INDEX: 43.71 KG/M2 | HEART RATE: 71 BPM | HEIGHT: 63 IN | WEIGHT: 246.7 LBS

## 2024-01-17 LAB
ANION GAP SERPL CALC-SCNC: 6 MMOL/L (ref 5–15)
BASOPHILS # BLD: 0 K/UL (ref 0–0.1)
BASOPHILS NFR BLD: 0 % (ref 0–1)
BUN SERPL-MCNC: 50 MG/DL (ref 6–20)
BUN/CREAT SERPL: 34 (ref 12–20)
CALCIUM SERPL-MCNC: 8.4 MG/DL (ref 8.5–10.1)
CHLORIDE SERPL-SCNC: 112 MMOL/L (ref 97–108)
CO2 SERPL-SCNC: 18 MMOL/L (ref 21–32)
CREAT SERPL-MCNC: 1.47 MG/DL (ref 0.55–1.02)
DIFFERENTIAL METHOD BLD: ABNORMAL
EOSINOPHIL # BLD: 0.2 K/UL (ref 0–0.4)
EOSINOPHIL NFR BLD: 2 % (ref 0–7)
ERYTHROCYTE [DISTWIDTH] IN BLOOD BY AUTOMATED COUNT: 15.1 % (ref 11.5–14.5)
GLUCOSE BLD STRIP.AUTO-MCNC: 184 MG/DL (ref 65–117)
GLUCOSE BLD STRIP.AUTO-MCNC: 246 MG/DL (ref 65–117)
GLUCOSE SERPL-MCNC: 182 MG/DL (ref 65–100)
HCT VFR BLD AUTO: 21.7 % (ref 35–47)
HCT VFR BLD AUTO: 22.9 % (ref 35–47)
HGB BLD-MCNC: 7 G/DL (ref 11.5–16)
HGB BLD-MCNC: 7.4 G/DL (ref 11.5–16)
IMM GRANULOCYTES # BLD AUTO: 0.1 K/UL (ref 0–0.04)
IMM GRANULOCYTES NFR BLD AUTO: 1 % (ref 0–0.5)
LYMPHOCYTES # BLD: 1.3 K/UL (ref 0.8–3.5)
LYMPHOCYTES NFR BLD: 13 % (ref 12–49)
MCH RBC QN AUTO: 32.9 PG (ref 26–34)
MCHC RBC AUTO-ENTMCNC: 32.3 G/DL (ref 30–36.5)
MCV RBC AUTO: 101.9 FL (ref 80–99)
MONOCYTES # BLD: 1.1 K/UL (ref 0–1)
MONOCYTES NFR BLD: 11 % (ref 5–13)
NEUTS SEG # BLD: 7.3 K/UL (ref 1.8–8)
NEUTS SEG NFR BLD: 73 % (ref 32–75)
NRBC # BLD: 0 K/UL (ref 0–0.01)
NRBC BLD-RTO: 0 PER 100 WBC
PLATELET # BLD AUTO: 252 K/UL (ref 150–400)
PMV BLD AUTO: 9.6 FL (ref 8.9–12.9)
POTASSIUM SERPL-SCNC: 4.4 MMOL/L (ref 3.5–5.1)
RBC # BLD AUTO: 2.13 M/UL (ref 3.8–5.2)
SERVICE CMNT-IMP: ABNORMAL
SERVICE CMNT-IMP: ABNORMAL
SODIUM SERPL-SCNC: 136 MMOL/L (ref 136–145)
WBC # BLD AUTO: 10.1 K/UL (ref 3.6–11)

## 2024-01-17 PROCEDURE — 94761 N-INVAS EAR/PLS OXIMETRY MLT: CPT

## 2024-01-17 PROCEDURE — 85025 COMPLETE CBC W/AUTO DIFF WBC: CPT

## 2024-01-17 PROCEDURE — 6370000000 HC RX 637 (ALT 250 FOR IP)

## 2024-01-17 PROCEDURE — 6360000002 HC RX W HCPCS

## 2024-01-17 PROCEDURE — 2580000003 HC RX 258

## 2024-01-17 PROCEDURE — 99238 HOSP IP/OBS DSCHRG MGMT 30/<: CPT | Performed by: FAMILY MEDICINE

## 2024-01-17 PROCEDURE — 85018 HEMOGLOBIN: CPT

## 2024-01-17 PROCEDURE — 94640 AIRWAY INHALATION TREATMENT: CPT

## 2024-01-17 PROCEDURE — 6370000000 HC RX 637 (ALT 250 FOR IP): Performed by: INTERNAL MEDICINE

## 2024-01-17 PROCEDURE — 97116 GAIT TRAINING THERAPY: CPT

## 2024-01-17 PROCEDURE — 6370000000 HC RX 637 (ALT 250 FOR IP): Performed by: SPECIALIST

## 2024-01-17 PROCEDURE — 85014 HEMATOCRIT: CPT

## 2024-01-17 PROCEDURE — 80048 BASIC METABOLIC PNL TOTAL CA: CPT

## 2024-01-17 PROCEDURE — 82962 GLUCOSE BLOOD TEST: CPT

## 2024-01-17 PROCEDURE — 36415 COLL VENOUS BLD VENIPUNCTURE: CPT

## 2024-01-17 RX ORDER — LOPERAMIDE HYDROCHLORIDE 2 MG/1
2 CAPSULE ORAL
Status: COMPLETED | OUTPATIENT
Start: 2024-01-17 | End: 2024-01-17

## 2024-01-17 RX ADMIN — CLOPIDOGREL BISULFATE 75 MG: 75 TABLET ORAL at 09:06

## 2024-01-17 RX ADMIN — SODIUM CHLORIDE, PRESERVATIVE FREE 10 ML: 5 INJECTION INTRAVENOUS at 09:07

## 2024-01-17 RX ADMIN — ARFORMOTEROL TARTRATE: 15 SOLUTION RESPIRATORY (INHALATION) at 07:37

## 2024-01-17 RX ADMIN — LISINOPRIL 2.5 MG: 5 TABLET ORAL at 09:06

## 2024-01-17 RX ADMIN — LOPERAMIDE HYDROCHLORIDE 2 MG: 2 CAPSULE ORAL at 14:52

## 2024-01-17 RX ADMIN — ATORVASTATIN CALCIUM 40 MG: 20 TABLET, FILM COATED ORAL at 09:06

## 2024-01-17 RX ADMIN — INSULIN LISPRO 1 UNITS: 100 INJECTION, SOLUTION INTRAVENOUS; SUBCUTANEOUS at 13:03

## 2024-01-17 NOTE — PROGRESS NOTES
[facility] CARDIOLOGY                    Cardiology Care Note     []Initial Encounter     [x]Follow-up    Patient Name: Adrienne To - :1940 - MRN:[id number]  Primary Cardiologist: Cj Lozada MD  Consulting Cardiologist: Ben Couch MD     Reason for encounter: Syncope, hypotension post cath     HPI:       Adrienne To is a 83 y.o. female with PMH significant for CAD, a-fib on Eliquis, HFpEF, HTN, HLD, CKD, and SUZAN on CPAP.     Pt initially presented for outpt cath. While in recover, she was getting up to use the bathroom when she lost consciousness and become hypotensive. Code blue was called despite pt maintaining a pulse and respirations. She regained consciousness shortly after and was alert and oriented. She feels well this morning. Reports she did have some R groin pain overnight that has since resolved. Denies any CP, SOB.     Subjective:      Adrienne To reports      Assessment and Plan     Syncope, hypotension: likely vagal episode, currently hemodynamically stable. Resume BP meds as able. TTE w/ normal EF     2. CAD: sig RCA disease, plans for repeat PCI with atherectomy in about 4 weeks. S/p PTCA only, will discuss if needs plavix resumed - on hold for bleeding/R groin pseudoaneurysm     3. Hx of PAF: Eliquis on hold for bleeding/R groin pseudoaneurysm    4. Chronic HFpEF: recent EF 60-65%    5. HTN: BP stable, slowly resume home meds - DBP low so complicating resumption of meds. Monitor for orthostasis     6. CKD stage III: Cr 1.69     7. 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 Arias, vasc is following along     8. Acute anemia d/t blood loss: Hgb 7.5 today   
      [facility] CARDIOLOGY                    Cardiology Care Note     []Initial Encounter     [x]Follow-up    Patient Name: Adrienne To - :1940 - MRN:[id number]  Primary Cardiologist: Cj Lozada MD  Consulting Cardiologist: Ben Couch MD     Reason for encounter: Syncope, hypotension post cath     HPI:       Adrienne To is a 83 y.o. female with PMH significant for CAD, a-fib on Eliquis, HFpEF, HTN, HLD, CKD, and SUZAN on CPAP.     Pt initially presented for outpt cath. While in recover, she was getting up to use the bathroom when she lost consciousness and become hypotensive. Code blue was called despite pt maintaining a pulse and respirations. She regained consciousness shortly after and was alert and oriented. She feels well this morning. Reports she did have some R groin pain overnight that has since resolved. Denies any CP, SOB.         Subjective:      Adrienne To reports no issues. Has not sat up yet secondary to possible hematoma at cath site in right groin.     Assessment and Plan     Syncope, hypotension: likely vagal episode, currently hemodynamically stable. Check orthostatics today, resume BP meds following if pressure ok   No further syncopal episodes but most likely related to anemia    2. CAD: sig RCA disease, plans for repeat PCI with atherectomy in about 4 weeks. Pt had some discomfort to R groin site yesterday, now resolved - site a little firm, but no obvious signs of hematoma although Hgb 9.1 (could also be dilutional), non tender to deep palpation, will discuss if further testing needed     3. Hx of PAF: on Eliquis     4. Chronic HFpEF: recent EF 60-65%    5. HTN: BP stable, can resume home meds on d/c   -Some of her medicines have been placed on hold and I restarted her lisinopril at 2.5 mg and she usually takes 5 mg a day.  I held her spironolactone and her diuretic for the time being but she is retaining fluids and may consider Lasix IV and tomorrow after 
      [facility] CARDIOLOGY                    Cardiology Care Note     [x]Initial Encounter     []Follow-up    Patient Name: Adrienne To - :1940 - MRN:[id number]  Primary Cardiologist: Cj Lozada MD  Consulting Cardiologist: Ben Couch MD     Reason for encounter: Syncope, hypotension post cath     HPI:       Adrienne To is a 83 y.o. female with PMH significant for CAD, a-fib on Eliquis, HFpEF, HTN, HLD, CKD, and SUZAN on CPAP.     Pt initially presented for outpt cath. While in recover, she was getting up to use the bathroom when she lost consciousness and become hypotensive. Code blue was called despite pt maintaining a pulse and respirations. She regained consciousness shortly after and was alert and oriented. She feels well this morning. Reports she did have some R groin pain overnight that has since resolved. Denies any CP, SOB.     Subjective:      Adrienne To reports no issues. Has not sat up yet secondary to possible hematoma at cath site in right groin.     Assessment and Plan     Syncope, hypotension: likely vagal episode, currently hemodynamically stable. Check orthostatics today, resume BP meds following if pressure ok     2. CAD: sig RCA disease, plans for repeat PCI with atherectomy in about 4 weeks. Pt had some discomfort to R groin site yesterday, now resolved - site a little firm, but no obvious signs of hematoma although Hgb 9.1 (could also be dilutional), non tender to deep palpation, will discuss if further testing needed     3. Hx of PAF: on Eliquis     4. Chronic HFpEF: recent EF 60-65%    5. HTN: BP stable, can resume home meds on d/c     6. 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  -Might get vascular to see.    8. Drop in hgb 11.4 to 8.5  -would watch one more day and patient and 
    Spoke with on-call IR, Dr. Boubacar Infante, regarding CTA result of 22mm pseudoaneurysm and extensive hematoma with suspected active bleeding.     Per Dr. Infante, size of pseudoaneurysm is reassuring. He will review CTA images and reach out regarding further recommendations. May need to consult vascular.     Lorena Rodrigues MD    
  [id number] [id number]   Office [phone]  Fax [phone]          Subjective / Objective     Subjective  Overnight Events:  none    Patient seen and examined at bedside. Reports feeling well.  Denies pain or bleeding. Tolerated standing with PT.       Vitals:    01/17/24 0738   BP:    Pulse: 70   Resp: 16   Temp:    SpO2: 99%     Physical Examination:   General: No acute distress. Alert. Cooperative.   Head: Normocephalic. Atraumatic.   Eyes:              Conjunctiva pink. Sclera white. PERRL.   Ears:              Hearing grossly intact.   Nose:             Septum midline. Mucosa pink. No drainage.   Respiratory: CTAB. No w/r/r/c.   Cardiovascular: Systolic murmur. Normal S1,S2. Pulses 2+ throughout.   GI: + bowel sounds. Nontender. No rebound tenderness or guarding. Nondistended. R Groin incision site firm without external bleeding. Underlying skin firm, no expansitile mass appreciated though  full exam limited 2/2 body habitus.    Extremities: Trace BLE edema. Distal pulses intact.   Musculoskeletal: Full ROM in all extremities.    Skin: Warm, dry. No rashes. Groin bandage c/d/I.    Neuro: CN II-XII grossly intact. Alert and oriented x4. Communicating well. No confusion. At baseline       I/O:  No intake/output data recorded.  Inpatient Medications  Current Facility-Administered Medications   Medication Dose Route Frequency    lisinopril (PRINIVIL;ZESTRIL) tablet 2.5 mg  2.5 mg Oral Daily    sodium chloride flush 0.9 % injection 5-40 mL  5-40 mL IntraVENous 2 times per day    sodium chloride flush 0.9 % injection 5-40 mL  5-40 mL IntraVENous PRN    0.9 % sodium chloride infusion   IntraVENous PRN    potassium chloride (KLOR-CON) extended release tablet 40 mEq  40 mEq Oral PRN    Or    potassium bicarb-citric acid (EFFER-K) effervescent tablet 40 mEq  40 mEq Oral PRN    Or    potassium chloride 10 mEq/100 mL IVPB (Peripheral Line)  10 mEq IntraVENous PRN    magnesium sulfate 2000 mg 
  [id number] [id number]   Office [phone]  Fax [phone]          Subjective / Objective     Subjective  Overnight Events: none  Patient seen and examined at bedside. Reports feeling well this AM. Denies CP, SOB, N/V, dysuria. Tolerating PO.       Vitals:    01/12/24 0955   BP: (!) 116/47   Pulse: 94   Resp:    Temp:    SpO2:      Physical Examination:   General: No acute distress. Alert. Cooperative.   Head: Normocephalic. Atraumatic.   Eyes:              Conjunctiva pink. Sclera white. PERRL.   Ears:              Hearing grossly intact.   Nose:             Septum midline. Mucosa pink. No drainage.   Respiratory: CTAB. No w/r/r/c.   Cardiovascular: Systolic murmur. Normal S1,S2. Pulses 2+ throughout.   GI: + bowel sounds. Nontender. No rebound tenderness or guarding. Nondistended.   Extremities: No LE edema. Distal pulses intact.   Musculoskeletal: Full ROM in all extremities.    Skin: Warm, dry. No rashes. Groin bandage c/d/I.    Neuro: CN II-XII grossly intact. Alert and oriented x4. Communicating well. No confusion. At baseline       I/O:  01/11 0701 - 01/12 0700  In: 120 [P.O.:120]  Out: 230 [Urine:200]  Inpatient Medications  @[email or web address]  Current Facility-Administered Medications   Medication Dose Route Frequency    sodium chloride flush 0.9 % injection 5-40 mL  5-40 mL IntraVENous 2 times per day    sodium chloride flush 0.9 % injection 5-40 mL  5-40 mL IntraVENous PRN    0.9 % sodium chloride infusion   IntraVENous PRN    potassium chloride (KLOR-CON) extended release tablet 40 mEq  40 mEq Oral PRN    Or    potassium bicarb-citric acid (EFFER-K) effervescent tablet 40 mEq  40 mEq Oral PRN    Or    potassium chloride 10 mEq/100 mL IVPB (Peripheral Line)  10 mEq IntraVENous PRN    magnesium sulfate 2000 mg in 50 mL IVPB premix  2,000 mg IntraVENous PRN    polyethylene glycol (GLYCOLAX) packet 17 g  17 g Oral Daily PRN    [Held by provider] cloNIDine (CATAPRES) tablet 0.1 mg  
  [id number] [id number]   Office [phone]  Fax [phone]          Subjective / Objective     Subjective  Overnight Events:  none    Patient seen and examined at bedside. Reports feeling well this improved, was able to transfer to bedside commode without dizziness.  Denies CP, SOB. Tolerating PO. Denies pain or bleeding.       Vitals:    01/14/24 1600   BP: (!) 130/40   Pulse: 73   Resp: 18   Temp: 98.1 °F (36.7 °C)   SpO2:      Physical Examination:   General: No acute distress. Alert. Cooperative.   Head: Normocephalic. Atraumatic.   Eyes:              Conjunctiva pink. Sclera white. PERRL.   Ears:              Hearing grossly intact.   Nose:             Septum midline. Mucosa pink. No drainage.   Respiratory: CTAB. No w/r/r/c.   Cardiovascular: Systolic murmur. Normal S1,S2. Pulses 2+ throughout.   GI: + bowel sounds. Nontender. No rebound tenderness or guarding. Nondistended. R Groin incision site firm without external bleeding. Underlying skin firm, no expansitile mass appreciated though  full exam limited 2/2 body habitus.    Extremities: Trace BLE edema. Distal pulses intact.   Musculoskeletal: Full ROM in all extremities.    Skin: Warm, dry. No rashes. Groin bandage c/d/I.    Neuro: CN II-XII grossly intact. Alert and oriented x4. Communicating well. No confusion. At baseline       I/O:  01/13 0701 - 01/14 0700  In: 240 [P.O.:240]  Out: 1375 [Urine:1375]  Inpatient Medications  Current Facility-Administered Medications   Medication Dose Route Frequency    lisinopril (PRINIVIL;ZESTRIL) tablet 2.5 mg  2.5 mg Oral Daily    sodium chloride flush 0.9 % injection 5-40 mL  5-40 mL IntraVENous 2 times per day    sodium chloride flush 0.9 % injection 5-40 mL  5-40 mL IntraVENous PRN    0.9 % sodium chloride infusion   IntraVENous PRN    potassium chloride (KLOR-CON) extended release tablet 40 mEq  40 mEq Oral PRN    Or    potassium bicarb-citric acid (EFFER-K) effervescent tablet 40 
  [address] [id number]   Office [phone]  Fax [phone]          Subjective / Objective     Subjective  Overnight Events:  Repeat Hgb 7.2    Patient seen and examined at bedside. Reports feeling well this improved, was able to sit up for 20 mins without lightheadedness. Has not stood. Denies CP, SOB. Tolerating PO. Denies pain.       Vitals:    01/14/24 0900   BP: (!) 124/43   Pulse: 74   Resp: 16   Temp: 98.1 °F (36.7 °C)   SpO2: 98%     Physical Examination:   General: No acute distress. Alert. Cooperative.   Head: Normocephalic. Atraumatic.   Eyes:              Conjunctiva pink. Sclera white. PERRL.   Ears:              Hearing grossly intact.   Nose:             Septum midline. Mucosa pink. No drainage.   Respiratory: CTAB. No w/r/r/c.   Cardiovascular: Systolic murmur. Normal S1,S2. Pulses 2+ throughout.   GI: + bowel sounds. Nontender. No rebound tenderness or guarding. Nondistended. R Groin incision site firm without external bleeding. Underlying skin firm, no expansitile mass appreciated though  full exam limited 2/2 body habitus.    Extremities: Trace BLE edema. Distal pulses intact.   Musculoskeletal: Full ROM in all extremities.    Skin: Warm, dry. No rashes. Groin bandage c/d/I.    Neuro: CN II-XII grossly intact. Alert and oriented x4. Communicating well. No confusion. At baseline       I/O:  01/13 0701 - 01/14 0700  In: 240 [P.O.:240]  Out: 1375 [Urine:1375]  Inpatient Medications  Current Facility-Administered Medications   Medication Dose Route Frequency    sodium chloride flush 0.9 % injection 5-40 mL  5-40 mL IntraVENous 2 times per day    sodium chloride flush 0.9 % injection 5-40 mL  5-40 mL IntraVENous PRN    0.9 % sodium chloride infusion   IntraVENous PRN    potassium chloride (KLOR-CON) extended release tablet 40 mEq  40 mEq Oral PRN    Or    potassium bicarb-citric acid (EFFER-K) effervescent tablet 40 mEq  40 mEq Oral PRN    Or    potassium chloride 10 
  [facility] Daily Progress Note    24 Hour Events: No acute events overnight. See H/P for events prior to admission    SUBJECTIVE: Reports feeling well with no complaints. Feels like she has been her normal self ever since just after her syncopal episode. Denies chest pain, SOB, nausea, vomiting, abdominal pain, dizziness.     OBJECTIVE:    Vitals:   Vitals:    01/12/24 0331   BP: (!) 126/48   Pulse: 60   Resp: 16   Temp: 97.7 °F (36.5 °C)   SpO2: 100%     Physical Exam:  General: NAD.  Respiratory: CTAB. No crackles auscultated  Cardiovascular: Regular rate.  GI: Nondistended. + bowel sounds. Nontender.  Extremities: No LE edema.   Skin: Warm, dry.  Neuro: Alert and oriented    Inpatient Medications  Current Facility-Administered Medications   Medication Dose Route Frequency    sodium chloride flush 0.9 % injection 5-40 mL  5-40 mL IntraVENous 2 times per day    sodium chloride flush 0.9 % injection 5-40 mL  5-40 mL IntraVENous PRN    0.9 % sodium chloride infusion   IntraVENous PRN    potassium chloride (KLOR-CON) extended release tablet 40 mEq  40 mEq Oral PRN    Or    potassium bicarb-citric acid (EFFER-K) effervescent tablet 40 mEq  40 mEq Oral PRN    Or    potassium chloride 10 mEq/100 mL IVPB (Peripheral Line)  10 mEq IntraVENous PRN    magnesium sulfate 2000 mg in 50 mL IVPB premix  2,000 mg IntraVENous PRN    polyethylene glycol (GLYCOLAX) packet 17 g  17 g Oral Daily PRN    [Held by provider] cloNIDine (CATAPRES) tablet 0.1 mg  0.1 mg Oral BID    insulin glargine (LANTUS) injection vial 20 Units  20 Units SubCUTAneous Nightly    [Held by provider] lisinopril (PRINIVIL;ZESTRIL) tablet 5 mg  5 mg Oral Daily    apixaban (ELIQUIS) tablet 5 mg  5 mg Oral BID    atorvastatin (LIPITOR) tablet 40 mg  40 mg Oral Daily    [Held by provider] spironolactone (ALDACTONE) tablet 25 mg  25 mg Oral Daily    [Held by provider] furosemide (LASIX) tablet 20 mg  20 mg Oral Daily    glucose chewable 
0620 -Patient voided 200 in purewic, post void residual is 431 mls. Patient is requesting fresh water to drink and would like to wait an hour before we attempt a straight cath.     0730- Repeat bladder scan 533mls. Straight cath performed per protocol. 575 mls of urine out. Dr. Trejo notified at bedside.   
11:01 AM  Patient arrived. ID and allergies verified verbally with patient. Pt voices understanding of procedure to be performed. Consent obtained. Pt prepped for procedure. Pt denies contrast allergy. Patient denies taking any blood thinners.    1:00 PM  TRANSFER - OUT REPORT:    Verbal report given to CANDIDO Tsang on Adrienne To  being transferred to cath lab for ordered procedure       Report consisted of patient's Situation, Background, Assessment and   Recommendations(SBAR).     Information from the following report(s) Nurse Handoff Report was reviewed with the receiving nurse.    Lines:   Peripheral IV (Active)       Peripheral IV 01/11/24 Proximal;Right Forearm (Active)       Peripheral IV 01/11/24 Left Forearm (Active)        Opportunity for questions and clarification was provided.      Patient transported with:  Registered Nurse    3:15 PM  TRANSFER - IN REPORT:    Verbal report received from Josefina TOPETE RN on Adrienne To  being received from cath lab for routine post-op      Report consisted of patient's Situation, Background, Assessment and   Recommendations(SBAR).     Information from the following report(s) Nurse Handoff Report was reviewed with the receiving nurse.    Opportunity for questions and clarification was provided.      Assessment completed upon patient's arrival to unit and care assumed.     R brachial venous sheath in place.   R femoral arterial sheath in place.  TR band inflated with 12cc of air.    5:22 PM  Blood aspirated from sheath. 6 Fr sheath pulled from R Groin. Quikclot applied. Manual pressure held by CANDIDO Flores for 25 minutes.    3 ml air released from TR Band. No bleeding or hematoma noted. Radial and Ulnar pulse on R wrist palpable. Pt tolerated well. Will continue to monitor.    5:27 PM  3 ml air released from TR Band. No bleeding or hematoma noted. Radial and Ulnar pulse on R wrist palpable. Pt tolerated well. Will continue to monitor.    5:32 PM  9 ml air released 
1345 attempted [facility]. Nurse is at lunch left a message with  to return call.  
Accessed pt chart to assist primary RN.    1730: Rec'd critical result from Dr. Singh. Pt has aneurysm in R groin/thigh. Notified primary RN.  
Cardiology Update:     Ok for d/c from cardiac standpoint pending duplex of R groin. Discussed with Dr. Couch, resume plavix tomorrow. Then will need repeat H&H in about 3 days to see if safe to resume Eliquis as an OP - sent message to Dr. Lozada's nurse to assist in ordering OP labs. OP follow up arranged with Dr. Lozada.     Future Appointments   Date Time Provider Department Center   1/23/2024 10:20 AM Cj Lozada MD [facility] AMB   1/29/2024  2:45 PM Kylah Villegas APRN - NP [facility] AMB   2/5/2024  1:00 PM Rebecca Miller APRN - NP [facility] AMB   2/8/2024  9:00 AM Ben Couch MD [facility] AMB   3/12/2024 10:00 AM Kylah Villegas APRN - NP [facility] AMB   4/15/2024  3:00 PM Estrella Chávez MD [facility] AMB   8/21/2024  3:20 PM Marcelina Jaimes MD [facility] BS AMB   10/10/2024  1:30 PM Daniel Castillo MD [facility] AMB       
Consult received  Agree with the assessment by Dr Naresh Wilks stable  Vitals stable  No acute changes  No need for acute intervention  Recommend thrombin injection by IR with surgical repair reserved for failure  Thank you for the consult  Will follow with you.   
Dr. Trejo at bedside and notified about pt's right femoral cath site with old drainage and hard surface around the area to make sure its not a hematoma. They observed and mentioned it might just be fluid accumulation. CT of abd and pelvis ordered.     (See CT results) pt has active bleeding. Family practice at bedside and talked to the family. H&H drawn, Hgb 8.8.     Family practice aware patient had onlly 100cc of urine output from the purewick. Bladder scanned 634, straight cath ordered in place, output 900cc assisted by Buck (tech). Pt tolerated well and daughter at bedside.   
Full H/P to come:    Rapid called for patient with syncopal episode after cath while in PACU. Gotten up from laying position to seated position, subsequently lost consiousness and was unresponsive. Code blue was called however patient never lost pulse and was breathing. BP at this time was 93/26. Seems to be most consistent with orthostasis with positional change, also with recovering from catheterization. Patient is HDS, communicating well, with no concerning symptoms. Will admit for observation at direction of Dr. Couch, Cardiologist.    - Give 1L NS bolus  
Occupational Therapy:  01/12/24    Orders received, chart reviewed and patient evaluated by occupational therapy. Pending progression with skilled acute occupational therapy, recommend:  TBD; limited evaluation due to pre-syncopal symptoms at EOB - unable to assess OOB activity.  Pt lives alone (supportive daughter lives a few doors down).     Patient Vitals for the past 6 hrs:   Pulse BP Patient Position   01/12/24 0955 94 (!) 116/47 Sitting EOB (second trial)   01/12/24 0945 81 (!) 115/92 Supine   01/12/24 0940 92 (!) 112/40 Sitting EOB (first trial)   01/12/24 0930 84 (!) 125/50 High fowlers; pre activity     Recommend with nursing patient to complete as able in order to maintain strength, endurance and independence: OOB to chair 3x/day, ADLs with supervision/setup and SPT to BSC (pending tolerance/BP/symptoms) for toileting with 1 person assist. Thank you for your assistance.     Full evaluation to follow.     Thank you,  Cyn Campos OTR/L    
Occupational Therapy:  01/15/24    Chart reviewed in prep for OT tx, spoke to RN. Pt with plan for injection with IR due to R groin pseudoaneurysm. Will defer and follow next tx day.     Thank you,  Cyn Campos, OTR/L    
Occupational Therapy:  01/16/24    Chart reviewed, spoke to RN. Attempted x 2 this AM; spoke with supportive sister at bedside. Pt BERNIE and unavailable on both attempts. Will continue to follow and re-attempt this afternoon as able.     Thank you,  Cyn Campos, OTR/L  
Patient sat edge of the bed, stand by assist. A little shortness of breath but recovered. Denies dizziness or pain.   
Patient seen and examined  Doing well without complaints no pain  Slight innocuous drift of Hgb with IVF  PlanIR injection tomorrow  Will continue to follow.  
Rapid Called at 2022    Responded to RRT at 2025 for syncopal episode with attempting to use the bathroom. Code blue was initiated but pt never lost a pulse and was breathing. NS bolus started for hypotension. Alert + oriented, no signs of distress at this time. Family practice at bedside for admission.     Provider at bedside: YES  Interventions ordered: EKG  Sepsis Suspected: No  Transfer to Higher Level of Care: admission  Blood Glucose: 176     Vitals:    01/11/24 2000   BP: (!) 118/40   Pulse: 56   Resp: 17   Temp:    SpO2: 100%        Rapid Ended at 2045  RRT RN assisted with transport to accepting unit NA.    Dc Camara, RN  
Responded to RRT.  Outpatient, post cath.  Became severely hypotensive and symptomatic.  Patient awake and responsive on my exam.  Patient followed by [facility].  I called them and asked them to respond to RRT and consider admission.  
Right lower extremity arterial study completed. Final results to follow.   
TRANSFER - OUT REPORT:    Verbal report given to CANDIDO Downs(name) on Adrienne To being transferred to [facility](unit) for routine progression of patient care       Report consisted of patient's Situation, Background, Assessment and   Recommendations(SBAR).     Information from the following report(s) Nurse Handoff Report was reviewed with the receiving nurse.    Opportunity for questions and clarification was provided.      Patient transported with:   Registered Nurse    
To assist primary nurse with discharge, I have completed AVS Med-updates and added information on Levaquin to the AVS. Post cath care is included in the AVS. Care Plans and Education were completed. According to CM note patient will be discharged to [facility] [facility] and will be transported by family after 3pm today. Primary nurse to call report to receiving nurse. I have printed AVS and placed it on her chart. Primary nurse updated.  
Ultrasound IV by Odilia Espinal RN :  Procedure Note    Ultrasound IV education provided to patient. Opportunities for questions given.     Ultrasound used for PIV placement:  20gauge 1.75in BD Nexiva  F forearm location.  1 X Attempt(s).    Flushed with ease; vigorous blood return.     Procedure tolerated well. Primary RN aware of IV placement and added to LDA.      Odilia Espinal RN   
active bleeding..    Query created by: Naty Sams on 1/15/2024 1:14 PM      QUERY TEXT:    Good Afternoon    This patient admitted on 01/12/2024- for \"Atypical Syncope\"    The patient had a cardiac cath on 01/11/2024 and while in PACU had RRT called   for hypotension and unresponsiveness.    01/12---Progress notes, \"22mm Pseudoaneurysm and extensive hematoma with   active bleeding\"    If possible, please document in progress notes and discharge summary the   relationship if any between the Right groin pseudoaneurysm with extensive   hematoma and the cardiac cath    The medical record reflects the following:  Risk Factors: CAD, Post op  right and left cardiac cath PTCA, CHF, CKD  Clinical Indicators: 83 yr old female with CAD, admitted after having a   syncope episode while in the PACU  s/p PTCA. RRT called, CTA on 01/12   Right groin pseudoaneurysm off a branch   of the common femoral artery  measuring 22 mm (660-68) with extensive hematoma with suspected active   bleeding  in the anterior and medial proximal right thigh. Pre cardiac cath hgb @ 11.3,   and hgb down to 7.2-7.3  Treatment: CTA, Eliquis held, IR consulted, Vascular consulted, going today   for Thrombin infection today with IR.      Thank you  Naty Sams, JONNATHANN,RN, CPHQ, CCDS, SMART  Options provided:  -- Right groin pseudoaneurysm with extensive hematoma  are due to the   procedure  -- Right groin pseudoaneurysm with extensive hematoma are not due to the   procedure, but is due to other incidental risk factor, Please specify other   incidental risk factor.  -- Other - I will add my own diagnosis  -- Disagree - Not applicable / Not valid  -- Disagree - Clinically unable to determine / Unknown  -- Refer to Clinical Documentation Reviewer    PROVIDER RESPONSE TEXT:    Patient has a right groin pseudoaneurysm with extensive hematoma due to the   cardia cath procedure.    Query created by: Naty Sams on 1/15/2024 1:08 PM      Electronically signed by:  
femoral artery  measuring 22 mm (660-68) with extensive hematoma with suspected active bleeding  in the anterior and medial proximal right thigh.  2.  Incidental findings as above.    I discussed this impression with intermediate care unit nurse Re at 1730  hours on  1/12/2024  .    789         Assessment and Plan   Adrienne To is a 83 y.o. female with a PMHx of CAD, DMII, Afib, HTN, HFpEF who is admitted for observation following syncopal episode in setting of hypotension and bradycardia following outpatient cardiac catheterization. Blood pressure was 93/36 at time of event with HR in the 50's. Administered 1L bolus of NS with blood pressures improving.      Syncope 2/2 orthostatic hypotension: Acute. Very likely 2/2 hypotension and with recovery from sedation given procedure. Occurred when patient was attempting to get up to use the restroom. Obtained CT H that was NAP.  BMP without significant electrolyte derangements.   - Cardiology consulted, agree likely vagal episode  - Close follow up with cardiology. Follows with Dr. Lozada    Acute Anemia: POA Hgb 9.1>8.5, downtrending. Suspect 2/2 blood loss in s/o R groin site hematoma. No other obvious source. No dark or bloody stools.  - Repeat H&H  - LE Duplex U/s of hematoma  - IR consulted yesterday who recommend no intervention at this time. Consider vascular consult if acutely worsening.     Urine Retention: Bladder scan showing retention, straight cath yielded 575cc.   - Monitor UOP    Sinus bradycardia, seen on EKG with rate of 56.   HR currently 60s-70s. Given c/f symptomatic bradycardia with recent syncope, cardiology consulted, recommending OP follow up.   - OP follow up with cardiology.      CAD: Underwent cardiac cath on 1/11/24 with Dr. Couch showing significant RCA disease. Was unable to adequately address blockage during this cath, so will be undergoing repeat cath in one month. Followed by Dr. Lozada. Denies chest pain  - Plavix 75mg daily  - 
adequately address blockage during this cath, so will be undergoing repeat cath in one month. Followed by Dr. Lozada. Denies chest pain  - Plavix 75mg daily  - Lipitor 40     pAfib: EKG shows sinus bradycardia  - Eliquis 5mg BID  - Consider holding in setting of decreasing Hgb    HTN: Hold home medications while monitoring pressures  - Clonidine 0.1 BID, Lasix 20 daily, Lisinopril 5 daily, aldactone 25 daily  -restarted lisinopril 2.5mg qd per cards  -CTM pressures and adjust as tolerated       DMII: Chronic. Stable.   - Continue home lantus 20  - SSI      HFpEF: echo from 12/3/23 - LVEF 60-65%   - Hold lasix 20mg daily while monitoring BP. No overt crackles on exam.   -OP follow up with cardiology      Asthma: Lungs CTAB  - Arformoterol-budesonide (sub for home fluticasone-salmeterol)  - Albuterol prn     CKD 3b: BL Cr approx 1.5.   - Monitor on daily labs.     FEN/GI - Diabetic diet.   Activity - Ambulate as tolerated  DVT prophylaxis - Eliquis  GI prophylaxis - Not indicated at this time  Fall prophylaxis - Not indicated at this time.  Disposition - Admit to Observation with telemetry monitoring. Plan to d/c to Home. Consulting PT and OT  Code Status - Full. Discussed with patient / caregivers.  Next of Kin Name and Contact - Mireille oT (Child)  [phone] (Mobile)     Patient discussed with Rema Trejo MD Abigail R Hewitt, MD  Family Medicine Resident       For Billing    No chief complaint on file.

## 2024-01-17 NOTE — CARE COORDINATION
Transition of Care Plan to Rehab    Communication to Patient/Family:  Met with patient who is agreeable to the transition plan.   The Patient and/or patient representative was provided with a choice of provider and agrees  with the discharge plan.      Yes [x] No []    A Freedom of choice list was provided with basic dialogue that supports the patient's individualized plan of care/goals and shares the quality data associated with the providers.       Yes [x] No []    SNF/Rehab Transition:  Patient has been accepted to [facility] and meets criteria for admission.   Patient will be transported by family and expected to leave after 3 pm today.    Communication to SNF/Rehab:  Bedside RN, Gwendolyn, has been notified to update the transition plan to the facility and call report [phone].  Patient has been assigned to room 2146 and the accepting MD is Dr. Cifuentes.      Discharge information has been updated on the AVS. And communicated to facility via Feedsky/Welcome Real-time, or CC link.

## 2024-01-17 NOTE — PLAN OF CARE
Problem: Discharge Planning  Goal: Discharge to home or other facility with appropriate resources  1/12/2024 0952 by Cassidy Andrews RN  Outcome: Not Progressing  1/12/2024 0342 by Betsy Qureshi RN  Outcome: Progressing     Problem: Chronic Conditions and Co-morbidities  Goal: Patient's chronic conditions and co-morbidity symptoms are monitored and maintained or improved  1/12/2024 0952 by Cassidy Andrews RN  Outcome: Progressing  Flowsheets (Taken 1/12/2024 0800)  Care Plan - Patient's Chronic Conditions and Co-Morbidity Symptoms are Monitored and Maintained or Improved: Monitor and assess patient's chronic conditions and comorbid symptoms for stability, deterioration, or improvement  1/12/2024 0342 by Betsy Qureshi RN  Outcome: Progressing     Problem: Safety - Adult  Goal: Free from fall injury  1/12/2024 0952 by Cassidy Andrews RN  Outcome: Progressing  1/12/2024 0342 by Betsy Qureshi RN  Outcome: Progressing     Problem: Pain  Goal: Verbalizes/displays adequate comfort level or baseline comfort level  1/12/2024 0952 by Cassidy Andrews RN  Outcome: Progressing  1/12/2024 0342 by Betsy Qureshi RN  Outcome: Progressing     Problem: Cardiovascular - Adult  Goal: Maintains optimal cardiac output and hemodynamic stability  1/12/2024 0952 by Cassidy Andrews RN  Outcome: Progressing  1/12/2024 0342 by Betsy Qureshi RN  Outcome: Progressing     Problem: Skin/Tissue Integrity - Adult  Goal: Skin integrity remains intact  1/12/2024 0952 by Cassidy Andrews RN  Outcome: Progressing  Flowsheets (Taken 1/12/2024 0800)  Skin Integrity Remains Intact:   Monitor for areas of redness and/or skin breakdown   Assess vascular access sites hourly  1/12/2024 0342 by Betsy Qureshi RN  Outcome: Progressing  Goal: Incisions, wounds, or drain sites healing without S/S of infection  1/12/2024 0342 by Betsy Qureshi RN  Outcome: Progressing  Goal: Oral mucous membranes remain 
  Problem: Discharge Planning  Goal: Discharge to home or other facility with appropriate resources  Outcome: Not Progressing     Problem: Chronic Conditions and Co-morbidities  Goal: Patient's chronic conditions and co-morbidity symptoms are monitored and maintained or improved  Outcome: Progressing     Problem: Safety - Adult  Goal: Free from fall injury  Outcome: Progressing     Problem: Pain  Goal: Verbalizes/displays adequate comfort level or baseline comfort level  Outcome: Progressing     Problem: Cardiovascular - Adult  Goal: Maintains optimal cardiac output and hemodynamic stability  Outcome: Progressing     Problem: Skin/Tissue Integrity - Adult  Goal: Skin integrity remains intact  Outcome: Progressing  Flowsheets (Taken 1/14/2024 0800)  Skin Integrity Remains Intact:   Monitor for areas of redness and/or skin breakdown   Assess vascular access sites hourly  Goal: Incisions, wounds, or drain sites healing without S/S of infection  Outcome: Progressing  Goal: Oral mucous membranes remain intact  Outcome: Progressing     Problem: Respiratory - Adult  Goal: Achieves optimal ventilation and oxygenation  Outcome: Progressing     Problem: Skin/Tissue Integrity  Goal: Absence of new skin breakdown  Description: 1.  Monitor for areas of redness and/or skin breakdown  2.  Assess vascular access sites hourly  3.  Every 4-6 hours minimum:  Change oxygen saturation probe site  4.  Every 4-6 hours:  If on nasal continuous positive airway pressure, respiratory therapy assess nares and determine need for appliance change or resting period.  Outcome: Progressing     Problem: Discharge Planning  Goal: Discharge to home or other facility with appropriate resources  Outcome: Not Progressing     
  Problem: Occupational Therapy - Adult  Goal: By Discharge: Performs self-care activities at highest level of function for planned discharge setting.  See evaluation for individualized goals.  Description: FUNCTIONAL STATUS PRIOR TO ADMISSION:  Pt normally independent but endorses more sedentary since 's passing ~1 year ago. Pt furniture surfs in the home and uses SPC vs rollator when in community.    , ADL Assistance: Independent,  ,  ,  ,  ,  ,  , Ambulation Assistance: Independent, Transfer Assistance: Independent, Active : No     HOME SUPPORT: Patient lived alone. Supportive daughter lives a few doors away.    Occupational Therapy Goals:  Initiated 1/12/2024  1.  Patient will perform grooming, standing at sink, with Modified Elbert within 7 day(s).  2.  Patient will perform lower body dressing with Modified Elbert within 7 day(s).  3.  Patient will perform bathing with Supervision within 7 day(s).  4.  Patient will perform toilet transfers with Modified Elbert  within 7 day(s).  5.  Patient will perform all aspects of toileting with Modified Elbert within 7 day(s).  6.  Patient will participate in upper extremity therapeutic exercise/activities with Elbert for 10 minutes within 7 day(s).    Outcome: Progressing     OCCUPATIONAL THERAPY TREATMENT  Patient: Adrienne To (83 y.o. female)  Date: 1/16/2024  Primary Diagnosis: RANKIN (dyspnea on exertion) [R06.09]  Abnormal stress test [R94.39]  Syncope [R55]  Syncope due to orthostatic hypotension [I95.1]  Atypical syncope [R55]  Procedure(s) (LRB):  Left and right heart cath / coronary angiography (N/A)  Insert stent gagan coronary (N/A)  Intravascular lithotripsy coronary (N/A) 5 Days Post-Op   Precautions: Fall Risk                Chart, occupational therapy assessment, plan of care, and goals were reviewed.    ASSESSMENT  Patient continues to benefit from skilled OT services and is slowly progressing towards goals. 
  Problem: Physical Therapy - Adult  Goal: By Discharge: Performs mobility at highest level of function for planned discharge setting.  See evaluation for individualized goals.  Description: FUNCTIONAL STATUS PRIOR TO ADMISSION: Patient was modified independent for mobility and ADL's with intermittent use of rollator/SPC or furniture.     HOME SUPPORT PRIOR TO ADMISSION: Lived alone, local family, one daughter in the same neighborhood.    Physical Therapy Goals  Initiated 1/12/2024  1.  Patient will move from supine to sit and sit to supine, scoot up and down, and roll side to side in bed with modified independence within 7 day(s).    2.  Patient will perform sit to stand with modified independence within 7 day(s).  3.  Patient will transfer from bed to chair and chair to bed with modified independence using the least restrictive device within 7 day(s).  4.  Patient will ambulate with modified independence for 150 feet with the least restrictive device within 7 day(s).     Outcome: Progressing   PHYSICAL THERAPY TREATMENT    Patient: Adrienne To (83 y.o. female)  Date: 1/16/2024  Diagnosis: RANKIN (dyspnea on exertion) [R06.09]  Abnormal stress test [R94.39]  Syncope [R55]  Syncope due to orthostatic hypotension [I95.1]  Atypical syncope [R55] Syncope  Procedure(s) (LRB):  Left and right heart cath / coronary angiography (N/A)  Insert stent gagan coronary (N/A)  Intravascular lithotripsy coronary (N/A) 5 Days Post-Op  Precautions: Fall Risk                    ASSESSMENT:  Patient continues to benefit from skilled PT services and is slowly progressing towards goals. She participates in bed mobility, unsupported sitting at edge of bed, multiple transfers, and brief ambulation to bedside chair. VSS with activity including SpO2 using room air though she experiences dyspnea on exertion. Modified Paco RPE 7/10 during static stance for BP assessment and improves with seated rest. With subsequent stance and mobility to 
  Problem: Physical Therapy - Adult  Goal: By Discharge: Performs mobility at highest level of function for planned discharge setting.  See evaluation for individualized goals.  Description: FUNCTIONAL STATUS PRIOR TO ADMISSION: Patient was modified independent for mobility and ADL's with intermittent use of rollator/SPC or furniture.     HOME SUPPORT PRIOR TO ADMISSION: Lived alone, local family, one daughter in the same neighborhood.    Physical Therapy Goals  Initiated 1/12/2024  1.  Patient will move from supine to sit and sit to supine, scoot up and down, and roll side to side in bed with modified independence within 7 day(s).    2.  Patient will perform sit to stand with modified independence within 7 day(s).  3.  Patient will transfer from bed to chair and chair to bed with modified independence using the least restrictive device within 7 day(s).  4.  Patient will ambulate with modified independence for 150 feet with the least restrictive device within 7 day(s).     Outcome: Progressing   PHYSICAL THERAPY TREATMENT    Patient: Adrienne To (83 y.o. female)  Date: 1/17/2024  Diagnosis: RANKIN (dyspnea on exertion) [R06.09]  Abnormal stress test [R94.39]  Syncope [R55]  Syncope due to orthostatic hypotension [I95.1]  Atypical syncope [R55] Syncope  Procedure(s) (LRB):  Left and right heart cath / coronary angiography (N/A)  Insert stent gagan coronary (N/A)  Intravascular lithotripsy coronary (N/A) 6 Days Post-Op  Precautions: Fall Risk                      ASSESSMENT:  Patient continues to benefit from skilled PT services and is slowly progressing towards goals. Patient received sitting up in chair, able to ambulate to bathroom for bowel movement, once on the commode increased shortness of breath and sats dropped to 80% on room air, able to return to sit up in chair and with seated rest break able to recover to >90% on room air.  Patient planned to discharge to rehab this afternoon.         PLAN:  Patient 
  Problem: Discharge Planning  Goal: Discharge to home or other facility with appropriate resources  1/13/2024 0808 by Tom Yanez RN  Outcome: Progressing  Flowsheets (Taken 1/12/2024 2034 by Holly Duque, RN)  Discharge to home or other facility with appropriate resources:   Identify barriers to discharge with patient and caregiver   Arrange for needed discharge resources and transportation as appropriate   Identify discharge learning needs (meds, wound care, etc)   Refer to discharge planning if patient needs post-hospital services based on physician order or complex needs related to functional status, cognitive ability or social support system  1/13/2024 0808 by Tom Yanez RN  Outcome: Progressing  Flowsheets (Taken 1/12/2024 2034 by Holly Duque, RN)  Discharge to home or other facility with appropriate resources:   Identify barriers to discharge with patient and caregiver   Arrange for needed discharge resources and transportation as appropriate   Identify discharge learning needs (meds, wound care, etc)   Refer to discharge planning if patient needs post-hospital services based on physician order or complex needs related to functional status, cognitive ability or social support system     Problem: Safety - Adult  Goal: Free from fall injury  1/13/2024 0808 by Tom Yanez RN  Outcome: Progressing  1/13/2024 0808 by Tom Yanez RN  Outcome: Progressing     Problem: Pain  Goal: Verbalizes/displays adequate comfort level or baseline comfort level  1/13/2024 0808 by Tom Yanez RN  Outcome: Progressing  1/13/2024 0808 by Tom Yanez RN  Outcome: Progressing     Problem: Cardiovascular - Adult  Goal: Maintains optimal cardiac output and hemodynamic stability  1/13/2024 0808 by Tom Yanez RN  Outcome: Progressing  Flowsheets (Taken 1/13/2024 0100)  Maintains optimal cardiac output and hemodynamic stability:   Monitor blood pressure 
oxygenation  1/13/2024 1117 by Cassidy Andrews RN  Outcome: Progressing  1/13/2024 0808 by Tom Yanez RN  Outcome: Progressing  Flowsheets (Taken 1/12/2024 2034 by Holly Duque, RN)  Achieves optimal ventilation and oxygenation: Assess for changes in respiratory status     Problem: Skin/Tissue Integrity  Goal: Absence of new skin breakdown  Description: 1.  Monitor for areas of redness and/or skin breakdown  2.  Assess vascular access sites hourly  3.  Every 4-6 hours minimum:  Change oxygen saturation probe site  4.  Every 4-6 hours:  If on nasal continuous positive airway pressure, respiratory therapy assess nares and determine need for appliance change or resting period.  1/13/2024 1117 by Cassidy Andrews RN  Outcome: Progressing  1/13/2024 0808 by Tom Yanez RN  Outcome: Progressing     Problem: Discharge Planning  Goal: Discharge to home or other facility with appropriate resources  1/13/2024 1117 by Cassidy Andrews RN  Outcome: Not Progressing  1/13/2024 0808 by Tom Yanez RN  Outcome: Progressing  Flowsheets (Taken 1/12/2024 2034 by Holly Duque, RN)  Discharge to home or other facility with appropriate resources:   Identify barriers to discharge with patient and caregiver   Arrange for needed discharge resources and transportation as appropriate   Identify discharge learning needs (meds, wound care, etc)   Refer to discharge planning if patient needs post-hospital services based on physician order or complex needs related to functional status, cognitive ability or social support system     Problem: Safety - Adult  Goal: Free from fall injury  1/13/2024 1117 by Cassidy Andrews RN  Outcome: Not Progressing  1/13/2024 0808 by Tom Yanez RN  Outcome: Progressing     
X4  Cognition  Overall Cognitive Status: Exceptions  Arousal/Alertness: Delayed responses to stimuli  Following Commands: Follows multistep commands with increased time  Attention Span: Difficulty attending to directions  Problem Solving: Decreased awareness of errors;Assistance required to correct errors made  Initiation: Requires cues for some  Sequencing: Requires cues for some    Strength:    Strength: Generally decreased, functional    Coordination:  Coordination: Generally decreased, functional    Range Of Motion:  AROM: Generally decreased, functional       Functional Mobility:  Bed Mobility:     Bed Mobility Training  Bed Mobility Training: Yes  Supine to Sit: Minimum assistance;Assist X1  Sit to Supine: Moderate assistance;Assist X2  Scooting: Contact-guard assistance;Additional time  Transfers:     Transfer Training  Transfer Training: No  Balance:      Balance  Sitting: Impaired  Sitting - Static: Good (unsupported)  Sitting - Dynamic: Fair (occasional)  Ambulation/Gait Training:   Deferred                                                                                                                                                                                                                                             [facility]®      Basic Mobility Inpatient Short Form (6-Clicks) Version 2    How much help is needed turning from your back to your side while in a flat bed without using bedrails?: None  How much help is needed moving from lying on your back to sitting on the side of a flat bed without using bedrails?: A Little  How much help is needed moving to and from a bed to a chair?: A Little  How much help is needed standing up from a chair using your arms?: A Little  How much help is needed walking in hospital room?: A Little  How much help is needed climbing 3-5 steps with a railing?: A Lot    AM-PAC Inpatient Mobility Raw Score : 18  AM-PAC Inpatient T-Scale Score : 43.63 
CARMELLA Durand, Hazel Carr, Drew Sierra, Robel Cooper, -PAC “6-Clicks” Functional Assessment Scores Predict Acute Care Hospital Discharge Destination, Physical Therapy, Volume 94, Issue 9, 1 September 2014, Pages 3926-6538, [email or web address]    Pain Rating:  Pt reporting minimal pain; c/c of RANKIN  Pain Intervention(s):   repositioning    Activity Tolerance:   Fair , Poor, observed shortness of breath on exertion, and unable to tolerate activity beyond sitting EOB    After treatment:   Patient left in no apparent distress sitting up in chair, Patient left in no apparent distress in bed, Call bell within reach, Bed/ chair alarm activated, Caregiver / family present, and Side rails x3    COMMUNICATION/EDUCATION:   The patient's plan of care was discussed with: physical therapist and registered nurse    Patient Education  Education Given To: Patient  Education Provided: Role of Therapy;Plan of Care;Transfer Training;Fall Prevention Strategies  Education Method: Verbal  Barriers to Learning: None  Education Outcome: Verbalized understanding    Thank you for this referral.  Cyn Campos OT  Minutes: 37

## 2024-01-25 ENCOUNTER — TELEPHONE (OUTPATIENT)
Age: 84
End: 2024-01-25

## 2024-01-25 NOTE — TELEPHONE ENCOUNTER
Called and spoke to [facility] care coordinator. (Lubna) Lubna states understanding per Corry NP:   I will agree to follow patient.

## 2024-01-25 NOTE — TELEPHONE ENCOUNTER
Lubna from [facility] called in and is asking if you would follow & sign orders for Home Health?

## 2024-01-29 ENCOUNTER — OFFICE VISIT (OUTPATIENT)
Age: 84
End: 2024-01-29
Payer: MEDICARE

## 2024-01-29 VITALS
SYSTOLIC BLOOD PRESSURE: 135 MMHG | OXYGEN SATURATION: 97 % | HEART RATE: 79 BPM | WEIGHT: 241 LBS | BODY MASS INDEX: 42.7 KG/M2 | HEIGHT: 63 IN | DIASTOLIC BLOOD PRESSURE: 62 MMHG | TEMPERATURE: 97.7 F | RESPIRATION RATE: 18 BRPM

## 2024-01-29 DIAGNOSIS — D50.0 ANEMIA, BLOOD LOSS: Primary | ICD-10-CM

## 2024-01-29 DIAGNOSIS — I48.91 ATRIAL FIBRILLATION, UNSPECIFIED TYPE (HCC): ICD-10-CM

## 2024-01-29 PROCEDURE — 1123F ACP DISCUSS/DSCN MKR DOCD: CPT | Performed by: NURSE PRACTITIONER

## 2024-01-29 PROCEDURE — G8400 PT W/DXA NO RESULTS DOC: HCPCS | Performed by: NURSE PRACTITIONER

## 2024-01-29 PROCEDURE — 1090F PRES/ABSN URINE INCON ASSESS: CPT | Performed by: NURSE PRACTITIONER

## 2024-01-29 PROCEDURE — G8484 FLU IMMUNIZE NO ADMIN: HCPCS | Performed by: NURSE PRACTITIONER

## 2024-01-29 PROCEDURE — G8417 CALC BMI ABV UP PARAM F/U: HCPCS | Performed by: NURSE PRACTITIONER

## 2024-01-29 PROCEDURE — 99213 OFFICE O/P EST LOW 20 MIN: CPT | Performed by: NURSE PRACTITIONER

## 2024-01-29 PROCEDURE — 3075F SYST BP GE 130 - 139MM HG: CPT | Performed by: NURSE PRACTITIONER

## 2024-01-29 PROCEDURE — 1036F TOBACCO NON-USER: CPT | Performed by: NURSE PRACTITIONER

## 2024-01-29 PROCEDURE — 1111F DSCHRG MED/CURRENT MED MERGE: CPT | Performed by: NURSE PRACTITIONER

## 2024-01-29 PROCEDURE — G8427 DOCREV CUR MEDS BY ELIG CLIN: HCPCS | Performed by: NURSE PRACTITIONER

## 2024-01-29 PROCEDURE — 3078F DIAST BP <80 MM HG: CPT | Performed by: NURSE PRACTITIONER

## 2024-01-29 RX ORDER — ASCORBIC ACID 500 MG
500 TABLET ORAL DAILY
COMMUNITY

## 2024-01-29 ASSESSMENT — PATIENT HEALTH QUESTIONNAIRE - PHQ9
SUM OF ALL RESPONSES TO PHQ9 QUESTIONS 1 & 2: 0
SUM OF ALL RESPONSES TO PHQ QUESTIONS 1-9: 0
2. FEELING DOWN, DEPRESSED OR HOPELESS: 0
1. LITTLE INTEREST OR PLEASURE IN DOING THINGS: 0

## 2024-01-29 NOTE — PROGRESS NOTES
Subjective:      Patient ID: Adrienne To is a 83 y.o. female.    HPI  Patient is in the office following up from a [facility] hospitalization - syncope 1/11/24-1/17/24.  Patient has right leg swelling since 2 weeks. Post cath that infiltrated  Patient states she has difficulty breathing on exertion - this is the reason for the extensive workup  She bled out so bad her iron hbg dropped to a 7.0  On iron daily  Needs to have this rechecked.   Also having right leg swelling and told she could take extra lasix. Wants to make sure this is ok    Review of Systems  A comprehensive review of system was obtained and negative except findings in the HPI    /62 (Site: Right Upper Arm)   Pulse 79   Temp 97.7 °F (36.5 °C)   Resp 18   Ht 1.6 m (5' 3\")   Wt 109.3 kg (241 lb)   SpO2 97%   BMI 42.69 kg/m²   Objective:   Physical Exam  Vitals and nursing note reviewed.   Constitutional:       General: She is not in acute distress.     Appearance: Normal appearance.   Cardiovascular:      Rate and Rhythm: Normal rate and regular rhythm.   Pulmonary:      Effort: Pulmonary effort is normal. No respiratory distress.      Breath sounds: Normal breath sounds. No wheezing or rhonchi.   Musculoskeletal:         General: No swelling.   Neurological:      General: No focal deficit present.      Mental Status: She is alert and oriented to person, place, and time.   Psychiatric:         Mood and Affect: Mood normal.         Assessment / Plan:   Adrienne was seen today for follow-up from hospital.    Diagnoses and all orders for this visit:    Anemia, blood loss  -     CBC with Auto Differential; Future  -     Iron and TIBC; Future    Atrial fibrillation, unspecified type (HCC)      Lab slip given for redraw in 2 weeks  Advised to use lasix a few days at a time for extra fluid  Follow up with cardio as planned    I have discussed the diagnosis with the patient and the intended plan as seen in the above orders. The patient has

## 2024-01-29 NOTE — PROGRESS NOTES
Chief Complaint   Patient presents with    Follow-Up from Hospital    Patient is in the office following up from a [facility] hospitalization syncope 1/11/24-1/17/24.  Patient has right leg swelling since 2 weeks.  Patient states she has difficulty breathing on exertion.  No other concerns.      1. Have you been to the ER, urgent care clinic since your last visit?  Hospitalized since your last visit?Yes When: 1/11-1/17 Where: [facility] Reason for visit: Syncope    2. Have you seen or consulted any other health care providers outside of the [facility] since your last visit?  Include any pap smears or colon screening. No

## 2024-02-05 ENCOUNTER — OFFICE VISIT (OUTPATIENT)
Age: 84
End: 2024-02-05
Payer: MEDICARE

## 2024-02-05 VITALS
HEART RATE: 76 BPM | OXYGEN SATURATION: 93 % | HEIGHT: 63 IN | RESPIRATION RATE: 16 BRPM | WEIGHT: 240 LBS | BODY MASS INDEX: 42.52 KG/M2 | DIASTOLIC BLOOD PRESSURE: 62 MMHG | SYSTOLIC BLOOD PRESSURE: 134 MMHG

## 2024-02-05 DIAGNOSIS — Z79.01 ANTICOAGULATED: ICD-10-CM

## 2024-02-05 DIAGNOSIS — R94.39 ABNORMAL STRESS TEST: ICD-10-CM

## 2024-02-05 DIAGNOSIS — R06.09 DOE (DYSPNEA ON EXERTION): Primary | ICD-10-CM

## 2024-02-05 DIAGNOSIS — I48.0 PAF (PAROXYSMAL ATRIAL FIBRILLATION) (HCC): Primary | ICD-10-CM

## 2024-02-05 DIAGNOSIS — I25.10 CORONARY ARTERY DISEASE INVOLVING NATIVE CORONARY ARTERY OF NATIVE HEART WITHOUT ANGINA PECTORIS: ICD-10-CM

## 2024-02-05 DIAGNOSIS — I10 PRIMARY HYPERTENSION: ICD-10-CM

## 2024-02-05 DIAGNOSIS — D50.0 ANEMIA, BLOOD LOSS: ICD-10-CM

## 2024-02-05 DIAGNOSIS — Z01.818 PREOP TESTING: ICD-10-CM

## 2024-02-05 LAB
IRON SATN MFR SERPL: 46 % (ref 20–50)
IRON SERPL-MCNC: 130 UG/DL (ref 35–150)
TIBC SERPL-MCNC: 283 UG/DL (ref 250–450)

## 2024-02-05 PROCEDURE — 1111F DSCHRG MED/CURRENT MED MERGE: CPT | Performed by: NURSE PRACTITIONER

## 2024-02-05 PROCEDURE — 3075F SYST BP GE 130 - 139MM HG: CPT | Performed by: NURSE PRACTITIONER

## 2024-02-05 PROCEDURE — G8427 DOCREV CUR MEDS BY ELIG CLIN: HCPCS | Performed by: NURSE PRACTITIONER

## 2024-02-05 PROCEDURE — 1123F ACP DISCUSS/DSCN MKR DOCD: CPT | Performed by: NURSE PRACTITIONER

## 2024-02-05 PROCEDURE — 99214 OFFICE O/P EST MOD 30 MIN: CPT | Performed by: NURSE PRACTITIONER

## 2024-02-05 PROCEDURE — G8400 PT W/DXA NO RESULTS DOC: HCPCS | Performed by: NURSE PRACTITIONER

## 2024-02-05 PROCEDURE — 3078F DIAST BP <80 MM HG: CPT | Performed by: NURSE PRACTITIONER

## 2024-02-05 PROCEDURE — G8484 FLU IMMUNIZE NO ADMIN: HCPCS | Performed by: NURSE PRACTITIONER

## 2024-02-05 PROCEDURE — 1090F PRES/ABSN URINE INCON ASSESS: CPT | Performed by: NURSE PRACTITIONER

## 2024-02-05 PROCEDURE — 1036F TOBACCO NON-USER: CPT | Performed by: NURSE PRACTITIONER

## 2024-02-05 PROCEDURE — G8417 CALC BMI ABV UP PARAM F/U: HCPCS | Performed by: NURSE PRACTITIONER

## 2024-02-05 RX ORDER — LISINOPRIL 2.5 MG/1
2.5 TABLET ORAL DAILY
COMMUNITY
Start: 2024-01-26

## 2024-02-05 NOTE — PROGRESS NOTES
Room #: EP 2    Chief Complaint   Patient presents with    Atrial Fibrillation     /62 (Site: Right Upper Arm, Position: Sitting, Cuff Size: Large Adult)   Pulse 76   Resp 16   Ht 1.6 m (5' 3\")   Wt 108.9 kg (240 lb)   SpO2 93%   BMI 42.51 kg/m²         Chest pain: NO       1. Have you been to the ER, urgent care clinic outside [facility] since your last visit?  Hospitalized since your last visit? NO         Refills:  NO

## 2024-02-05 NOTE — PROGRESS NOTES
Cardiac Electrophysiology OFFICE Follow-up Note     Primary Cardiologist: Dr. Lozada    Assessment/Plan:   1. PAF (paroxysmal atrial fibrillation) ([facility])  2. Coronary artery disease involving native coronary artery of native heart without angina pectoris  3. Anticoagulated  4. Primary hypertension           Persistent atrial fibrillation  Diagnosed in 05/2023, unknown onset.  Initially paroxysmal but now has progressed to persistent atrial fibrillation.  Duration unknown but echocardiogram demonstrated mild left atrial dilation indicating reasonable degree of remodeling, still adequate candidate for ablation therapy  --S/p PVI, PWI, right gume line atypical AFL ablation, CTI (9/19/23-[facility])  - No recurrence off amiodarone  - Follow up with Dr. Jaimes in 6 months     CAD  - Cath 1/11/24: s/p PTCA only, significant RCA disease. Plans for repeat PCI with arterectomy.   - Continue Plavix and atorvastatin   - Follow up with Dr. Couch in several days as scheduled     Anticoagulation  - Eliquis currently on hold due to recent bleeding, R groin pseudoaneurysm, and anemia     Anemia   - Hg improved to 8.1 on most recent check     SUZAN  - Wears CPAP            Subjective:       Adrienne To is a 83 y.o. patient who presents for follow up of PAF.       Diagnosed 05/01/2023, but date of onset unknown. On sotalol for years due to irregular HR & palpitations, but she's unsure which arrhythmia that was.    S/p PVI, PWI, right gume line atypical AFL ablation, CTI (9/19/23-[facility])    Echo in 05/2023 showed normal LVEF with mildly dilated LA, mild AS/AR.    She had dyspnea and underwent stress test. It showed a small sized, reversible defect. She then underwent cath 1/11/24. S/p PTCA only, significant RCA disease. Plans for repeat PCI with arterectomy. While in recovery she got up to use the restroom and syncopized and was hypotensive. Right groin pseudoaneurysm off a branch of the common femoral artery with extensive hematoma

## 2024-02-06 LAB
BASOPHILS # BLD: 0.1 K/UL (ref 0–0.1)
BASOPHILS NFR BLD: 1 % (ref 0–1)
DIFFERENTIAL METHOD BLD: ABNORMAL
EOSINOPHIL # BLD: 0.2 K/UL (ref 0–0.4)
EOSINOPHIL NFR BLD: 2 % (ref 0–7)
ERYTHROCYTE [DISTWIDTH] IN BLOOD BY AUTOMATED COUNT: 16.3 % (ref 11.5–14.5)
HCT VFR BLD AUTO: 32.9 % (ref 35–47)
HGB BLD-MCNC: 9.8 G/DL (ref 11.5–16)
IMM GRANULOCYTES # BLD AUTO: 0.1 K/UL (ref 0–0.04)
IMM GRANULOCYTES NFR BLD AUTO: 1 % (ref 0–0.5)
LYMPHOCYTES # BLD: 1.1 K/UL (ref 0.8–3.5)
LYMPHOCYTES NFR BLD: 12 % (ref 12–49)
MCH RBC QN AUTO: 32.6 PG (ref 26–34)
MCHC RBC AUTO-ENTMCNC: 29.8 G/DL (ref 30–36.5)
MCV RBC AUTO: 109.3 FL (ref 80–99)
MONOCYTES # BLD: 0.9 K/UL (ref 0–1)
MONOCYTES NFR BLD: 10 % (ref 5–13)
NEUTS SEG # BLD: 6.5 K/UL (ref 1.8–8)
NEUTS SEG NFR BLD: 74 % (ref 32–75)
NRBC # BLD: 0 K/UL (ref 0–0.01)
NRBC BLD-RTO: 0 PER 100 WBC
PLATELET # BLD AUTO: 280 K/UL (ref 150–400)
PMV BLD AUTO: 9.7 FL (ref 8.9–12.9)
RBC # BLD AUTO: 3.01 M/UL (ref 3.8–5.2)
RBC MORPH BLD: ABNORMAL
WBC # BLD AUTO: 8.9 K/UL (ref 3.6–11)

## 2024-02-08 ENCOUNTER — OFFICE VISIT (OUTPATIENT)
Age: 84
End: 2024-02-08
Payer: MEDICARE

## 2024-02-08 VITALS
DIASTOLIC BLOOD PRESSURE: 68 MMHG | HEIGHT: 63 IN | SYSTOLIC BLOOD PRESSURE: 130 MMHG | WEIGHT: 239.4 LBS | HEART RATE: 74 BPM | OXYGEN SATURATION: 94 % | BODY MASS INDEX: 42.42 KG/M2

## 2024-02-08 DIAGNOSIS — I10 ESSENTIAL (PRIMARY) HYPERTENSION: ICD-10-CM

## 2024-02-08 DIAGNOSIS — I25.118 CORONARY ARTERY DISEASE OF NATIVE ARTERY OF NATIVE HEART WITH STABLE ANGINA PECTORIS (HCC): Primary | ICD-10-CM

## 2024-02-08 DIAGNOSIS — R94.39 ABNORMAL STRESS TEST: ICD-10-CM

## 2024-02-08 DIAGNOSIS — E66.01 OBESITY, CLASS III, BMI 40-49.9 (MORBID OBESITY) (HCC): ICD-10-CM

## 2024-02-08 DIAGNOSIS — R06.09 DOE (DYSPNEA ON EXERTION): ICD-10-CM

## 2024-02-08 DIAGNOSIS — E78.2 MIXED HYPERLIPIDEMIA: ICD-10-CM

## 2024-02-08 DIAGNOSIS — D64.9 ANEMIA, UNSPECIFIED TYPE: ICD-10-CM

## 2024-02-08 DIAGNOSIS — I48.0 PAF (PAROXYSMAL ATRIAL FIBRILLATION) (HCC): ICD-10-CM

## 2024-02-08 PROCEDURE — G8400 PT W/DXA NO RESULTS DOC: HCPCS | Performed by: INTERNAL MEDICINE

## 2024-02-08 PROCEDURE — 3078F DIAST BP <80 MM HG: CPT | Performed by: INTERNAL MEDICINE

## 2024-02-08 PROCEDURE — 1090F PRES/ABSN URINE INCON ASSESS: CPT | Performed by: INTERNAL MEDICINE

## 2024-02-08 PROCEDURE — 1123F ACP DISCUSS/DSCN MKR DOCD: CPT | Performed by: INTERNAL MEDICINE

## 2024-02-08 PROCEDURE — 99214 OFFICE O/P EST MOD 30 MIN: CPT | Performed by: INTERNAL MEDICINE

## 2024-02-08 PROCEDURE — 1036F TOBACCO NON-USER: CPT | Performed by: INTERNAL MEDICINE

## 2024-02-08 PROCEDURE — 3075F SYST BP GE 130 - 139MM HG: CPT | Performed by: INTERNAL MEDICINE

## 2024-02-08 PROCEDURE — G8427 DOCREV CUR MEDS BY ELIG CLIN: HCPCS | Performed by: INTERNAL MEDICINE

## 2024-02-08 PROCEDURE — 1111F DSCHRG MED/CURRENT MED MERGE: CPT | Performed by: INTERNAL MEDICINE

## 2024-02-08 PROCEDURE — G8417 CALC BMI ABV UP PARAM F/U: HCPCS | Performed by: INTERNAL MEDICINE

## 2024-02-08 PROCEDURE — G8484 FLU IMMUNIZE NO ADMIN: HCPCS | Performed by: INTERNAL MEDICINE

## 2024-02-08 NOTE — PROGRESS NOTES
Ben Couch MD., FAC    [id number],[facility],[place] [address]    Office [phone],Fax [phone]           Adrienne To is here for a f/u office visit.    Primary care physician:  Kylah Villegas, APRN - NP    CC - as documented in EMR    Dear Kylah Negron, BUBBA - NP    I had the pleasure of seeing Ms.Adrienne To in the office today.      Assessment:     CAD-abnormal stress test/PTCA to mid RCA lesion on cardiac cath 1/11/2024-unable to deliver stent secondary to significant calcium-had right pseudoaneurysm/severe anemia developed after cardiac catheterization underwent thrombus injection    Anemia    PAF-s/p ablation-currently off Eliquis secondary to anemia    Morbid obesity      Plan:      Discussed with patient about repeat LHC/PCI to RCA with rotational atherectomy.  Her hemoglobin is still not back to baseline.  On 2/5/2024 it was 8.6.  Currently she is only on Plavix.  She does not have significant symptoms of angina.  Has chronic dyspnea on exertion.  However she is not very active.    Patient wants to wait on attempting PCI to RCA.    Will restart Eliquis at a lower dose 2.5 mg twice daily.  She will follow-up with PCP in 2 to 3 weeks to get a repeat hemoglobin check.  If hemoglobin is stable/trending upwards then she can continue both Eliquis and Plavix.  However, if her hemoglobin trends downwards, will DC Eliquis and discontinue Plavix.    Patient usually sees Dr. Lozada and has an appointment to see him at the end of the month.  Since she has to come back to see me regarding need for LHC/PCI, she wants to reschedule Dr. Lozada's appointment to later and will follow-up with me in 2 months.  She will make an appointment to see Dr. Lozada in 4 months.  I also indicated to her that she can be treated medically for her CAD and can revisit PCI to RCA if she has worsening symptoms.  She occasionally has chest tightness on exertion.    Fup

## 2024-02-08 NOTE — PROGRESS NOTES
Chief Complaint   Patient presents with    Follow-Up from Hospital     1/11/24- DC  RANKIN  Abnormal stress test         Chest Pain  No        Last Lipids   Lab Results   Component Value Date    CHOL 185 12/12/2023    TRIG 129 12/12/2023    HDL 56 12/12/2023    LDLCALC 103.2 (H) 12/12/2023    VLDL 42 (H) 01/06/2022    CHOLHDLRATIO 3.3 12/12/2023        Refills    /68 (Site: Right Upper Arm, Position: Sitting)   Pulse 74   Ht 1.6 m (5' 3\")   Wt 108.6 kg (239 lb 6.4 oz)   SpO2 94%   BMI 42.41 kg/m²       Have you been to the ER, urgent care clinic since your last visit? No  Hospitalized since your last visit? NO    2. Have you seen or consulted with any other health care providers outside of the [facility] since your last visit?  No

## 2024-02-11 RX ORDER — DULAGLUTIDE 1.5 MG/.5ML
INJECTION, SOLUTION SUBCUTANEOUS
Qty: 2 ML | Refills: 3 | Status: SHIPPED | OUTPATIENT
Start: 2024-02-11

## 2024-02-15 ENCOUNTER — CLINICAL DOCUMENTATION (OUTPATIENT)
Age: 84
End: 2024-02-15

## 2024-02-26 RX ORDER — LEVOCETIRIZINE DIHYDROCHLORIDE 5 MG/1
5 TABLET, FILM COATED ORAL DAILY
Qty: 90 TABLET | Refills: 3 | Status: SHIPPED | OUTPATIENT
Start: 2024-02-26

## 2024-03-04 ENCOUNTER — CLINICAL DOCUMENTATION (OUTPATIENT)
Age: 84
End: 2024-03-04

## 2024-03-07 ENCOUNTER — CLINICAL DOCUMENTATION (OUTPATIENT)
Age: 84
End: 2024-03-07

## 2024-03-07 ENCOUNTER — OFFICE VISIT (OUTPATIENT)
Age: 84
End: 2024-03-07
Payer: MEDICARE

## 2024-03-07 VITALS
WEIGHT: 230.4 LBS | SYSTOLIC BLOOD PRESSURE: 132 MMHG | BODY MASS INDEX: 40.82 KG/M2 | HEIGHT: 63 IN | DIASTOLIC BLOOD PRESSURE: 52 MMHG

## 2024-03-07 DIAGNOSIS — R06.02 SHORTNESS OF BREATH: ICD-10-CM

## 2024-03-07 DIAGNOSIS — E11.9 TYPE 2 DIABETES MELLITUS WITHOUT COMPLICATION, WITHOUT LONG-TERM CURRENT USE OF INSULIN (HCC): Primary | ICD-10-CM

## 2024-03-07 DIAGNOSIS — N18.31 CHRONIC KIDNEY DISEASE, STAGE 3A (HCC): ICD-10-CM

## 2024-03-07 DIAGNOSIS — I48.91 ATRIAL FIBRILLATION, UNSPECIFIED TYPE (HCC): ICD-10-CM

## 2024-03-07 DIAGNOSIS — E78.00 PURE HYPERCHOLESTEROLEMIA, UNSPECIFIED: ICD-10-CM

## 2024-03-07 PROCEDURE — G8417 CALC BMI ABV UP PARAM F/U: HCPCS | Performed by: NURSE PRACTITIONER

## 2024-03-07 PROCEDURE — G8427 DOCREV CUR MEDS BY ELIG CLIN: HCPCS | Performed by: NURSE PRACTITIONER

## 2024-03-07 PROCEDURE — 3075F SYST BP GE 130 - 139MM HG: CPT | Performed by: NURSE PRACTITIONER

## 2024-03-07 PROCEDURE — G8400 PT W/DXA NO RESULTS DOC: HCPCS | Performed by: NURSE PRACTITIONER

## 2024-03-07 PROCEDURE — G8484 FLU IMMUNIZE NO ADMIN: HCPCS | Performed by: NURSE PRACTITIONER

## 2024-03-07 PROCEDURE — 1123F ACP DISCUSS/DSCN MKR DOCD: CPT | Performed by: NURSE PRACTITIONER

## 2024-03-07 PROCEDURE — 1090F PRES/ABSN URINE INCON ASSESS: CPT | Performed by: NURSE PRACTITIONER

## 2024-03-07 PROCEDURE — 3078F DIAST BP <80 MM HG: CPT | Performed by: NURSE PRACTITIONER

## 2024-03-07 PROCEDURE — 99214 OFFICE O/P EST MOD 30 MIN: CPT | Performed by: NURSE PRACTITIONER

## 2024-03-07 PROCEDURE — 1036F TOBACCO NON-USER: CPT | Performed by: NURSE PRACTITIONER

## 2024-03-07 RX ORDER — ATORVASTATIN CALCIUM 40 MG/1
40 TABLET, FILM COATED ORAL DAILY
Qty: 90 TABLET | Refills: 3 | Status: SHIPPED | OUTPATIENT
Start: 2024-03-07

## 2024-03-07 NOTE — PROGRESS NOTES
Subjective:      Patient ID: Adrienne To is a 83 y.o. female.    HPI  She is here for lab update  Recently saw Dr. Sifuentes for a fib  He restarted the eliquis with the plavix  Wants her to have an update for cbc and lipids  She would like to have referral to pulm for consult for the sob    Has appt with endo next month as well  Using her Kiara 2 meter consistently  Reviewed log today, atleast 5-6 readings per day with avg 130 through the day  Continues to take her Lantus insulin 20u per night  Off the Trulicity for now due to  back order    Review of Systems  A comprehensive review of system was obtained and negative except findings in the HPI    BP (!) 132/52   Ht 1.6 m (5' 3\")   Wt 104.5 kg (230 lb 6.4 oz)   BMI 40.81 kg/m²   Objective:   Physical Exam  Vitals and nursing note reviewed.   Constitutional:       General: She is not in acute distress.     Appearance: Normal appearance.   Cardiovascular:      Rate and Rhythm: Normal rate and regular rhythm.   Pulmonary:      Effort: Pulmonary effort is normal. No respiratory distress.      Breath sounds: Normal breath sounds. No wheezing or rhonchi.   Musculoskeletal:         General: No swelling.   Neurological:      General: No focal deficit present.      Mental Status: She is alert and oriented to person, place, and time.   Psychiatric:         Mood and Affect: Mood normal.         Assessment / Plan:   Diagnoses and all orders for this visit:    Type 2 diabetes mellitus without complication, with long-term current use of insulin (HCC)  -     Hemoglobin A1C; Future    Pure hypercholesterolemia, unspecified  -     Lipid Panel; Future    Atrial fibrillation, unspecified type (HCC)  -     CBC with Auto Differential; Future  -     Comprehensive Metabolic Panel; Future    Chronic kidney disease, stage 3a (HCC)  -     CBC with Auto Differential; Future  -     Comprehensive Metabolic Panel; Future    Shortness of breath  -     AFL - Kennedi Payne,

## 2024-03-09 LAB
ALBUMIN SERPL-MCNC: 3.9 G/DL (ref 3.7–4.7)
ALBUMIN/GLOB SERPL: 1.9 {RATIO} (ref 1.2–2.2)
ALP SERPL-CCNC: 117 IU/L (ref 44–121)
ALT SERPL-CCNC: 16 IU/L (ref 0–32)
AST SERPL-CCNC: 15 IU/L (ref 0–40)
BASOPHILS # BLD AUTO: 0 X10E3/UL (ref 0–0.2)
BASOPHILS NFR BLD AUTO: 0 %
BILIRUB SERPL-MCNC: 0.4 MG/DL (ref 0–1.2)
BUN SERPL-MCNC: 25 MG/DL (ref 8–27)
BUN/CREAT SERPL: 22 (ref 12–28)
CALCIUM SERPL-MCNC: 8.8 MG/DL (ref 8.7–10.3)
CHLORIDE SERPL-SCNC: 108 MMOL/L (ref 96–106)
CHOLEST SERPL-MCNC: 168 MG/DL (ref 100–199)
CO2 SERPL-SCNC: 20 MMOL/L (ref 20–29)
CREAT SERPL-MCNC: 1.12 MG/DL (ref 0.57–1)
EGFRCR SERPLBLD CKD-EPI 2021: 49 ML/MIN/1.73
EOSINOPHIL # BLD AUTO: 0.2 X10E3/UL (ref 0–0.4)
EOSINOPHIL NFR BLD AUTO: 2 %
ERYTHROCYTE [DISTWIDTH] IN BLOOD BY AUTOMATED COUNT: 13.9 % (ref 11.7–15.4)
GLOBULIN SER CALC-MCNC: 2.1 G/DL (ref 1.5–4.5)
GLUCOSE SERPL-MCNC: 172 MG/DL (ref 70–99)
HBA1C MFR BLD: 5.6 % (ref 4.8–5.6)
HCT VFR BLD AUTO: 29.9 % (ref 34–46.6)
HDLC SERPL-MCNC: 49 MG/DL
HGB BLD-MCNC: 9.6 G/DL (ref 11.1–15.9)
IMM GRANULOCYTES # BLD AUTO: 0 X10E3/UL (ref 0–0.1)
IMM GRANULOCYTES NFR BLD AUTO: 0 %
IMP & REVIEW OF LAB RESULTS: NORMAL
LDLC SERPL CALC-MCNC: 84 MG/DL (ref 0–99)
LYMPHOCYTES # BLD AUTO: 1.5 X10E3/UL (ref 0.7–3.1)
LYMPHOCYTES NFR BLD AUTO: 16 %
Lab: NORMAL
MCH RBC QN AUTO: 32.4 PG (ref 26.6–33)
MCHC RBC AUTO-ENTMCNC: 32.1 G/DL (ref 31.5–35.7)
MCV RBC AUTO: 101 FL (ref 79–97)
MONOCYTES # BLD AUTO: 1 X10E3/UL (ref 0.1–0.9)
MONOCYTES NFR BLD AUTO: 10 %
NEUTROPHILS # BLD AUTO: 6.6 X10E3/UL (ref 1.4–7)
NEUTROPHILS NFR BLD AUTO: 72 %
PLATELET # BLD AUTO: 303 X10E3/UL (ref 150–450)
POTASSIUM SERPL-SCNC: 4.6 MMOL/L (ref 3.5–5.2)
PROT SERPL-MCNC: 6 G/DL (ref 6–8.5)
RBC # BLD AUTO: 2.96 X10E6/UL (ref 3.77–5.28)
REPORT: NORMAL
REPORT: NORMAL
SODIUM SERPL-SCNC: 143 MMOL/L (ref 134–144)
TRIGL SERPL-MCNC: 208 MG/DL (ref 0–149)
VLDLC SERPL CALC-MCNC: 35 MG/DL (ref 5–40)
WBC # BLD AUTO: 9.3 X10E3/UL (ref 3.4–10.8)

## 2024-03-11 RX ORDER — LISINOPRIL 2.5 MG/1
2.5 TABLET ORAL DAILY
Qty: 30 TABLET | Refills: 5 | Status: SHIPPED | OUTPATIENT
Start: 2024-03-11

## 2024-03-14 ENCOUNTER — CLINICAL DOCUMENTATION (OUTPATIENT)
Age: 84
End: 2024-03-14

## 2024-03-14 NOTE — PROGRESS NOTES
Faxed Patient's (Adrienne   ) Visit summary forms to [facility] on 3/14/24 with conformation.

## 2024-04-08 ENCOUNTER — OFFICE VISIT (OUTPATIENT)
Age: 84
End: 2024-04-08
Payer: MEDICARE

## 2024-04-08 VITALS
WEIGHT: 224.6 LBS | SYSTOLIC BLOOD PRESSURE: 120 MMHG | HEART RATE: 65 BPM | BODY MASS INDEX: 39.79 KG/M2 | RESPIRATION RATE: 18 BRPM | OXYGEN SATURATION: 99 % | DIASTOLIC BLOOD PRESSURE: 60 MMHG

## 2024-04-08 DIAGNOSIS — I10 PRIMARY HYPERTENSION: ICD-10-CM

## 2024-04-08 DIAGNOSIS — I48.91 ATRIAL FIBRILLATION, UNSPECIFIED TYPE (HCC): ICD-10-CM

## 2024-04-08 DIAGNOSIS — I25.118 CORONARY ARTERY DISEASE OF NATIVE ARTERY OF NATIVE HEART WITH STABLE ANGINA PECTORIS (HCC): Primary | ICD-10-CM

## 2024-04-08 DIAGNOSIS — I25.118 CORONARY ARTERY DISEASE OF NATIVE ARTERY OF NATIVE HEART WITH STABLE ANGINA PECTORIS (HCC): ICD-10-CM

## 2024-04-08 DIAGNOSIS — R06.09 DOE (DYSPNEA ON EXERTION): ICD-10-CM

## 2024-04-08 PROCEDURE — 3074F SYST BP LT 130 MM HG: CPT | Performed by: INTERNAL MEDICINE

## 2024-04-08 PROCEDURE — G8417 CALC BMI ABV UP PARAM F/U: HCPCS | Performed by: INTERNAL MEDICINE

## 2024-04-08 PROCEDURE — G8400 PT W/DXA NO RESULTS DOC: HCPCS | Performed by: INTERNAL MEDICINE

## 2024-04-08 PROCEDURE — G8427 DOCREV CUR MEDS BY ELIG CLIN: HCPCS | Performed by: INTERNAL MEDICINE

## 2024-04-08 PROCEDURE — 99214 OFFICE O/P EST MOD 30 MIN: CPT | Performed by: INTERNAL MEDICINE

## 2024-04-08 PROCEDURE — 93010 ELECTROCARDIOGRAM REPORT: CPT | Performed by: INTERNAL MEDICINE

## 2024-04-08 PROCEDURE — 3078F DIAST BP <80 MM HG: CPT | Performed by: INTERNAL MEDICINE

## 2024-04-08 PROCEDURE — 1036F TOBACCO NON-USER: CPT | Performed by: INTERNAL MEDICINE

## 2024-04-08 PROCEDURE — 93005 ELECTROCARDIOGRAM TRACING: CPT | Performed by: INTERNAL MEDICINE

## 2024-04-08 PROCEDURE — 1090F PRES/ABSN URINE INCON ASSESS: CPT | Performed by: INTERNAL MEDICINE

## 2024-04-08 PROCEDURE — 1123F ACP DISCUSS/DSCN MKR DOCD: CPT | Performed by: INTERNAL MEDICINE

## 2024-04-08 NOTE — PROGRESS NOTES
Chief Complaint   Patient presents with    Coronary Artery Disease    Other     PAF, anemia         Chest Pain  No        Last Lipids   Lab Results   Component Value Date    CHOL 168 03/08/2024    TRIG 208 (H) 03/08/2024    HDL 49 03/08/2024    LDLCALC 84 03/08/2024    VLDL 42 (H) 01/06/2022    CHOLHDLRATIO 3.3 12/12/2023        Refills    /60 (Site: Right Upper Arm, Position: Sitting, Cuff Size: Medium Adult)   Pulse 65   Resp 18   Wt 101.9 kg (224 lb 9.6 oz)   SpO2 99%   BMI 39.79 kg/m²       Have you been to the ER, urgent care clinic since your last visit? No  Hospitalized since your last visit? NO    2. Have you seen or consulted with any other health care providers outside of the [facility] since your last visit?  No

## 2024-04-08 NOTE — PROGRESS NOTES
eBn Couch MD., [facility]    [address],[facility],[place] [address]    Office [phone],Fax [phone]           Adrienne To is here for a f/u office visit.    Primary care physician:  Kylah Villegas, APRN - NP    CC - as documented in EMR    Dear Ms . BakariKylah APRN - NP    I had the pleasure of seeing Ms.Adrienne To in the office today.      Assessment:     CAD-abnormal stress test/PTCA to mid RCA lesion on cardiac cath 1/11/2024-unable to deliver stent secondary to significant calcium-had right pseudoaneurysm/severe anemia developed after cardiac catheterization underwent thrombus injection    Anemia    PAF-s/p ablation-currently off Eliquis secondary to anemia    Morbid obesity      Plan:      Discussed with patient about repeat LHC/PCI to RCA with rotational atherectomy.  Her hemoglobin is still not back to baseline.  On 2/5/2024 it was 8.6.  On 3/8/2024 it was 9.6.  Currently she is only Eliquis/Plavix.  She does not have significant symptoms of angina.  Has chronic dyspnea on exertion.  However she is not very active.  Will monitor hemoglobin and if stable/improves will schedule for RCA PCI- will need atherectomy.  RIBA of high risk PCI explained to patient.    Also seeing pulmonary-dyspnea improved slightly.    Patient usually sees Dr. Lozada but she will follow-up with me to schedule her for the PCI and then will be seen by Dr. Lozada thereafter.    Fup 3months/earlier prn.  CBC/CMP prior to the visit.      Patient understands the plan. All questions were answered to the patient's satisfaction.    I appreciate the opportunity to be involved in . See note below for details. Please do not hesitate to contact us with questions or concerns.    Ben Couch MD      Cardiac Testing/ Procedures:       A.Cardiac Cath/PCI:1/11/24  Access: R Radial Access - 6 F sheath   -

## 2024-04-15 ENCOUNTER — OFFICE VISIT (OUTPATIENT)
Age: 84
End: 2024-04-15
Payer: MEDICARE

## 2024-04-15 VITALS
SYSTOLIC BLOOD PRESSURE: 140 MMHG | WEIGHT: 227 LBS | HEART RATE: 79 BPM | OXYGEN SATURATION: 95 % | BODY MASS INDEX: 40.22 KG/M2 | DIASTOLIC BLOOD PRESSURE: 65 MMHG | TEMPERATURE: 98.2 F | HEIGHT: 63 IN

## 2024-04-15 DIAGNOSIS — Z79.4 TYPE 2 DIABETES MELLITUS WITH STAGE 4 CHRONIC KIDNEY DISEASE, WITH LONG-TERM CURRENT USE OF INSULIN (HCC): Primary | ICD-10-CM

## 2024-04-15 DIAGNOSIS — E78.5 DYSLIPIDEMIA: ICD-10-CM

## 2024-04-15 DIAGNOSIS — N18.4 TYPE 2 DIABETES MELLITUS WITH STAGE 4 CHRONIC KIDNEY DISEASE, WITH LONG-TERM CURRENT USE OF INSULIN (HCC): Primary | ICD-10-CM

## 2024-04-15 DIAGNOSIS — E11.22 TYPE 2 DIABETES MELLITUS WITH STAGE 4 CHRONIC KIDNEY DISEASE, WITH LONG-TERM CURRENT USE OF INSULIN (HCC): Primary | ICD-10-CM

## 2024-04-15 PROCEDURE — 1123F ACP DISCUSS/DSCN MKR DOCD: CPT | Performed by: INTERNAL MEDICINE

## 2024-04-15 PROCEDURE — G8427 DOCREV CUR MEDS BY ELIG CLIN: HCPCS | Performed by: INTERNAL MEDICINE

## 2024-04-15 PROCEDURE — G8417 CALC BMI ABV UP PARAM F/U: HCPCS | Performed by: INTERNAL MEDICINE

## 2024-04-15 PROCEDURE — 3077F SYST BP >= 140 MM HG: CPT | Performed by: INTERNAL MEDICINE

## 2024-04-15 PROCEDURE — 3044F HG A1C LEVEL LT 7.0%: CPT | Performed by: INTERNAL MEDICINE

## 2024-04-15 PROCEDURE — 95251 CONT GLUC MNTR ANALYSIS I&R: CPT | Performed by: INTERNAL MEDICINE

## 2024-04-15 PROCEDURE — 3078F DIAST BP <80 MM HG: CPT | Performed by: INTERNAL MEDICINE

## 2024-04-15 PROCEDURE — 1090F PRES/ABSN URINE INCON ASSESS: CPT | Performed by: INTERNAL MEDICINE

## 2024-04-15 PROCEDURE — 1036F TOBACCO NON-USER: CPT | Performed by: INTERNAL MEDICINE

## 2024-04-15 PROCEDURE — G8400 PT W/DXA NO RESULTS DOC: HCPCS | Performed by: INTERNAL MEDICINE

## 2024-04-15 PROCEDURE — 99214 OFFICE O/P EST MOD 30 MIN: CPT | Performed by: INTERNAL MEDICINE

## 2024-04-15 NOTE — ASSESSMENT & PLAN NOTE
3/2024 - egfr 49  Continuous glucose monitor was downloaded, printed reported generated. Time range, 14 days. I independently reviewed and analyzed these findings  Doing very well  Mild hypoglycemia  Stop Lantus   Start Tradjenta 5 mg daily   Start Jardiance 10 mg daily   -goal HbA1c is 8 or less without frequent lows  -to call if BS is <70 or >300  -bring log and meter to each visit  -I reviewed pertinent lab data, provider notes, imaging and reports in care-everywhere   -DM HM:  ophtho: upcoming 1/2023   Renal: monitor - CKD   feet: following w/podiatrist

## 2024-04-15 NOTE — PROGRESS NOTES
Adrienne To is a 83 y.o. female here for No chief complaint on file.      1. Have you been to the ER, urgent care clinic since your last visit?  Hospitalized since your last visit? -NO    2. Have you seen or consulted any other health care providers outside of the [facility] since your last visit?  Include any pap smears or colon screening.-Pulmonary doctor Dr. Galeas on March 25th for new pt(difficulty breathing/SOB)      
84 03/08/2024 04:06 PM     Lab Results   Component Value Date/Time    TSH 2.750 11/10/2023 02:23 PM     No results found for: \"CPEPLT\", \"CPEPTIDE\"    Current Outpatient Medications   Medication Sig Dispense Refill    empagliflozin (JARDIANCE) 10 MG tablet Take 1 tablet by mouth daily 90 tablet 1    SITagliptin (JANUVIA) 50 MG tablet Take 1 tablet by mouth daily 90 tablet 1    Budeson-Glycopyrrol-Formoterol (BREZTRI AEROSPHERE IN) Inhale 2 puffs into the lungs in the morning and at bedtime      lisinopril (PRINIVIL;ZESTRIL) 2.5 MG tablet Take 1 tablet by mouth daily 30 tablet 5    atorvastatin (LIPITOR) 40 MG tablet Take 1 tablet by mouth daily 90 tablet 3    apixaban (ELIQUIS) 2.5 MG TABS tablet Take 1 tablet by mouth 2 times daily 180 tablet 1    levocetirizine (XYZAL) 5 MG tablet TAKE 1 TABLET DAILY 90 tablet 3    vitamin C (ASCORBIC ACID) 500 MG tablet Take 1 tablet by mouth daily      clopidogrel (PLAVIX) 75 MG tablet Take 1 tablet by mouth daily 30 tablet 3    furosemide (LASIX) 20 MG tablet Take 20 mg daily. 180 tablet 3    ferrous sulfate (IRON 325) 325 (65 Fe) MG tablet Take 1 tablet by mouth daily (with breakfast)      albuterol sulfate HFA (PROVENTIL;VENTOLIN;PROAIR) 108 (90 Base) MCG/ACT inhaler Inhale 2 puffs into the lungs every 4 hours as needed for Wheezing or Shortness of Breath 3 each 5    Coenzyme Q10 (COQ10) 100 MG CAPS Take 1 capsule by mouth daily       No current facility-administered medications for this visit.        Past Medical History:   Diagnosis Date    CAD (coronary artery disease)     CKD (chronic kidney disease)     Diabetes (HCC)     Dyslipidemia     Heart attack (HCC) 2014    Had cath and treated medically     HTN (hypertension)     Monoclonal gammopathies     Obesity     SUZAN on CPAP     Tachycardia     placed on Sotalol at [place]         Past Surgical History:   Procedure Laterality Date    BREAST BIOPSY Left     several bxs on left, all benign    BREAST SURGERY      CARDIAC

## 2024-04-16 DIAGNOSIS — N18.4 TYPE 2 DIABETES MELLITUS WITH STAGE 4 CHRONIC KIDNEY DISEASE, WITH LONG-TERM CURRENT USE OF INSULIN (HCC): ICD-10-CM

## 2024-04-16 DIAGNOSIS — E11.22 TYPE 2 DIABETES MELLITUS WITH STAGE 4 CHRONIC KIDNEY DISEASE, WITH LONG-TERM CURRENT USE OF INSULIN (HCC): ICD-10-CM

## 2024-04-16 DIAGNOSIS — Z79.4 TYPE 2 DIABETES MELLITUS WITH STAGE 4 CHRONIC KIDNEY DISEASE, WITH LONG-TERM CURRENT USE OF INSULIN (HCC): ICD-10-CM

## 2024-04-16 NOTE — TELEPHONE ENCOUNTER
Pt stated medications are too expensive at [facility]. Pt wants rx to be send to [facility]

## 2024-04-22 RX ORDER — CLOPIDOGREL BISULFATE 75 MG/1
75 TABLET ORAL DAILY
Qty: 90 TABLET | Refills: 3 | Status: SHIPPED | OUTPATIENT
Start: 2024-04-22

## 2024-05-09 ENCOUNTER — TRANSCRIBE ORDERS (OUTPATIENT)
Facility: HOSPITAL | Age: 84
End: 2024-05-09

## 2024-05-09 DIAGNOSIS — Z12.31 VISIT FOR SCREENING MAMMOGRAM: Primary | ICD-10-CM

## 2024-05-09 RX ORDER — LISINOPRIL 2.5 MG/1
2.5 TABLET ORAL DAILY
Qty: 90 TABLET | Refills: 0 | Status: SHIPPED | OUTPATIENT
Start: 2024-05-09

## 2024-05-09 RX ORDER — CLOPIDOGREL BISULFATE 75 MG/1
75 TABLET ORAL DAILY
Qty: 90 TABLET | Refills: 3 | Status: SHIPPED | OUTPATIENT
Start: 2024-05-09

## 2024-05-15 ENCOUNTER — TELEPHONE (OUTPATIENT)
Age: 84
End: 2024-05-15

## 2024-05-15 DIAGNOSIS — I25.10 CORONARY ARTERY DISEASE, UNSPECIFIED VESSEL OR LESION TYPE, UNSPECIFIED WHETHER ANGINA PRESENT, UNSPECIFIED WHETHER NATIVE OR TRANSPLANTED HEART: Primary | ICD-10-CM

## 2024-05-15 NOTE — TELEPHONE ENCOUNTER
Spoke with Pt of RCA PCI w/Rotational Atherectomy tyson. For 5/28/24 At 9:15am arrive at 7:45am Pt aware that they need a  they must stay on site NPO from Midnight the night before. Check in at the second floor Outpt. Reg. Desk. Pt is to have Labs done between 5/15/24-5/21/24.   Medications:  Hold Eliquis 2 days prior to procedure  Hold Jardiance day of prpcedure  Hold Januvia day of procedure  Hold Lasix day of procedure   VO by /nurse Steven WEIR

## 2024-05-17 LAB
ERYTHROCYTE [DISTWIDTH] IN BLOOD BY AUTOMATED COUNT: 13.7 % (ref 11.7–15.4)
HCT VFR BLD AUTO: 33.5 % (ref 34–46.6)
HGB BLD-MCNC: 11.4 G/DL (ref 11.1–15.9)
MCH RBC QN AUTO: 31.1 PG (ref 26.6–33)
MCHC RBC AUTO-ENTMCNC: 34 G/DL (ref 31.5–35.7)
MCV RBC AUTO: 91 FL (ref 79–97)
PLATELET # BLD AUTO: 264 X10E3/UL (ref 150–450)
RBC # BLD AUTO: 3.67 X10E6/UL (ref 3.77–5.28)
WBC # BLD AUTO: 10.2 X10E3/UL (ref 3.4–10.8)

## 2024-05-22 ENCOUNTER — DIRECT ADMIT ORDERS (OUTPATIENT)
Facility: HOSPITAL | Age: 84
End: 2024-05-22

## 2024-05-22 DIAGNOSIS — I25.10 CORONARY ARTERY DISEASE: Primary | ICD-10-CM

## 2024-05-22 DIAGNOSIS — R06.09 DOE (DYSPNEA ON EXERTION): ICD-10-CM

## 2024-05-22 DIAGNOSIS — I25.10 CORONARY ARTERY DISEASE: ICD-10-CM

## 2024-05-22 DIAGNOSIS — Z01.818 PREOP TESTING: ICD-10-CM

## 2024-05-22 DIAGNOSIS — I10 ESSENTIAL (PRIMARY) HYPERTENSION: ICD-10-CM

## 2024-05-22 RX ORDER — SODIUM CHLORIDE 9 MG/ML
INJECTION, SOLUTION INTRAVENOUS PRN
OUTPATIENT
Start: 2024-05-28

## 2024-05-22 RX ORDER — SODIUM CHLORIDE 0.9 % (FLUSH) 0.9 %
5-40 SYRINGE (ML) INJECTION PRN
OUTPATIENT
Start: 2024-05-28

## 2024-05-22 RX ORDER — SODIUM CHLORIDE 9 MG/ML
INJECTION, SOLUTION INTRAVENOUS CONTINUOUS
OUTPATIENT
Start: 2024-05-28

## 2024-05-22 RX ORDER — SODIUM CHLORIDE 0.9 % (FLUSH) 0.9 %
5-40 SYRINGE (ML) INJECTION EVERY 12 HOURS SCHEDULED
OUTPATIENT
Start: 2024-05-28

## 2024-05-23 LAB
ALBUMIN SERPL-MCNC: 3.6 G/DL (ref 3.7–4.7)
ALBUMIN/GLOB SERPL: 1.6 {RATIO} (ref 1.2–2.2)
ALP SERPL-CCNC: 120 IU/L (ref 44–121)
ALT SERPL-CCNC: 17 IU/L (ref 0–32)
AST SERPL-CCNC: 16 IU/L (ref 0–40)
BILIRUB SERPL-MCNC: 0.3 MG/DL (ref 0–1.2)
BUN SERPL-MCNC: 34 MG/DL (ref 8–27)
BUN/CREAT SERPL: 27 (ref 12–28)
CALCIUM SERPL-MCNC: 8.9 MG/DL (ref 8.7–10.3)
CHLORIDE SERPL-SCNC: 106 MMOL/L (ref 96–106)
CO2 SERPL-SCNC: 20 MMOL/L (ref 20–29)
CREAT SERPL-MCNC: 1.25 MG/DL (ref 0.57–1)
EGFRCR SERPLBLD CKD-EPI 2021: 43 ML/MIN/1.73
GLOBULIN SER CALC-MCNC: 2.3 G/DL (ref 1.5–4.5)
GLUCOSE SERPL-MCNC: 147 MG/DL (ref 70–99)
POTASSIUM SERPL-SCNC: 4.5 MMOL/L (ref 3.5–5.2)
PROT SERPL-MCNC: 5.9 G/DL (ref 6–8.5)
REPORT: NORMAL
SODIUM SERPL-SCNC: 141 MMOL/L (ref 134–144)

## 2024-05-23 RX ORDER — BUDESONIDE, GLYCOPYRROLATE, AND FORMOTEROL FUMARATE 160; 9; 4.8 UG/1; UG/1; UG/1
AEROSOL, METERED RESPIRATORY (INHALATION)
Qty: 3 EACH | Refills: 3 | Status: SHIPPED | OUTPATIENT
Start: 2024-05-23

## 2024-05-24 NOTE — TELEPHONE ENCOUNTER
Left Message For Pt:       Just a reminder not to forget you PCI scheduled for Tuesday 5/29/24.  Arriving at 7:45am  You will need a  must stay on site  NPO from midnight   check in at the second floor  outpt. reg. Desk.  Medications:  Hold Eliquis 2 days prior to procedure  Hold Jardiance day of procedure  Hold Januvia day of procedure   Hold Lasix day of procedure  VO by /nurse Steven WEIR

## 2024-05-28 ENCOUNTER — HOSPITAL ENCOUNTER (OUTPATIENT)
Facility: HOSPITAL | Age: 84
Setting detail: OBSERVATION
LOS: 1 days | Discharge: HOME | End: 2024-05-29
Attending: INTERNAL MEDICINE | Admitting: INTERNAL MEDICINE
Payer: MEDICARE

## 2024-05-28 DIAGNOSIS — I25.10 CORONARY ARTERY DISEASE: ICD-10-CM

## 2024-05-28 PROBLEM — Z98.61 CAD S/P PERCUTANEOUS CORONARY ANGIOPLASTY: Status: ACTIVE | Noted: 2024-05-28

## 2024-05-28 LAB
ACT BLD: 179 SECS (ref 79–138)
ACT BLD: 190 SECS (ref 79–138)
ACT BLD: 244 SECS (ref 79–138)
ACT BLD: 288 SECS (ref 79–138)
ACT BLD: 293 SECS (ref 79–138)
ACT BLD: 325 SECS (ref 79–138)
GLUCOSE BLD STRIP.AUTO-MCNC: 120 MG/DL (ref 65–117)
GLUCOSE BLD STRIP.AUTO-MCNC: 135 MG/DL (ref 65–117)
GLUCOSE BLD STRIP.AUTO-MCNC: 190 MG/DL (ref 65–117)
GLUCOSE BLD STRIP.AUTO-MCNC: 97 MG/DL (ref 65–117)
SERVICE CMNT-IMP: ABNORMAL
SERVICE CMNT-IMP: NORMAL

## 2024-05-28 PROCEDURE — 99152 MOD SED SAME PHYS/QHP 5/>YRS: CPT | Performed by: INTERNAL MEDICINE

## 2024-05-28 PROCEDURE — 92978 ENDOLUMINL IVUS OCT C 1ST: CPT | Performed by: INTERNAL MEDICINE

## 2024-05-28 PROCEDURE — C1761 HC CATH TRANSLUM INTRAVASCULAR LITHOTRIPSY CORONARY: HCPCS | Performed by: INTERNAL MEDICINE

## 2024-05-28 PROCEDURE — C1769 GUIDE WIRE: HCPCS | Performed by: INTERNAL MEDICINE

## 2024-05-28 PROCEDURE — 6360000002 HC RX W HCPCS: Performed by: INTERNAL MEDICINE

## 2024-05-28 PROCEDURE — 99153 MOD SED SAME PHYS/QHP EA: CPT | Performed by: INTERNAL MEDICINE

## 2024-05-28 PROCEDURE — C1760 CLOSURE DEV, VASC: HCPCS | Performed by: INTERNAL MEDICINE

## 2024-05-28 PROCEDURE — 85347 COAGULATION TIME ACTIVATED: CPT

## 2024-05-28 PROCEDURE — C1894 INTRO/SHEATH, NON-LASER: HCPCS | Performed by: INTERNAL MEDICINE

## 2024-05-28 PROCEDURE — 2709999900 HC NON-CHARGEABLE SUPPLY: Performed by: INTERNAL MEDICINE

## 2024-05-28 PROCEDURE — 92972 PERQ TRLUML CORONRY LITHOTRP: CPT | Performed by: INTERNAL MEDICINE

## 2024-05-28 PROCEDURE — C1724 CATH, TRANS ATHEREC,ROTATION: HCPCS | Performed by: INTERNAL MEDICINE

## 2024-05-28 PROCEDURE — 93458 L HRT ARTERY/VENTRICLE ANGIO: CPT | Performed by: INTERNAL MEDICINE

## 2024-05-28 PROCEDURE — 76937 US GUIDE VASCULAR ACCESS: CPT | Performed by: INTERNAL MEDICINE

## 2024-05-28 PROCEDURE — C1725 CATH, TRANSLUMIN NON-LASER: HCPCS | Performed by: INTERNAL MEDICINE

## 2024-05-28 PROCEDURE — G0378 HOSPITAL OBSERVATION PER HR: HCPCS

## 2024-05-28 PROCEDURE — C1753 CATH, INTRAVAS ULTRASOUND: HCPCS | Performed by: INTERNAL MEDICINE

## 2024-05-28 PROCEDURE — 82962 GLUCOSE BLOOD TEST: CPT

## 2024-05-28 PROCEDURE — 2580000003 HC RX 258: Performed by: INTERNAL MEDICINE

## 2024-05-28 PROCEDURE — C1887 CATHETER, GUIDING: HCPCS | Performed by: INTERNAL MEDICINE

## 2024-05-28 PROCEDURE — 6360000004 HC RX CONTRAST MEDICATION: Performed by: INTERNAL MEDICINE

## 2024-05-28 PROCEDURE — 92924 PRQ TRLUML C ATHRC 1 ART&/BR: CPT | Performed by: INTERNAL MEDICINE

## 2024-05-28 PROCEDURE — 2500000003 HC RX 250 WO HCPCS: Performed by: INTERNAL MEDICINE

## 2024-05-28 PROCEDURE — 85347 COAGULATION TIME ACTIVATED: CPT | Performed by: INTERNAL MEDICINE

## 2024-05-28 RX ORDER — LIDOCAINE HYDROCHLORIDE 10 MG/ML
INJECTION, SOLUTION INFILTRATION; PERINEURAL PRN
Status: DISCONTINUED | OUTPATIENT
Start: 2024-05-28 | End: 2024-05-28 | Stop reason: HOSPADM

## 2024-05-28 RX ORDER — DEXTROSE MONOHYDRATE 100 MG/ML
INJECTION, SOLUTION INTRAVENOUS CONTINUOUS PRN
Status: DISCONTINUED | OUTPATIENT
Start: 2024-05-28 | End: 2024-05-29 | Stop reason: HOSPADM

## 2024-05-28 RX ORDER — CLOPIDOGREL BISULFATE 75 MG/1
75 TABLET ORAL DAILY
Status: DISCONTINUED | OUTPATIENT
Start: 2024-05-28 | End: 2024-05-29 | Stop reason: HOSPADM

## 2024-05-28 RX ORDER — FENTANYL CITRATE 50 UG/ML
INJECTION, SOLUTION INTRAMUSCULAR; INTRAVENOUS PRN
Status: DISCONTINUED | OUTPATIENT
Start: 2024-05-28 | End: 2024-05-28 | Stop reason: HOSPADM

## 2024-05-28 RX ORDER — FERROUS SULFATE 325(65) MG
325 TABLET ORAL
Status: DISCONTINUED | OUTPATIENT
Start: 2024-05-29 | End: 2024-05-29 | Stop reason: HOSPADM

## 2024-05-28 RX ORDER — FUROSEMIDE 20 MG/1
20 TABLET ORAL DAILY
Status: DISCONTINUED | OUTPATIENT
Start: 2024-05-29 | End: 2024-05-29 | Stop reason: HOSPADM

## 2024-05-28 RX ORDER — SODIUM CHLORIDE 0.9 % (FLUSH) 0.9 %
5-40 SYRINGE (ML) INJECTION EVERY 12 HOURS SCHEDULED
Status: DISCONTINUED | OUTPATIENT
Start: 2024-05-28 | End: 2024-05-28 | Stop reason: HOSPADM

## 2024-05-28 RX ORDER — LISINOPRIL 5 MG/1
2.5 TABLET ORAL DAILY
Status: DISCONTINUED | OUTPATIENT
Start: 2024-05-29 | End: 2024-05-29 | Stop reason: HOSPADM

## 2024-05-28 RX ORDER — ATORVASTATIN CALCIUM 20 MG/1
40 TABLET, FILM COATED ORAL DAILY
Status: DISCONTINUED | OUTPATIENT
Start: 2024-05-29 | End: 2024-05-29 | Stop reason: HOSPADM

## 2024-05-28 RX ORDER — HEPARIN SODIUM 200 [USP'U]/100ML
INJECTION, SOLUTION INTRAVENOUS CONTINUOUS PRN
Status: COMPLETED | OUTPATIENT
Start: 2024-05-28 | End: 2024-05-28

## 2024-05-28 RX ORDER — ALBUTEROL SULFATE 90 UG/1
2 AEROSOL, METERED RESPIRATORY (INHALATION) EVERY 4 HOURS PRN
Status: DISCONTINUED | OUTPATIENT
Start: 2024-05-28 | End: 2024-05-28

## 2024-05-28 RX ORDER — SODIUM CHLORIDE 9 MG/ML
INJECTION, SOLUTION INTRAVENOUS PRN
Status: DISCONTINUED | OUTPATIENT
Start: 2024-05-28 | End: 2024-05-28 | Stop reason: HOSPADM

## 2024-05-28 RX ORDER — INSULIN LISPRO 100 [IU]/ML
0-8 INJECTION, SOLUTION INTRAVENOUS; SUBCUTANEOUS
Status: DISCONTINUED | OUTPATIENT
Start: 2024-05-28 | End: 2024-05-29 | Stop reason: HOSPADM

## 2024-05-28 RX ORDER — MIDAZOLAM HYDROCHLORIDE 1 MG/ML
INJECTION INTRAMUSCULAR; INTRAVENOUS PRN
Status: DISCONTINUED | OUTPATIENT
Start: 2024-05-28 | End: 2024-05-28 | Stop reason: HOSPADM

## 2024-05-28 RX ORDER — INSULIN LISPRO 100 [IU]/ML
0-4 INJECTION, SOLUTION INTRAVENOUS; SUBCUTANEOUS NIGHTLY
Status: DISCONTINUED | OUTPATIENT
Start: 2024-05-28 | End: 2024-05-29 | Stop reason: HOSPADM

## 2024-05-28 RX ORDER — HEPARIN SODIUM 1000 [USP'U]/ML
INJECTION, SOLUTION INTRAVENOUS; SUBCUTANEOUS PRN
Status: DISCONTINUED | OUTPATIENT
Start: 2024-05-28 | End: 2024-05-28 | Stop reason: HOSPADM

## 2024-05-28 RX ORDER — SODIUM CHLORIDE 0.9 % (FLUSH) 0.9 %
5-40 SYRINGE (ML) INJECTION PRN
Status: DISCONTINUED | OUTPATIENT
Start: 2024-05-28 | End: 2024-05-28 | Stop reason: HOSPADM

## 2024-05-28 RX ORDER — PHENYLEPHRINE HCL IN 0.9% NACL 0.4MG/10ML
SYRINGE (ML) INTRAVENOUS PRN
Status: DISCONTINUED | OUTPATIENT
Start: 2024-05-28 | End: 2024-05-28 | Stop reason: HOSPADM

## 2024-05-28 RX ORDER — GLUCAGON 1 MG
1 KIT INJECTION PRN
Status: DISCONTINUED | OUTPATIENT
Start: 2024-05-28 | End: 2024-05-29 | Stop reason: HOSPADM

## 2024-05-28 RX ORDER — SODIUM CHLORIDE 9 MG/ML
INJECTION, SOLUTION INTRAVENOUS CONTINUOUS
Status: DISCONTINUED | OUTPATIENT
Start: 2024-05-28 | End: 2024-05-28 | Stop reason: HOSPADM

## 2024-05-28 RX ORDER — ALBUTEROL SULFATE 2.5 MG/3ML
2.5 SOLUTION RESPIRATORY (INHALATION) EVERY 4 HOURS PRN
Status: DISCONTINUED | OUTPATIENT
Start: 2024-05-28 | End: 2024-05-29 | Stop reason: HOSPADM

## 2024-05-28 RX ORDER — ACETAMINOPHEN 325 MG/1
650 TABLET ORAL EVERY 4 HOURS PRN
Status: DISCONTINUED | OUTPATIENT
Start: 2024-05-28 | End: 2024-05-29 | Stop reason: HOSPADM

## 2024-05-28 RX ADMIN — AMINOPHYLLINE 750 MG/HR: 25 INJECTION, SOLUTION INTRAVENOUS at 10:34

## 2024-05-28 ASSESSMENT — PAIN DESCRIPTION - LOCATION: LOCATION: CHEST;BACK

## 2024-05-28 ASSESSMENT — PAIN SCALES - GENERAL: PAINLEVEL_OUTOF10: 10

## 2024-05-28 NOTE — CARDIO/PULMONARY
[facility]- Referral  Chart review completed.  Noted: RCA arthrectomy as part of a staged PCI.  Pt was previously admitted on 1/11/24 and had PCI procedure.  Hospitalization prolonged with groin bleed.  Pt was discharged on 1/17/24 and admitted to [facility] from 1/17/24-1/25/24.  Pt requested the staged procedure to be delayed and today had arthrectomy.   Met with patient during January PCI procedure.  (See note 1/12/24).  Pt did express interest in participating. However, she requested to enroll at the [facility] location. Referral was placed for this location.   Enrollment was put on hold until after this 2nd procedure.  [facility] will contact [facility] CR program to communicate pt's procedure was completed today.   Educational handouts were provided at the time of the January 2024 procedure.    MASON Lopez RN

## 2024-05-28 NOTE — PROCEDURES
BRIEF PROCEDURE NOTE    Date of Procedure: 5/28/2024   Preoperative Diagnosis: Severe mid RCA dz- ( PTCA 1/11/24); non obstructive dz LCA  Postoperative Diagnosis: Rotational atherectomy/PTCA with cutting balloon Mid RCA - unable to deliver lithotripsy balloon/stent ; no nobstructive dz LCA  Procedure: Left heart cath, LV angiography, coronary angiography  Interventional Cardiologist: Ben Couch MD  Assistant : none  Anesthesia: local + IV sedation  I administered moderate sedation throughout this procedure. An independent trained observer pushed medications at my direction, and monitored the patient’s level of consciousness and physiological status throughout.  Estimated Blood Loss: Minimal    Access:   R CFA - Ultrasound/Fluoroscopic guided micropuncture access - 7 F sheath;   R FV- Ultrasound/Fluoroscopic guided micropuncture access - 5 F sheath    Catheters: RCA : AR1 with SH                    LCA : JL4    Findings:     Calcified cors     L Main: Long;Med; MLI     LAD:  Med; Tortuous; Prox/Mid 30%; D1 - small to med; MLI     LCflex: Prox - small; Distal very small; OM1 - small; Prox 40%; Ca++     RI - small; Mod dz     RCA: Med to Large; Dominant; Ca++; Mid 80%; PDA - small; MLI; PLB - small to med; MLI     LVEDP: 15 mm Hg     LVEF: Not assessed     No significant gradient across aortic valve.    PCI:     Mid RCA - 80%   7 F JR 4 guide  Wired with [employer]   Exchanged for Rotafloppy wire using finecross catheter  1.5 rogelio was advanced to prox RCA using dynaglide mode  Pt started having severe CP ( before atherectomy could be performed)  Rogelio pulled back into guide and removed  CP improved - probably with 7 F guide getting deep seated with rogelio - occlusive causing CP.  Decided to downsize gude to 6 F AR1 with SH  Vessel rewired with [employer]/Exchanged for Rotafloppy wire using finecross catheter  1.5 rogelio was advanced to prox RCA using dynaglide mode  Rotational atherectomy performed - 3 passes performed. However ,

## 2024-05-28 NOTE — H&P
normal. Normal wall thickness. Normal wall motion.    Aortic Valve: Moderate sclerosis of the aortic valve cusp. Mild stenosis of the aortic valve. AV mean gradient is 11 mmHg. AV area by continuity VTI is 1.7 cm2.    Mitral Valve: Moderate annular calcification of the mitral valve.    Left Atrium: Left atrium is mildly dilated.    Image quality is technically difficult. Technically difficult study due to patient's body habitus.     C.StressNuclear/Stress ECHO/Stress test: 12/1/23    Perfusion Comments: Prone images were not obtained. LV perfusion is abnormal.  There is a small sized, low grade, reversible defect at the apical lateral wall.   SSS = 3, SRS = 0, SDS = 3.   FIndings suggest mild ischemia in LCX territory.    Stress Function: Left ventricular function post-stress is normal. Post-stress ejection fraction is 74%. The stress end diastolic cavity size is normal.     D.Vascular:     E. EP:S/p PVI, PWI, right gume line atypical AFL ablation, CTI (9/19/23-[facility])      F. Miscellaneous:     History:      Adrienne To is a 83 y.o. female who returns for follow up visit.     Denies any chest pain.  Mild dyspnea on exertion.  Mild swelling lower extremities right greater than left.  Swelling decreases on elevating lower extremities.  Not very active.  Tries to walk.           Accompanied by her daughter.     ROS:  (bold if positive, if negative)              Medications:              Current Outpatient Medications   Medication Instructions    albuterol sulfate HFA (PROVENTIL;VENTOLIN;PROAIR) 108 (90 Base) MCG/ACT inhaler 2 puffs, Inhalation, EVERY 4 HOURS PRN    apixaban (ELIQUIS) 2.5 mg, Oral, 2 TIMES DAILY    atorvastatin (LIPITOR) 40 mg, Oral, DAILY    Budeson-Glycopyrrol-Formoterol (BREZTRI AEROSPHERE IN) Inhalation    clopidogrel (PLAVIX) 75 mg, Oral, DAILY    Coenzyme Q10 (COQ10) 100 MG CAPS 1 capsule, Oral, DAILY    ferrous sulfate (IRON 325) 325 mg, Oral, DAILY WITH BREAKFAST    furosemide (LASIX) 20

## 2024-05-29 VITALS
OXYGEN SATURATION: 98 % | WEIGHT: 221 LBS | SYSTOLIC BLOOD PRESSURE: 129 MMHG | RESPIRATION RATE: 18 BRPM | HEIGHT: 63 IN | DIASTOLIC BLOOD PRESSURE: 64 MMHG | TEMPERATURE: 98.4 F | HEART RATE: 73 BPM | BODY MASS INDEX: 39.16 KG/M2

## 2024-05-29 LAB
ANION GAP SERPL CALC-SCNC: 2 MMOL/L (ref 5–15)
BASOPHILS # BLD: 0.1 K/UL (ref 0–0.1)
BASOPHILS NFR BLD: 1 % (ref 0–1)
BUN SERPL-MCNC: 33 MG/DL (ref 6–20)
BUN/CREAT SERPL: 31 (ref 12–20)
CALCIUM SERPL-MCNC: 9 MG/DL (ref 8.5–10.1)
CHLORIDE SERPL-SCNC: 110 MMOL/L (ref 97–108)
CO2 SERPL-SCNC: 26 MMOL/L (ref 21–32)
CREAT SERPL-MCNC: 1.08 MG/DL (ref 0.55–1.02)
DIFFERENTIAL METHOD BLD: ABNORMAL
EOSINOPHIL # BLD: 0.3 K/UL (ref 0–0.4)
EOSINOPHIL NFR BLD: 3 % (ref 0–7)
ERYTHROCYTE [DISTWIDTH] IN BLOOD BY AUTOMATED COUNT: 14.7 % (ref 11.5–14.5)
GLUCOSE BLD STRIP.AUTO-MCNC: 137 MG/DL (ref 65–117)
GLUCOSE BLD STRIP.AUTO-MCNC: 181 MG/DL (ref 65–117)
GLUCOSE SERPL-MCNC: 128 MG/DL (ref 65–100)
HCT VFR BLD AUTO: 34.8 % (ref 35–47)
HGB BLD-MCNC: 11.2 G/DL (ref 11.5–16)
IMM GRANULOCYTES # BLD AUTO: 0.1 K/UL (ref 0–0.04)
IMM GRANULOCYTES NFR BLD AUTO: 1 % (ref 0–0.5)
LYMPHOCYTES # BLD: 0.9 K/UL (ref 0.8–3.5)
LYMPHOCYTES NFR BLD: 9 % (ref 12–49)
MCH RBC QN AUTO: 30.4 PG (ref 26–34)
MCHC RBC AUTO-ENTMCNC: 32.2 G/DL (ref 30–36.5)
MCV RBC AUTO: 94.6 FL (ref 80–99)
MONOCYTES # BLD: 0.8 K/UL (ref 0–1)
MONOCYTES NFR BLD: 8 % (ref 5–13)
NEUTS SEG # BLD: 8.1 K/UL (ref 1.8–8)
NEUTS SEG NFR BLD: 78 % (ref 32–75)
NRBC # BLD: 0 K/UL (ref 0–0.01)
NRBC BLD-RTO: 0 PER 100 WBC
PLATELET # BLD AUTO: 231 K/UL (ref 150–400)
PMV BLD AUTO: 9.5 FL (ref 8.9–12.9)
POTASSIUM SERPL-SCNC: 4.2 MMOL/L (ref 3.5–5.1)
RBC # BLD AUTO: 3.68 M/UL (ref 3.8–5.2)
SERVICE CMNT-IMP: ABNORMAL
SERVICE CMNT-IMP: ABNORMAL
SODIUM SERPL-SCNC: 138 MMOL/L (ref 136–145)
WBC # BLD AUTO: 10.3 K/UL (ref 3.6–11)

## 2024-05-29 PROCEDURE — 85025 COMPLETE CBC W/AUTO DIFF WBC: CPT

## 2024-05-29 PROCEDURE — APPSS30 APP SPLIT SHARED TIME 16-30 MINUTES: Performed by: NURSE PRACTITIONER

## 2024-05-29 PROCEDURE — G0378 HOSPITAL OBSERVATION PER HR: HCPCS

## 2024-05-29 PROCEDURE — 80048 BASIC METABOLIC PNL TOTAL CA: CPT

## 2024-05-29 PROCEDURE — 36415 COLL VENOUS BLD VENIPUNCTURE: CPT

## 2024-05-29 PROCEDURE — 82962 GLUCOSE BLOOD TEST: CPT

## 2024-05-29 PROCEDURE — 6370000000 HC RX 637 (ALT 250 FOR IP): Performed by: INTERNAL MEDICINE

## 2024-05-29 PROCEDURE — 99213 OFFICE O/P EST LOW 20 MIN: CPT | Performed by: INTERNAL MEDICINE

## 2024-05-29 RX ADMIN — FERROUS SULFATE TAB 325 MG (65 MG ELEMENTAL FE) 325 MG: 325 (65 FE) TAB at 08:07

## 2024-05-29 RX ADMIN — CLOPIDOGREL BISULFATE 75 MG: 75 TABLET ORAL at 08:07

## 2024-05-29 RX ADMIN — EMPAGLIFLOZIN 10 MG: 10 TABLET, FILM COATED ORAL at 08:07

## 2024-05-29 RX ADMIN — APIXABAN 2.5 MG: 2.5 TABLET, FILM COATED ORAL at 08:07

## 2024-05-29 RX ADMIN — FUROSEMIDE 20 MG: 20 TABLET ORAL at 08:07

## 2024-05-29 RX ADMIN — ATORVASTATIN CALCIUM 40 MG: 20 TABLET, FILM COATED ORAL at 08:07

## 2024-05-29 RX ADMIN — LISINOPRIL 2.5 MG: 5 TABLET ORAL at 08:07

## 2024-05-29 NOTE — DISCHARGE INSTRUCTIONS
Patient Discharge Instructions    Adrienne To / [id number] : 1940    Admitted 2024 Discharged: 2024     Take Home Medications     It is important that you take the medication exactly as they are prescribed.   Keep your medication in the bottles provided by the pharmacist and keep a list of the medication names, dosages, and times to be taken in your wallet.   Do not take other medications without consulting your doctor.     BRING ALL OF YOUR MEDICINES TO YOUR OFFICE VISIT with Dr. Lyn/Khoa.     Cardiac Catheterization  Discharge Instructions    Do not drive, operate any machinery, or sign any legal documents for 24 hours after your procedure.  You must have someone to drive you home.    You may take a shower 24 hours after your cardiac catheterization.  Be sure to get the dressing wet and then remove it; gently wash the area with warm soapy water.  Pat dry and leave open to air.  To help prevent infections, be sure to keep the cath site clean and dry.  No lotions, creams, powders, ointments, etc. in the cath site for approximately 1 week.    Do not take a tub bath, get in a hot tub or swimming pool for approximately 5 days or until the cath site is completely healed.      No strenuous activity or heavy lifting over 10 lbs. for 7 days.    Drink plenty of fluids for 24-48 hours after your cath to flush the contrast dye from your kidneys. No alcoholic beverages for 24 hours.  You may resume your previous diet (low fat, low cholesterol) after your cath.      After your cath, some bruising or discomfort is common during the healing process.  Tylenol, 1-2 tablets every 6 hours as needed, is recommended if you experience any discomfort.  If you experience any signs or symptoms of infection such as fever, chills, or poorly healing incision, persistent tenderness or swelling in the groin, redness and/or warmth to the touch, numbness, significant tingling or pain at the groin site or affected

## 2024-05-29 NOTE — CARE COORDINATION
Care Management Discharge Note:      05/29/24 0848   Discharge Planning   Patient expects to be discharged to: House   Services At/After Discharge   Transition of Care Consult (CM Consult) Other  (OP cardiac rehab at [facility])   Mode of Transport at Discharge Other (see comment)  (family)   Confirm Follow Up Transport Self     Patient with dc orders. No identified CM needs. Patient will dc home with family to transport patient referred to OP cardiac rehab at [facility].  ______________________  Yanet COLON, RN  Care Management  5/29/2024  8:50 AM

## 2024-05-29 NOTE — PROGRESS NOTES
[facility] CARDIOLOGY                    Cardiology Care Note     []Initial Encounter     [x]Follow-up    Patient Name: Adrienne To - :1940 - MRN:[id number]  Primary Cardiologist: Cj Lozada MD  Consulting Cardiologist: Ben Couch MD     Reason for encounter: S/p cardiac cath    HPI:       Adrienne To is a 83 y.o. female with PMH significant for CAD s/p PCI, PAF, anemia, CKD, HTN, HLD, DM, SUZAN and obesity.     Pt presented for OP cardiac cath, s/p rotational atherectomy/PTCA to mid RCA. Kept overnight to observe groin due to complications/pseudoaneurysm following last cath.     Subjective:      Adrienne To reports none.     Assessment and Plan     CAD s/p prior PCI  - s/p rotational atherectomy/PTCA to mid RCA  - cont plavix, Eliquis  - on statin   - groin site without evidence of hematoma, soft     Ok for d/c from cardiac standpoint          ____________________________________________________________    Cardiac testing  24    ECHO (TTE) COMPLETE (PRN CONTRAST/BUBBLE/STRAIN/3D) 2024  1:03 PM (Final)    Interpretation Summary    Left Ventricle: Normal left ventricular systolic function with a visually estimated EF of 55 - 60%. Left ventricle size is normal. Normal wall thickness. Normal wall motion.    Aortic Valve: Moderate sclerosis of the aortic valve cusp. Mild stenosis of the aortic valve. AV mean gradient is 11 mmHg. AV area by continuity VTI is 1.7 cm2.    Mitral Valve: Moderate annular calcification of the mitral valve.    Left Atrium: Left atrium is mildly dilated.    Image quality is technically difficult. Technically difficult study due to patient's body habitus.    Signed by: Ben Couch MD on 2024  1:03 PM    23    NM STRESS TEST WITH MYOCARDIAL PERFUSION 2023  8:28 PM (Final)    Interpretation Summary    Perfusion Comments: Prone images were not obtained. LV perfusion is abnormal.  There is a small sized, low grade, reversible 
To assist primary nurse with discharge, I have completed the AVS Med-updates and resolved the Care Plans and Education. AVS was printed and placed on the chart. Nurse updated  
with the receiving nurse.           Lines:   Peripheral IV 05/28/24 Left Wrist (Active)        Opportunity for questions and clarification was provided.      Patient transported with:  Registered Nurse      CLPO and PCC nurse at patient bedside to assess patient and site and redness to groin area. Dressing clean, dry, and intact.

## 2024-06-06 ENCOUNTER — HOSPITAL ENCOUNTER (OUTPATIENT)
Facility: HOSPITAL | Age: 84
Discharge: HOME | End: 2024-06-06
Payer: MEDICARE

## 2024-06-06 VITALS — WEIGHT: 221 LBS | HEIGHT: 63 IN | BODY MASS INDEX: 39.16 KG/M2

## 2024-06-06 DIAGNOSIS — Z12.31 VISIT FOR SCREENING MAMMOGRAM: ICD-10-CM

## 2024-06-06 PROCEDURE — 77063 BREAST TOMOSYNTHESIS BI: CPT

## 2024-06-07 ENCOUNTER — TELEPHONE (OUTPATIENT)
Age: 84
End: 2024-06-07

## 2024-06-07 NOTE — TELEPHONE ENCOUNTER
Patient dropped off paperwork for the [place] [facility] to be filled out on 06/07/2024. Please fill out and call when ready. The number is [phone]. Patients sister charles Sinclair can be contacted at [phone] if needed. She is on HIPAA. Put on provider desk.

## 2024-06-21 ENCOUNTER — TELEPHONE (OUTPATIENT)
Age: 84
End: 2024-06-21

## 2024-06-21 ENCOUNTER — PATIENT MESSAGE (OUTPATIENT)
Age: 84
End: 2024-06-21

## 2024-06-21 ENCOUNTER — OFFICE VISIT (OUTPATIENT)
Age: 84
End: 2024-06-21
Payer: MEDICARE

## 2024-06-21 VITALS
WEIGHT: 219.6 LBS | SYSTOLIC BLOOD PRESSURE: 120 MMHG | BODY MASS INDEX: 38.91 KG/M2 | DIASTOLIC BLOOD PRESSURE: 60 MMHG | HEIGHT: 63 IN | HEART RATE: 73 BPM | OXYGEN SATURATION: 97 %

## 2024-06-21 DIAGNOSIS — E11.22 TYPE 2 DIABETES MELLITUS WITH CHRONIC KIDNEY DISEASE, WITHOUT LONG-TERM CURRENT USE OF INSULIN, UNSPECIFIED CKD STAGE (HCC): ICD-10-CM

## 2024-06-21 DIAGNOSIS — R94.39 ABNORMAL NUCLEAR STRESS TEST: ICD-10-CM

## 2024-06-21 DIAGNOSIS — R94.39 ABNORMAL STRESS TEST: ICD-10-CM

## 2024-06-21 DIAGNOSIS — I48.91 ATRIAL FIBRILLATION, UNSPECIFIED TYPE (HCC): ICD-10-CM

## 2024-06-21 DIAGNOSIS — I25.10 CORONARY ARTERY DISEASE INVOLVING NATIVE CORONARY ARTERY OF NATIVE HEART WITHOUT ANGINA PECTORIS: ICD-10-CM

## 2024-06-21 DIAGNOSIS — R06.02 SOB (SHORTNESS OF BREATH): Primary | ICD-10-CM

## 2024-06-21 DIAGNOSIS — I48.0 PAF (PAROXYSMAL ATRIAL FIBRILLATION) (HCC): Primary | ICD-10-CM

## 2024-06-21 DIAGNOSIS — I25.10 CORONARY ARTERY DISEASE INVOLVING NATIVE CORONARY ARTERY OF NATIVE HEART WITHOUT ANGINA PECTORIS: Primary | ICD-10-CM

## 2024-06-21 DIAGNOSIS — D50.0 IRON DEFICIENCY ANEMIA DUE TO CHRONIC BLOOD LOSS: ICD-10-CM

## 2024-06-21 DIAGNOSIS — I48.0 PAF (PAROXYSMAL ATRIAL FIBRILLATION) (HCC): ICD-10-CM

## 2024-06-21 PROCEDURE — 99204 OFFICE O/P NEW MOD 45 MIN: CPT | Performed by: INTERNAL MEDICINE

## 2024-06-21 ASSESSMENT — PATIENT HEALTH QUESTIONNAIRE - PHQ9
SUM OF ALL RESPONSES TO PHQ QUESTIONS 1-9: 0
2. FEELING DOWN, DEPRESSED OR HOPELESS: NOT AT ALL
SUM OF ALL RESPONSES TO PHQ9 QUESTIONS 1 & 2: 0
SUM OF ALL RESPONSES TO PHQ QUESTIONS 1-9: 0

## 2024-06-21 NOTE — TELEPHONE ENCOUNTER
Spoke to patients daughter and scheduled patient for PCI with Dr. Lyn on 7/5 @ 8:15 am with 6:30 am arrival. Instructions gone over with patients daughter and sent to my chart and advised to have labs by 7/1, advised to hold Eliquis 2 days prior to procedure, Hold Jardiance and Januvia the day of the procedure. Patient verbalized understanding and had no questions at the time of call.    Patient identification verified x2.      Patient Instructions    Watchman       PCI     Bilateral Angio w/ runoff    PFO  Pre-procedure instructions  Lab work: Please do by 7/1  [facility] Draw Sites:  Heart & Vascular [place]: [address] [facility] [address]  [place]: [address] [address]  [place]: [address] [facility] [address]  [facility]: [address] [facility] [address]  [place]: [address] [facility] [address]  [facility]: [address] [facility] [address]  [place]: [address] [facility] [address]  The night before the procedure nothing to eat or drink after midnight, you may take approved medications the morning of the procedure with a few sips of water.  Stop blood thinners 2 days prior to procedure EXCEPT: Brilinta, Plavix or Aspirin  Medication restrictions: Hold Eliquis 2 days prior to procedure, Hold Januvia & Jardiance the day of the procedure.    Procedure day  Have a  that will bring you and take you home  Bring ID and insurance card  Wear comfortable clothing  Leave valuables at home, bring: dentures, hearing aids, or glasses  Bring overnight bag   Where to report  [place]: go through main entrance and to the left is outpatient registration  [facility] patients report to first floor outpatient registration     Date of procedure: 7/5 w/ Dr. Lyn  Arrival Time: 6:30 am    Post procedure instructions  No driving for 24 hours  No heavy lifting (over 10lbs) or

## 2024-06-21 NOTE — PROGRESS NOTES
Dr. Riki Dempsey [facility] Cardiology.  [phone]            Cardiology structural heart disease consult/Progress Note      Requesting/referring provider: Kylah Villegas, APRN - NPNo ref. provider found     Reason for Consult: Discussion regarding PCI and alternatives to long-term oral anticoagulation    Assessment/Plan:  1.  Shortness of breath multifactorial  2.  Anemia  3.  Morbid obesity  4.  Frequent bruising  5.  Needing assistance to ambulate, fall risk  6.  Coronary artery disease with previous unsuccessful PCI of RCA  7.  History of atrial fibrillation long-term persistent status post prior A-fib ablation      Adrienne To is seen for above-mentioned reasons.  She has shortness of breath which is multifactorial with some component of dyspnea probably from CAD.  Unsuccessful PCI to RCA because of calcification.  I discussed options including conservative therapy with medications, consideration for atherectomy/high risk PCI.  In my opinion she should not undergo coronary bypass grafting for the degree of disease that she has.  She is willing to proceed with atherectomy and PCI.    Previous femoral catheterization was also complicated by pseudoaneurysm which required thrombin injection.  Will consider left radial approach.    Additionally she is also concerned about significant bruising and anemia and does not want to continue it long-term oral anticoagulation due to bruising and risk of falls as well as recent major femoral bleeding requiring intervention for pseudoaneurysm..  The patient has a CHADSVASC/CHADS 2 score of 4.  He/she should avoid long term anticoagulation history of severe anemia, fall risk frequent bruising.  She additionally requires antiplatelets because of coronary artery disease in combination of antiplatelets and anticoagulants with no significant increase her risk of bleeding long-term.  The patient feels strongly that anticoagulation and

## 2024-06-25 ENCOUNTER — TELEPHONE (OUTPATIENT)
Age: 84
End: 2024-06-25

## 2024-06-25 NOTE — TELEPHONE ENCOUNTER
Called and spoke to the patient's daughter, informed her that her mother forms were ready for  at the front. She stated understanding and will .

## 2024-06-25 NOTE — TELEPHONE ENCOUNTER
Orders faxed to [facility] [place]/ [place]; confirmation received   
may take Tylenol for relief  When to call the office  You notice any tingling, numbness, coldness, or loss of feeling, or you have a fever for 2-3 days after the procedure, if there is visible blood at the site, hold pressure for 20 minutes and then call     Contact  [address] [place] [address]   Cath lab: [phone]   [place] office: [phone] Rocha

## 2024-06-27 ENCOUNTER — OFFICE VISIT (OUTPATIENT)
Age: 84
End: 2024-06-27
Payer: MEDICARE

## 2024-06-27 VITALS
DIASTOLIC BLOOD PRESSURE: 66 MMHG | SYSTOLIC BLOOD PRESSURE: 136 MMHG | OXYGEN SATURATION: 96 % | WEIGHT: 216.2 LBS | HEART RATE: 65 BPM | HEIGHT: 63 IN | TEMPERATURE: 97.8 F | BODY MASS INDEX: 38.31 KG/M2

## 2024-06-27 DIAGNOSIS — N18.4 TYPE 2 DIABETES MELLITUS WITH STAGE 4 CHRONIC KIDNEY DISEASE, WITH LONG-TERM CURRENT USE OF INSULIN (HCC): Primary | ICD-10-CM

## 2024-06-27 DIAGNOSIS — Z79.4 TYPE 2 DIABETES MELLITUS WITH STAGE 4 CHRONIC KIDNEY DISEASE, WITH LONG-TERM CURRENT USE OF INSULIN (HCC): Primary | ICD-10-CM

## 2024-06-27 DIAGNOSIS — E11.22 TYPE 2 DIABETES MELLITUS WITH STAGE 4 CHRONIC KIDNEY DISEASE, WITH LONG-TERM CURRENT USE OF INSULIN (HCC): Primary | ICD-10-CM

## 2024-06-27 DIAGNOSIS — E78.5 DYSLIPIDEMIA: ICD-10-CM

## 2024-06-27 LAB — HBA1C MFR BLD: 6.1 %

## 2024-06-27 PROCEDURE — 3044F HG A1C LEVEL LT 7.0%: CPT | Performed by: INTERNAL MEDICINE

## 2024-06-27 PROCEDURE — 95251 CONT GLUC MNTR ANALYSIS I&R: CPT | Performed by: INTERNAL MEDICINE

## 2024-06-27 PROCEDURE — 83036 HEMOGLOBIN GLYCOSYLATED A1C: CPT | Performed by: INTERNAL MEDICINE

## 2024-06-27 PROCEDURE — 3078F DIAST BP <80 MM HG: CPT | Performed by: INTERNAL MEDICINE

## 2024-06-27 PROCEDURE — 99214 OFFICE O/P EST MOD 30 MIN: CPT | Performed by: INTERNAL MEDICINE

## 2024-06-27 PROCEDURE — G8417 CALC BMI ABV UP PARAM F/U: HCPCS | Performed by: INTERNAL MEDICINE

## 2024-06-27 PROCEDURE — 1036F TOBACCO NON-USER: CPT | Performed by: INTERNAL MEDICINE

## 2024-06-27 PROCEDURE — 3075F SYST BP GE 130 - 139MM HG: CPT | Performed by: INTERNAL MEDICINE

## 2024-06-27 PROCEDURE — G8427 DOCREV CUR MEDS BY ELIG CLIN: HCPCS | Performed by: INTERNAL MEDICINE

## 2024-06-27 PROCEDURE — 1123F ACP DISCUSS/DSCN MKR DOCD: CPT | Performed by: INTERNAL MEDICINE

## 2024-06-27 PROCEDURE — G8400 PT W/DXA NO RESULTS DOC: HCPCS | Performed by: INTERNAL MEDICINE

## 2024-06-27 PROCEDURE — 1090F PRES/ABSN URINE INCON ASSESS: CPT | Performed by: INTERNAL MEDICINE

## 2024-06-27 NOTE — PROGRESS NOTES
Adrienne To is a 83 y.o. female here for   Chief Complaint   Patient presents with    Diabetes       1. Have you been to the ER, urgent care clinic since your last visit?  Hospitalized since your last visit? -NO    2. Have you seen or consulted any other health care providers outside of the [facility] since your last visit?  Include any pap smears or colon screening.-Pt stated she went to [facility] 2 weeks ago      
CATH LAB    INVASIVE VASCULAR N/A 2024    Ultrasound guided vascular access performed by Ben Couch MD at [facility] CARDIAC CATH LAB    INVASIVE VASCULAR N/A 2024    Ultrasound guided vascular access R groin performed by Ben Couch MD at [facility] CARDIAC CATH LAB    INVASIVE VASCULAR N/A 2024    Angiography femoral popliteal right performed by Ben Couch MD at [facility] CARDIAC CATH LAB        Social History     Tobacco Use    Smoking status: Former     Current packs/day: 0.00     Average packs/day: 1 pack/day for 16.0 years (16.0 ttl pk-yrs)     Types: Cigarettes     Start date: 1961     Quit date: 1977     Years since quittin.5    Smokeless tobacco: Never   Substance Use Topics    Alcohol use: Never      Employer:  Retired     Family History   Problem Relation Age of Onset    Heart Attack Sister     Heart Attack Mother     Cancer Father         Stomache cancer      PHYSICAL EXAM  Blood pressure 136/66, pulse 65, temperature 97.8 °F (36.6 °C), temperature source Temporal, height 1.6 m (5' 3\"), weight 98.1 kg (216 lb 3.2 oz), SpO2 96 %, currently breastfeeding.   GENERAL: Well-developed, well-nourished female in no acute distress.  HENT: normocephalic, atraumatic  THYROID: no palpable masses  EYES: EOMI. No lid lag, proptosis, icterus, conjunctival injection, periorbital edema.  CARDIO: Regular rate, no LE edema  RESP: Breathing comfortably. No use of accessory muscles.  GI: Soft, nontender, nondistended. No rebound/guarding.   NEUROLOGIC: No tremor. Speech coherent, no dysarthria.  PSYCHIATRIC: Pleasant, Normal affect, normal judgment.  SKIN: Normal temperature, bruising over arms     Assessment/Plan:     1. Type 2 diabetes mellitus with stage 4 chronic kidney disease, with long-term current use of insulin ([facility])  Assessment & Plan:  Continuous glucose monitor was downloaded, printed reported generated. Time range, 14 days. I independently reviewed and analyzed these

## 2024-06-27 NOTE — ASSESSMENT & PLAN NOTE
Continuous glucose monitor was downloaded, printed reported generated. Time range, 14 days. I independently reviewed and analyzed these findings  Doing well- stopped insulin last visit   Continue Tradjenta 5 mg daily   Continue Jardiance 10 mg daily   -goal HbA1c is 8 or less without frequent lows  -to call if BS is <70 or >300  -bring log and meter to each visit  -I reviewed pertinent lab data, provider notes, imaging and reports in care-everywhere   -DM HM:  ophtho: UTD  Renal: monitor - CKD   feet: following w/podiatrist

## 2024-06-28 ENCOUNTER — PREP FOR PROCEDURE (OUTPATIENT)
Age: 84
End: 2024-06-28

## 2024-06-29 LAB
ALBUMIN SERPL-MCNC: 3.8 G/DL (ref 3.7–4.7)
ALP SERPL-CCNC: 117 IU/L (ref 44–121)
ALT SERPL-CCNC: 20 IU/L (ref 0–32)
AST SERPL-CCNC: 18 IU/L (ref 0–40)
BASOPHILS # BLD AUTO: 0 X10E3/UL (ref 0–0.2)
BASOPHILS NFR BLD AUTO: 1 %
BILIRUB SERPL-MCNC: 0.5 MG/DL (ref 0–1.2)
BUN SERPL-MCNC: 34 MG/DL (ref 8–27)
BUN/CREAT SERPL: 30 (ref 12–28)
CALCIUM SERPL-MCNC: 9.3 MG/DL (ref 8.7–10.3)
CHLORIDE SERPL-SCNC: 100 MMOL/L (ref 96–106)
CO2 SERPL-SCNC: 23 MMOL/L (ref 20–29)
CREAT SERPL-MCNC: 1.14 MG/DL (ref 0.57–1)
EGFRCR SERPLBLD CKD-EPI 2021: 48 ML/MIN/1.73
EOSINOPHIL # BLD AUTO: 0.2 X10E3/UL (ref 0–0.4)
EOSINOPHIL NFR BLD AUTO: 2 %
ERYTHROCYTE [DISTWIDTH] IN BLOOD BY AUTOMATED COUNT: 14.7 % (ref 11.7–15.4)
GLOBULIN SER CALC-MCNC: 2.7 G/DL (ref 1.5–4.5)
GLUCOSE SERPL-MCNC: 122 MG/DL (ref 70–99)
HCT VFR BLD AUTO: 36.6 % (ref 34–46.6)
HGB BLD-MCNC: 11.7 G/DL (ref 11.1–15.9)
IMM GRANULOCYTES # BLD AUTO: 0.1 X10E3/UL (ref 0–0.1)
IMM GRANULOCYTES NFR BLD AUTO: 1 %
LYMPHOCYTES # BLD AUTO: 0.9 X10E3/UL (ref 0.7–3.1)
LYMPHOCYTES NFR BLD AUTO: 11 %
MCH RBC QN AUTO: 30.2 PG (ref 26.6–33)
MCHC RBC AUTO-ENTMCNC: 32 G/DL (ref 31.5–35.7)
MCV RBC AUTO: 94 FL (ref 79–97)
MONOCYTES # BLD AUTO: 0.7 X10E3/UL (ref 0.1–0.9)
MONOCYTES NFR BLD AUTO: 8 %
NEUTROPHILS # BLD AUTO: 6.9 X10E3/UL (ref 1.4–7)
NEUTROPHILS NFR BLD AUTO: 77 %
PLATELET # BLD AUTO: 265 X10E3/UL (ref 150–450)
POTASSIUM SERPL-SCNC: 4.1 MMOL/L (ref 3.5–5.2)
PROT SERPL-MCNC: 6.5 G/DL (ref 6–8.5)
RBC # BLD AUTO: 3.88 X10E6/UL (ref 3.77–5.28)
REPORT: NORMAL
SODIUM SERPL-SCNC: 140 MMOL/L (ref 134–144)
WBC # BLD AUTO: 8.8 X10E3/UL (ref 3.4–10.8)

## 2024-06-29 RX ORDER — SODIUM CHLORIDE 9 MG/ML
INJECTION, SOLUTION INTRAVENOUS CONTINUOUS
Status: CANCELLED | OUTPATIENT
Start: 2024-06-29

## 2024-06-29 RX ORDER — SODIUM CHLORIDE 0.9 % (FLUSH) 0.9 %
5-40 SYRINGE (ML) INJECTION EVERY 12 HOURS SCHEDULED
Status: CANCELLED | OUTPATIENT
Start: 2024-06-29

## 2024-07-05 ENCOUNTER — HOSPITAL ENCOUNTER (OUTPATIENT)
Facility: HOSPITAL | Age: 84
Discharge: HOME | End: 2024-07-05
Attending: INTERNAL MEDICINE | Admitting: INTERNAL MEDICINE
Payer: MEDICARE

## 2024-07-05 VITALS
RESPIRATION RATE: 21 BRPM | DIASTOLIC BLOOD PRESSURE: 46 MMHG | BODY MASS INDEX: 38.27 KG/M2 | HEART RATE: 65 BPM | OXYGEN SATURATION: 99 % | WEIGHT: 216 LBS | HEIGHT: 63 IN | SYSTOLIC BLOOD PRESSURE: 120 MMHG | TEMPERATURE: 97.5 F

## 2024-07-05 DIAGNOSIS — I25.10 ATHEROSCLEROSIS OF NATIVE CORONARY ARTERY OF NATIVE HEART WITHOUT ANGINA PECTORIS: Primary | ICD-10-CM

## 2024-07-05 DIAGNOSIS — Z95.5 STENTED CORONARY ARTERY: Primary | ICD-10-CM

## 2024-07-05 DIAGNOSIS — I25.10 CORONARY ATHEROSCLEROSIS OF NATIVE CORONARY ARTERY: ICD-10-CM

## 2024-07-05 LAB
ACT BLD: 293 SECS (ref 79–138)
ACT BLD: 330 SECS (ref 79–138)
ECHO BSA: 2.09 M2
EKG ATRIAL RATE: 60 BPM
EKG DIAGNOSIS: NORMAL
EKG P AXIS: 38 DEGREES
EKG P-R INTERVAL: 196 MS
EKG Q-T INTERVAL: 464 MS
EKG QRS DURATION: 96 MS
EKG QTC CALCULATION (BAZETT): 464 MS
EKG R AXIS: 34 DEGREES
EKG T AXIS: 41 DEGREES
EKG VENTRICULAR RATE: 60 BPM

## 2024-07-05 PROCEDURE — 2500000003 HC RX 250 WO HCPCS: Performed by: INTERNAL MEDICINE

## 2024-07-05 PROCEDURE — C1724 CATH, TRANS ATHEREC,ROTATION: HCPCS | Performed by: INTERNAL MEDICINE

## 2024-07-05 PROCEDURE — C1713 ANCHOR/SCREW BN/BN,TIS/BN: HCPCS | Performed by: INTERNAL MEDICINE

## 2024-07-05 PROCEDURE — 92933 PRQ TRLML C ATHRC ST ANGIOP1: CPT | Performed by: INTERNAL MEDICINE

## 2024-07-05 PROCEDURE — 93005 ELECTROCARDIOGRAM TRACING: CPT

## 2024-07-05 PROCEDURE — 6360000002 HC RX W HCPCS: Performed by: INTERNAL MEDICINE

## 2024-07-05 PROCEDURE — 99152 MOD SED SAME PHYS/QHP 5/>YRS: CPT | Performed by: INTERNAL MEDICINE

## 2024-07-05 PROCEDURE — 99153 MOD SED SAME PHYS/QHP EA: CPT | Performed by: INTERNAL MEDICINE

## 2024-07-05 PROCEDURE — 85347 COAGULATION TIME ACTIVATED: CPT

## 2024-07-05 PROCEDURE — C1887 CATHETER, GUIDING: HCPCS | Performed by: INTERNAL MEDICINE

## 2024-07-05 PROCEDURE — 6370000000 HC RX 637 (ALT 250 FOR IP): Performed by: INTERNAL MEDICINE

## 2024-07-05 PROCEDURE — 2709999900 HC NON-CHARGEABLE SUPPLY: Performed by: INTERNAL MEDICINE

## 2024-07-05 PROCEDURE — 92978 ENDOLUMINL IVUS OCT C 1ST: CPT | Performed by: INTERNAL MEDICINE

## 2024-07-05 PROCEDURE — 6360000004 HC RX CONTRAST MEDICATION: Performed by: INTERNAL MEDICINE

## 2024-07-05 PROCEDURE — 2580000003 HC RX 258: Performed by: INTERNAL MEDICINE

## 2024-07-05 PROCEDURE — 93005 ELECTROCARDIOGRAM TRACING: CPT | Performed by: INTERNAL MEDICINE

## 2024-07-05 PROCEDURE — C1769 GUIDE WIRE: HCPCS | Performed by: INTERNAL MEDICINE

## 2024-07-05 PROCEDURE — C1894 INTRO/SHEATH, NON-LASER: HCPCS | Performed by: INTERNAL MEDICINE

## 2024-07-05 PROCEDURE — 93452 LEFT HRT CATH W/VENTRCLGRPHY: CPT | Performed by: INTERNAL MEDICINE

## 2024-07-05 PROCEDURE — C1753 CATH, INTRAVAS ULTRASOUND: HCPCS | Performed by: INTERNAL MEDICINE

## 2024-07-05 PROCEDURE — C9602 PERC D-E COR STENT ATHER S: HCPCS | Performed by: INTERNAL MEDICINE

## 2024-07-05 PROCEDURE — C1874 STENT, COATED/COV W/DEL SYS: HCPCS | Performed by: INTERNAL MEDICINE

## 2024-07-05 PROCEDURE — C1725 CATH, TRANSLUMIN NON-LASER: HCPCS | Performed by: INTERNAL MEDICINE

## 2024-07-05 DEVICE — STENT ONYXNG30022UX ONYX 3.00X22RX
Type: IMPLANTABLE DEVICE | Status: FUNCTIONAL
Brand: ONYX FRONTIER™

## 2024-07-05 RX ORDER — SODIUM CHLORIDE 0.9 % (FLUSH) 0.9 %
5-40 SYRINGE (ML) INJECTION PRN
Status: DISCONTINUED | OUTPATIENT
Start: 2024-07-05 | End: 2024-07-05 | Stop reason: HOSPADM

## 2024-07-05 RX ORDER — ACETAMINOPHEN 325 MG/1
650 TABLET ORAL EVERY 4 HOURS PRN
Status: DISCONTINUED | OUTPATIENT
Start: 2024-07-05 | End: 2024-07-05 | Stop reason: HOSPADM

## 2024-07-05 RX ORDER — 0.9 % SODIUM CHLORIDE 0.9 %
INTRAVENOUS SOLUTION INTRAVENOUS CONTINUOUS PRN
Status: COMPLETED | OUTPATIENT
Start: 2024-07-05 | End: 2024-07-05

## 2024-07-05 RX ORDER — MIDAZOLAM HYDROCHLORIDE 1 MG/ML
INJECTION INTRAMUSCULAR; INTRAVENOUS PRN
Status: DISCONTINUED | OUTPATIENT
Start: 2024-07-05 | End: 2024-07-05 | Stop reason: HOSPADM

## 2024-07-05 RX ORDER — HEPARIN SODIUM 1000 [USP'U]/ML
INJECTION, SOLUTION INTRAVENOUS; SUBCUTANEOUS PRN
Status: DISCONTINUED | OUTPATIENT
Start: 2024-07-05 | End: 2024-07-05 | Stop reason: HOSPADM

## 2024-07-05 RX ORDER — SODIUM CHLORIDE 9 MG/ML
INJECTION, SOLUTION INTRAVENOUS CONTINUOUS
Status: DISCONTINUED | OUTPATIENT
Start: 2024-07-05 | End: 2024-07-05 | Stop reason: HOSPADM

## 2024-07-05 RX ORDER — CLOPIDOGREL 300 MG/1
TABLET, FILM COATED ORAL PRN
Status: DISCONTINUED | OUTPATIENT
Start: 2024-07-05 | End: 2024-07-05 | Stop reason: HOSPADM

## 2024-07-05 RX ORDER — FENTANYL CITRATE 50 UG/ML
INJECTION, SOLUTION INTRAMUSCULAR; INTRAVENOUS PRN
Status: DISCONTINUED | OUTPATIENT
Start: 2024-07-05 | End: 2024-07-05 | Stop reason: HOSPADM

## 2024-07-05 RX ORDER — SODIUM CHLORIDE 9 MG/ML
INJECTION, SOLUTION INTRAVENOUS PRN
Status: DISCONTINUED | OUTPATIENT
Start: 2024-07-05 | End: 2024-07-05 | Stop reason: HOSPADM

## 2024-07-05 RX ORDER — SODIUM CHLORIDE 0.9 % (FLUSH) 0.9 %
5-40 SYRINGE (ML) INJECTION EVERY 12 HOURS SCHEDULED
Status: DISCONTINUED | OUTPATIENT
Start: 2024-07-05 | End: 2024-07-05 | Stop reason: HOSPADM

## 2024-07-05 NOTE — PROGRESS NOTES
0940-TRANSFER - IN Cath Lab RR REPORT:    Verbal report received from Travis on Adrienne To  being received from the Cardiac Cath Lab for routine progression of care. Report consisted of patient’s Situation, Background, Assessment and Recommendations(SBAR). Procedural summary and MAR reviewed with receiving nurse. Opportunity for questions and clarification was provided. Assessment completed upon patient’s arrival to Cardiac Cath Lab RECOVERY AREA and care assumed.       Cardiac Cath Lab Recovery Arrival Note:    Adrienne To arrived to [facility] recovery area.  Patient procedure= LHC. Patient on cardiac monitor, non-invasive blood pressure, SPO2 monitor. On RA .  IV  of NS on pump at 125 ml/hr. Patient is A&Ox 3. Patient reports NO CP SOB.    PROCEDURE SITE CHECK:    Procedure site: No bleeding and hematoma, no pain/discomfort reported at procedure site.   2ml air released upon arrival. Pressure held proximal for 10 min. Dr Lyn in to eval. Hematoma resolved.   No change in patient status. Continue to monitor patient and status.     1315- TR band removed clean occlusive dressing applied. Pt noted to have induration to left wrist. Manual pressure applied for 10 minutes. Blood pressure cuff applied for 5 minutes. Dr Lyn to come eval. Pleth  WDL, radial pulse WDL. Area softened. Ice pack applied.     1400- Dr Lyn in to assess, ok for pt to be discharged. LUE soft, no bleeding noted.  Ambulated patient to the bathroom with a steady gait, voided without difficulty. Patient denies chest pain, shortness of breath, or dizziness. Returned to stretcher. Vital signs stable.     Procedural site is clean, dry, and intact. No bleeding, no hematoma.     Assisted patient in dressing    Educated patient about their sedation precautions such as not driving, operating any machinery, or signing legal documents 24 hours post procedure.     Reviewed discharge instructions, including medications and site care using the teach

## 2024-07-05 NOTE — DISCHARGE INSTRUCTIONS
Future Appointments   Date Time Provider Department Center   7/9/2024  3:45 PM Kylah Villegas, APRN - NP [id number] BS AMB   8/20/2024  2:40 PM Cj Lozada MD CAV BS AMB   8/21/2024  3:20 PM Marcelina Jaimes MD CAVSF BS AMB   8/22/2024  3:00 PM Ben Couch MD CAV[facility] BS AMB   10/10/2024  1:30 PM Daniel Castillo MD ONCSF BS AMB     The office  will reach to you as well to set up Watchman implant.    Will cancel appt on 8/22 with Dr. Couch,  please keep appt on 8/20 with Dr. Lozada.

## 2024-07-05 NOTE — PROGRESS NOTES
CATH LAB to RECOVERY ROOM REPORT    Procedure: [facility]    MD: VICTOR MANUEL Lyn MD    The procedure was an intervention with 1 stent(s) placed to the RCA.    Verbal Report given to Recovery Nurse on patient being transferred to Cardiac Cath Lab  for routine post-op. Patient stable upon transfer to .  Vitals, mental status, MAR, procedural summary discussed with recovery RN.    Medication given during procedure:    Contrast:35mL                          Heparin:9000units     Versed:3mg                                                               Fentanyl:50mcg                                                           Plavix:300 mg         Nitro: 700mcg    250ml fluid bolus        Sheaths:    Left radial sheath pulled at 0926 am, band at 14mL of air

## 2024-07-05 NOTE — PROGRESS NOTES
Cardiac Cath Lab Procedure Area Arrival Note:    Adrienne To arrived to Cardiac Cath Lab, Procedure Area. Patient identifiers verified with NAME and DATE OF BIRTH. Procedure verified with patient. Consent forms verified. Allergies verified. Patient informed of procedure and plan of care. Questions answered with review. Patient voiced understanding of procedure and plan of care.    Patient on cardiac monitor, non-invasive blood pressure, SPO2 monitor. On RA.  IV of NS on pump at 25 ml/hr. Patient status doing well without problems. Patient is A&Ox 4. Patient reports no complaints.     Patient medicated during procedure with orders obtained and verified by Dr. Lyn.    Refer to patients Cardiac Cath Lab PROCEDURE REPORT for vital signs, assessment, status, and response during procedure, printed at end of case. Printed report on chart or scanned into chart.

## 2024-07-08 ENCOUNTER — TELEPHONE (OUTPATIENT)
Age: 84
End: 2024-07-08

## 2024-07-08 DIAGNOSIS — I48.91 ATRIAL FIBRILLATION, UNSPECIFIED TYPE (HCC): Primary | ICD-10-CM

## 2024-07-08 NOTE — TELEPHONE ENCOUNTER
Spoke to patient and scheduled her for Watchman with Dr. Lyn on 8/27 @ 12:00 pm with 10:00 am arrival. Instructions gone over with patient and advised to have labs between 8/1-8/20, advised to hold Eliquis 2 days prior to procedure, Hold Jardiance and Januvia the day of the procedure. Patient verbalized understanding and had no questions at the time of call.    Patient identification verified x2.        Patient Instructions    Watchman       PCI     Bilateral Angio w/ runoff    PFO  Pre-procedure instructions  Lab work: Please do between 8/1-8/20  [facility] Draw Sites:  Heart & Vascular [place]: [address] [facility] [address]  [place]: [address] [address]  [place]: [address] [facility] [address]  [facility]: [address] [facility] [address]  [place]: [address] [facility] [address]  [facility]: [address] [facility] [address]  [place]: [address] [facility] [address]  The night before the procedure nothing to eat or drink after midnight, you may take approved medications the morning of the procedure with a few sips of water.  Stop blood thinners 2 days prior to procedure EXCEPT: Brilinta, Plavix or Aspirin  Medication restrictions: Hold Eliquis 2 days prior to procedure, Hold Jardiance & Januvia the day of the procedure.    Procedure day  Have a  that will bring you and take you home  Bring ID and insurance card  Wear comfortable clothing  Leave valuables at home, bring: dentures, hearing aids, or glasses  Bring overnight bag   Where to report  [place]: go through main entrance and to the left is outpatient registration  [facility] patients report to first floor outpatient registration     Date of procedure: 8/27 w/ Dr. Lyn  Arrival Time: 10:00 am    Post procedure instructions  No driving for 24 hours  No heavy lifting (over 10lbs) or strenuous activity

## 2024-07-09 ENCOUNTER — OFFICE VISIT (OUTPATIENT)
Age: 84
End: 2024-07-09
Payer: MEDICARE

## 2024-07-09 VITALS
BODY MASS INDEX: 38.62 KG/M2 | DIASTOLIC BLOOD PRESSURE: 71 MMHG | SYSTOLIC BLOOD PRESSURE: 129 MMHG | WEIGHT: 218 LBS | RESPIRATION RATE: 18 BRPM | HEART RATE: 76 BPM | TEMPERATURE: 97.9 F | HEIGHT: 63 IN | OXYGEN SATURATION: 96 %

## 2024-07-09 DIAGNOSIS — E11.9 TYPE 2 DIABETES MELLITUS WITHOUT COMPLICATION, WITHOUT LONG-TERM CURRENT USE OF INSULIN (HCC): Primary | ICD-10-CM

## 2024-07-09 DIAGNOSIS — I48.91 ATRIAL FIBRILLATION, UNSPECIFIED TYPE (HCC): ICD-10-CM

## 2024-07-09 PROCEDURE — 1123F ACP DISCUSS/DSCN MKR DOCD: CPT | Performed by: NURSE PRACTITIONER

## 2024-07-09 PROCEDURE — 3074F SYST BP LT 130 MM HG: CPT | Performed by: NURSE PRACTITIONER

## 2024-07-09 PROCEDURE — G8400 PT W/DXA NO RESULTS DOC: HCPCS | Performed by: NURSE PRACTITIONER

## 2024-07-09 PROCEDURE — 1036F TOBACCO NON-USER: CPT | Performed by: NURSE PRACTITIONER

## 2024-07-09 PROCEDURE — 1090F PRES/ABSN URINE INCON ASSESS: CPT | Performed by: NURSE PRACTITIONER

## 2024-07-09 PROCEDURE — G8417 CALC BMI ABV UP PARAM F/U: HCPCS | Performed by: NURSE PRACTITIONER

## 2024-07-09 PROCEDURE — 99213 OFFICE O/P EST LOW 20 MIN: CPT | Performed by: NURSE PRACTITIONER

## 2024-07-09 PROCEDURE — 3044F HG A1C LEVEL LT 7.0%: CPT | Performed by: NURSE PRACTITIONER

## 2024-07-09 PROCEDURE — G8427 DOCREV CUR MEDS BY ELIG CLIN: HCPCS | Performed by: NURSE PRACTITIONER

## 2024-07-09 PROCEDURE — 3078F DIAST BP <80 MM HG: CPT | Performed by: NURSE PRACTITIONER

## 2024-07-09 NOTE — PROGRESS NOTES
Adrienne To (:  1940) is a 84 y.o. female, Established patient, here for evaluation of the following chief complaint(s):  Follow-up, Diabetes, and Lab Collection         Assessment & Plan  1. Post-catheterization status.    Results  Laboratory Studies  A1c is 6.1. Creatinine is 107. GFR is 48.  1. Type 2 diabetes mellitus without complication, without long-term current use of insulin (HCC)  2. Atrial fibrillation, unspecified type (HCC)    No follow-ups on file.   No labs at this time are due  She will be transferring to [facility] in-house pcp for future care after her move-in on Thursday      Subjective   History of Present Illness  The patient presents for evaluation of multiple medical concerns. She is accompanied by her sister.    The patient underwent a cardiac catheterization and stent placement last Friday. She is scheduled for a WATCHMAN procedure on 2023. She reports an improvement in her dyspnea post-catheterization. Her current medication regimen includes Plavix and Eliquis.    She saw endo and is only on Januvia and Jardiance, A1c was great, even told to stop monitoring moving forward    Review of Systems   A comprehensive review of system was obtained and negative except findings in the HPI      Objective   Blood pressure 129/71, pulse 76, temperature 97.9 °F (36.6 °C), temperature source Oral, resp. rate 18, height 1.6 m (5' 3\"), weight 98.9 kg (218 lb), SpO2 96 %, not currently breastfeeding.  Physical Exam  Physical Examination:   GENERAL ASSESSMENT: well developed and well nourished  CHEST: normal air exchange, no rales, no rhonchi, no wheezes, respiratory effort normal with no retractions  HEART: regular rate and rhythm, normal S1/S2, no murmurs         The patient (or guardian, if applicable) and other individuals in attendance with the patient were advised that Artificial Intelligence will be utilized during this visit to record and process the conversation to generate a

## 2024-07-09 NOTE — PROGRESS NOTES
Chief Complaint   Patient presents with    Follow-up    Diabetes    Lab Collection     Patient is in the office today for diabetes f/u and labs.  Patient stated that her blood sugars have great.  Patient stated that she has concerns about her arm that the did the heart cath. Stated it has been bruised.  No other concerns.      \"Have you been to the ER, urgent care clinic since your last visit?  Hospitalized since your last visit?\"    YES - When: approximately 7/5/24 ago.  Where and Why: CAD procedure Heart cath  at [facility].    “Have you seen or consulted any other health care providers outside of [facility] since your last visit?”    NO            Click Here for Release of Records Request

## 2024-07-25 ENCOUNTER — CLINICAL DOCUMENTATION (OUTPATIENT)
Age: 84
End: 2024-07-25

## 2024-07-25 NOTE — PROGRESS NOTES
US Med request was signed & faxec to [phone],ok,Copy placed in scan folder to be scanned to chart.

## 2024-07-29 ENCOUNTER — CLINICAL DOCUMENTATION (OUTPATIENT)
Age: 84
End: 2024-07-29

## 2024-08-06 ENCOUNTER — CLINICAL DOCUMENTATION (OUTPATIENT)
Age: 84
End: 2024-08-06

## 2024-08-15 DIAGNOSIS — I25.10 ATHEROSCLEROSIS OF NATIVE CORONARY ARTERY OF NATIVE HEART WITHOUT ANGINA PECTORIS: ICD-10-CM

## 2024-08-15 DIAGNOSIS — E78.00 PURE HYPERCHOLESTEROLEMIA, UNSPECIFIED: ICD-10-CM

## 2024-08-15 DIAGNOSIS — E11.9 TYPE 2 DIABETES MELLITUS WITHOUT COMPLICATION, WITHOUT LONG-TERM CURRENT USE OF INSULIN (HCC): ICD-10-CM

## 2024-08-15 LAB
ALBUMIN SERPL-MCNC: 3.2 G/DL (ref 3.5–5)
ALBUMIN/GLOB SERPL: 0.9 (ref 1.1–2.2)
ALP SERPL-CCNC: 110 U/L (ref 45–117)
ALT SERPL-CCNC: 18 U/L (ref 12–78)
ANION GAP SERPL CALC-SCNC: 5 MMOL/L (ref 5–15)
AST SERPL-CCNC: 12 U/L (ref 15–37)
BASOPHILS # BLD: 0 K/UL (ref 0–0.1)
BASOPHILS NFR BLD: 0 % (ref 0–1)
BILIRUB SERPL-MCNC: 0.6 MG/DL (ref 0.2–1)
BUN SERPL-MCNC: 28 MG/DL (ref 6–20)
BUN/CREAT SERPL: 23 (ref 12–20)
CALCIUM SERPL-MCNC: 9.2 MG/DL (ref 8.5–10.1)
CHLORIDE SERPL-SCNC: 109 MMOL/L (ref 97–108)
CHOLEST SERPL-MCNC: 203 MG/DL
CO2 SERPL-SCNC: 26 MMOL/L (ref 21–32)
CREAT SERPL-MCNC: 1.22 MG/DL (ref 0.55–1.02)
DIFFERENTIAL METHOD BLD: ABNORMAL
EOSINOPHIL # BLD: 0.2 K/UL (ref 0–0.4)
EOSINOPHIL NFR BLD: 2 % (ref 0–7)
ERYTHROCYTE [DISTWIDTH] IN BLOOD BY AUTOMATED COUNT: 15.9 % (ref 11.5–14.5)
EST. AVERAGE GLUCOSE BLD GHB EST-MCNC: 126 MG/DL
GLOBULIN SER CALC-MCNC: 3.4 G/DL (ref 2–4)
GLUCOSE SERPL-MCNC: 136 MG/DL (ref 65–100)
HBA1C MFR BLD: 6 % (ref 4–5.6)
HCT VFR BLD AUTO: 33.1 % (ref 35–47)
HDLC SERPL-MCNC: 55 MG/DL
HDLC SERPL: 3.7 (ref 0–5)
HGB BLD-MCNC: 10.3 G/DL (ref 11.5–16)
IMM GRANULOCYTES # BLD AUTO: 0.1 K/UL (ref 0–0.04)
IMM GRANULOCYTES NFR BLD AUTO: 1 % (ref 0–0.5)
LDLC SERPL CALC-MCNC: 120 MG/DL (ref 0–100)
LYMPHOCYTES # BLD: 1.1 K/UL (ref 0.8–3.5)
LYMPHOCYTES NFR BLD: 12 % (ref 12–49)
MCH RBC QN AUTO: 31.3 PG (ref 26–34)
MCHC RBC AUTO-ENTMCNC: 31.1 G/DL (ref 30–36.5)
MCV RBC AUTO: 100.6 FL (ref 80–99)
MONOCYTES # BLD: 0.9 K/UL (ref 0–1)
MONOCYTES NFR BLD: 10 % (ref 5–13)
NEUTS SEG # BLD: 6.8 K/UL (ref 1.8–8)
NEUTS SEG NFR BLD: 75 % (ref 32–75)
NRBC # BLD: 0 K/UL (ref 0–0.01)
NRBC BLD-RTO: 0 PER 100 WBC
PLATELET # BLD AUTO: 278 K/UL (ref 150–400)
PMV BLD AUTO: 9.9 FL (ref 8.9–12.9)
POTASSIUM SERPL-SCNC: 4.2 MMOL/L (ref 3.5–5.1)
PROT SERPL-MCNC: 6.6 G/DL (ref 6.4–8.2)
RBC # BLD AUTO: 3.29 M/UL (ref 3.8–5.2)
SODIUM SERPL-SCNC: 140 MMOL/L (ref 136–145)
TRIGL SERPL-MCNC: 140 MG/DL
VLDLC SERPL CALC-MCNC: 28 MG/DL
WBC # BLD AUTO: 9.3 K/UL (ref 3.6–11)

## 2024-08-19 RX ORDER — LISINOPRIL 2.5 MG/1
2.5 TABLET ORAL DAILY
Qty: 90 TABLET | Refills: 0 | Status: SHIPPED | OUTPATIENT
Start: 2024-08-19

## 2024-08-21 ENCOUNTER — OFFICE VISIT (OUTPATIENT)
Age: 84
End: 2024-08-21
Payer: MEDICARE

## 2024-08-21 VITALS
WEIGHT: 219 LBS | HEIGHT: 63 IN | BODY MASS INDEX: 38.8 KG/M2 | OXYGEN SATURATION: 97 % | DIASTOLIC BLOOD PRESSURE: 60 MMHG | SYSTOLIC BLOOD PRESSURE: 128 MMHG | HEART RATE: 75 BPM

## 2024-08-21 DIAGNOSIS — I10 PRIMARY HYPERTENSION: ICD-10-CM

## 2024-08-21 DIAGNOSIS — N18.4 TYPE 2 DIABETES MELLITUS WITH STAGE 4 CHRONIC KIDNEY DISEASE, WITH LONG-TERM CURRENT USE OF INSULIN (HCC): ICD-10-CM

## 2024-08-21 DIAGNOSIS — Z79.4 TYPE 2 DIABETES MELLITUS WITH STAGE 4 CHRONIC KIDNEY DISEASE, WITH LONG-TERM CURRENT USE OF INSULIN (HCC): ICD-10-CM

## 2024-08-21 DIAGNOSIS — D64.9 ANEMIA, UNSPECIFIED TYPE: ICD-10-CM

## 2024-08-21 DIAGNOSIS — N18.30 STAGE 3 CHRONIC KIDNEY DISEASE, UNSPECIFIED WHETHER STAGE 3A OR 3B CKD (HCC): ICD-10-CM

## 2024-08-21 DIAGNOSIS — E11.22 TYPE 2 DIABETES MELLITUS WITH STAGE 4 CHRONIC KIDNEY DISEASE, WITH LONG-TERM CURRENT USE OF INSULIN (HCC): ICD-10-CM

## 2024-08-21 DIAGNOSIS — Z79.01 ANTICOAGULATED: ICD-10-CM

## 2024-08-21 DIAGNOSIS — Z98.61 CAD S/P PERCUTANEOUS CORONARY ANGIOPLASTY: ICD-10-CM

## 2024-08-21 DIAGNOSIS — I25.10 CAD S/P PERCUTANEOUS CORONARY ANGIOPLASTY: ICD-10-CM

## 2024-08-21 DIAGNOSIS — I48.19 PERSISTENT ATRIAL FIBRILLATION (HCC): Primary | ICD-10-CM

## 2024-08-21 PROCEDURE — 1090F PRES/ABSN URINE INCON ASSESS: CPT | Performed by: INTERNAL MEDICINE

## 2024-08-21 PROCEDURE — 3044F HG A1C LEVEL LT 7.0%: CPT | Performed by: INTERNAL MEDICINE

## 2024-08-21 PROCEDURE — 99214 OFFICE O/P EST MOD 30 MIN: CPT | Performed by: INTERNAL MEDICINE

## 2024-08-21 PROCEDURE — 1036F TOBACCO NON-USER: CPT | Performed by: INTERNAL MEDICINE

## 2024-08-21 PROCEDURE — G8417 CALC BMI ABV UP PARAM F/U: HCPCS | Performed by: INTERNAL MEDICINE

## 2024-08-21 PROCEDURE — G8427 DOCREV CUR MEDS BY ELIG CLIN: HCPCS | Performed by: INTERNAL MEDICINE

## 2024-08-21 PROCEDURE — 3078F DIAST BP <80 MM HG: CPT | Performed by: INTERNAL MEDICINE

## 2024-08-21 PROCEDURE — 1123F ACP DISCUSS/DSCN MKR DOCD: CPT | Performed by: INTERNAL MEDICINE

## 2024-08-21 PROCEDURE — G8400 PT W/DXA NO RESULTS DOC: HCPCS | Performed by: INTERNAL MEDICINE

## 2024-08-21 PROCEDURE — 3074F SYST BP LT 130 MM HG: CPT | Performed by: INTERNAL MEDICINE

## 2024-08-21 RX ORDER — FLUTICASONE PROPIONATE 50 MCG
1 SPRAY, SUSPENSION (ML) NASAL DAILY
COMMUNITY

## 2024-08-21 NOTE — PROGRESS NOTES
Cardiac Electrophysiology OFFICE Follow-up Note     Primary Cardiologist: Dr. Lozada    Assessment/Plan:   1. Persistent atrial fibrillation (HCC)  2. CAD S/P percutaneous coronary angioplasty  3. Primary hypertension  4. Type 2 diabetes mellitus with stage 4 chronic kidney disease, with long-term current use of insulin (HCC)  5. Stage 3 chronic kidney disease, unspecified whether stage 3a or 3b CKD (HCC)  6. Anticoagulated  7. Anemia, unspecified type         Persistent atrial fibrillation:   Diagnosed in 05/2023, unknown onset.  Initially paroxysmal but now has progressed to persistent atrial fibrillation.  Duration unknown but echocardiogram demonstrated mild left atrial dilation indicating reasonable degree of remodeling, still adequate candidate for ablation therapy  --S/p PVI, PWI, right gume line atypical AFL ablation, CTI (9/19/23-Jaimes)  - No longer on Amoidarone  - No recurrence off Amiodarone  -  I'm pleased to hear how well the patient has done post ablation. We spoke in great detail regarding the importance of multidisciplinary management of AFib and the role of risk factors modification in preventing recurrent AF including focusing on diet, weight loss, control of blood pressure, glycemic control, SUZAN diagnosis and treatment, and medication compliance.  - Obtain 1 week monitor prior to follow-up to evaluate for Afib in 6 months  - scheduled for WM with Dr. Lyn next week, will benefit from coming off of Eliquis and Plavix given anemia  - FU with EP in 1 year or sooner if needed       HTN:  -BP controlled.  -Continue current clonidine, lisinopril, & spironolactone      Anticoagulation:  -Continue Eliquis for thromboembolic prophylaxis.  -dealing with anemia and bruising  -Knows to call clinic for BRBPR, melena, hematuria, or hemoptysis.  -Scheduled for watchman implant with Dr. Lyn on 8/27/2024     CAD  - Cath 1/11/24: s/p PTCA only, significant RCA disease.  - LHC 7/5/2024 with stent to

## 2024-08-21 NOTE — PROGRESS NOTES
Chief Complaint   Patient presents with    Atrial Fibrillation     Vitals:    08/21/24 1447   BP: 128/60   Site: Left Upper Arm   Position: Sitting   Pulse: 75   SpO2: 97%   Weight: 99.3 kg (219 lb)   Height: 1.6 m (5' 3\")     Chest pain: DENIED     Recent hospital stays: DENIED     Refills: DENIED

## 2024-08-22 RX ORDER — SODIUM CHLORIDE 0.9 % (FLUSH) 0.9 %
5-40 SYRINGE (ML) INJECTION EVERY 12 HOURS SCHEDULED
Status: CANCELLED | OUTPATIENT
Start: 2024-08-22

## 2024-08-22 RX ORDER — SODIUM CHLORIDE 9 MG/ML
INJECTION, SOLUTION INTRAVENOUS CONTINUOUS
Status: CANCELLED | OUTPATIENT
Start: 2024-08-22

## 2024-08-26 ENCOUNTER — TELEPHONE (OUTPATIENT)
Facility: CLINIC | Age: 84
End: 2024-08-26

## 2024-08-27 ENCOUNTER — APPOINTMENT (OUTPATIENT)
Facility: HOSPITAL | Age: 84
DRG: 274 | End: 2024-08-27
Attending: INTERNAL MEDICINE
Payer: MEDICARE

## 2024-08-27 ENCOUNTER — ANESTHESIA (OUTPATIENT)
Facility: HOSPITAL | Age: 84
DRG: 274 | End: 2024-08-27
Payer: MEDICARE

## 2024-08-27 ENCOUNTER — HOSPITAL ENCOUNTER (INPATIENT)
Facility: HOSPITAL | Age: 84
LOS: 1 days | Discharge: HOME | DRG: 274 | End: 2024-08-27
Attending: INTERNAL MEDICINE | Admitting: INTERNAL MEDICINE
Payer: MEDICARE

## 2024-08-27 ENCOUNTER — HOSPITAL ENCOUNTER (OUTPATIENT)
Facility: HOSPITAL | Age: 84
Discharge: HOME | DRG: 274 | End: 2024-08-29
Attending: INTERNAL MEDICINE
Payer: MEDICARE

## 2024-08-27 ENCOUNTER — ANESTHESIA EVENT (OUTPATIENT)
Facility: HOSPITAL | Age: 84
DRG: 274 | End: 2024-08-27
Payer: MEDICARE

## 2024-08-27 VITALS
BODY MASS INDEX: 38.8 KG/M2 | WEIGHT: 219 LBS | HEART RATE: 67 BPM | SYSTOLIC BLOOD PRESSURE: 170 MMHG | TEMPERATURE: 98.5 F | OXYGEN SATURATION: 94 % | HEIGHT: 63 IN | DIASTOLIC BLOOD PRESSURE: 54 MMHG | RESPIRATION RATE: 15 BRPM

## 2024-08-27 DIAGNOSIS — I48.91 ATRIAL FIBRILLATION, UNSPECIFIED TYPE (HCC): ICD-10-CM

## 2024-08-27 DIAGNOSIS — I48.0 PAROXYSMAL ATRIAL FIBRILLATION (HCC): ICD-10-CM

## 2024-08-27 DIAGNOSIS — Z95.818 PRESENCE OF WATCHMAN LEFT ATRIAL APPENDAGE CLOSURE DEVICE: Primary | ICD-10-CM

## 2024-08-27 DIAGNOSIS — I48.91 ATRIAL FIBRILLATION (HCC): ICD-10-CM

## 2024-08-27 LAB
ABO + RH BLD: NORMAL
ACT BLD: 378 SECS (ref 79–138)
ACT BLD: 599 SECS (ref 79–138)
BLOOD GROUP ANTIBODIES SERPL: NORMAL
ECHO BSA: 2.1 M2
GLUCOSE BLD STRIP.AUTO-MCNC: 146 MG/DL (ref 65–117)
SERVICE CMNT-IMP: ABNORMAL
SPECIMEN EXP DATE BLD: NORMAL

## 2024-08-27 PROCEDURE — 6360000002 HC RX W HCPCS

## 2024-08-27 PROCEDURE — 2500000003 HC RX 250 WO HCPCS

## 2024-08-27 PROCEDURE — 82962 GLUCOSE BLOOD TEST: CPT

## 2024-08-27 PROCEDURE — 86900 BLOOD TYPING SEROLOGIC ABO: CPT

## 2024-08-27 PROCEDURE — 2580000003 HC RX 258

## 2024-08-27 PROCEDURE — 93308 TTE F-UP OR LMTD: CPT

## 2024-08-27 PROCEDURE — 33340 PERQ CLSR TCAT L ATR APNDGE: CPT | Performed by: INTERNAL MEDICINE

## 2024-08-27 PROCEDURE — 2709999900 HC NON-CHARGEABLE SUPPLY: Performed by: INTERNAL MEDICINE

## 2024-08-27 PROCEDURE — 36415 COLL VENOUS BLD VENIPUNCTURE: CPT

## 2024-08-27 PROCEDURE — B24BZZ4 ULTRASONOGRAPHY OF HEART WITH AORTA, TRANSESOPHAGEAL: ICD-10-PCS | Performed by: ANESTHESIOLOGY

## 2024-08-27 PROCEDURE — 1100000000 HC RM PRIVATE

## 2024-08-27 PROCEDURE — 3700000000 HC ANESTHESIA ATTENDED CARE: Performed by: INTERNAL MEDICINE

## 2024-08-27 PROCEDURE — 86901 BLOOD TYPING SEROLOGIC RH(D): CPT

## 2024-08-27 PROCEDURE — 93662 INTRACARDIAC ECG (ICE): CPT | Performed by: INTERNAL MEDICINE

## 2024-08-27 PROCEDURE — 2580000003 HC RX 258: Performed by: NURSE PRACTITIONER

## 2024-08-27 PROCEDURE — 02L73DK OCCLUSION OF LEFT ATRIAL APPENDAGE WITH INTRALUMINAL DEVICE, PERCUTANEOUS APPROACH: ICD-10-PCS | Performed by: INTERNAL MEDICINE

## 2024-08-27 PROCEDURE — C1760 CLOSURE DEV, VASC: HCPCS | Performed by: INTERNAL MEDICINE

## 2024-08-27 PROCEDURE — 93355 ECHO TRANSESOPHAGEAL (TEE): CPT

## 2024-08-27 PROCEDURE — 76937 US GUIDE VASCULAR ACCESS: CPT | Performed by: INTERNAL MEDICINE

## 2024-08-27 PROCEDURE — 86850 RBC ANTIBODY SCREEN: CPT

## 2024-08-27 PROCEDURE — C1889 IMPLANT/INSERT DEVICE, NOC: HCPCS | Performed by: INTERNAL MEDICINE

## 2024-08-27 PROCEDURE — C1893 INTRO/SHEATH, FIXED,NON-PEEL: HCPCS | Performed by: INTERNAL MEDICINE

## 2024-08-27 PROCEDURE — 85347 COAGULATION TIME ACTIVATED: CPT

## 2024-08-27 PROCEDURE — 6360000004 HC RX CONTRAST MEDICATION: Performed by: INTERNAL MEDICINE

## 2024-08-27 PROCEDURE — 3700000001 HC ADD 15 MINUTES (ANESTHESIA): Performed by: INTERNAL MEDICINE

## 2024-08-27 PROCEDURE — C1894 INTRO/SHEATH, NON-LASER: HCPCS | Performed by: INTERNAL MEDICINE

## 2024-08-27 DEVICE — LEFT ATRIAL APPENDAGE CLOSURE DEVICE WITH DELIVERY SYSTEM
Type: IMPLANTABLE DEVICE | Status: FUNCTIONAL
Brand: WATCHMAN FLX™ PRO

## 2024-08-27 RX ORDER — SODIUM CHLORIDE 0.9 % (FLUSH) 0.9 %
5-40 SYRINGE (ML) INJECTION EVERY 12 HOURS SCHEDULED
Status: DISCONTINUED | OUTPATIENT
Start: 2024-08-27 | End: 2024-08-27 | Stop reason: HOSPADM

## 2024-08-27 RX ORDER — HEPARIN SODIUM 1000 [USP'U]/ML
INJECTION, SOLUTION INTRAVENOUS; SUBCUTANEOUS PRN
Status: DISCONTINUED | OUTPATIENT
Start: 2024-08-27 | End: 2024-08-27 | Stop reason: SDUPTHER

## 2024-08-27 RX ORDER — ACETAMINOPHEN 325 MG/1
650 TABLET ORAL EVERY 4 HOURS PRN
Status: DISCONTINUED | OUTPATIENT
Start: 2024-08-27 | End: 2024-08-27 | Stop reason: HOSPADM

## 2024-08-27 RX ORDER — PROTAMINE SULFATE 10 MG/ML
INJECTION, SOLUTION INTRAVENOUS PRN
Status: DISCONTINUED | OUTPATIENT
Start: 2024-08-27 | End: 2024-08-27 | Stop reason: SDUPTHER

## 2024-08-27 RX ORDER — SODIUM CHLORIDE 9 MG/ML
INJECTION, SOLUTION INTRAVENOUS CONTINUOUS
Status: DISCONTINUED | OUTPATIENT
Start: 2024-08-27 | End: 2024-08-27 | Stop reason: HOSPADM

## 2024-08-27 RX ORDER — SUCCINYLCHOLINE/SOD CL,ISO/PF 200MG/10ML
SYRINGE (ML) INTRAVENOUS PRN
Status: DISCONTINUED | OUTPATIENT
Start: 2024-08-27 | End: 2024-08-27 | Stop reason: SDUPTHER

## 2024-08-27 RX ORDER — DEXAMETHASONE SODIUM PHOSPHATE 4 MG/ML
INJECTION, SOLUTION INTRA-ARTICULAR; INTRALESIONAL; INTRAMUSCULAR; INTRAVENOUS; SOFT TISSUE PRN
Status: DISCONTINUED | OUTPATIENT
Start: 2024-08-27 | End: 2024-08-27 | Stop reason: SDUPTHER

## 2024-08-27 RX ORDER — SODIUM CHLORIDE 9 MG/ML
INJECTION, SOLUTION INTRAVENOUS PRN
Status: DISCONTINUED | OUTPATIENT
Start: 2024-08-27 | End: 2024-08-27 | Stop reason: HOSPADM

## 2024-08-27 RX ORDER — PROPOFOL 10 MG/ML
INJECTION, EMULSION INTRAVENOUS PRN
Status: DISCONTINUED | OUTPATIENT
Start: 2024-08-27 | End: 2024-08-27 | Stop reason: SDUPTHER

## 2024-08-27 RX ORDER — CEFAZOLIN SODIUM 1 G/3ML
INJECTION, POWDER, FOR SOLUTION INTRAMUSCULAR; INTRAVENOUS PRN
Status: DISCONTINUED | OUTPATIENT
Start: 2024-08-27 | End: 2024-08-27 | Stop reason: SDUPTHER

## 2024-08-27 RX ORDER — LIDOCAINE HYDROCHLORIDE 20 MG/ML
INJECTION, SOLUTION EPIDURAL; INFILTRATION; INTRACAUDAL; PERINEURAL PRN
Status: DISCONTINUED | OUTPATIENT
Start: 2024-08-27 | End: 2024-08-27 | Stop reason: SDUPTHER

## 2024-08-27 RX ORDER — ROCURONIUM BROMIDE 10 MG/ML
INJECTION, SOLUTION INTRAVENOUS PRN
Status: DISCONTINUED | OUTPATIENT
Start: 2024-08-27 | End: 2024-08-27 | Stop reason: SDUPTHER

## 2024-08-27 RX ORDER — IOPAMIDOL 755 MG/ML
INJECTION, SOLUTION INTRAVASCULAR PRN
Status: DISCONTINUED | OUTPATIENT
Start: 2024-08-27 | End: 2024-08-27 | Stop reason: HOSPADM

## 2024-08-27 RX ORDER — ONDANSETRON 2 MG/ML
INJECTION INTRAMUSCULAR; INTRAVENOUS PRN
Status: DISCONTINUED | OUTPATIENT
Start: 2024-08-27 | End: 2024-08-27 | Stop reason: SDUPTHER

## 2024-08-27 RX ORDER — SODIUM CHLORIDE 9 MG/ML
INJECTION, SOLUTION INTRAVENOUS CONTINUOUS PRN
Status: DISCONTINUED | OUTPATIENT
Start: 2024-08-27 | End: 2024-08-27 | Stop reason: SDUPTHER

## 2024-08-27 RX ORDER — SODIUM CHLORIDE 0.9 % (FLUSH) 0.9 %
5-40 SYRINGE (ML) INJECTION PRN
Status: DISCONTINUED | OUTPATIENT
Start: 2024-08-27 | End: 2024-08-27 | Stop reason: HOSPADM

## 2024-08-27 RX ADMIN — PHENYLEPHRINE HYDROCHLORIDE 40 MCG/MIN: 10 INJECTION INTRAVENOUS at 13:40

## 2024-08-27 RX ADMIN — PROPOFOL 150 MG: 10 INJECTION, EMULSION INTRAVENOUS at 12:53

## 2024-08-27 RX ADMIN — DEXAMETHASONE SODIUM PHOSPHATE 4 MG: 4 INJECTION INTRA-ARTICULAR; INTRALESIONAL; INTRAMUSCULAR; INTRAVENOUS; SOFT TISSUE at 13:32

## 2024-08-27 RX ADMIN — SODIUM CHLORIDE: 9 INJECTION, SOLUTION INTRAVENOUS at 11:16

## 2024-08-27 RX ADMIN — Medication 200 MG: at 12:54

## 2024-08-27 RX ADMIN — ROCURONIUM BROMIDE 5 MG: 10 INJECTION, SOLUTION INTRAVENOUS at 12:53

## 2024-08-27 RX ADMIN — ROCURONIUM BROMIDE 10 MG: 10 INJECTION, SOLUTION INTRAVENOUS at 13:24

## 2024-08-27 RX ADMIN — CEFAZOLIN 2 G: 330 INJECTION, POWDER, FOR SOLUTION INTRAMUSCULAR; INTRAVENOUS at 13:23

## 2024-08-27 RX ADMIN — PROTAMINE SULFATE 40 MG: 10 INJECTION, SOLUTION INTRAVENOUS at 13:59

## 2024-08-27 RX ADMIN — LIDOCAINE HYDROCHLORIDE 40 MG: 20 INJECTION, SOLUTION EPIDURAL; INFILTRATION; INTRACAUDAL; PERINEURAL at 12:53

## 2024-08-27 RX ADMIN — HEPARIN SODIUM 5000 UNITS: 1000 INJECTION, SOLUTION INTRAVENOUS; SUBCUTANEOUS at 13:32

## 2024-08-27 RX ADMIN — ONDANSETRON 4 MG: 2 INJECTION INTRAMUSCULAR; INTRAVENOUS at 13:32

## 2024-08-27 RX ADMIN — SODIUM CHLORIDE: 9 INJECTION, SOLUTION INTRAVENOUS at 12:43

## 2024-08-27 RX ADMIN — ROCURONIUM BROMIDE 25 MG: 10 INJECTION, SOLUTION INTRAVENOUS at 13:01

## 2024-08-27 RX ADMIN — SUGAMMADEX 200 MG: 100 INJECTION, SOLUTION INTRAVENOUS at 14:18

## 2024-08-27 RX ADMIN — HEPARIN SODIUM 7000 UNITS: 1000 INJECTION, SOLUTION INTRAVENOUS; SUBCUTANEOUS at 13:44

## 2024-08-27 ASSESSMENT — ENCOUNTER SYMPTOMS: SHORTNESS OF BREATH: 1

## 2024-08-27 NOTE — H&P
Dr. Lyn                                 [facility].  [phone]            Cardiology structural heart disease consult/Progress Note      Requesting/referring provider: Kylah Villegas, BUBBA - Simona Lyn MD     Reason for Consult: Discussion regarding PCI and alternatives to long-term oral anticoagulation    Assessment/Plan:  1.  Shortness of breath multifactorial  2.  Anemia  3.  Morbid obesity  4.  Frequent bruising  5.  Needing assistance to ambulate, fall risk  6.  Coronary artery disease with previous unsuccessful PCI of RCA  7.  History of atrial fibrillation long-term persistent status post prior A-fib ablation      Adrienne To is seen for above-mentioned reasons.  She has shortness of breath which is multifactorial with some component of dyspnea probably from CAD.  Unsuccessful PCI to RCA because of calcification.  I discussed options including conservative therapy with medications, consideration for atherectomy/high risk PCI.  In my opinion she should not undergo coronary bypass grafting for the degree of disease that she has.  She is willing to proceed with atherectomy and PCI.    Previous femoral catheterization was also complicated by pseudoaneurysm which required thrombin injection.  Will consider left radial approach.    Additionally she is also concerned about significant bruising and anemia and does not want to continue it long-term oral anticoagulation due to bruising and risk of falls as well as recent major femoral bleeding requiring intervention for pseudoaneurysm..  The patient has a CHADSVASC/CHADS 2 score of 4.  He/she should avoid long term anticoagulation history of severe anemia, fall risk frequent bruising.  She additionally requires antiplatelets because of coronary artery disease in combination of antiplatelets and anticoagulants with no significant increase her risk of bleeding long-term.  The patient feels strongly that anticoagulation and documented

## 2024-08-27 NOTE — ANESTHESIA POSTPROCEDURE EVALUATION
Department of Anesthesiology  Postprocedure Note    Patient: Adrienne To  MRN: [id number]  YOB: 1940  Date of evaluation: 8/27/2024    Procedure Summary       Date: 08/27/24 Room / Location: [facility] CATH LAB 3 / [facility] CARDIAC CATH LAB    Anesthesia Start: 1243 Anesthesia Stop: 1441    Procedures:       Watchman kirit closure device      Ricardo during cath case      Ultrasound guided vascular access      Intracardiac echocardiogram Diagnosis:       Paroxysmal atrial fibrillation (HCC)      (Atrial fibrillation (HCC) [I48.91])    Providers: Shaista Lyn MD Responsible Provider: Jong Joseph DO    Anesthesia Type: General ASA Status: 3            Anesthesia Type: General    Yefri Phase I: Yefri Score: 10    Yefri Phase II:      Anesthesia Post Evaluation    Patient location during evaluation: PACU  Patient participation: complete - patient participated  Level of consciousness: awake and alert  Pain score: 0  Airway patency: patent  Nausea & Vomiting: no nausea  Cardiovascular status: hemodynamically stable  Respiratory status: acceptable  Hydration status: euvolemic  Comments: Seen, no complaints   Pain management: adequate    There were no known notable events for this encounter.

## 2024-08-27 NOTE — ANESTHESIA PROCEDURE NOTES
Procedure Performed: JASSON       Start Time:        End Time:      Preanesthesia Checklist:  Patient identified, IV assessed, risks and benefits discussed, monitors and equipment assessed, procedure being performed at surgeon's request and anesthesia consent obtained.    General Procedure Information  Diagnostic Indications for Echo:  assessment of surgical repair, hemodynamic monitoring and suspected pericardial effusion  Location performed:  Cath lab  Intubated  Bite block not placed  Heart visualized  Probe Insertion:  Easy  Probe Type:  Mulitplane and 3D  Modalities:  2D, color flow mapping, 3D, pulse wave Doppler and continuous wave Doppler    Echocardiographic and Doppler Measurements    Ventricles    Right Ventricle:  Hypertrophy not present.  Thrombus not present.  Global function normal.    Left Ventricle:  Hypertrophy present.  Thrombus not present.  Global Function normal.      Ventricular Regional Function:  1- Basal Anteroseptal:  normal  2- Basal Anterior:  normal  3- Basal Anterolateral:  normal  4- Basal Inferolateral:  normal  5- Basal Inferior:  normal  6- Basal Inferoseptal:  normal  7- Mid Anteroseptal:  normal  8- Mid Anterior:  normal  9- Mid Anterolateral:  normal  10- Mid Inferolateral:  normal  11- Mid Inferior:  normal  12- Mid Inferoseptal:  normal  13- Apical Anterior:  normal  14- Apical Lateral:  normal  15- Apical Inferior:  normal  16- Apical Septal:  normal  17- Meta:  normal      Valves    Aortic Valve:  Stenosis not present.  Regurgitation mild.  Leaflets thickened.  Leaflet motions normal.      Mitral Valve:  Annulus normal.  Stenosis not present.  Leaflets thickened.  Leaflet motions normal.      Tricuspid Valve:  Annulus normal.  Stenosis not present.  Leaflets normal.  Leaflet motions normal.    Pulmonic Valve:  Annulus normal.  Stenosis not present.  Regurgitation none.    Other Valve Findings:       Trace TR and MR, sclerotic AV    Aorta    Ascending Aorta:  Size normal.

## 2024-08-27 NOTE — DISCHARGE INSTRUCTIONS
Future Appointments   Date Time Provider Department Center   10/10/2024  1:20 PM Daniel Castillo MD [facility] BS AMB   10/23/2024  2:20 PM Virginia Irvin APRN - CNP [facility] BS AMB   9/3/2025  2:20 PM Marcelina Jaimes MD [facility] BS AMB       The office  will call to arrange your POST WATCHMAN JASSON, to be done in 6 weeks.          DISCHARGE INSTRUCTIONS    If possible, have someone stay with you for the first night. It is normal to feel tired for the first couple of days.  Take it easy and follow your physician’s instructions on activity.    CHECK THE CATHETER INSERTION SITE DAILY:    If bleeding at the cath site occurs, take a clean washcloth and apply direct pressure just above the puncture site for at least 15 minutes.  Call 911 immediately if the bleeding is not controlled.  Continue to apply direct pressure until an ambulance gets to your location. Do not try to drive yourself or have someone else drive you to the hospital.  Have the ambulance bring you to the emergency room.    You may shower 24 hours after your procedure. Gently remove the bandage during showering.  Wash with soap and water and pat dry.  To prevent infection, keep the groin area/insertion site clean and dry.  Do not apply powders, creams, lotions, or ointments to the site for 5 days. You may cover the site with a fresh Band-Aid each day until well healed.  You may notice a small lump at the site. This is normal and may last up to 6 weeks.    CALL THE PHYSICIAN:  If the site becomes red, swollen, or feels warm to the touch, or is healing poorly    If you note any large/extending bruise, fever >101.0 or chills  If your extremity has numbness, tingling, discoloration, abnormal swelling, tightness or pain   If you have difficulty with urination or develop nausea, vomiting, or severe abdominal pain    ACTIVITY for the first 24-48 hours, or as instructed by your physician:  No lifting, pushing or pulling over 10 pounds and no straining the insertion  site. Do not lift grocery bags or the garbage can; do not run the vacuum  or  for 7 days.  You may start walking short distances the day of your procedure. Gradually increase as tolerated each day.  Current activity recommendations are 30 minutes of exercise at least 5 days a week. Work up to this as you recover.   Avoid walking outside in extremes of heat or cold.  Walk inside (at home, at the mall, or at a large store) when it is cold and windy or hot and humid.     THINGS TO KEEP IN MIND:   Do not drive, operate any machinery, or sign any legal documents for 24 hours after your procedure, or as directed by your physician. You must have someone drive you home after your procedure.   Drink plenty of fluids for 24-48 hours after your procedure to flush the contrast dye from your kidneys.   Limit the number of times you go up and down the stairs  Take rests and pace yourself with activity  Be careful and do not strain with bowel movements    MEDICATIONS:  Take all medications as prescribed  Call your physician if you have any questions  Keep an updated list of your medications with you at all times and give a list to your primary physician and pharmacist  You may use Tylenol 325mg 1-2 tablets every 6 hours as needed for pain or discomfort, unless otherwise instructed.  If you have significant discomfort more than 48 hours after your procedure, please call your physician’s office.    AFTER CARE:  Follow up with your physician as instructed  You may start walking short distances the day of your procedure. Gradually increase as tolerated each day.  Current activity recommendations are 30 minutes of exercise at least 5 days a week. Work up to this as you recover. Walk, ride a bike, or choose any other activity you enjoy to reach this activity goal.   Avoid walking outside in extremes of heat or cold. Walk inside (at home, at the mall, or at a large store) when it is cold and windy or hot and humid.   If

## 2024-08-27 NOTE — PROGRESS NOTES
1516: Updated daughter via telephone regarding patient status and discharge disposition.     1529: Head of bed elevated 30degrees right groin site without bleeding and no hematoma.     1645: Educated patient about their sedation precautions such as not driving, operating any machinery, or signing legal documents 24 hours post procedure.     Reviewed discharge instructions, including medications and site care using the teach back method. Answered all questions. Verbalized understanding.     1702: Ambulated patient to the bathroom with a steady gait, voided without difficulty. Patient denies chest pain, shortness of breath, or dizziness. Returned to [facility]. Vital signs stable.     Procedural site is clean, dry, and intact. No bleeding, no hematoma.     Assisted patient in dressing self.     Removed peripheral IV.    1710: Escorted to discharge area in a wheelchair with all of their belongings.    Patient's daughter present to take patient home. Reviewed discharge instructions with patients' daughter, they verbalized understanding.

## 2024-08-27 NOTE — PROGRESS NOTES
EP/Cath LAB to Recovery Room Report    Procedure: Alyson VALVERDE: VICTOR MANUEL Lyn MD    Verbal Report given to Recovery Nurse on patient being transferred to Recovery Room for routine post-op. Patient stable upon transfer to .  Pt had general sedation with Anesthesia team, who managed MAR, vitals, and airway. Vitals, mental status, MAR, procedural summary discussed with recovery RN.       Procedural Site:    Right femoral vein 14fr sheath removed, closed with perclose.

## 2024-08-27 NOTE — ANESTHESIA PRE PROCEDURE
Department of Anesthesiology  Preprocedure Note       Name:  Adrienne To   Age:  84 y.o.  :  1940                                          MRN:  [id number]         Date:  2024      Surgeon: Surgeon(s):  Shaista Lyn MD    Procedure: Procedure(s):  Watchman kirit closure device  Ricardo during cath case    Medications prior to admission:   Prior to Admission medications    Medication Sig Start Date End Date Taking? Authorizing Provider   fluticasone (FLONASE) 50 MCG/ACT nasal spray 1 spray by Each Nostril route daily   Yes Aneesh Rios MD   lisinopril (PRINIVIL;ZESTRIL) 2.5 MG tablet Take 1 tablet by mouth daily 24  Yes Kylah Villegas APRN - NP   Budeson-Glycopyrrol-Formoterol (BREZTRI AEROSPHERE) 160-9-4.8 MCG/ACT AERO Inhale 2 puffs into the lungs in the morning and at bedtime 24  Yes Kylah Villegas APRN - NP   clopidogrel (PLAVIX) 75 MG tablet Take 1 tablet by mouth daily 24  Yes Ben Couch MD   empagliflozin (JARDIANCE) 10 MG tablet Take 1 tablet by mouth daily 24  Yes Estrella Chávez MD   SITagliptin (JANUVIA) 50 MG tablet Take 1 tablet by mouth daily 24  Yes Estrella Chávez MD   atorvastatin (LIPITOR) 40 MG tablet Take 1 tablet by mouth daily 3/7/24  Yes Kylah Villegas APRN - NP   levocetirizine (XYZAL) 5 MG tablet TAKE 1 TABLET DAILY 24  Yes Kylah Villegas APRN - NP   vitamin C (ASCORBIC ACID) 500 MG tablet Take 1 tablet by mouth daily   Yes Aneesh Rios MD   furosemide (LASIX) 20 MG tablet Take 20 mg daily. 23  Yes Tyesha Abbott APRN - NP   ferrous sulfate (IRON 325) 325 (65 Fe) MG tablet Take 1 tablet by mouth daily (with breakfast)   Yes Aneesh Rios MD   albuterol sulfate HFA (PROVENTIL;VENTOLIN;PROAIR) 108 (90 Base) MCG/ACT inhaler Inhale 2 puffs into the lungs every 4 hours as needed for Wheezing or Shortness of Breath 10/9/23  Yes Kylah Villegas, APRN - NP   Coenzyme Q10 (COQ10) 100 MG CAPS  Time of last liquid consumption: 0700                        Time of last solid consumption: 2000                        Date of last liquid consumption: 08/26/24                        Date of last solid food consumption: 08/26/24    BMI:   Wt Readings from Last 3 Encounters:   08/27/24 99.3 kg (219 lb)   08/21/24 99.3 kg (219 lb)   07/09/24 98.9 kg (218 lb)     Body mass index is 38.79 kg/m².    CBC:   Lab Results   Component Value Date/Time    WBC 9.3 08/15/2024 09:59 AM    RBC 3.29 08/15/2024 09:59 AM    HGB 10.3 08/15/2024 09:59 AM    HCT 33.1 08/15/2024 09:59 AM    .6 08/15/2024 09:59 AM    RDW 15.9 08/15/2024 09:59 AM     08/15/2024 09:59 AM       CMP:   Lab Results   Component Value Date/Time     08/15/2024 09:59 AM    K 4.2 08/15/2024 09:59 AM     08/15/2024 09:59 AM    CO2 26 08/15/2024 09:59 AM    BUN 28 08/15/2024 09:59 AM    CREATININE 1.22 08/15/2024 09:59 AM    GFRAA 51 06/09/2022 10:45 AM    AGRATIO 1.6 05/22/2024 03:43 PM    LABGLOM 44 08/15/2024 09:59 AM    LABGLOM 49 03/08/2024 04:06 PM    GLUCOSE 136 08/15/2024 09:59 AM    GLUCOSE 122 06/28/2024 11:40 AM    CALCIUM 9.2 08/15/2024 09:59 AM    BILITOT 0.6 08/15/2024 09:59 AM    ALKPHOS 110 08/15/2024 09:59 AM    ALKPHOS 117 06/28/2024 11:40 AM    AST 12 08/15/2024 09:59 AM    ALT 18 08/15/2024 09:59 AM       POC Tests:   Recent Labs     08/27/24  1027   POCGLU 146*       Coags:   Lab Results   Component Value Date/Time    PROTIME 14.4 07/14/2022 12:51 PM    INR 1.1 07/14/2022 12:51 PM       HCG (If Applicable): No results found for: \"PREGTESTUR\", \"PREGSERUM\", \"HCG\", \"HCGQUANT\"     ABGs: No results found for: \"PHART\", \"PO2ART\", \"TVE5UOL\", \"KVR8IUX\", \"BEART\", \"T3EPPJHA\"     Type & Screen (If Applicable):  No results found for: \"LABABO\"    Drug/Infectious Status (If Applicable):  No results found for: \"HIV\", \"HEPCAB\"    COVID-19 Screening (If Applicable): No results found for: \"COVID19\"        Anesthesia Evaluation  Patient

## 2024-08-27 NOTE — DISCHARGE SUMMARY
[facility] CARDIOLOGY  [facility].  [phone]       Watchman Device Discharge Summary     Patient ID:  Adrienne To  [id number]  84 y.o.  1940    Admit Date: 8/27/2024    Discharge Date: 8/27/2024     Admitting Physician: Shaista Lyn MD     Discharge Physician: BUBBA Pedro - TATIANA/Dr. Lyn      Admission Diagnoses:   Atrial fibrillation ([facility]) [I48.91]  Atrial fibrillation    Discharge Diagnoses:   Principal Problem:    Atrial fibrillation ([facility])  Resolved Problems:    * No resolved hospital problems. 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[facility]) 01/14/2024    Hematoma of groin 01/13/2024    Syncope due to orthostatic hypotension 01/12/2024    Pain of right lower extremity 01/12/2024    Atypical syncope 01/12/2024    RANKIN (dyspnea on exertion) 01/03/2024    Abnormal stress test 01/03/2024    Type 2 diabetes mellitus with stage 4 chronic kidney disease, with long-term current use of insulin ([facility]) 06/22/2023    PAF (paroxysmal atrial fibrillation) ([facility]) 06/19/2023    Anticoagulated 06/19/2023    High risk medication use 06/19/2023    Chronic renal disease, stage III ([facility]) 09/15/2022    Dyslipidemia     CAD (coronary artery disease)     HTN (hypertension)     CKD (chronic kidney disease)     Obesity, Class III, BMI 40-49.9 (morbid obesity) ([facility]) 10/07/2020    Heart attack ([facility]) 01/01/2014      Past Medical History:   Diagnosis Date    CAD (coronary artery disease)     CKD (chronic kidney disease)     Diabetes ([facility])

## 2024-08-27 NOTE — PROGRESS NOTES
Cardiac Cath Lab Procedure Area Arrival Note:    Adrienne To arrived to Cardiac Cath Lab, Procedure Area. Patient identifiers verified with NAME and DATE OF BIRTH. Procedure verified with patient. Consent forms verified. Allergies verified. Patient informed of procedure and plan of care. Questions answered with review. Patient voiced understanding of procedure and plan of care.    Patient on cardiac monitor, non-invasive blood pressure, SPO2 monitor. On room air with airway managed by anesthesia. Patient status doing well without problems. Patient is A&Ox 4. Patient reports no CP or SOB.     Patient medicated during procedure by anesthesia team.    Refer to patients Cardiac Cath Lab PROCEDURE REPORT for vital signs, assessment, status, and response during procedure, printed at end of case. Printed report on chart or scanned into chart.

## 2024-08-27 NOTE — PROGRESS NOTES
TRANSFER - IN Cath Lab RR REPORT:    Verbal report received from Ashlie REY and CRNA on Adrienne To  being received from the Cardiac Cath Lab for routine progression of care. Report consisted of patient’s Situation, Background, Assessment and Recommendations(SBAR). Procedural summary and MAR reviewed with receiving nurse. Opportunity for questions and clarification was provided. Assessment completed upon patient’s arrival to Cardiac Cath Lab RECOVERY AREA and care assumed.       Cardiac Cath Lab Recovery Arrival Note:    Adrienne To arrived to CCL recovery area.  Patient procedure= JASSON/Watchman. Patient on cardiac monitor, non-invasive blood pressure, SPO2 monitor. On O2 @ 2lpm via NC. Patient is A&Ox 4. Patient reports no complaints.    PROCEDURE SITE CHECK:    Procedure site: No bleeding and no hematoma, no pain/discomfort reported at procedure site.     No change in patient status. Continue to monitor patient and status.

## 2024-08-28 ENCOUNTER — TELEPHONE (OUTPATIENT)
Age: 84
End: 2024-08-28

## 2024-08-28 LAB
ECHO BSA: 2.1 M2
ECHO LV EF PHYSICIAN: 60 %

## 2024-08-28 NOTE — TELEPHONE ENCOUNTER
Left message for patient to return call to schedule procedure.    ----- Message from CANDIDO GARCIA RN sent at 8/27/2024  1:47 PM EDT -----    CPT: 18129  ICD:I48.91; Z95.818  No labs, hold jardiance and januvia    Thanks!  ----- Message -----  From: Virginia Irvin APRN - CNP  Sent: 8/27/2024   1:23 PM EDT  To: Josefina Campuzano MA; Inés Cardenas RN    S/p watchman today     Needs 6 week post watchman JASSON please     Thx  [place]  
                                             [place] [address]                                                        [place] office: [phone]-Josefina         
Universal Safety Interventions

## 2024-09-10 ENCOUNTER — TELEPHONE (OUTPATIENT)
Facility: CLINIC | Age: 84
End: 2024-09-10

## 2024-09-10 NOTE — TELEPHONE ENCOUNTER
----- Message from Isauro FONSECA sent at 9/10/2024 10:08 AM EDT -----  Regarding: [facility] Appointment Request  [facility] Appointment Request    Patient needs appointment for [facility] Appointment Type: New to Provider.    Patient Requested Dates(s): As soon as possible  Patient Requested Time: Any time  Provider Name: Dr. Nnacy Miller    Reason for Appointment Request: Other PT will be moving to another practice   --------------------------------------------------------------------------------------------------------------------------    Relationship to Patient: Other DAUGHTER\ TAYA     Call Back Information: OK to leave message on voicemail  Preferred Call Back Number: Phone [phone]

## 2024-09-27 ENCOUNTER — TELEPHONE (OUTPATIENT)
Age: 84
End: 2024-09-27

## 2024-10-03 DIAGNOSIS — N18.31 CHRONIC KIDNEY DISEASE, STAGE 3A (HCC): ICD-10-CM

## 2024-10-03 DIAGNOSIS — I48.91 ATRIAL FIBRILLATION, UNSPECIFIED TYPE (HCC): ICD-10-CM

## 2024-10-03 LAB
ALBUMIN SERPL-MCNC: 3.4 G/DL (ref 3.5–5)
ALBUMIN SERPL-MCNC: 3.4 G/DL (ref 3.5–5)
ALBUMIN/GLOB SERPL: 1 (ref 1.1–2.2)
ALP SERPL-CCNC: 120 U/L (ref 45–117)
ALT SERPL-CCNC: 24 U/L (ref 12–78)
ANION GAP SERPL CALC-SCNC: 5 MMOL/L (ref 2–12)
ANION GAP SERPL CALC-SCNC: 6 MMOL/L (ref 2–12)
AST SERPL-CCNC: 13 U/L (ref 15–37)
BASOPHILS # BLD: 0 K/UL (ref 0–0.1)
BASOPHILS # BLD: 0.1 K/UL (ref 0–0.1)
BASOPHILS NFR BLD: 0 % (ref 0–1)
BASOPHILS NFR BLD: 1 % (ref 0–1)
BILIRUB SERPL-MCNC: 0.5 MG/DL (ref 0.2–1)
BUN SERPL-MCNC: 34 MG/DL (ref 6–20)
BUN SERPL-MCNC: 35 MG/DL (ref 6–20)
BUN/CREAT SERPL: 27 (ref 12–20)
BUN/CREAT SERPL: 28 (ref 12–20)
CALCIUM SERPL-MCNC: 9.4 MG/DL (ref 8.5–10.1)
CALCIUM SERPL-MCNC: 9.4 MG/DL (ref 8.5–10.1)
CHLORIDE SERPL-SCNC: 110 MMOL/L (ref 97–108)
CHLORIDE SERPL-SCNC: 110 MMOL/L (ref 97–108)
CO2 SERPL-SCNC: 26 MMOL/L (ref 21–32)
CO2 SERPL-SCNC: 26 MMOL/L (ref 21–32)
COMMENT:: NORMAL
CREAT SERPL-MCNC: 1.25 MG/DL (ref 0.55–1.02)
CREAT SERPL-MCNC: 1.26 MG/DL (ref 0.55–1.02)
DIFFERENTIAL METHOD BLD: ABNORMAL
DIFFERENTIAL METHOD BLD: ABNORMAL
EOSINOPHIL # BLD: 0.2 K/UL (ref 0–0.4)
EOSINOPHIL # BLD: 0.2 K/UL (ref 0–0.4)
EOSINOPHIL NFR BLD: 2 % (ref 0–7)
EOSINOPHIL NFR BLD: 2 % (ref 0–7)
ERYTHROCYTE [DISTWIDTH] IN BLOOD BY AUTOMATED COUNT: 14.8 % (ref 11.5–14.5)
ERYTHROCYTE [DISTWIDTH] IN BLOOD BY AUTOMATED COUNT: 14.8 % (ref 11.5–14.5)
GLOBULIN SER CALC-MCNC: 3.5 G/DL (ref 2–4)
GLUCOSE SERPL-MCNC: 122 MG/DL (ref 65–100)
GLUCOSE SERPL-MCNC: 125 MG/DL (ref 65–100)
HCT VFR BLD AUTO: 37.7 % (ref 35–47)
HCT VFR BLD AUTO: 38.4 % (ref 35–47)
HGB BLD-MCNC: 11.7 G/DL (ref 11.5–16)
HGB BLD-MCNC: 11.9 G/DL (ref 11.5–16)
IMM GRANULOCYTES # BLD AUTO: 0.1 K/UL (ref 0–0.04)
IMM GRANULOCYTES # BLD AUTO: 0.1 K/UL (ref 0–0.04)
IMM GRANULOCYTES NFR BLD AUTO: 1 % (ref 0–0.5)
IMM GRANULOCYTES NFR BLD AUTO: 1 % (ref 0–0.5)
LYMPHOCYTES # BLD: 1.1 K/UL (ref 0.8–3.5)
LYMPHOCYTES # BLD: 1.1 K/UL (ref 0.8–3.5)
LYMPHOCYTES NFR BLD: 11 % (ref 12–49)
LYMPHOCYTES NFR BLD: 11 % (ref 12–49)
MCH RBC QN AUTO: 30.7 PG (ref 26–34)
MCH RBC QN AUTO: 31.2 PG (ref 26–34)
MCHC RBC AUTO-ENTMCNC: 30.5 G/DL (ref 30–36.5)
MCHC RBC AUTO-ENTMCNC: 31.6 G/DL (ref 30–36.5)
MCV RBC AUTO: 100.8 FL (ref 80–99)
MCV RBC AUTO: 99 FL (ref 80–99)
MONOCYTES # BLD: 0.9 K/UL (ref 0–1)
MONOCYTES # BLD: 0.9 K/UL (ref 0–1)
MONOCYTES NFR BLD: 10 % (ref 5–13)
MONOCYTES NFR BLD: 9 % (ref 5–13)
NEUTS SEG # BLD: 7.3 K/UL (ref 1.8–8)
NEUTS SEG # BLD: 7.4 K/UL (ref 1.8–8)
NEUTS SEG NFR BLD: 76 % (ref 32–75)
NEUTS SEG NFR BLD: 76 % (ref 32–75)
NRBC # BLD: 0 K/UL (ref 0–0.01)
NRBC # BLD: 0 K/UL (ref 0–0.01)
NRBC BLD-RTO: 0 PER 100 WBC
NRBC BLD-RTO: 0 PER 100 WBC
PHOSPHATE SERPL-MCNC: 4 MG/DL (ref 2.6–4.7)
PLATELET # BLD AUTO: 270 K/UL (ref 150–400)
PLATELET # BLD AUTO: 274 K/UL (ref 150–400)
PMV BLD AUTO: 10 FL (ref 8.9–12.9)
PMV BLD AUTO: 9.9 FL (ref 8.9–12.9)
POTASSIUM SERPL-SCNC: 4.2 MMOL/L (ref 3.5–5.1)
POTASSIUM SERPL-SCNC: 4.3 MMOL/L (ref 3.5–5.1)
PROT SERPL-MCNC: 6.9 G/DL (ref 6.4–8.2)
RBC # BLD AUTO: 3.81 M/UL (ref 3.8–5.2)
RBC # BLD AUTO: 3.81 M/UL (ref 3.8–5.2)
SODIUM SERPL-SCNC: 141 MMOL/L (ref 136–145)
SODIUM SERPL-SCNC: 142 MMOL/L (ref 136–145)
SPECIMEN HOLD: NORMAL
WBC # BLD AUTO: 9.6 K/UL (ref 3.6–11)
WBC # BLD AUTO: 9.8 K/UL (ref 3.6–11)

## 2024-10-04 NOTE — PROGRESS NOTES
[facility] [place] [facility]  [address], [address] [place] [address]  W: [phone]  F: [phone]      Reason for Visit:   Adrienne To is a 84 y.o. 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    History of Present Illness:   She had an ablation for Afib since I saw her last year. She had cardiac cath with stent placement. She had Watchman procedure done last month. Her apixaban was stopped recently. She is now on ASA and plavix. She has bruising but no bleeding.    No recent infections. No fevers, chills, night sweats, unintentional weight loss, adenopathy. No bone pain.      Review of systems was obtained and pertinent findings reviewed above. Past medical history, social history, family history, medications, and allergies are located in the electronic medical record.    Physical Exam:   Visit Vitals  BP (!) 149/50 (Site: Right Upper Arm, Position: Sitting, Cuff Size: Large Adult)   Pulse 71   Temp 97.3 °F (36.3 °C) (Temporal)   Resp 18   SpO2 98%       General: no distress  Respiratory: normal respiratory effort  CV: no peripheral edema  Skin: no rashes; no ecchymoses; no petechiae    Results:     Lab Results   Component Value Date    WBC 9.8 10/03/2024    HGB 11.7 10/03/2024    HCT 38.4 10/03/2024     10/03/2024    .8 (H) 10/03/2024    NEUTROABS 7.4 10/03/2024     Lab Results   Component Value Date     10/03/2024    K 4.2 10/03/2024     (H) 10/03/2024    CO2 26 10/03/2024    GLUCOSE 125 (H) 10/03/2024    BUN 34 (H) 10/03/2024    CREATININE 1.25 (H) 10/03/2024    LABGLOM 43 (L) 10/03/2024    CALCIUM 9.4 10/03/2024    PHOS 4.0 10/03/2024     Lab Results   Component Value Date    BILITOT 0.5 10/03/2024    ALT 24 10/03/2024    AST 13 (L) 10/03/2024    ALKPHOS 120 (H) 10/03/2024    LABALBU 0.0 09/11/2023    GLOB 3.1 10/03/2024     Lab Results   Component Value Date    IRON 130 02/05/2024    TIBC 283 02/05/2024    IRONPERSAT 46 02/05/2024

## 2024-10-07 ENCOUNTER — ANESTHESIA (OUTPATIENT)
Facility: HOSPITAL | Age: 84
End: 2024-10-07
Payer: MEDICARE

## 2024-10-07 ENCOUNTER — ANESTHESIA EVENT (OUTPATIENT)
Facility: HOSPITAL | Age: 84
End: 2024-10-07
Payer: MEDICARE

## 2024-10-07 ENCOUNTER — HOSPITAL ENCOUNTER (OUTPATIENT)
Facility: HOSPITAL | Age: 84
Discharge: HOME | End: 2024-10-09
Attending: INTERNAL MEDICINE
Payer: MEDICARE

## 2024-10-07 ENCOUNTER — TELEPHONE (OUTPATIENT)
Age: 84
End: 2024-10-07

## 2024-10-07 VITALS
WEIGHT: 221 LBS | TEMPERATURE: 97.8 F | SYSTOLIC BLOOD PRESSURE: 131 MMHG | RESPIRATION RATE: 18 BRPM | BODY MASS INDEX: 39.15 KG/M2 | HEART RATE: 55 BPM | DIASTOLIC BLOOD PRESSURE: 46 MMHG | OXYGEN SATURATION: 100 %

## 2024-10-07 DIAGNOSIS — Z95.818 STATUS POST BLALOCK-TAUSSIG SHUNT: ICD-10-CM

## 2024-10-07 DIAGNOSIS — D47.2 MONOCLONAL GAMMOPATHY: ICD-10-CM

## 2024-10-07 DIAGNOSIS — I48.92 FLUTTER-FIBRILLATION (HCC): ICD-10-CM

## 2024-10-07 DIAGNOSIS — I48.91 FLUTTER-FIBRILLATION (HCC): ICD-10-CM

## 2024-10-07 PROCEDURE — 6360000002 HC RX W HCPCS

## 2024-10-07 PROCEDURE — 93312 ECHO TRANSESOPHAGEAL: CPT

## 2024-10-07 PROCEDURE — 3700000001 HC ADD 15 MINUTES (ANESTHESIA)

## 2024-10-07 PROCEDURE — 3700000000 HC ANESTHESIA ATTENDED CARE

## 2024-10-07 PROCEDURE — 2500000003 HC RX 250 WO HCPCS

## 2024-10-07 PROCEDURE — 2580000003 HC RX 258

## 2024-10-07 RX ORDER — SODIUM CHLORIDE, SODIUM LACTATE, POTASSIUM CHLORIDE, CALCIUM CHLORIDE 600; 310; 30; 20 MG/100ML; MG/100ML; MG/100ML; MG/100ML
INJECTION, SOLUTION INTRAVENOUS
Status: DISCONTINUED | OUTPATIENT
Start: 2024-10-07 | End: 2024-10-07 | Stop reason: SDUPTHER

## 2024-10-07 RX ORDER — LIDOCAINE HYDROCHLORIDE 20 MG/ML
INJECTION, SOLUTION EPIDURAL; INFILTRATION; INTRACAUDAL; PERINEURAL
Status: DISCONTINUED | OUTPATIENT
Start: 2024-10-07 | End: 2024-10-07 | Stop reason: SDUPTHER

## 2024-10-07 RX ORDER — ASPIRIN 81 MG/1
81 TABLET ORAL DAILY
Qty: 90 TABLET | Refills: 2 | Status: SHIPPED | OUTPATIENT
Start: 2024-10-07

## 2024-10-07 RX ADMIN — LIDOCAINE HYDROCHLORIDE 50 MG: 20 INJECTION, SOLUTION EPIDURAL; INFILTRATION; INTRACAUDAL; PERINEURAL at 13:35

## 2024-10-07 RX ADMIN — PROPOFOL 10 MG: 10 INJECTION, EMULSION INTRAVENOUS at 13:41

## 2024-10-07 RX ADMIN — PROPOFOL 10 MG: 10 INJECTION, EMULSION INTRAVENOUS at 13:39

## 2024-10-07 RX ADMIN — PROPOFOL 20 MG: 10 INJECTION, EMULSION INTRAVENOUS at 13:36

## 2024-10-07 RX ADMIN — SODIUM CHLORIDE, POTASSIUM CHLORIDE, SODIUM LACTATE AND CALCIUM CHLORIDE: 600; 310; 30; 20 INJECTION, SOLUTION INTRAVENOUS at 13:28

## 2024-10-07 RX ADMIN — PROPOFOL 60 MG: 10 INJECTION, EMULSION INTRAVENOUS at 13:35

## 2024-10-07 ASSESSMENT — ENCOUNTER SYMPTOMS: SHORTNESS OF BREATH: 1

## 2024-10-07 NOTE — H&P
system.  Although proofread, it may and can contain electronic, spelling and other errors.  Corrections may be executed at a later time.  Please feel free to contact us for any clarifications as needed.    [facility]  [facility], [facility],  [place], [place]. [phone]

## 2024-10-07 NOTE — TELEPHONE ENCOUNTER
Telephone call made to patient. Two patient identifiers verified. F/u with Dr. Lozada made per LARRY Coleman. Pt would like appt to be earlier if possible; will fwd to his nurse.    Pt also c/o bilat leg swelling R>L. Was told by one of the nurses at [facility] when she had her JASSON today that she should let Dr. Lozada know.    Future Appointments   Date Time Provider Department Center   10/10/2024  1:20 PM Daniel Castillo MD [facility] BS AMB   1/23/2025  9:40 AM Cj Lozada MD [facility] BS AMB   9/3/2025  2:20 PM Marcelina Jaimes MD [facility] BS AMB

## 2024-10-07 NOTE — PROGRESS NOTES
Patient has returned to baseline at arrival for procedure.  Pt awake, alert, and oriented.  VSS.  Pt denies chest pain, SOB, and dizziness.    I discussed AVS and discharge instructions with patient and with her daughter and I provided a copy for her to take home.    Pt and daughter voiced understanding and denied any questions or need for clarification.    IV D/C'd and hemostasis attained. Coban and gauze dressing applied.    Transported with:  SHEREE Padilla RN via w/c to vehicle driven by her daughter.

## 2024-10-07 NOTE — PROGRESS NOTES
Pt arrives by w/c to noninvasive cardiology department accompanied by her daughter for JASSON procedure. All assessments completed and consent was reviewed.  Education given was regarding procedure, medications to be given, potential side effects of medications to be given, post-procedure management and follow-up. Opportunity for questions was provided and all questions and concerns were addressed. Patient and patient contact verbalized understanding of education.

## 2024-10-07 NOTE — ANESTHESIA PRE PROCEDURE
Ben Couch MD at [facility] CARDIAC CATH LAB   • CARDIAC PROCEDURE N/A 1/11/2024    Intravascular ultrasound performed by Ben Couch MD at [facility] CARDIAC CATH LAB   • CARDIAC PROCEDURE N/A 7/5/2024    Percutaneous coronary intervention performed by Shaista Lyn MD at [facility] CARDIAC CATH LAB   • CARDIAC PROCEDURE N/A 7/5/2024    Atherectomy coronary performed by Shaista Lyn MD at [facility] CARDIAC CATH LAB   • CARDIAC PROCEDURE N/A 7/5/2024    Intravascular ultrasound performed by Shaista Lyn MD at [facility] CARDIAC CATH LAB   • CARDIAC PROCEDURE N/A 5/28/2024    Percutaneous coronary intervention performed by Ben Couch MD at [facility] CARDIAC CATH LAB   • CARDIAC PROCEDURE N/A 5/28/2024    Intravascular ultrasound RCA- unable to cross lesion performed by Ben Couch MD at [facility] CARDIAC CATH LAB   • CARDIAC PROCEDURE N/A 5/28/2024    Intravascular lithotripsy coronary RCA -  unable to cross lesion performed by Ben Couch MD at [facility] CARDIAC CATH LAB   • CARDIAC PROCEDURE N/A 5/28/2024    Left heart cath / coronary angiography performed by Ben Couch MD at [facility] CARDIAC CATH LAB   • CARDIAC PROCEDURE N/A 5/28/2024    Atherectomy coronary performed by Ben Couch MD at [facility] CARDIAC CATH LAB   • CARDIAC PROCEDURE N/A 8/27/2024    Ricardo during cath case performed by Shaista Lyn MD at [facility] CARDIAC CATH LAB   • CARDIAC PROCEDURE N/A 8/27/2024    Intracardiac echocardiogram performed by Shaista Lyn MD at [facility] CARDIAC CATH LAB   • EP DEVICE PROCEDURE N/A 9/19/2023    Ablation A-fib w complete ep study performed by Marcelina Jaimes MD at [facility] CARDIAC CATH LAB   • EP DEVICE PROCEDURE N/A 9/19/2023    Drug stimulation performed by Marcelina Jaimes MD at [facility] CARDIAC CATH LAB   • EP DEVICE PROCEDURE N/A 9/19/2023    Ep 3d mapping performed by Marcelina Jaimes MD at [facility] CARDIAC CATH LAB   • EP DEVICE PROCEDURE N/A 9/19/2023    Ablation following A-fib addl performed by Marcelina Jaimes MD at [facility] CARDIAC CATH LAB   • EP  intact

## 2024-10-07 NOTE — ANESTHESIA POSTPROCEDURE EVALUATION
Department of Anesthesiology  Postprocedure Note    Patient: Adrienne To  MRN: [id number]  YOB: 1940  Date of evaluation: 10/7/2024    Procedure Summary       Date: 10/07/24 Room / Location: [facility] NON-INVASIVE CARDIOLOGY    Anesthesia Start: 1328 Anesthesia Stop: 1349    Procedure: JASSON (PRN CONTRAST/BUBBLE/3D) Diagnosis:       Flutter-fibrillation (HCC)      Status post Marisela-Taussig shunt    Scheduled Providers: Shaista Lyn MD; Anderson Guerra MD; Jamir Luna APRN - CRNA Responsible Provider: Anderson Guerra MD    Anesthesia Type: MAC ASA Status: 3            Anesthesia Type: MAC    Yefri Phase I: Yefri Score: 10    Yefri Phase II:      Anesthesia Post Evaluation  Post-Anesthesia Evaluation and Assessment    Patient: Adrienne To MRN: [id number]  SSN: [id number]    YOB: 1940  Age: 84 y.o.  Sex: female      I have evaluated the patient and they are stable and ready for discharge from the PACU.     Cardiovascular Function/Vital Signs  Visit Vitals  BP (!) 131/46   Pulse 55   Temp 97.9 °F (36.6 °C) (Oral)   Resp 18   Wt 100.2 kg (221 lb)   SpO2 100%   BMI 39.15 kg/m²       Patient is status post * No anesthesia type entered * anesthesia for * No procedures listed *.    Nausea/Vomiting: None    Postoperative hydration reviewed and adequate.    Pain:      Managed    Neurological Status:       At baseline    Mental Status, Level of Consciousness: Arousable    Pulmonary Status:       Adequate oxygenation and airway patent    Complications related to anesthesia: None    Post-anesthesia assessment completed. No concerns    Signed By: Anderson Guerra MD     October 7, 2024                No notable events documented.

## 2024-10-07 NOTE — DISCHARGE INSTRUCTIONS
Future Appointments   Date Time Provider Department Center   10/10/2024  1:20 PM Daniel Castillo MD [facility] AM[facility]   10/23/2024  2:20 PM Virginia Irvin APRN - CNP [facility] AMB   9/3/2025  2:20 PM Marcelina Jaimes MD [facility] AMB     AFTER YOU TRANSESOPHAGEAL ECHOCARDIOGRAM    Be sure someone else drives you home. You may feel drowsy for several hours.    Be careful when you do eat or drink for the first time especially with hot fluids since you could easily burn your throat.    Call your doctor if:    You are bleeding from your throat or mouth.  You have trouble breathing all of a sudden.  You have chest pain or any pain that spreads to your neck, jaw, or arms.  You have questions or concerns.  You have a fever greater than 101°F.    Doctor: Shaista Lyn    Special Instructions:    No driving for 24 hours.    YOU MAY STOP TAKING YOUR ELIQUIS AND START TAKING BABY ASPIRIN EVERY DAY ALONG WITH YOUR PLAVIX.    FOLLOW UP WITH YOUR PHYSICIANS AS INSTRUCTED.

## 2024-10-08 LAB
ALBUMIN 24H MFR UR ELPH: 100 %
ALBUMIN SERPL ELPH-MCNC: 3.4 G/DL (ref 2.9–4.4)
ALBUMIN/GLOB SERPL: 1.1 (ref 0.7–1.7)
ALPHA1 GLOB 24H MFR UR ELPH: 0 %
ALPHA1 GLOB SERPL ELPH-MCNC: 0.3 G/DL (ref 0–0.4)
ALPHA2 GLOB 24H MFR UR ELPH: 0 %
ALPHA2 GLOB SERPL ELPH-MCNC: 0.9 G/DL (ref 0.4–1)
B-GLOBULIN MFR UR ELPH: 0 %
B-GLOBULIN SERPL ELPH-MCNC: 0.9 G/DL (ref 0.7–1.3)
GAMMA GLOB 24H MFR UR ELPH: 0 %
GAMMA GLOB SERPL ELPH-MCNC: 1.1 G/DL (ref 0.4–1.8)
GLOBULIN SER-MCNC: 3.1 G/DL (ref 2.2–3.9)
IGA SERPL-MCNC: 117 MG/DL (ref 64–422)
IGG SERPL-MCNC: 1264 MG/DL (ref 586–1602)
IGM SERPL-MCNC: 47 MG/DL (ref 26–217)
INTERPRETATION SERPL IEP-IMP: ABNORMAL
INTERPRETATION UR IFE-IMP: ABNORMAL
KAPPA LC FREE SER-MCNC: 28.5 MG/L (ref 3.3–19.4)
KAPPA LC FREE/LAMBDA FREE SER: 1.57 (ref 0.26–1.65)
LAMBDA LC FREE SERPL-MCNC: 18.2 MG/L (ref 5.7–26.3)
Lab: ABNORMAL
M PROTEIN 24H MFR UR ELPH: ABNORMAL %
M PROTEIN SERPL ELPH-MCNC: 0.9 G/DL
PROT SERPL-MCNC: 6.5 G/DL (ref 6–8.5)
PROT UR-MCNC: <4 MG/DL

## 2024-10-10 ENCOUNTER — OFFICE VISIT (OUTPATIENT)
Age: 84
End: 2024-10-10
Payer: MEDICARE

## 2024-10-10 VITALS
HEART RATE: 71 BPM | DIASTOLIC BLOOD PRESSURE: 50 MMHG | TEMPERATURE: 97.3 F | SYSTOLIC BLOOD PRESSURE: 149 MMHG | RESPIRATION RATE: 18 BRPM | OXYGEN SATURATION: 98 %

## 2024-10-10 DIAGNOSIS — D47.2 MONOCLONAL GAMMOPATHY: Primary | ICD-10-CM

## 2024-10-10 PROCEDURE — 1123F ACP DISCUSS/DSCN MKR DOCD: CPT | Performed by: INTERNAL MEDICINE

## 2024-10-10 PROCEDURE — 99214 OFFICE O/P EST MOD 30 MIN: CPT | Performed by: INTERNAL MEDICINE

## 2024-10-10 PROCEDURE — 3077F SYST BP >= 140 MM HG: CPT | Performed by: INTERNAL MEDICINE

## 2024-10-10 PROCEDURE — G8484 FLU IMMUNIZE NO ADMIN: HCPCS | Performed by: INTERNAL MEDICINE

## 2024-10-10 PROCEDURE — 1036F TOBACCO NON-USER: CPT | Performed by: INTERNAL MEDICINE

## 2024-10-10 PROCEDURE — G8427 DOCREV CUR MEDS BY ELIG CLIN: HCPCS | Performed by: INTERNAL MEDICINE

## 2024-10-10 PROCEDURE — G8417 CALC BMI ABV UP PARAM F/U: HCPCS | Performed by: INTERNAL MEDICINE

## 2024-10-10 PROCEDURE — G8400 PT W/DXA NO RESULTS DOC: HCPCS | Performed by: INTERNAL MEDICINE

## 2024-10-10 PROCEDURE — 3078F DIAST BP <80 MM HG: CPT | Performed by: INTERNAL MEDICINE

## 2024-10-10 PROCEDURE — 1090F PRES/ABSN URINE INCON ASSESS: CPT | Performed by: INTERNAL MEDICINE

## 2024-10-10 NOTE — PROGRESS NOTES
Adrienne To is a 84 y.o. female follow up for mgus.      1. Have you been to the ER, urgent care clinic since your last visit?  Hospitalized since your last visit?no    2. Have you seen or consulted any other health care providers outside of the [facility] since your last visit?  Include any pap smears or colon screening. no

## 2024-11-04 ENCOUNTER — TELEPHONE (OUTPATIENT)
Age: 84
End: 2024-11-04

## 2024-11-04 NOTE — TELEPHONE ENCOUNTER
Pt stated her BG has been in the 200s. She stated she moved [place] at [facility] living. She stated she normally have a piece of toast and a boiled egg. She stated she has been eating what the facility provides but is staying away from [facility]. She stated she is taking Jardiance and Januvia daily    BG reading  11/4/24 @8am- 179   11/4/24- @9:10am- 196- has not had any breakfast  11/3/24 @11pm- 162- ate a piece of leftover pizza and peanut  11/3/24@7:18am- 184  11/3/24 @7:05am- 175    I printed katlin reading and handed to provider to review

## 2024-11-04 NOTE — TELEPHONE ENCOUNTER
Informed pt of Dr. Chávez's note(These levels are not urgent, need to schedule a follow up visit). Pt verbalized understanding with no further questions or concerns at this time.

## 2024-11-04 NOTE — TELEPHONE ENCOUNTER
Attempted to call. Unsuccessful. Left msg for Adrienne To to give us a call back at the office. A callback number was left.

## 2024-11-07 RX ORDER — LISINOPRIL 2.5 MG/1
2.5 TABLET ORAL DAILY
Qty: 90 TABLET | Refills: 3 | Status: SHIPPED | OUTPATIENT
Start: 2024-11-07

## 2024-11-12 ENCOUNTER — OFFICE VISIT (OUTPATIENT)
Age: 84
End: 2024-11-12
Payer: MEDICARE

## 2024-11-12 VITALS
BODY MASS INDEX: 38.55 KG/M2 | HEART RATE: 68 BPM | HEIGHT: 63 IN | WEIGHT: 217.6 LBS | SYSTOLIC BLOOD PRESSURE: 128 MMHG | DIASTOLIC BLOOD PRESSURE: 60 MMHG

## 2024-11-12 DIAGNOSIS — Z95.818 PRESENCE OF WATCHMAN LEFT ATRIAL APPENDAGE CLOSURE DEVICE: Primary | ICD-10-CM

## 2024-11-12 DIAGNOSIS — R06.09 DOE (DYSPNEA ON EXERTION): ICD-10-CM

## 2024-11-12 DIAGNOSIS — R60.0 LOWER EXTREMITY EDEMA: ICD-10-CM

## 2024-11-12 DIAGNOSIS — E78.5 DYSLIPIDEMIA: ICD-10-CM

## 2024-11-12 DIAGNOSIS — Z98.61 CAD S/P PERCUTANEOUS CORONARY ANGIOPLASTY: ICD-10-CM

## 2024-11-12 DIAGNOSIS — I25.10 CAD S/P PERCUTANEOUS CORONARY ANGIOPLASTY: ICD-10-CM

## 2024-11-12 DIAGNOSIS — I48.0 PAROXYSMAL ATRIAL FIBRILLATION (HCC): ICD-10-CM

## 2024-11-12 DIAGNOSIS — I10 ESSENTIAL (PRIMARY) HYPERTENSION: ICD-10-CM

## 2024-11-12 PROCEDURE — 99214 OFFICE O/P EST MOD 30 MIN: CPT

## 2024-11-12 NOTE — PROGRESS NOTES
/60   Pulse 68   Ht 1.6 m (5' 3\")   Wt 98.7 kg (217 lb 9.6 oz)   BMI 38.55 kg/m²     C/o edema in both feet mostly on Left  
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     ASSESSMENT AND PLAN:     Assessment and Plan:    1) Atrial fibrillation    - Newly discovered 5/1/23 -  onset is unknown   --S/p PVI, PWI, right gume line atypical AFL ablation, CTI (9/19/23-Jaimes)   - Continue Eliquis 5 mg BID   - Watchman placed 8/27/24 (Riki)   - Post watchman JASSON 10/10/24 - Device closure in the appendage that is completely occluded with a Watchman.   - cont plavix, aspirin     2) HFpEF  - Lately she feels overloaded --> is responding to lasix 40 mg daily - has lost 5 lbs --> cont lasix  - cont lasix 40 mg daily and aldactone   - echo from 12/3/23 - LVEF 60-65%, moderately elevated RVSP 53 mmHg  - labs with slightly worsening creatinine 1.84 - asked her to reduce lasix to 20 mg daily - will recheck labs prior to cath  - on 11/12/24 - mild lower extremity edema of left lower extremity that improves with elevation, compression, diuretic - discussed continue use of compression stockings - would recommend additional lasix PRN for swelling, continue jardiance 10 mg daily (takes this for DM as well)      3) CAD    - MI in 2014-- distal lesion treated medically per patient    - cont ASA, statin    - Given RANKIN, will also check a lexiscan cardiolite 2 days study    - Lexiscan Cardiolite 12/4/23 - There is a small sized, low grade, reversible defect at the apical lateral wall.   SSS = 3, SRS = 0, SDS = 3.   FIndings suggest mild ischemia in LCX territory.  LVEF 74%  - Cardiac cath 1/11/24 - prox/mid LAD 30%, Lcx 40%, RCA (complicated by Pseudoaneurysm)  - Cardiac cath 5/29/24 - unsuccessful PCI to mid RCA, arthrectomy   - Cardiac cath (Riki) 7/5/24 - PCI to RCA   -

## 2024-11-14 ENCOUNTER — PATIENT MESSAGE (OUTPATIENT)
Age: 84
End: 2024-11-14

## 2024-12-02 DIAGNOSIS — N18.4 TYPE 2 DIABETES MELLITUS WITH STAGE 4 CHRONIC KIDNEY DISEASE, WITH LONG-TERM CURRENT USE OF INSULIN (HCC): ICD-10-CM

## 2024-12-02 DIAGNOSIS — E11.22 TYPE 2 DIABETES MELLITUS WITH STAGE 4 CHRONIC KIDNEY DISEASE, WITH LONG-TERM CURRENT USE OF INSULIN (HCC): ICD-10-CM

## 2024-12-02 DIAGNOSIS — Z79.4 TYPE 2 DIABETES MELLITUS WITH STAGE 4 CHRONIC KIDNEY DISEASE, WITH LONG-TERM CURRENT USE OF INSULIN (HCC): ICD-10-CM

## 2024-12-02 RX ORDER — EMPAGLIFLOZIN 10 MG/1
10 TABLET, FILM COATED ORAL DAILY
Qty: 90 TABLET | Refills: 1 | Status: SHIPPED | OUTPATIENT
Start: 2024-12-02

## 2024-12-17 ENCOUNTER — OFFICE VISIT (OUTPATIENT)
Age: 84
End: 2024-12-17
Payer: MEDICARE

## 2024-12-17 VITALS
TEMPERATURE: 97.9 F | OXYGEN SATURATION: 97 % | HEIGHT: 63 IN | HEART RATE: 100 BPM | BODY MASS INDEX: 39.55 KG/M2 | WEIGHT: 223.2 LBS | DIASTOLIC BLOOD PRESSURE: 68 MMHG | SYSTOLIC BLOOD PRESSURE: 129 MMHG

## 2024-12-17 DIAGNOSIS — E11.22 TYPE 2 DIABETES MELLITUS WITH STAGE 4 CHRONIC KIDNEY DISEASE, WITH LONG-TERM CURRENT USE OF INSULIN (HCC): Primary | ICD-10-CM

## 2024-12-17 DIAGNOSIS — N18.4 TYPE 2 DIABETES MELLITUS WITH STAGE 4 CHRONIC KIDNEY DISEASE, WITH LONG-TERM CURRENT USE OF INSULIN (HCC): Primary | ICD-10-CM

## 2024-12-17 DIAGNOSIS — E78.5 DYSLIPIDEMIA: ICD-10-CM

## 2024-12-17 DIAGNOSIS — Z79.4 TYPE 2 DIABETES MELLITUS WITH STAGE 4 CHRONIC KIDNEY DISEASE, WITH LONG-TERM CURRENT USE OF INSULIN (HCC): Primary | ICD-10-CM

## 2024-12-17 LAB — HBA1C MFR BLD: 7.4 %

## 2024-12-17 PROCEDURE — 83036 HEMOGLOBIN GLYCOSYLATED A1C: CPT | Performed by: INTERNAL MEDICINE

## 2024-12-17 RX ORDER — GLIPIZIDE 5 MG/1
TABLET ORAL
Qty: 60 TABLET | Refills: 3 | Status: SHIPPED | OUTPATIENT
Start: 2024-12-17

## 2024-12-17 RX ORDER — BLOOD-GLUCOSE SENSOR
EACH MISCELLANEOUS
COMMUNITY

## 2024-12-17 NOTE — PROGRESS NOTES
Adrienne To is a 84 y.o. female here for   Chief Complaint   Patient presents with    Diabetes       1. Have you been to the ER, urgent care clinic since your last visit?  Hospitalized since your last visit? -NO    2. Have you seen or consulted any other health care providers outside of the [facility] since your last visit?  Include any pap smears or colon screening.-Pt stated she was seen by her Pulmonologist and pcp      
2024    Atherectomy coronary performed by Ben Couch MD at [facility] CARDIAC CATH LAB    CARDIAC PROCEDURE N/A 2024    Ricardo during cath case performed by Shaista Lyn MD at [facility] CARDIAC CATH LAB    CARDIAC PROCEDURE N/A 2024    Intracardiac echocardiogram performed by Shaista Lyn MD at [facility] CARDIAC CATH LAB    EP DEVICE PROCEDURE N/A 2023    Ablation A-fib w complete ep study performed by Marcelina Jaimes MD at [facility] CARDIAC CATH LAB    EP DEVICE PROCEDURE N/A 2023    Drug stimulation performed by Marcelina Jaimes MD at [facility] CARDIAC CATH LAB    EP DEVICE PROCEDURE N/A 2023    Ep 3d mapping performed by Marcelina Jaimes MD at [facility] CARDIAC CATH LAB    EP DEVICE PROCEDURE N/A 2023    Ablation following A-fib addl performed by Marcelina Jaimes MD at [facility] CARDIAC CATH LAB    EP DEVICE PROCEDURE N/A 2024    Watchman kirit closure device performed by Shaista Lyn MD at [facility] CARDIAC CATH LAB    INVASIVE VASCULAR N/A 2023    Ultrasound guided vascular access performed by Marcelina Jaimes MD at [facility] CARDIAC CATH LAB    INVASIVE VASCULAR N/A 2024    Ultrasound guided vascular access performed by Ben Couch MD at [facility] CARDIAC CATH LAB    INVASIVE VASCULAR N/A 2024    Ultrasound guided vascular access R groin performed by Ben Couch MD at [facility] CARDIAC CATH LAB    INVASIVE VASCULAR N/A 2024    Angiography femoral popliteal right performed by Ben Couch MD at [facility] CARDIAC CATH LAB    INVASIVE VASCULAR N/A 2024    Ultrasound guided vascular access performed by Shaista Lyn MD at [facility] CARDIAC CATH LAB        Social History     Tobacco Use    Smoking status: Former     Current packs/day: 0.00     Average packs/day: 1 pack/day for 16.0 years (16.0 ttl pk-yrs)     Types: Cigarettes     Start date: 1961     Quit date: 1977     Years since quittin.0     Passive exposure: Never    Smokeless tobacco: Never   Substance Use Topics    Alcohol use: Never

## 2024-12-25 DIAGNOSIS — N18.4 TYPE 2 DIABETES MELLITUS WITH STAGE 4 CHRONIC KIDNEY DISEASE, WITH LONG-TERM CURRENT USE OF INSULIN (HCC): ICD-10-CM

## 2024-12-25 DIAGNOSIS — E11.22 TYPE 2 DIABETES MELLITUS WITH STAGE 4 CHRONIC KIDNEY DISEASE, WITH LONG-TERM CURRENT USE OF INSULIN (HCC): ICD-10-CM

## 2024-12-25 DIAGNOSIS — Z79.4 TYPE 2 DIABETES MELLITUS WITH STAGE 4 CHRONIC KIDNEY DISEASE, WITH LONG-TERM CURRENT USE OF INSULIN (HCC): ICD-10-CM

## 2024-12-26 RX ORDER — SITAGLIPTIN 50 MG/1
50 TABLET, FILM COATED ORAL DAILY
Qty: 90 TABLET | Refills: 3 | Status: SHIPPED | OUTPATIENT
Start: 2024-12-26

## 2025-01-14 ENCOUNTER — TELEPHONE (OUTPATIENT)
Age: 85
End: 2025-01-14

## 2025-01-14 DIAGNOSIS — E11.22 TYPE 2 DIABETES MELLITUS WITH STAGE 4 CHRONIC KIDNEY DISEASE, WITH LONG-TERM CURRENT USE OF INSULIN (HCC): Primary | ICD-10-CM

## 2025-01-14 DIAGNOSIS — Z79.4 TYPE 2 DIABETES MELLITUS WITH STAGE 4 CHRONIC KIDNEY DISEASE, WITH LONG-TERM CURRENT USE OF INSULIN (HCC): Primary | ICD-10-CM

## 2025-01-14 DIAGNOSIS — N18.4 TYPE 2 DIABETES MELLITUS WITH STAGE 4 CHRONIC KIDNEY DISEASE, WITH LONG-TERM CURRENT USE OF INSULIN (HCC): Primary | ICD-10-CM

## 2025-01-14 RX ORDER — HYDROCHLOROTHIAZIDE 12.5 MG/1
CAPSULE ORAL
Qty: 6 EACH | Refills: 3 | Status: SHIPPED | OUTPATIENT
Start: 2025-01-14

## 2025-01-14 NOTE — TELEPHONE ENCOUNTER
Hi,    Pt is requesting to switch to Kiara 3 Plus. Plz send to .[facility].     Thank you.

## 2025-01-21 ENCOUNTER — TELEPHONE (OUTPATIENT)
Age: 85
End: 2025-01-21

## 2025-01-21 NOTE — TELEPHONE ENCOUNTER
Hi,    Pt says, she cannot afford to pay $300.00 for a Uplogix 3 Plus [place] and wants to know if she can send her daughter to the clinic to pick-up one? Pt is requesting a call back today, if possible.     #[phone]    Thank you.

## 2025-01-21 NOTE — TELEPHONE ENCOUNTER
Pt was called and was made aware that we do not have any Kiara 3 reader in the office. Pt stated she already has a kiara 2 reader but her insurance will not pay for a Kiara 3 reader until next year. Pt also stated she like the Kiara 3 plus sensor and she has been using her phone but was told she needs a reader. I told pt she can continue using her phone if that is what she prefer.

## 2025-01-23 ENCOUNTER — OFFICE VISIT (OUTPATIENT)
Age: 85
End: 2025-01-23
Payer: MEDICARE

## 2025-01-23 ENCOUNTER — TELEPHONE (OUTPATIENT)
Age: 85
End: 2025-01-23

## 2025-01-23 VITALS
SYSTOLIC BLOOD PRESSURE: 130 MMHG | HEART RATE: 127 BPM | BODY MASS INDEX: 38.27 KG/M2 | OXYGEN SATURATION: 96 % | WEIGHT: 216 LBS | HEIGHT: 63 IN | DIASTOLIC BLOOD PRESSURE: 70 MMHG

## 2025-01-23 DIAGNOSIS — I48.0 PAROXYSMAL ATRIAL FIBRILLATION (HCC): ICD-10-CM

## 2025-01-23 DIAGNOSIS — E66.01 MORBID (SEVERE) OBESITY DUE TO EXCESS CALORIES: ICD-10-CM

## 2025-01-23 DIAGNOSIS — Z98.61 CAD S/P PERCUTANEOUS CORONARY ANGIOPLASTY: Primary | ICD-10-CM

## 2025-01-23 DIAGNOSIS — I25.10 CAD S/P PERCUTANEOUS CORONARY ANGIOPLASTY: Primary | ICD-10-CM

## 2025-01-23 PROCEDURE — G8400 PT W/DXA NO RESULTS DOC: HCPCS | Performed by: SPECIALIST

## 2025-01-23 PROCEDURE — 93005 ELECTROCARDIOGRAM TRACING: CPT | Performed by: SPECIALIST

## 2025-01-23 PROCEDURE — G8417 CALC BMI ABV UP PARAM F/U: HCPCS | Performed by: SPECIALIST

## 2025-01-23 PROCEDURE — 1123F ACP DISCUSS/DSCN MKR DOCD: CPT | Performed by: SPECIALIST

## 2025-01-23 PROCEDURE — 1160F RVW MEDS BY RX/DR IN RCRD: CPT | Performed by: SPECIALIST

## 2025-01-23 PROCEDURE — 99215 OFFICE O/P EST HI 40 MIN: CPT | Performed by: SPECIALIST

## 2025-01-23 PROCEDURE — 3075F SYST BP GE 130 - 139MM HG: CPT | Performed by: SPECIALIST

## 2025-01-23 PROCEDURE — 93010 ELECTROCARDIOGRAM REPORT: CPT | Performed by: SPECIALIST

## 2025-01-23 PROCEDURE — G8427 DOCREV CUR MEDS BY ELIG CLIN: HCPCS | Performed by: SPECIALIST

## 2025-01-23 PROCEDURE — 1159F MED LIST DOCD IN RCRD: CPT | Performed by: SPECIALIST

## 2025-01-23 PROCEDURE — 1090F PRES/ABSN URINE INCON ASSESS: CPT | Performed by: SPECIALIST

## 2025-01-23 PROCEDURE — 1036F TOBACCO NON-USER: CPT | Performed by: SPECIALIST

## 2025-01-23 PROCEDURE — 3078F DIAST BP <80 MM HG: CPT | Performed by: SPECIALIST

## 2025-01-23 RX ORDER — METOPROLOL TARTRATE 50 MG
50 TABLET ORAL 2 TIMES DAILY
Qty: 60 TABLET | Refills: 3 | Status: SHIPPED | OUTPATIENT
Start: 2025-01-23 | End: 2025-01-23 | Stop reason: SDUPTHER

## 2025-01-23 RX ORDER — METOPROLOL TARTRATE 50 MG
50 TABLET ORAL 2 TIMES DAILY
Qty: 180 TABLET | Refills: 3 | Status: SHIPPED | OUTPATIENT
Start: 2025-01-23

## 2025-01-23 NOTE — PROGRESS NOTES
Patient: Adrienne To  : 1940    Primary Cardiologist: Cj Lozada MD. [facility]      Today's Date: 2025        HISTORY OF PRESENT ILLNESS:     History of Present Illness:  Feels fine.  Pulse can be 120 bpm past month.    Has class 2-3 RANKIN.       PAST MEDICAL HISTORY:     Past Medical History:   Diagnosis Date    Atrial fibrillation (HCC)     CAD (coronary artery disease)     CKD (chronic kidney disease)     Diabetes (HCC)     Dyslipidemia     Heart attack (HCC)     Had cath and treated medically     HTN (hypertension)     Monoclonal gammopathies     Obesity     SUZAN on CPAP     Tachycardia     placed on Sotalol at [place]        Past Surgical History:   Procedure Laterality Date    BREAST BIOPSY Left     several bxs on left, all benign    BREAST SURGERY      CARDIAC PROCEDURE N/A 2023    Intracardiac echocardiogram performed by Marcelina Jaimes MD at [facility] CARDIAC CATH LAB    CARDIAC PROCEDURE N/A 2024    Left and right heart cath / coronary angiography performed by Ben Couch MD at [facility] CARDIAC CATH LAB    CARDIAC PROCEDURE N/A 2024    Insert stent gagan coronary - unsuccessful performed by Ben Couch MD at [facility] CARDIAC CATH LAB    CARDIAC PROCEDURE N/A 2024    Intravascular lithotripsy coronary performed by Ben Couch MD at [facility] CARDIAC CATH LAB    CARDIAC PROCEDURE N/A 2024    Intravascular ultrasound performed by Ben Couch MD at [facility] CARDIAC CATH LAB    CARDIAC PROCEDURE N/A 2024    Percutaneous coronary intervention performed by Shaista Lyn MD at [facility] CARDIAC CATH LAB    CARDIAC PROCEDURE N/A 2024    Atherectomy coronary performed by Shaista Lyn MD at [facility] CARDIAC CATH LAB    CARDIAC PROCEDURE N/A 2024    Intravascular ultrasound performed by Shaista Lyn MD at [facility] CARDIAC CATH LAB    CARDIAC PROCEDURE N/A 2024    Percutaneous coronary intervention performed by Ben Couch MD at [facility] CARDIAC CATH LAB

## 2025-01-23 NOTE — TELEPHONE ENCOUNTER
Cira from [facility] called to follow up on new prescription for metoprolol tartrate 50mg, 30 days in pharmacy and the rest mail order, pharmacist stated it was closed out.    [facility] [phone]

## 2025-01-23 NOTE — PROGRESS NOTES
Chief Complaint   Patient presents with    Coronary Artery Disease    Hypertension    Atrial Fibrillation    Cholesterol Problem     Vitals:    01/23/25 0927   BP: 130/70   Site: Left Upper Arm   Position: Sitting   Cuff Size: Medium Adult   Pulse: (!) 127   SpO2: 96%   Weight: 98 kg (216 lb)   Height: 1.6 m (5' 3\")      /70 (Site: Left Upper Arm, Position: Sitting, Cuff Size: Medium Adult)   Pulse (!) 127   Ht 1.6 m (5' 3\")   Wt 98 kg (216 lb)   SpO2 96%   BMI 38.26 kg/m²

## 2025-01-31 ENCOUNTER — PATIENT MESSAGE (OUTPATIENT)
Age: 85
End: 2025-01-31

## 2025-01-31 RX ORDER — METOPROLOL SUCCINATE 100 MG/1
100 TABLET, EXTENDED RELEASE ORAL EVERY EVENING
Qty: 30 TABLET | Refills: 3 | Status: SHIPPED | OUTPATIENT
Start: 2025-01-31

## 2025-02-17 ENCOUNTER — TELEPHONE (OUTPATIENT)
Age: 85
End: 2025-02-17

## 2025-02-17 RX ORDER — METOPROLOL SUCCINATE 100 MG/1
100 TABLET, EXTENDED RELEASE ORAL DAILY
Qty: 90 TABLET | Refills: 1 | Status: SHIPPED | OUTPATIENT
Start: 2025-02-17

## 2025-02-17 NOTE — TELEPHONE ENCOUNTER
Enrolled with [employer] - Ordered and being shipped to patient's home address on file.  ETA within 5-7 Business Days.     ----- Message -----   From: Josefina Moore RN   Sent: 8/21/2024   3:55 PM EDT   To: Marah Pleitez      Obtain 1 week monitor prior to evaluate for Afib in 6 months

## 2025-02-19 NOTE — PROGRESS NOTES
[facility] Cardiology  Cardiac Electrophysiology Clinic Care Note                  []Initial visit     [x]Established visit     Patient Name: Adrienne To - :1940 - MRN:[id number]  Primary Cardiologist: Cj Lozada MD  Electrophysiologist: Marcelina Jaimes MD     Reason for visit: Persistent AF follow up    HPI:  Ms. Seaman is a 84 y.o. female who presents for follow up, is s/p PVI, PWI, right gume line atypical AFL, & CTI ablation (2023-Jaimes).    She continues Toprol XL, denies significant chest pain or palpitations, but has noted some rapid rates.    ECG on 2025 showed  bpm.    Holter was mailed out, delivered 2025; she is due to complete wearing this tomorrow.    JASSON in 10/2024 showed LVEF 55-60% with mild LVH, dilated LA, mild MR, mild AR.    LHC in 2024 showed severe RCA disease, otherwise without significant CAD.  Underwent atherectomy & PCI (TEO).    BP controlled.    No longer on OAC s/p Watchman.  Continues DAPT.      Previous:  S/p Watchman (2024-Riki).  JASSON in 10/2024 confirmed complete JEFF closure.    S/p PCI RCA in 2024.    S/p RCA atherectomy & unsuccessful PCI in 2024.    Right groin pseudoaneurysm in 2024.    S/p PVI, PWI, right gume line atypical AFL, & CTI ablation (2023-Jaimes).    Previously used sotalol.    AF diagnosed in 2023, unknown onset.    SUZAN on CPAP.    Moved from [place] to [place] to be close to family (daughter and son).  passed in .        Assessment and Plan       ICD-10-CM    1. Persistent atrial fibrillation (HCC)  I48.19 EKG 12 Lead     TSH     Comprehensive Metabolic Panel      2. Presence of Watchman left atrial appendage closure device  Z95.818 EKG 12 Lead     TSH     Comprehensive Metabolic Panel      3. Coronary artery disease involving native coronary artery of native heart without angina pectoris  I25.10 TSH     Comprehensive Metabolic Panel      4. History of coronary artery stent

## 2025-02-27 ENCOUNTER — OFFICE VISIT (OUTPATIENT)
Age: 85
End: 2025-02-27
Payer: MEDICARE

## 2025-02-27 VITALS
DIASTOLIC BLOOD PRESSURE: 68 MMHG | BODY MASS INDEX: 39.16 KG/M2 | OXYGEN SATURATION: 95 % | HEIGHT: 63 IN | HEART RATE: 74 BPM | WEIGHT: 221 LBS | SYSTOLIC BLOOD PRESSURE: 118 MMHG

## 2025-02-27 DIAGNOSIS — Z95.818 PRESENCE OF WATCHMAN LEFT ATRIAL APPENDAGE CLOSURE DEVICE: ICD-10-CM

## 2025-02-27 DIAGNOSIS — Z95.5 HISTORY OF CORONARY ARTERY STENT PLACEMENT: ICD-10-CM

## 2025-02-27 DIAGNOSIS — I25.10 CORONARY ARTERY DISEASE INVOLVING NATIVE CORONARY ARTERY OF NATIVE HEART WITHOUT ANGINA PECTORIS: ICD-10-CM

## 2025-02-27 DIAGNOSIS — E66.812 CLASS 2 OBESITY WITH BODY MASS INDEX (BMI) OF 38.0 TO 38.9 IN ADULT, UNSPECIFIED OBESITY TYPE, UNSPECIFIED WHETHER SERIOUS COMORBIDITY PRESENT: ICD-10-CM

## 2025-02-27 DIAGNOSIS — I48.19 PERSISTENT ATRIAL FIBRILLATION (HCC): Primary | ICD-10-CM

## 2025-02-27 DIAGNOSIS — I10 PRIMARY HYPERTENSION: ICD-10-CM

## 2025-02-27 DIAGNOSIS — I48.19 PERSISTENT ATRIAL FIBRILLATION (HCC): ICD-10-CM

## 2025-02-27 PROCEDURE — 1123F ACP DISCUSS/DSCN MKR DOCD: CPT | Performed by: NURSE PRACTITIONER

## 2025-02-27 PROCEDURE — 99214 OFFICE O/P EST MOD 30 MIN: CPT | Performed by: NURSE PRACTITIONER

## 2025-02-27 PROCEDURE — 1159F MED LIST DOCD IN RCRD: CPT | Performed by: NURSE PRACTITIONER

## 2025-02-27 PROCEDURE — 1126F AMNT PAIN NOTED NONE PRSNT: CPT | Performed by: NURSE PRACTITIONER

## 2025-02-27 PROCEDURE — G8417 CALC BMI ABV UP PARAM F/U: HCPCS | Performed by: NURSE PRACTITIONER

## 2025-02-27 PROCEDURE — 1036F TOBACCO NON-USER: CPT | Performed by: NURSE PRACTITIONER

## 2025-02-27 PROCEDURE — 1090F PRES/ABSN URINE INCON ASSESS: CPT | Performed by: NURSE PRACTITIONER

## 2025-02-27 PROCEDURE — G8427 DOCREV CUR MEDS BY ELIG CLIN: HCPCS | Performed by: NURSE PRACTITIONER

## 2025-02-27 PROCEDURE — 3074F SYST BP LT 130 MM HG: CPT | Performed by: NURSE PRACTITIONER

## 2025-02-27 PROCEDURE — 93010 ELECTROCARDIOGRAM REPORT: CPT | Performed by: NURSE PRACTITIONER

## 2025-02-27 PROCEDURE — G8400 PT W/DXA NO RESULTS DOC: HCPCS | Performed by: NURSE PRACTITIONER

## 2025-02-27 PROCEDURE — 3078F DIAST BP <80 MM HG: CPT | Performed by: NURSE PRACTITIONER

## 2025-02-27 PROCEDURE — 93005 ELECTROCARDIOGRAM TRACING: CPT | Performed by: NURSE PRACTITIONER

## 2025-02-27 RX ORDER — ALBUTEROL SULFATE 90 UG/1
2 INHALANT RESPIRATORY (INHALATION) EVERY 4 HOURS PRN
Qty: 3 EACH | Refills: 5 | Status: SHIPPED | OUTPATIENT
Start: 2025-02-27

## 2025-02-27 RX ORDER — AMIODARONE HYDROCHLORIDE 200 MG/1
200 TABLET ORAL DAILY
Qty: 60 TABLET | Refills: 3 | Status: SHIPPED | OUTPATIENT
Start: 2025-02-27

## 2025-02-27 NOTE — PATIENT INSTRUCTIONS
Please take 200mg of amiodarone by mouth twice daily for 2 weeks, then decrease to 200mg by mouth once a day

## 2025-02-27 NOTE — PROGRESS NOTES
Chief Complaint   Patient presents with    Atrial Fibrillation    Coronary Artery Disease     Vitals:    02/27/25 1406   BP: 118/68   Site: Right Upper Arm   Position: Sitting   Pulse: 74   SpO2: 95%   Weight: 100.2 kg (221 lb)   Height: 1.6 m (5' 3\")         Chest pain: DENIED     Recent hospital stays: DENIED     Refills: DENIED

## 2025-02-27 NOTE — TELEPHONE ENCOUNTER
Received medication refill request for patients albuterol sulfate 108 (90) base  Also got her scheduled with lang on 05/20/25

## 2025-02-28 LAB
ALBUMIN SERPL-MCNC: 3 G/DL (ref 3.5–5)
ALBUMIN/GLOB SERPL: 0.8 (ref 1.1–2.2)
ALP SERPL-CCNC: 111 U/L (ref 45–117)
ALT SERPL-CCNC: 28 U/L (ref 12–78)
ANION GAP SERPL CALC-SCNC: 6 MMOL/L (ref 2–12)
AST SERPL-CCNC: 19 U/L (ref 15–37)
BILIRUB SERPL-MCNC: 0.6 MG/DL (ref 0.2–1)
BUN SERPL-MCNC: 37 MG/DL (ref 6–20)
BUN/CREAT SERPL: 28 (ref 12–20)
CALCIUM SERPL-MCNC: 9.4 MG/DL (ref 8.5–10.1)
CHLORIDE SERPL-SCNC: 107 MMOL/L (ref 97–108)
CO2 SERPL-SCNC: 26 MMOL/L (ref 21–32)
CREAT SERPL-MCNC: 1.3 MG/DL (ref 0.55–1.02)
GLOBULIN SER CALC-MCNC: 3.6 G/DL (ref 2–4)
GLUCOSE SERPL-MCNC: 121 MG/DL (ref 65–100)
POTASSIUM SERPL-SCNC: 4.5 MMOL/L (ref 3.5–5.1)
PROT SERPL-MCNC: 6.6 G/DL (ref 6.4–8.2)
SODIUM SERPL-SCNC: 139 MMOL/L (ref 136–145)
TSH SERPL DL<=0.05 MIU/L-ACNC: 1.6 UIU/ML (ref 0.36–3.74)

## 2025-03-03 ENCOUNTER — TELEPHONE (OUTPATIENT)
Age: 85
End: 2025-03-03

## 2025-03-03 NOTE — TELEPHONE ENCOUNTER
----- Message from BUBBA Alonso NP sent at 2/28/2025  1:20 PM EST -----  Cr 1.3, mildly elevated but not significantly changed from previous.  TSH WNL.  OK to start amiodarone as planned.

## 2025-03-17 ENCOUNTER — TELEPHONE (OUTPATIENT)
Age: 85
End: 2025-03-17

## 2025-03-17 NOTE — TELEPHONE ENCOUNTER
----- Message from Dr. Marcelina Jaimes MD sent at 3/16/2025  6:05 PM EDT -----  Please call to let patient know that her monitor demonstrated continuous Afib. History of WM implantation, please schedule for DCCV around 12:30 PM on Wednesday at [facility] with me. Please make sure she's taking Amiodarone. Thanks.    Called patient, ID verified using two patient identifiers.  Notified of above results and recommendations.    Ms. To states she will talk with her daughter and send a Olista message back if that day and time works for her.

## 2025-03-18 ENCOUNTER — PREP FOR PROCEDURE (OUTPATIENT)
Age: 85
End: 2025-03-18

## 2025-03-18 ENCOUNTER — TELEPHONE (OUTPATIENT)
Age: 85
End: 2025-03-18

## 2025-03-18 RX ORDER — SODIUM CHLORIDE 0.9 % (FLUSH) 0.9 %
5-40 SYRINGE (ML) INJECTION EVERY 12 HOURS SCHEDULED
Status: CANCELLED | OUTPATIENT
Start: 2025-03-18

## 2025-03-18 RX ORDER — SODIUM CHLORIDE 0.9 % (FLUSH) 0.9 %
5-40 SYRINGE (ML) INJECTION PRN
Status: CANCELLED | OUTPATIENT
Start: 2025-03-18

## 2025-03-18 RX ORDER — SODIUM CHLORIDE 9 MG/ML
INJECTION, SOLUTION INTRAVENOUS PRN
Status: CANCELLED | OUTPATIENT
Start: 2025-03-18

## 2025-03-18 NOTE — TELEPHONE ENCOUNTER
Spoke to Pt of DCCV Scheduled for 3/21/25 at 12pm arrive at 11am  Check in at the 2nd floor outpt Reg. Desk at [facility]  You will need a  must stay on site  NPO from midnight prior to procedure  Have  Labs done on the day of the procedure     Medications:    Hold glipizide the morning of the procedure     VO by Dr. Jiames/nurse Josefina SHUKLA

## 2025-03-21 ENCOUNTER — HOSPITAL ENCOUNTER (OUTPATIENT)
Facility: HOSPITAL | Age: 85
Discharge: HOME | End: 2025-03-21
Attending: INTERNAL MEDICINE
Payer: MEDICARE

## 2025-03-21 VITALS
DIASTOLIC BLOOD PRESSURE: 79 MMHG | HEART RATE: 49 BPM | HEIGHT: 63 IN | OXYGEN SATURATION: 99 % | RESPIRATION RATE: 16 BRPM | SYSTOLIC BLOOD PRESSURE: 102 MMHG | BODY MASS INDEX: 39.16 KG/M2 | WEIGHT: 221 LBS

## 2025-03-21 DIAGNOSIS — I48.91 AF (ATRIAL FIBRILLATION) (HCC): ICD-10-CM

## 2025-03-21 LAB
ANION GAP SERPL CALC-SCNC: 2 MMOL/L (ref 2–12)
BUN SERPL-MCNC: 31 MG/DL (ref 6–20)
BUN/CREAT SERPL: 26 (ref 12–20)
CALCIUM SERPL-MCNC: 9.3 MG/DL (ref 8.5–10.1)
CHLORIDE SERPL-SCNC: 109 MMOL/L (ref 97–108)
CO2 SERPL-SCNC: 29 MMOL/L (ref 21–32)
CREAT SERPL-MCNC: 1.2 MG/DL (ref 0.55–1.02)
ECHO BSA: 2.11 M2
GLUCOSE SERPL-MCNC: 128 MG/DL (ref 65–100)
MAGNESIUM SERPL-MCNC: 2.1 MG/DL (ref 1.6–2.4)
POTASSIUM SERPL-SCNC: 4.5 MMOL/L (ref 3.5–5.1)
SODIUM SERPL-SCNC: 140 MMOL/L (ref 136–145)

## 2025-03-21 PROCEDURE — 80048 BASIC METABOLIC PNL TOTAL CA: CPT

## 2025-03-21 PROCEDURE — 6360000002 HC RX W HCPCS: Performed by: INTERNAL MEDICINE

## 2025-03-21 PROCEDURE — 2500000003 HC RX 250 WO HCPCS: Performed by: INTERNAL MEDICINE

## 2025-03-21 PROCEDURE — 83735 ASSAY OF MAGNESIUM: CPT

## 2025-03-21 PROCEDURE — 99152 MOD SED SAME PHYS/QHP 5/>YRS: CPT

## 2025-03-21 PROCEDURE — 36415 COLL VENOUS BLD VENIPUNCTURE: CPT

## 2025-03-21 PROCEDURE — 92960 CARDIOVERSION ELECTRIC EXT: CPT

## 2025-03-21 PROCEDURE — 99152 MOD SED SAME PHYS/QHP 5/>YRS: CPT | Performed by: INTERNAL MEDICINE

## 2025-03-21 PROCEDURE — 92960 CARDIOVERSION ELECTRIC EXT: CPT | Performed by: INTERNAL MEDICINE

## 2025-03-21 PROCEDURE — 93005 ELECTROCARDIOGRAM TRACING: CPT | Performed by: INTERNAL MEDICINE

## 2025-03-21 RX ORDER — MIDAZOLAM HYDROCHLORIDE 1 MG/ML
INJECTION, SOLUTION INTRAMUSCULAR; INTRAVENOUS PRN
Status: COMPLETED | OUTPATIENT
Start: 2025-03-21 | End: 2025-03-21

## 2025-03-21 RX ORDER — AMIODARONE HYDROCHLORIDE 200 MG/1
200 TABLET ORAL DAILY
Qty: 90 TABLET | Refills: 3 | Status: SHIPPED | OUTPATIENT
Start: 2025-03-21 | End: 2025-03-21

## 2025-03-21 RX ORDER — AMIODARONE HYDROCHLORIDE 200 MG/1
200 TABLET ORAL DAILY
Qty: 90 TABLET | Refills: 3 | Status: SHIPPED | OUTPATIENT
Start: 2025-03-21

## 2025-03-21 RX ADMIN — METHOHEXITAL SODIUM 40 MG: 500 INJECTION, POWDER, LYOPHILIZED, FOR SOLUTION INTRAMUSCULAR; INTRAVENOUS; RECTAL at 12:24

## 2025-03-21 RX ADMIN — MIDAZOLAM HYDROCHLORIDE 1 MG: 1 INJECTION, SOLUTION INTRAMUSCULAR; INTRAVENOUS at 12:24

## 2025-03-21 NOTE — PROGRESS NOTES
[facility] Cardiology  Cardiac Electrophysiology Clinic Care Note                  []Initial visit     [x]Established visit     Patient Name: Adrienne To - :1940 - MRN:[id number]  Primary Cardiologist: Cj Lozada MD  Electrophysiologist: Marcelina Jaimes MD     Reason for visit: Persistent AF follow up    HPI:  Ms. Seaman is a 84 y.o. female who presents for follow up, is s/p DCCV of persistent AF on 2025.    Continues amiodarone since then.  She reports frequent breakthrough episodes of elevated HR, may last up to about a day at a time.  Chronic lower extremity edema has been worsening slightly as well.    Holter in 2025 showed 100% AF with rates  bpm.    JASSON in 10/2024 showed LVEF 55-60% with mild LVH, dilated LA, mild MR, mild AR.    LHC in 2024 showed severe RCA disease, otherwise without significant CAD.  Underwent atherectomy & PCI (TEO).    BP controlled.    No longer on OAC s/p Watchman.  Continues DAPT.      Previous:  S/p DCCV 2025.    S/p Watchman (2024-Riki).  JASSON in 10/2024 confirmed complete JEFF closure.    S/p PCI RCA in 2024.    S/p RCA atherectomy & unsuccessful PCI in 2024.    Right groin pseudoaneurysm in 2024.    S/p PVI, PWI, right gume line atypical AFL, & CTI ablation (2023-Fiona).    Previously used sotalol.    AF diagnosed in 2023, unknown onset.    SUZAN on CPAP.    Moved from [place] to [place] to be close to family (daughter and son).  passed in .        Assessment and Plan       ICD-10-CM    1. Persistent atrial fibrillation (HCC)  I48.19 EKG 12 Lead      2. S/P ablation of atrial fibrillation  Z98.890     Z86.79       3. S/P ablation of atrial flutter  Z98.890     Z86.79       4. Typical atrial flutter (HCC)  I48.3       5. Coronary artery disease involving native coronary artery of native heart without angina pectoris  I25.10       6. Status post coronary artery stent placement  Z95.5       7.

## 2025-03-21 NOTE — PROCEDURES
BRIEF PROCEDURE NOTE    Date of Procedure: 3/21/2025   Preoperative Diagnosis: atrial fibrillation  Postoperative Diagnosis: same   Procedure: Synchronized DC cardioversion  Cardiologist: Marcelina Jaimes MD  Anesthesia:    I administered moderate sedation throughout this procedure. An independent trained observer pushed medications at my direction, and monitored the patient’s level of consciousness and physiological status throughout, total sedation time 15 mins  Estimated Blood Loss: None      Informed consent obtained. Appropriate time out performed.  Patient was sedated with IV 1 mg midazolam and 40 mg of Brevatol.  Patient has been on uninterrupted anticoagulation for at least 1 month.  After ensuring that patient was adequately sedated, a 300 joule biphasic shock was delivered using anterior and posterior pads converting atrial fibrillation to sinus rhythm.     No complications were noted.     Patient's vital signs were stable and was moving all four extremities at the end of procedure.    Marcelina Jaimes MD

## 2025-03-21 NOTE — TELEPHONE ENCOUNTER
Telephone Call to Patient. ID verified using two patient identifiers.     Patient advised we had received a refill request from [facility] and refill was sent in today.  Then received refill from [facility]   Called to verify which Pharmacy she wishes to use    Patient wants refills to go to [facility]

## 2025-03-21 NOTE — DISCHARGE INSTRUCTIONS
Future Appointments   Date Time Provider Department Center   3/27/2025 11:40 AM Donna Krueger APRN - NP [facility] AMB   4/15/2025 11:20 AM Nancy Miller MD [facility]   5/28/2025  2:20 PM Marcelina Jaimes MD [facility] AMB   8/27/2025  3:40 PM Shaista Lyn MD [facility] AMB   9/3/2025  2:20 PM Marcelina Jaimes MD [facility] AMB   10/9/2025  1:20 PM Daniel Castillo MD [facility] AMB

## 2025-03-23 LAB
EKG ATRIAL RATE: 227 BPM
EKG ATRIAL RATE: 49 BPM
EKG DIAGNOSIS: NORMAL
EKG DIAGNOSIS: NORMAL
EKG P AXIS: 44 DEGREES
EKG P-R INTERVAL: 220 MS
EKG Q-T INTERVAL: 454 MS
EKG Q-T INTERVAL: 504 MS
EKG QRS DURATION: 92 MS
EKG QRS DURATION: 94 MS
EKG QTC CALCULATION (BAZETT): 454 MS
EKG QTC CALCULATION (BAZETT): 455 MS
EKG R AXIS: -38 DEGREES
EKG R AXIS: 25 DEGREES
EKG T AXIS: 12 DEGREES
EKG T AXIS: 18 DEGREES
EKG VENTRICULAR RATE: 49 BPM
EKG VENTRICULAR RATE: 60 BPM

## 2025-03-23 PROCEDURE — 93010 ELECTROCARDIOGRAM REPORT: CPT | Performed by: INTERNAL MEDICINE

## 2025-03-24 RX ORDER — ATORVASTATIN CALCIUM 40 MG/1
40 TABLET, FILM COATED ORAL DAILY
Qty: 90 TABLET | Refills: 3 | OUTPATIENT
Start: 2025-03-24

## 2025-03-24 NOTE — PROCEDURES
BRIEF PROCEDURE NOTE    Date of Procedure: 3/21/2025   Preoperative Diagnosis: atrial fibrillation  Postoperative Diagnosis: same   Procedure: Synchronized DC cardioversion  Cardiologist: Marcelina Jaimes MD  Anesthesia:    I administered moderate sedation throughout this procedure. An independent trained observer pushed medications at my direction, and monitored the patient’s level of consciousness and physiological status throughout.  Estimated Blood Loss: None      Informed consent obtained. Appropriate time out performed.  Patient was sedated with IV 1 mg midazolam and 40 mg of Brevatol. Patient has been on uninterrupted anticoagulation for at least 1 month.  After ensuring that patient was adequately sedated, a 200 joule biphasic shock was delivered using anterior and posterior pads converting atrial fibrillation to sinus rhythm.     No complications were noted.   Patient's vital signs were stable and was moving all four extremities at the end of procedure.    Marcelina Jaimes MD

## 2025-03-24 NOTE — H&P
Interval H&P:  No significant interval changes since patient was last seen in clinic.          _________________________________  Marcelina Jaimes MD, [facility], [facility]  Cardiac Electrophysiology  [facility] [place]                               [facility] Cardiology  Cardiac Electrophysiology Clinic Care Note                  []Initial visit     [x]Established visit     Patient Name: Adrienne To - :1940 - MRN:[id number]  Primary Cardiologist: Cj Lozada MD  Electrophysiologist: Marcelina Jaimes MD     Reason for visit: Persistent AF follow up    HPI:  Ms. Seaman is a 84 y.o. female who presents for follow up, is s/p PVI, PWI, right gume line atypical AFL, & CTI ablation (2023-).    She continues Toprol XL, denies significant chest pain or palpitations, but has noted some rapid rates.    ECG on 2025 showed  bpm.    Holter was mailed out, delivered 2025; she is due to complete wearing this tomorrow.    JASSON in 10/2024 showed LVEF 55-60% with mild LVH, dilated LA, mild MR, mild AR.    LHC in 2024 showed severe RCA disease, otherwise without significant CAD.  Underwent atherectomy & PCI (TEO).    BP controlled.    No longer on OAC s/p Watchman.  Continues DAPT.      Previous:  S/p Watchman (2024-Riki).  JASSON in 10/2024 confirmed complete JEFF closure.    S/p PCI RCA in 2024.    S/p RCA atherectomy & unsuccessful PCI in 2024.    Right groin pseudoaneurysm in 2024.    S/p PVI, PWI, right gume line atypical AFL, & CTI ablation (2023-Jaimes).    Previously used sotalol.    AF diagnosed in 2023, unknown onset.    SUZAN on CPAP.    [place]oved [place] to [place] to [place] close to family (daughter and son).  passed in .        Assessment and Plan       ICD-10-CM    1. AF (atrial fibrillation) ([facility])  I48.91 Cardioversion external     Cardioversion external          Persistent AF:  -AF diagnosed in 2023, unknown onset.  -S/p PVI, PWI, right gume line

## 2025-03-25 RX ORDER — FUROSEMIDE 20 MG/1
20 TABLET ORAL DAILY
Qty: 90 TABLET | Refills: 1 | Status: SHIPPED | OUTPATIENT
Start: 2025-03-25 | End: 2025-03-27 | Stop reason: ALTCHOICE

## 2025-03-25 RX ORDER — CLOPIDOGREL BISULFATE 75 MG/1
75 TABLET ORAL DAILY
Qty: 90 TABLET | Refills: 2 | Status: SHIPPED | OUTPATIENT
Start: 2025-03-25

## 2025-03-27 ENCOUNTER — TELEPHONE (OUTPATIENT)
Age: 85
End: 2025-03-27

## 2025-03-27 ENCOUNTER — OFFICE VISIT (OUTPATIENT)
Age: 85
End: 2025-03-27
Payer: MEDICARE

## 2025-03-27 VITALS
HEART RATE: 56 BPM | BODY MASS INDEX: 39.51 KG/M2 | OXYGEN SATURATION: 92 % | SYSTOLIC BLOOD PRESSURE: 120 MMHG | HEIGHT: 63 IN | DIASTOLIC BLOOD PRESSURE: 72 MMHG | WEIGHT: 223 LBS

## 2025-03-27 DIAGNOSIS — Z86.79 S/P ABLATION OF ATRIAL FIBRILLATION: ICD-10-CM

## 2025-03-27 DIAGNOSIS — I25.10 CORONARY ARTERY DISEASE INVOLVING NATIVE CORONARY ARTERY OF NATIVE HEART WITHOUT ANGINA PECTORIS: ICD-10-CM

## 2025-03-27 DIAGNOSIS — Z86.79 S/P ABLATION OF ATRIAL FLUTTER: ICD-10-CM

## 2025-03-27 DIAGNOSIS — Z95.5 STATUS POST CORONARY ARTERY STENT PLACEMENT: ICD-10-CM

## 2025-03-27 DIAGNOSIS — I48.3 TYPICAL ATRIAL FLUTTER (HCC): ICD-10-CM

## 2025-03-27 DIAGNOSIS — I48.19 PERSISTENT ATRIAL FIBRILLATION (HCC): Primary | ICD-10-CM

## 2025-03-27 DIAGNOSIS — Z98.890 S/P ABLATION OF ATRIAL FLUTTER: ICD-10-CM

## 2025-03-27 DIAGNOSIS — R60.0 LOWER EXTREMITY EDEMA: ICD-10-CM

## 2025-03-27 DIAGNOSIS — G47.33 OSA (OBSTRUCTIVE SLEEP APNEA): ICD-10-CM

## 2025-03-27 DIAGNOSIS — Z79.899 ON AMIODARONE THERAPY: ICD-10-CM

## 2025-03-27 DIAGNOSIS — I10 PRIMARY HYPERTENSION: ICD-10-CM

## 2025-03-27 DIAGNOSIS — E66.812 CLASS 2 OBESITY WITH BODY MASS INDEX (BMI) OF 39.0 TO 39.9 IN ADULT, UNSPECIFIED OBESITY TYPE, UNSPECIFIED WHETHER SERIOUS COMORBIDITY PRESENT: ICD-10-CM

## 2025-03-27 DIAGNOSIS — Z98.890 S/P ABLATION OF ATRIAL FIBRILLATION: ICD-10-CM

## 2025-03-27 PROCEDURE — 93005 ELECTROCARDIOGRAM TRACING: CPT | Performed by: NURSE PRACTITIONER

## 2025-03-27 PROCEDURE — G8427 DOCREV CUR MEDS BY ELIG CLIN: HCPCS | Performed by: NURSE PRACTITIONER

## 2025-03-27 PROCEDURE — 99214 OFFICE O/P EST MOD 30 MIN: CPT | Performed by: NURSE PRACTITIONER

## 2025-03-27 PROCEDURE — 1123F ACP DISCUSS/DSCN MKR DOCD: CPT | Performed by: NURSE PRACTITIONER

## 2025-03-27 PROCEDURE — G8417 CALC BMI ABV UP PARAM F/U: HCPCS | Performed by: NURSE PRACTITIONER

## 2025-03-27 PROCEDURE — 3074F SYST BP LT 130 MM HG: CPT | Performed by: NURSE PRACTITIONER

## 2025-03-27 PROCEDURE — 3078F DIAST BP <80 MM HG: CPT | Performed by: NURSE PRACTITIONER

## 2025-03-27 PROCEDURE — 1090F PRES/ABSN URINE INCON ASSESS: CPT | Performed by: NURSE PRACTITIONER

## 2025-03-27 PROCEDURE — 1159F MED LIST DOCD IN RCRD: CPT | Performed by: NURSE PRACTITIONER

## 2025-03-27 PROCEDURE — 93010 ELECTROCARDIOGRAM REPORT: CPT | Performed by: NURSE PRACTITIONER

## 2025-03-27 PROCEDURE — 1036F TOBACCO NON-USER: CPT | Performed by: NURSE PRACTITIONER

## 2025-03-27 PROCEDURE — G8400 PT W/DXA NO RESULTS DOC: HCPCS | Performed by: NURSE PRACTITIONER

## 2025-03-27 PROCEDURE — 1126F AMNT PAIN NOTED NONE PRSNT: CPT | Performed by: NURSE PRACTITIONER

## 2025-03-27 RX ORDER — BUMETANIDE 0.5 MG/1
0.5 TABLET ORAL DAILY
Qty: 90 TABLET | Refills: 3 | Status: SHIPPED | OUTPATIENT
Start: 2025-03-27

## 2025-03-27 NOTE — TELEPHONE ENCOUNTER
Spoke to Pt of Afib Ablation Procedure with Dr. Jaimes on 6/30/25 at 12pm arriving at 10am. Please NPO from Midnight the night prior to the procedure. Please have lab work done Between 5/30/25-6/23/25. Check in at the 1st floor OutPt Registation desk at [facility].    Medications:  Take medications as normal the morning of the procedure     VO BY Dr. Jaimes/Nurse Josefina SHUKLA/Rebecca ROMERO

## 2025-03-27 NOTE — PROGRESS NOTES
Chief Complaint   Patient presents with    Atrial Fibrillation     PAF     Vitals:    03/27/25 1130   BP: 120/72   BP Site: Left Upper Arm   Patient Position: Sitting   Pulse: 56   SpO2: 92%   Weight: 101.2 kg (223 lb)   Height: 1.6 m (5' 3\")         Chest pain: DENIED     Recent hospital stays: DENIED     Refills: DENIED

## 2025-03-27 NOTE — PATIENT INSTRUCTIONS
Stop furosemide.  Start bumetanide 0.5 mg by mouth daily.  If needed, you can take an extra tablet of bumetanide daily for increased lower extremity edema.    Follow up with Dr. Jaimes as previously scheduled.

## 2025-03-28 RX ORDER — FUROSEMIDE 20 MG/1
20 TABLET ORAL DAILY
Qty: 90 TABLET | Refills: 1 | Status: SHIPPED | OUTPATIENT
Start: 2025-03-28

## 2025-03-28 RX ORDER — ATORVASTATIN CALCIUM 40 MG/1
40 TABLET, FILM COATED ORAL DAILY
Qty: 90 TABLET | Refills: 1 | Status: SHIPPED | OUTPATIENT
Start: 2025-03-28

## 2025-03-28 NOTE — TELEPHONE ENCOUNTER
Refill per VO of Dr. Lozada  Last appt: 1/23/2025    NV needed x1 month    Future Appointments   Date Time Provider Department Center   4/15/2025 11:20 AM Nancy Miller MD [facility] E[facility] DEP   5/28/2025  2:20 PM Marcelina Jaimes MD [facility] BS AMB   7/31/2025  3:00 PM Donna Krueger, [facility] BS AMB   8/27/2025  3:40 PM Shaista Lyn MD [facility]  AMB   9/3/2025  2:20 PM Marcelina Jaimes MD [facility] BS AMB   10/9/2025  1:20 PM Daniel Castillo MD [facility] BS AMB       Requested Prescriptions     Signed Prescriptions Disp Refills    atorvastatin (LIPITOR) 40 MG tablet 90 tablet 1     Sig: Take 1 tablet by mouth daily     Authorizing Provider: LUCIE LOZADA     Ordering User: BILLY CALIX    furosemide (LASIX) 20 MG tablet 90 tablet 1     Sig: Take 1 tablet by mouth daily     Authorizing Provider: LUCIE LOZADA     Ordering User: BILLY CALIX

## 2025-04-12 SDOH — HEALTH STABILITY: PHYSICAL HEALTH: ON AVERAGE, HOW MANY DAYS PER WEEK DO YOU ENGAGE IN MODERATE TO STRENUOUS EXERCISE (LIKE A BRISK WALK)?: 0 DAYS

## 2025-04-15 ENCOUNTER — OFFICE VISIT (OUTPATIENT)
Facility: CLINIC | Age: 85
End: 2025-04-15
Payer: MEDICARE

## 2025-04-15 VITALS
BODY MASS INDEX: 38.62 KG/M2 | HEIGHT: 63 IN | HEART RATE: 56 BPM | DIASTOLIC BLOOD PRESSURE: 54 MMHG | TEMPERATURE: 97.8 F | RESPIRATION RATE: 20 BRPM | OXYGEN SATURATION: 97 % | SYSTOLIC BLOOD PRESSURE: 124 MMHG | WEIGHT: 218 LBS

## 2025-04-15 DIAGNOSIS — E78.5 DYSLIPIDEMIA: ICD-10-CM

## 2025-04-15 DIAGNOSIS — N18.30 TYPE 2 DIABETES MELLITUS WITH STAGE 3 CHRONIC KIDNEY DISEASE, WITH LONG-TERM CURRENT USE OF INSULIN, UNSPECIFIED WHETHER STAGE 3A OR 3B CKD (HCC): ICD-10-CM

## 2025-04-15 DIAGNOSIS — I10 ESSENTIAL (PRIMARY) HYPERTENSION: ICD-10-CM

## 2025-04-15 DIAGNOSIS — D64.9 ANEMIA, UNSPECIFIED TYPE: ICD-10-CM

## 2025-04-15 DIAGNOSIS — J30.89 ENVIRONMENTAL AND SEASONAL ALLERGIES: ICD-10-CM

## 2025-04-15 DIAGNOSIS — N18.30 TYPE 2 DIABETES MELLITUS WITH STAGE 3 CHRONIC KIDNEY DISEASE, WITH LONG-TERM CURRENT USE OF INSULIN, UNSPECIFIED WHETHER STAGE 3A OR 3B CKD (HCC): Primary | ICD-10-CM

## 2025-04-15 DIAGNOSIS — E11.22 TYPE 2 DIABETES MELLITUS WITH STAGE 3 CHRONIC KIDNEY DISEASE, WITH LONG-TERM CURRENT USE OF INSULIN, UNSPECIFIED WHETHER STAGE 3A OR 3B CKD (HCC): ICD-10-CM

## 2025-04-15 DIAGNOSIS — E11.22 TYPE 2 DIABETES MELLITUS WITH STAGE 3 CHRONIC KIDNEY DISEASE, WITH LONG-TERM CURRENT USE OF INSULIN, UNSPECIFIED WHETHER STAGE 3A OR 3B CKD (HCC): Primary | ICD-10-CM

## 2025-04-15 DIAGNOSIS — Z79.4 TYPE 2 DIABETES MELLITUS WITH STAGE 3 CHRONIC KIDNEY DISEASE, WITH LONG-TERM CURRENT USE OF INSULIN, UNSPECIFIED WHETHER STAGE 3A OR 3B CKD (HCC): Primary | ICD-10-CM

## 2025-04-15 DIAGNOSIS — Z79.4 TYPE 2 DIABETES MELLITUS WITH STAGE 3 CHRONIC KIDNEY DISEASE, WITH LONG-TERM CURRENT USE OF INSULIN, UNSPECIFIED WHETHER STAGE 3A OR 3B CKD (HCC): ICD-10-CM

## 2025-04-15 PROBLEM — R55 ATYPICAL SYNCOPE: Status: RESOLVED | Noted: 2024-01-12 | Resolved: 2025-04-15

## 2025-04-15 PROBLEM — J45.40 MODERATE PERSISTENT ASTHMA WITHOUT COMPLICATION: Status: ACTIVE | Noted: 2025-04-15

## 2025-04-15 PROBLEM — S30.1XXA HEMATOMA OF GROIN: Status: RESOLVED | Noted: 2024-01-13 | Resolved: 2025-04-15

## 2025-04-15 PROBLEM — D47.2 MONOCLONAL GAMMOPATHY: Status: ACTIVE | Noted: 2025-04-15

## 2025-04-15 PROBLEM — I48.0 PAF (PAROXYSMAL ATRIAL FIBRILLATION) (HCC): Status: RESOLVED | Noted: 2023-06-19 | Resolved: 2025-04-15

## 2025-04-15 PROBLEM — I48.11 LONGSTANDING PERSISTENT ATRIAL FIBRILLATION (HCC): Status: ACTIVE | Noted: 2024-07-08

## 2025-04-15 PROBLEM — Z79.01 ANTICOAGULATED: Status: RESOLVED | Noted: 2023-06-19 | Resolved: 2025-04-15

## 2025-04-15 PROBLEM — G47.33 OSA ON CPAP: Status: ACTIVE | Noted: 2025-04-15

## 2025-04-15 PROBLEM — M79.604 PAIN OF RIGHT LOWER EXTREMITY: Status: RESOLVED | Noted: 2024-01-12 | Resolved: 2025-04-15

## 2025-04-15 PROBLEM — I95.1 SYNCOPE DUE TO ORTHOSTATIC HYPOTENSION: Status: RESOLVED | Noted: 2024-01-12 | Resolved: 2025-04-15

## 2025-04-15 PROBLEM — D62 ACUTE BLOOD LOSS ANEMIA: Status: RESOLVED | Noted: 2024-01-15 | Resolved: 2025-04-15

## 2025-04-15 PROBLEM — R55 SYNCOPE: Status: RESOLVED | Noted: 2024-01-11 | Resolved: 2025-04-15

## 2025-04-15 PROBLEM — R94.39 ABNORMAL STRESS TEST: Status: RESOLVED | Noted: 2024-01-03 | Resolved: 2025-04-15

## 2025-04-15 PROCEDURE — 3074F SYST BP LT 130 MM HG: CPT | Performed by: STUDENT IN AN ORGANIZED HEALTH CARE EDUCATION/TRAINING PROGRAM

## 2025-04-15 PROCEDURE — 1036F TOBACCO NON-USER: CPT | Performed by: STUDENT IN AN ORGANIZED HEALTH CARE EDUCATION/TRAINING PROGRAM

## 2025-04-15 PROCEDURE — G8417 CALC BMI ABV UP PARAM F/U: HCPCS | Performed by: STUDENT IN AN ORGANIZED HEALTH CARE EDUCATION/TRAINING PROGRAM

## 2025-04-15 PROCEDURE — 1159F MED LIST DOCD IN RCRD: CPT | Performed by: STUDENT IN AN ORGANIZED HEALTH CARE EDUCATION/TRAINING PROGRAM

## 2025-04-15 PROCEDURE — G8427 DOCREV CUR MEDS BY ELIG CLIN: HCPCS | Performed by: STUDENT IN AN ORGANIZED HEALTH CARE EDUCATION/TRAINING PROGRAM

## 2025-04-15 PROCEDURE — 3078F DIAST BP <80 MM HG: CPT | Performed by: STUDENT IN AN ORGANIZED HEALTH CARE EDUCATION/TRAINING PROGRAM

## 2025-04-15 PROCEDURE — 99204 OFFICE O/P NEW MOD 45 MIN: CPT | Performed by: STUDENT IN AN ORGANIZED HEALTH CARE EDUCATION/TRAINING PROGRAM

## 2025-04-15 PROCEDURE — 1090F PRES/ABSN URINE INCON ASSESS: CPT | Performed by: STUDENT IN AN ORGANIZED HEALTH CARE EDUCATION/TRAINING PROGRAM

## 2025-04-15 PROCEDURE — G8400 PT W/DXA NO RESULTS DOC: HCPCS | Performed by: STUDENT IN AN ORGANIZED HEALTH CARE EDUCATION/TRAINING PROGRAM

## 2025-04-15 PROCEDURE — 1123F ACP DISCUSS/DSCN MKR DOCD: CPT | Performed by: STUDENT IN AN ORGANIZED HEALTH CARE EDUCATION/TRAINING PROGRAM

## 2025-04-15 RX ORDER — LEVOCETIRIZINE DIHYDROCHLORIDE 5 MG/1
2.5 TABLET, FILM COATED ORAL EVERY OTHER DAY
Qty: 45 TABLET | Refills: 0 | Status: SHIPPED | OUTPATIENT
Start: 2025-04-15

## 2025-04-15 SDOH — ECONOMIC STABILITY: FOOD INSECURITY: WITHIN THE PAST 12 MONTHS, THE FOOD YOU BOUGHT JUST DIDN'T LAST AND YOU DIDN'T HAVE MONEY TO GET MORE.: NEVER TRUE

## 2025-04-15 SDOH — ECONOMIC STABILITY: FOOD INSECURITY: WITHIN THE PAST 12 MONTHS, YOU WORRIED THAT YOUR FOOD WOULD RUN OUT BEFORE YOU GOT MONEY TO BUY MORE.: NEVER TRUE

## 2025-04-15 ASSESSMENT — ENCOUNTER SYMPTOMS
VOMITING: 0
ABDOMINAL PAIN: 0
NAUSEA: 0
COUGH: 0
RHINORRHEA: 0
SHORTNESS OF BREATH: 0

## 2025-04-15 ASSESSMENT — PATIENT HEALTH QUESTIONNAIRE - PHQ9
SUM OF ALL RESPONSES TO PHQ QUESTIONS 1-9: 0
2. FEELING DOWN, DEPRESSED OR HOPELESS: NOT AT ALL
SUM OF ALL RESPONSES TO PHQ QUESTIONS 1-9: 0
1. LITTLE INTEREST OR PLEASURE IN DOING THINGS: NOT AT ALL

## 2025-04-15 NOTE — PROGRESS NOTES
dentified pt with two pt identifiers(name and ).    Chief Complaint   Patient presents with    New Patient     Patient here to be established.        Health Maintenance Due   Topic    DTaP/Tdap/Td vaccine (1 - Tdap)    Pneumococcal 50+ years Vaccine (1 of 2 - PCV)    Shingles vaccine (1 of 2)    DEXA (modify frequency per FRAX score)     Respiratory Syncytial Virus (RSV) Pregnant or age 60 yrs+ (1 - 1-dose 75+ series)    Diabetic Alb to Cr ratio (uACR) test     COVID-19 Vaccine ( season)    Annual Wellness Visit (Medicare)        Wt Readings from Last 3 Encounters:   25 101.2 kg (223 lb)   25 100.2 kg (221 lb)   25 100.2 kg (221 lb)     Temp Readings from Last 3 Encounters:   24 97.9 °F (36.6 °C) (Temporal)   10/10/24 97.3 °F (36.3 °C) (Temporal)     BP Readings from Last 3 Encounters:   25 120/72   25 102/79   25 118/68     Pulse Readings from Last 3 Encounters:   25 56   25 (!) 49   25 74           Coordination of Care Questionnaire:  :   1. \"Have you been to the ER, urgent care clinic since your last visit?  Hospitalized since your last visit?\" no    2. \"Have you seen or consulted any other health care providers outside of the [facility] since your last visit?\" no     3. For patients aged 45-75: Has the patient had a colonoscopy / FIT/ Cologuard? no      If the patient is female:    4. For patients aged 40-74: Has the patient had a mammogram within the past 2 years? no      5. For patients aged 21-65: Has the patient had a pap smear? no     3) Do you have an Advance Directive on file? no  Are you interested in receiving information about Advance Directives? no    Patient is accompanied by Daughter I have received verbal consent from Adrienne To to discuss any/all medical information while they are present in the room.

## 2025-04-15 NOTE — PROGRESS NOTES
Assessment/Plan:     Diagnoses and all orders for this visit:    Type 2 diabetes mellitus with stage 3 chronic kidney disease, with long-term current use of insulin, unspecified whether stage 3a or 3b CKD (HCC)  -     Hemoglobin A1C; Future  -     CBC with Auto Differential; Future  -     Comprehensive Metabolic Panel; Future  -     Albumin/Creatinine Ratio, Urine; Future  - Chronic.  Presumed stable.  -Check routine lab work including hemoglobin A1c  -Continue on daily Jardiance and Januvia  -She continues to use low-dose glipizide only as needed  -Continue routine diabetic eye exam  -Continue diabetic diet    Essential (primary) hypertension  -     CBC with Auto Differential; Future  -     Comprehensive Metabolic Panel; Future  -     Albumin/Creatinine Ratio, Urine; Future  -Chronic.  Stable.  -Home blood pressures show excellent control  -Check routine lab work today  -Continue current regimen of metoprolol and lisinopril    Dyslipidemia  -     Comprehensive Metabolic Panel; Future  -     Lipid Panel; Future  -Chronic.  Presumed stable.  -Check lipid panel  -Continue atorvastatin daily    Environmental and seasonal allergies  -     levocetirizine (XYZAL) 5 MG tablet; Take 0.5 tablets by mouth every other day  -Chronic.  Stable.  -Continue Xyzal and Flonase as needed    Anemia, unspecified type  -     CBC with Auto Differential; Future  -     Iron and TIBC; Future  -Previous history of anemia  -Recheck lab work today  -She is currently on oral iron daily     Please note that this dictation was completed with [employer], the [employer] voice recognition software. Quite often unanticipated grammatical, syntax, homophones, and other interpretive errors are inadvertently transcribed by the computer software. Please disregard these errors. Please excuse any errors that have escaped final proofreading.      Return in about 3 months (around 7/15/2025).     Discussed expected course/resolution/complications of diagnosis in

## 2025-04-16 LAB
ALBUMIN SERPL-MCNC: 3.4 G/DL (ref 3.5–5)
ALBUMIN/GLOB SERPL: 0.9 (ref 1.1–2.2)
ALP SERPL-CCNC: 116 U/L (ref 45–117)
ALT SERPL-CCNC: 24 U/L (ref 12–78)
ANION GAP SERPL CALC-SCNC: 6 MMOL/L (ref 2–12)
AST SERPL-CCNC: 17 U/L (ref 15–37)
BASOPHILS # BLD: 0.05 K/UL (ref 0–0.1)
BASOPHILS NFR BLD: 0.6 % (ref 0–1)
BILIRUB SERPL-MCNC: 0.7 MG/DL (ref 0.2–1)
BUN SERPL-MCNC: 41 MG/DL (ref 6–20)
BUN/CREAT SERPL: 28 (ref 12–20)
CALCIUM SERPL-MCNC: 9.3 MG/DL (ref 8.5–10.1)
CHLORIDE SERPL-SCNC: 109 MMOL/L (ref 97–108)
CHOLEST SERPL-MCNC: 189 MG/DL
CO2 SERPL-SCNC: 25 MMOL/L (ref 21–32)
CREAT SERPL-MCNC: 1.49 MG/DL (ref 0.55–1.02)
CREAT UR-MCNC: 71.2 MG/DL
DIFFERENTIAL METHOD BLD: ABNORMAL
EOSINOPHIL # BLD: 0.12 K/UL (ref 0–0.4)
EOSINOPHIL NFR BLD: 1.3 % (ref 0–7)
ERYTHROCYTE [DISTWIDTH] IN BLOOD BY AUTOMATED COUNT: 16.1 % (ref 11.5–14.5)
EST. AVERAGE GLUCOSE BLD GHB EST-MCNC: 148 MG/DL
GLOBULIN SER CALC-MCNC: 3.8 G/DL (ref 2–4)
GLUCOSE SERPL-MCNC: 145 MG/DL (ref 65–100)
HBA1C MFR BLD: 6.8 % (ref 4–5.6)
HCT VFR BLD AUTO: 39.4 % (ref 35–47)
HDLC SERPL-MCNC: 59 MG/DL
HDLC SERPL: 3.2 (ref 0–5)
HGB BLD-MCNC: 12.2 G/DL (ref 11.5–16)
IMM GRANULOCYTES # BLD AUTO: 0.1 K/UL (ref 0–0.04)
IMM GRANULOCYTES NFR BLD AUTO: 1.1 % (ref 0–0.5)
IRON SATN MFR SERPL: 23 % (ref 20–50)
IRON SERPL-MCNC: 69 UG/DL (ref 35–150)
LDLC SERPL CALC-MCNC: 110.4 MG/DL (ref 0–100)
LYMPHOCYTES # BLD: 1.1 K/UL (ref 0.8–3.5)
LYMPHOCYTES NFR BLD: 12.4 % (ref 12–49)
MCH RBC QN AUTO: 30.2 PG (ref 26–34)
MCHC RBC AUTO-ENTMCNC: 31 G/DL (ref 30–36.5)
MCV RBC AUTO: 97.5 FL (ref 80–99)
MICROALBUMIN UR-MCNC: 4.66 MG/DL
MICROALBUMIN/CREAT UR-RTO: 65 MG/G (ref 0–30)
MONOCYTES # BLD: 0.91 K/UL (ref 0–1)
MONOCYTES NFR BLD: 10.2 % (ref 5–13)
NEUTS SEG # BLD: 6.62 K/UL (ref 1.8–8)
NEUTS SEG NFR BLD: 74.4 % (ref 32–75)
NRBC # BLD: 0 K/UL (ref 0–0.01)
NRBC BLD-RTO: 0 PER 100 WBC
PLATELET # BLD AUTO: 239 K/UL (ref 150–400)
PMV BLD AUTO: 10.3 FL (ref 8.9–12.9)
POTASSIUM SERPL-SCNC: 4.6 MMOL/L (ref 3.5–5.1)
PROT SERPL-MCNC: 7.2 G/DL (ref 6.4–8.2)
RBC # BLD AUTO: 4.04 M/UL (ref 3.8–5.2)
SODIUM SERPL-SCNC: 140 MMOL/L (ref 136–145)
TIBC SERPL-MCNC: 302 UG/DL (ref 250–450)
TRIGL SERPL-MCNC: 98 MG/DL
VLDLC SERPL CALC-MCNC: 19.6 MG/DL
WBC # BLD AUTO: 8.9 K/UL (ref 3.6–11)

## 2025-04-22 ENCOUNTER — RESULTS FOLLOW-UP (OUTPATIENT)
Facility: CLINIC | Age: 85
End: 2025-04-22

## 2025-05-09 RX ORDER — BUDESONIDE, GLYCOPYRROLATE, AND FORMOTEROL FUMARATE 160; 9; 4.8 UG/1; UG/1; UG/1
AEROSOL, METERED RESPIRATORY (INHALATION)
Qty: 32.1 G | Refills: 3 | OUTPATIENT
Start: 2025-05-09

## 2025-05-15 RX ORDER — AMIODARONE HYDROCHLORIDE 200 MG/1
TABLET ORAL
Refills: 0 | OUTPATIENT
Start: 2025-05-15

## 2025-05-16 DIAGNOSIS — E11.22 TYPE 2 DIABETES MELLITUS WITH STAGE 4 CHRONIC KIDNEY DISEASE, WITH LONG-TERM CURRENT USE OF INSULIN (HCC): ICD-10-CM

## 2025-05-16 DIAGNOSIS — J45.40 MODERATE PERSISTENT ASTHMA WITHOUT COMPLICATION: Primary | ICD-10-CM

## 2025-05-16 DIAGNOSIS — Z79.4 TYPE 2 DIABETES MELLITUS WITH STAGE 4 CHRONIC KIDNEY DISEASE, WITH LONG-TERM CURRENT USE OF INSULIN (HCC): ICD-10-CM

## 2025-05-16 DIAGNOSIS — N18.4 TYPE 2 DIABETES MELLITUS WITH STAGE 4 CHRONIC KIDNEY DISEASE, WITH LONG-TERM CURRENT USE OF INSULIN (HCC): ICD-10-CM

## 2025-05-16 RX ORDER — BUDESONIDE, GLYCOPYRROLATE, AND FORMOTEROL FUMARATE 160; 9; 4.8 UG/1; UG/1; UG/1
AEROSOL, METERED RESPIRATORY (INHALATION)
Qty: 3 EACH | Refills: 1 | Status: SHIPPED | OUTPATIENT
Start: 2025-05-16

## 2025-05-16 RX ORDER — GLIPIZIDE 5 MG/1
TABLET ORAL
Qty: 60 TABLET | Refills: 1 | Status: SHIPPED | OUTPATIENT
Start: 2025-05-16

## 2025-05-27 PROBLEM — I72.4 PSEUDOANEURYSM OF FEMORAL ARTERY: Status: RESOLVED | Noted: 2024-01-14 | Resolved: 2025-05-27

## 2025-05-28 ENCOUNTER — OFFICE VISIT (OUTPATIENT)
Age: 85
End: 2025-05-28
Payer: MEDICARE

## 2025-05-28 VITALS
WEIGHT: 224 LBS | HEIGHT: 63 IN | SYSTOLIC BLOOD PRESSURE: 130 MMHG | BODY MASS INDEX: 39.69 KG/M2 | DIASTOLIC BLOOD PRESSURE: 60 MMHG | OXYGEN SATURATION: 95 % | RESPIRATION RATE: 16 BRPM | HEART RATE: 56 BPM

## 2025-05-28 DIAGNOSIS — I10 ESSENTIAL (PRIMARY) HYPERTENSION: ICD-10-CM

## 2025-05-28 DIAGNOSIS — E78.5 DYSLIPIDEMIA: ICD-10-CM

## 2025-05-28 DIAGNOSIS — E11.22 TYPE 2 DIABETES MELLITUS WITH STAGE 3 CHRONIC KIDNEY DISEASE, WITH LONG-TERM CURRENT USE OF INSULIN, UNSPECIFIED WHETHER STAGE 3A OR 3B CKD (HCC): ICD-10-CM

## 2025-05-28 DIAGNOSIS — Z95.818 PRESENCE OF WATCHMAN LEFT ATRIAL APPENDAGE CLOSURE DEVICE: ICD-10-CM

## 2025-05-28 DIAGNOSIS — I48.11 LONGSTANDING PERSISTENT ATRIAL FIBRILLATION (HCC): Primary | ICD-10-CM

## 2025-05-28 DIAGNOSIS — G47.33 OSA ON CPAP: ICD-10-CM

## 2025-05-28 DIAGNOSIS — Z79.899 HIGH RISK MEDICATION USE: ICD-10-CM

## 2025-05-28 DIAGNOSIS — I25.10 CAD S/P PERCUTANEOUS CORONARY ANGIOPLASTY: ICD-10-CM

## 2025-05-28 DIAGNOSIS — Z98.61 CAD S/P PERCUTANEOUS CORONARY ANGIOPLASTY: ICD-10-CM

## 2025-05-28 DIAGNOSIS — Z79.4 TYPE 2 DIABETES MELLITUS WITH STAGE 3 CHRONIC KIDNEY DISEASE, WITH LONG-TERM CURRENT USE OF INSULIN, UNSPECIFIED WHETHER STAGE 3A OR 3B CKD (HCC): ICD-10-CM

## 2025-05-28 DIAGNOSIS — N18.30 TYPE 2 DIABETES MELLITUS WITH STAGE 3 CHRONIC KIDNEY DISEASE, WITH LONG-TERM CURRENT USE OF INSULIN, UNSPECIFIED WHETHER STAGE 3A OR 3B CKD (HCC): ICD-10-CM

## 2025-05-28 PROCEDURE — 1126F AMNT PAIN NOTED NONE PRSNT: CPT | Performed by: INTERNAL MEDICINE

## 2025-05-28 PROCEDURE — 93010 ELECTROCARDIOGRAM REPORT: CPT | Performed by: INTERNAL MEDICINE

## 2025-05-28 PROCEDURE — G8417 CALC BMI ABV UP PARAM F/U: HCPCS | Performed by: INTERNAL MEDICINE

## 2025-05-28 PROCEDURE — 3044F HG A1C LEVEL LT 7.0%: CPT | Performed by: INTERNAL MEDICINE

## 2025-05-28 PROCEDURE — 1159F MED LIST DOCD IN RCRD: CPT | Performed by: INTERNAL MEDICINE

## 2025-05-28 PROCEDURE — 93005 ELECTROCARDIOGRAM TRACING: CPT | Performed by: INTERNAL MEDICINE

## 2025-05-28 PROCEDURE — G2211 COMPLEX E/M VISIT ADD ON: HCPCS | Performed by: INTERNAL MEDICINE

## 2025-05-28 PROCEDURE — 1160F RVW MEDS BY RX/DR IN RCRD: CPT | Performed by: INTERNAL MEDICINE

## 2025-05-28 PROCEDURE — 3078F DIAST BP <80 MM HG: CPT | Performed by: INTERNAL MEDICINE

## 2025-05-28 PROCEDURE — 1036F TOBACCO NON-USER: CPT | Performed by: INTERNAL MEDICINE

## 2025-05-28 PROCEDURE — G8427 DOCREV CUR MEDS BY ELIG CLIN: HCPCS | Performed by: INTERNAL MEDICINE

## 2025-05-28 PROCEDURE — 3075F SYST BP GE 130 - 139MM HG: CPT | Performed by: INTERNAL MEDICINE

## 2025-05-28 PROCEDURE — G8400 PT W/DXA NO RESULTS DOC: HCPCS | Performed by: INTERNAL MEDICINE

## 2025-05-28 PROCEDURE — 1090F PRES/ABSN URINE INCON ASSESS: CPT | Performed by: INTERNAL MEDICINE

## 2025-05-28 PROCEDURE — 99214 OFFICE O/P EST MOD 30 MIN: CPT | Performed by: INTERNAL MEDICINE

## 2025-05-28 PROCEDURE — 1123F ACP DISCUSS/DSCN MKR DOCD: CPT | Performed by: INTERNAL MEDICINE

## 2025-05-28 RX ORDER — AMIODARONE HYDROCHLORIDE 200 MG/1
200 TABLET ORAL DAILY
Qty: 90 TABLET | Refills: 0 | Status: SHIPPED | OUTPATIENT
Start: 2025-05-28

## 2025-05-28 NOTE — PATIENT INSTRUCTIONS
Stop Plavix today  Start Eliquis 2.5 mg twice a day 3 weeks before the ablation (hold Aspirin)    Schedule for pulse field atrial fibrillation ablation next available  Please hold anticoagulation on the AM of the procedure  Obtain blood work prior to the procedure    For more information regarding atrial fibrillation management, please visit:  [email or web address]          You are scheduled for the following procedure with Dr. Jaimes:  Pulsed field AFIB ablation at [facility], [address] [place] [address], [place] [address]        PLEASE be aware that your procedure date/time is tentative and subject to change due to emergency cases.    Any changes to your procedure date/time will be communicated by the hospital staff.      Procedure date/time:    Monday, June 30, 2025 at  10:30 am - please arrive by  8:30 am    ARRIVAL time:  (You will need  to be discharged home with.)     [X]  [place] procedures: arrive to check in on 1st floor near  entrance two hours prior to your procedure.      Pre-procedure Labs:    PRE-PROCEDURE LABS NEEDED: Yes   -  please have labs done after 5/30/25   Forms for labwork may be given at appointment. If not, they will be mailed to you.  These can be done at any [facility] or [facility] lab.        [facility]  [address], [address]  [place], [place] [address]  Monday-Friday 730A-430P (closed 1230-563P)  [phone]    [address], [address]   [place] [address]  Monday-Friday 8:00AM - 5:00 PM   [phone]    [facility]  [address], [address]  [place], [place] [address]  Monday-Friday 7A-5P  [phone]    [facility]  [address], [address]  [place], [place] [address]  Monday-Friday 730A-430P  [phone] (closed 1-2P)    [facility]  [address] , [address]

## 2025-05-28 NOTE — PROGRESS NOTES
Chief Complaint   Patient presents with    Atrial Fibrillation     Have you been to the ER, urgent care clinic since your last visit?  Hospitalized since your last visit?   No recent Hospital visits.    Denies Chest Pain, Dizziness.   Has Shortness of Breath on exertion.

## 2025-05-28 NOTE — PROGRESS NOTES
Cardiac Electrophysiology Office Follow-up    NAME: Adrienne To   :  1940  MRM:  [id number]    Date:  2025         Assessment and Plan:     1. Longstanding persistent atrial fibrillation (HCC)  -     EKG 12 Lead  2. Presence of Watchman left atrial appendage closure device  -     EKG 12 Lead  3. Essential (primary) hypertension  -     EKG 12 Lead  4. CAD S/P percutaneous coronary angioplasty  -     EKG 12 Lead  5. SUZAN on CPAP  -     EKG 12 Lead  6. Type 2 diabetes mellitus with stage 3 chronic kidney disease, with long-term current use of insulin, unspecified whether stage 3a or 3b CKD (HCC)  -     EKG 12 Lead  7. Body mass index [BMI] 38.0-38.9, adult (Z68.38)  -     EKG 12 Lead  8. Dyslipidemia  -     EKG 12 Lead  9. High risk medication use  -     EKG 12 Lead          Persistent AF:  -AF diagnosed in 2023, unknown onset.  -S/p PVI, PWI, right gume line atypical AFL, & CTI ablation (2023-Fiona).  -S/p Watchman (2024-Riki). JASSON in 10/2024 confirmed complete JEFF closure. \"Will plan to continue Plavix thru 2024 (total 6 months) per Dr. Lyn. Baby aspirin only thereafter.\" Will stop Plavix today   -Holter in 2025 showed 100% AF burden with some RVR.  -Now s/p DCCV 2025. Patient reports feeling better post DCCV with improved energy. Now dealing with bradycardia  -Continue amiodarone 200 mg daily, Plan to stop 2 months post ablation.   - I have discussed options including continued medical therapy and ablation in detail. I have discussed the risks of left atrial ablation including vascular complications, infection, MI, death, stroke, cardiac tamponade, esophageal fistula, phrenic nerve paralysis, PV stenosis and need for a 2nd procedure with the pt. Patient reports complete understanding of discussions and recommendations and wishes to proceed with the procedure.  - She had significant LA scarScheduled for PFA AF ablation 25  - Stop Plavix today  - Start

## 2025-06-13 LAB
BASOPHILS # BLD AUTO: 0 X10E3/UL (ref 0–0.2)
BASOPHILS NFR BLD AUTO: 1 %
BUN SERPL-MCNC: 39 MG/DL (ref 8–27)
BUN/CREAT SERPL: 27 (ref 12–28)
CALCIUM SERPL-MCNC: 9.3 MG/DL (ref 8.7–10.3)
CHLORIDE SERPL-SCNC: 107 MMOL/L (ref 96–106)
CO2 SERPL-SCNC: 19 MMOL/L (ref 20–29)
CREAT SERPL-MCNC: 1.45 MG/DL (ref 0.57–1)
EGFRCR SERPLBLD CKD-EPI 2021: 36 ML/MIN/1.73
EOSINOPHIL # BLD AUTO: 0.2 X10E3/UL (ref 0–0.4)
EOSINOPHIL NFR BLD AUTO: 2 %
ERYTHROCYTE [DISTWIDTH] IN BLOOD BY AUTOMATED COUNT: 14.2 % (ref 11.7–15.4)
GLUCOSE SERPL-MCNC: 130 MG/DL (ref 70–99)
HCT VFR BLD AUTO: 40.5 % (ref 34–46.6)
HGB BLD-MCNC: 13.2 G/DL (ref 11.1–15.9)
IMM GRANULOCYTES # BLD AUTO: 0 X10E3/UL (ref 0–0.1)
IMM GRANULOCYTES NFR BLD AUTO: 0 %
LYMPHOCYTES # BLD AUTO: 1 X10E3/UL (ref 0.7–3.1)
LYMPHOCYTES NFR BLD AUTO: 13 %
MCH RBC QN AUTO: 32.2 PG (ref 26.6–33)
MCHC RBC AUTO-ENTMCNC: 32.6 G/DL (ref 31.5–35.7)
MCV RBC AUTO: 99 FL (ref 79–97)
MONOCYTES # BLD AUTO: 0.6 X10E3/UL (ref 0.1–0.9)
MONOCYTES NFR BLD AUTO: 9 %
NEUTROPHILS # BLD AUTO: 5.4 X10E3/UL (ref 1.4–7)
NEUTROPHILS NFR BLD AUTO: 74 %
PLATELET # BLD AUTO: 226 X10E3/UL (ref 150–450)
POTASSIUM SERPL-SCNC: 5.5 MMOL/L (ref 3.5–5.2)
RBC # BLD AUTO: 4.1 X10E6/UL (ref 3.77–5.28)
SODIUM SERPL-SCNC: 144 MMOL/L (ref 134–144)
WBC # BLD AUTO: 7.2 X10E3/UL (ref 3.4–10.8)

## 2025-06-14 LAB
Lab: NORMAL
REPORT: NORMAL

## 2025-06-16 ENCOUNTER — RESULTS FOLLOW-UP (OUTPATIENT)
Age: 85
End: 2025-06-16

## 2025-06-27 ENCOUNTER — ANESTHESIA EVENT (OUTPATIENT)
Facility: HOSPITAL | Age: 85
End: 2025-06-27
Payer: MEDICARE

## 2025-06-27 ENCOUNTER — PREP FOR PROCEDURE (OUTPATIENT)
Age: 85
End: 2025-06-27

## 2025-06-27 RX ORDER — SODIUM CHLORIDE 0.9 % (FLUSH) 0.9 %
5-40 SYRINGE (ML) INJECTION EVERY 12 HOURS SCHEDULED
Status: CANCELLED | OUTPATIENT
Start: 2025-06-27

## 2025-06-27 RX ORDER — SODIUM CHLORIDE 0.9 % (FLUSH) 0.9 %
5-40 SYRINGE (ML) INJECTION PRN
Status: CANCELLED | OUTPATIENT
Start: 2025-06-27

## 2025-06-27 RX ORDER — SODIUM CHLORIDE 9 MG/ML
INJECTION, SOLUTION INTRAVENOUS PRN
Status: CANCELLED | OUTPATIENT
Start: 2025-06-27

## 2025-06-27 NOTE — H&P
Office note from 2025 reviewed. No interim changes.   --------------------------------------------------------        Cardiac Electrophysiology Office Follow-up    NAME: Adrienne To   :  1940  MRM:  [id number]    Date:  2025         Assessment and Plan:     1. Longstanding persistent atrial fibrillation (HCC)  -     EKG 12 Lead  2. Presence of Watchman left atrial appendage closure device  -     EKG 12 Lead  3. Essential (primary) hypertension  -     EKG 12 Lead  4. CAD S/P percutaneous coronary angioplasty  -     EKG 12 Lead  5. SUZAN on CPAP  -     EKG 12 Lead  6. Type 2 diabetes mellitus with stage 3 chronic kidney disease, with long-term current use of insulin, unspecified whether stage 3a or 3b CKD (HCC)  -     EKG 12 Lead  7. Body mass index [BMI] 38.0-38.9, adult (Z68.38)  -     EKG 12 Lead  8. Dyslipidemia  -     EKG 12 Lead  9. High risk medication use  -     EKG 12 Lead          Persistent AF:  -AF diagnosed in 2023, unknown onset.  -S/p PVI, PWI, right gume line atypical AFL, & CTI ablation (2023-Fiona).  -S/p Watchman (2024-Riki). JASSON in 10/2024 confirmed complete JEFF closure. \"Will plan to continue Plavix thru 2024 (total 6 months) per Dr. Lyn. Baby aspirin only thereafter.\" Will stop Plavix today   -Holter in 2025 showed 100% AF burden with some RVR.  -Now s/p DCCV 2025. Patient reports feeling better post DCCV with improved energy. Now dealing with bradycardia  -Continue amiodarone 200 mg daily, Plan to stop 2 months post ablation.   - I have discussed options including continued medical therapy and ablation in detail. I have discussed the risks of left atrial ablation including vascular complications, infection, MI, death, stroke, cardiac tamponade, esophageal fistula, phrenic nerve paralysis, PV stenosis and need for a 2nd procedure with the pt. Patient reports complete understanding of discussions and recommendations and wishes to proceed

## 2025-06-30 ENCOUNTER — HOSPITAL ENCOUNTER (OUTPATIENT)
Facility: HOSPITAL | Age: 85
Discharge: HOME | End: 2025-06-30
Attending: INTERNAL MEDICINE | Admitting: INTERNAL MEDICINE
Payer: MEDICARE

## 2025-06-30 ENCOUNTER — ANESTHESIA (OUTPATIENT)
Facility: HOSPITAL | Age: 85
End: 2025-06-30
Payer: MEDICARE

## 2025-06-30 VITALS
HEIGHT: 63 IN | WEIGHT: 224 LBS | TEMPERATURE: 97.3 F | DIASTOLIC BLOOD PRESSURE: 47 MMHG | BODY MASS INDEX: 39.69 KG/M2 | SYSTOLIC BLOOD PRESSURE: 158 MMHG | RESPIRATION RATE: 19 BRPM | HEART RATE: 74 BPM | OXYGEN SATURATION: 96 %

## 2025-06-30 DIAGNOSIS — I48.19 PERSISTENT ATRIAL FIBRILLATION (HCC): ICD-10-CM

## 2025-06-30 LAB
ABO + RH BLD: NORMAL
ACT BLD: 331 SECS (ref 79–138)
ACT BLD: 348 SECS (ref 79–138)
ACT BLD: 423 SECS (ref 79–138)
ANION GAP SERPL CALC-SCNC: 6 MMOL/L (ref 2–12)
BLOOD GROUP ANTIBODIES SERPL: NORMAL
BUN SERPL-MCNC: 42 MG/DL (ref 6–20)
BUN/CREAT SERPL: 23 (ref 12–20)
CALCIUM SERPL-MCNC: 9.2 MG/DL (ref 8.5–10.1)
CHLORIDE SERPL-SCNC: 107 MMOL/L (ref 97–108)
CO2 SERPL-SCNC: 27 MMOL/L (ref 21–32)
CREAT SERPL-MCNC: 1.85 MG/DL (ref 0.55–1.02)
ECHO BSA: 2.13 M2
EKG ATRIAL RATE: 46 BPM
EKG DIAGNOSIS: NORMAL
EKG P AXIS: 40 DEGREES
EKG P-R INTERVAL: 240 MS
EKG Q-T INTERVAL: 524 MS
EKG QRS DURATION: 108 MS
EKG QTC CALCULATION (BAZETT): 458 MS
EKG R AXIS: 129 DEGREES
EKG T AXIS: 2 DEGREES
EKG VENTRICULAR RATE: 46 BPM
GLUCOSE BLD STRIP.AUTO-MCNC: 145 MG/DL (ref 65–117)
GLUCOSE BLD STRIP.AUTO-MCNC: 154 MG/DL (ref 65–117)
GLUCOSE SERPL-MCNC: 156 MG/DL (ref 65–100)
POTASSIUM SERPL-SCNC: 4.9 MMOL/L (ref 3.5–5.1)
SERVICE CMNT-IMP: ABNORMAL
SERVICE CMNT-IMP: ABNORMAL
SODIUM SERPL-SCNC: 140 MMOL/L (ref 136–145)
SPECIMEN EXP DATE BLD: NORMAL

## 2025-06-30 PROCEDURE — 6360000002 HC RX W HCPCS: Performed by: NURSE ANESTHETIST, CERTIFIED REGISTERED

## 2025-06-30 PROCEDURE — 2709999900 HC NON-CHARGEABLE SUPPLY: Performed by: INTERNAL MEDICINE

## 2025-06-30 PROCEDURE — C1732 CATH, EP, DIAG/ABL, 3D/VECT: HCPCS | Performed by: INTERNAL MEDICINE

## 2025-06-30 PROCEDURE — C1894 INTRO/SHEATH, NON-LASER: HCPCS | Performed by: INTERNAL MEDICINE

## 2025-06-30 PROCEDURE — 3700000000 HC ANESTHESIA ATTENDED CARE: Performed by: INTERNAL MEDICINE

## 2025-06-30 PROCEDURE — 2500000003 HC RX 250 WO HCPCS: Performed by: NURSE ANESTHETIST, CERTIFIED REGISTERED

## 2025-06-30 PROCEDURE — 2720000010 HC SURG SUPPLY STERILE: Performed by: INTERNAL MEDICINE

## 2025-06-30 PROCEDURE — 86900 BLOOD TYPING SEROLOGIC ABO: CPT

## 2025-06-30 PROCEDURE — 93005 ELECTROCARDIOGRAM TRACING: CPT

## 2025-06-30 PROCEDURE — 3700000001 HC ADD 15 MINUTES (ANESTHESIA): Performed by: INTERNAL MEDICINE

## 2025-06-30 PROCEDURE — 82962 GLUCOSE BLOOD TEST: CPT

## 2025-06-30 PROCEDURE — 93656 COMPRE EP EVAL ABLTJ ATR FIB: CPT | Performed by: INTERNAL MEDICINE

## 2025-06-30 PROCEDURE — 93657 TX L/R ATRIAL FIB ADDL: CPT | Performed by: INTERNAL MEDICINE

## 2025-06-30 PROCEDURE — 85347 COAGULATION TIME ACTIVATED: CPT

## 2025-06-30 PROCEDURE — C1733 CATH, EP, OTHR THAN COOL-TIP: HCPCS | Performed by: INTERNAL MEDICINE

## 2025-06-30 PROCEDURE — C1760 CLOSURE DEV, VASC: HCPCS | Performed by: INTERNAL MEDICINE

## 2025-06-30 PROCEDURE — 2580000003 HC RX 258: Performed by: ANESTHESIOLOGY

## 2025-06-30 PROCEDURE — 93623 PRGRMD STIMJ&PACG IV RX NFS: CPT | Performed by: INTERNAL MEDICINE

## 2025-06-30 PROCEDURE — 2580000003 HC RX 258: Performed by: NURSE ANESTHETIST, CERTIFIED REGISTERED

## 2025-06-30 PROCEDURE — C1769 GUIDE WIRE: HCPCS | Performed by: INTERNAL MEDICINE

## 2025-06-30 PROCEDURE — C1759 CATH, INTRA ECHOCARDIOGRAPHY: HCPCS | Performed by: INTERNAL MEDICINE

## 2025-06-30 PROCEDURE — 76937 US GUIDE VASCULAR ACCESS: CPT | Performed by: INTERNAL MEDICINE

## 2025-06-30 PROCEDURE — 93622 COMP EP EVAL L VENTR PAC&REC: CPT | Performed by: INTERNAL MEDICINE

## 2025-06-30 PROCEDURE — 80048 BASIC METABOLIC PNL TOTAL CA: CPT

## 2025-06-30 PROCEDURE — 86901 BLOOD TYPING SEROLOGIC RH(D): CPT

## 2025-06-30 PROCEDURE — 93655 ICAR CATH ABLTJ DSCRT ARRHYT: CPT | Performed by: INTERNAL MEDICINE

## 2025-06-30 PROCEDURE — C1730 CATH, EP, 19 OR FEW ELECT: HCPCS | Performed by: INTERNAL MEDICINE

## 2025-06-30 PROCEDURE — C1766 INTRO/SHEATH,STRBLE,NON-PEEL: HCPCS | Performed by: INTERNAL MEDICINE

## 2025-06-30 PROCEDURE — 86850 RBC ANTIBODY SCREEN: CPT

## 2025-06-30 PROCEDURE — 93005 ELECTROCARDIOGRAM TRACING: CPT | Performed by: INTERNAL MEDICINE

## 2025-06-30 RX ORDER — ONDANSETRON 2 MG/ML
INJECTION INTRAMUSCULAR; INTRAVENOUS
Status: DISCONTINUED | OUTPATIENT
Start: 2025-06-30 | End: 2025-06-30 | Stop reason: SDUPTHER

## 2025-06-30 RX ORDER — HYDROMORPHONE HYDROCHLORIDE 1 MG/ML
0.5 INJECTION, SOLUTION INTRAMUSCULAR; INTRAVENOUS; SUBCUTANEOUS EVERY 5 MIN PRN
Status: DISCONTINUED | OUTPATIENT
Start: 2025-06-30 | End: 2025-06-30 | Stop reason: HOSPADM

## 2025-06-30 RX ORDER — OXYCODONE HYDROCHLORIDE 5 MG/1
5 TABLET ORAL
Status: DISCONTINUED | OUTPATIENT
Start: 2025-06-30 | End: 2025-06-30 | Stop reason: HOSPADM

## 2025-06-30 RX ORDER — SODIUM CHLORIDE 0.9 % (FLUSH) 0.9 %
5-40 SYRINGE (ML) INJECTION EVERY 12 HOURS SCHEDULED
Status: DISCONTINUED | OUTPATIENT
Start: 2025-06-30 | End: 2025-06-30 | Stop reason: HOSPADM

## 2025-06-30 RX ORDER — LIDOCAINE HYDROCHLORIDE 10 MG/ML
1 INJECTION, SOLUTION EPIDURAL; INFILTRATION; INTRACAUDAL; PERINEURAL
Status: DISCONTINUED | OUTPATIENT
Start: 2025-06-30 | End: 2025-06-30 | Stop reason: HOSPADM

## 2025-06-30 RX ORDER — LIDOCAINE HYDROCHLORIDE 20 MG/ML
INJECTION, SOLUTION EPIDURAL; INFILTRATION; INTRACAUDAL; PERINEURAL
Status: DISCONTINUED | OUTPATIENT
Start: 2025-06-30 | End: 2025-06-30 | Stop reason: SDUPTHER

## 2025-06-30 RX ORDER — SODIUM CHLORIDE 9 MG/ML
INJECTION, SOLUTION INTRAVENOUS PRN
Status: DISCONTINUED | OUTPATIENT
Start: 2025-06-30 | End: 2025-06-30 | Stop reason: HOSPADM

## 2025-06-30 RX ORDER — SODIUM CHLORIDE 0.9 % (FLUSH) 0.9 %
5-40 SYRINGE (ML) INJECTION PRN
Status: DISCONTINUED | OUTPATIENT
Start: 2025-06-30 | End: 2025-06-30 | Stop reason: HOSPADM

## 2025-06-30 RX ORDER — DEXAMETHASONE SODIUM PHOSPHATE 4 MG/ML
INJECTION, SOLUTION INTRA-ARTICULAR; INTRALESIONAL; INTRAMUSCULAR; INTRAVENOUS; SOFT TISSUE
Status: DISCONTINUED | OUTPATIENT
Start: 2025-06-30 | End: 2025-06-30 | Stop reason: SDUPTHER

## 2025-06-30 RX ORDER — HYDRALAZINE HYDROCHLORIDE 20 MG/ML
10 INJECTION INTRAMUSCULAR; INTRAVENOUS
Status: DISCONTINUED | OUTPATIENT
Start: 2025-06-30 | End: 2025-06-30 | Stop reason: HOSPADM

## 2025-06-30 RX ORDER — ESMOLOL HYDROCHLORIDE 10 MG/ML
INJECTION INTRAVENOUS
Status: DISCONTINUED | OUTPATIENT
Start: 2025-06-30 | End: 2025-06-30 | Stop reason: SDUPTHER

## 2025-06-30 RX ORDER — ACETAMINOPHEN 325 MG/1
650 TABLET ORAL EVERY 4 HOURS PRN
Status: DISCONTINUED | OUTPATIENT
Start: 2025-06-30 | End: 2025-06-30 | Stop reason: HOSPADM

## 2025-06-30 RX ORDER — PROTAMINE SULFATE 10 MG/ML
INJECTION, SOLUTION INTRAVENOUS
Status: DISCONTINUED | OUTPATIENT
Start: 2025-06-30 | End: 2025-06-30 | Stop reason: SDUPTHER

## 2025-06-30 RX ORDER — ONDANSETRON 2 MG/ML
4 INJECTION INTRAMUSCULAR; INTRAVENOUS
Status: DISCONTINUED | OUTPATIENT
Start: 2025-06-30 | End: 2025-06-30 | Stop reason: HOSPADM

## 2025-06-30 RX ORDER — PHENYLEPHRINE HCL IN 0.9% NACL 0.4MG/10ML
SYRINGE (ML) INTRAVENOUS
Status: DISCONTINUED | OUTPATIENT
Start: 2025-06-30 | End: 2025-06-30 | Stop reason: SDUPTHER

## 2025-06-30 RX ORDER — FENTANYL CITRATE 50 UG/ML
100 INJECTION, SOLUTION INTRAMUSCULAR; INTRAVENOUS
Status: DISCONTINUED | OUTPATIENT
Start: 2025-06-30 | End: 2025-06-30 | Stop reason: HOSPADM

## 2025-06-30 RX ORDER — SODIUM CHLORIDE, SODIUM LACTATE, POTASSIUM CHLORIDE, CALCIUM CHLORIDE 600; 310; 30; 20 MG/100ML; MG/100ML; MG/100ML; MG/100ML
INJECTION, SOLUTION INTRAVENOUS CONTINUOUS
Status: DISCONTINUED | OUTPATIENT
Start: 2025-06-30 | End: 2025-06-30 | Stop reason: HOSPADM

## 2025-06-30 RX ORDER — METOPROLOL SUCCINATE 50 MG/1
50 TABLET, EXTENDED RELEASE ORAL DAILY
Qty: 90 TABLET | Refills: 0 | Status: SHIPPED | OUTPATIENT
Start: 2025-06-30

## 2025-06-30 RX ORDER — PROCHLORPERAZINE EDISYLATE 5 MG/ML
5 INJECTION INTRAMUSCULAR; INTRAVENOUS
Status: DISCONTINUED | OUTPATIENT
Start: 2025-06-30 | End: 2025-06-30 | Stop reason: HOSPADM

## 2025-06-30 RX ORDER — ROCURONIUM BROMIDE 10 MG/ML
INJECTION, SOLUTION INTRAVENOUS
Status: DISCONTINUED | OUTPATIENT
Start: 2025-06-30 | End: 2025-06-30 | Stop reason: SDUPTHER

## 2025-06-30 RX ORDER — ACETAMINOPHEN 500 MG
1000 TABLET ORAL ONCE
Status: DISCONTINUED | OUTPATIENT
Start: 2025-06-30 | End: 2025-06-30 | Stop reason: HOSPADM

## 2025-06-30 RX ORDER — FENTANYL CITRATE 50 UG/ML
25 INJECTION, SOLUTION INTRAMUSCULAR; INTRAVENOUS EVERY 5 MIN PRN
Status: DISCONTINUED | OUTPATIENT
Start: 2025-06-30 | End: 2025-06-30 | Stop reason: HOSPADM

## 2025-06-30 RX ORDER — SUCCINYLCHOLINE/SOD CL,ISO/PF 200MG/10ML
SYRINGE (ML) INTRAVENOUS
Status: DISCONTINUED | OUTPATIENT
Start: 2025-06-30 | End: 2025-06-30 | Stop reason: SDUPTHER

## 2025-06-30 RX ORDER — HEPARIN SODIUM 1000 [USP'U]/ML
INJECTION, SOLUTION INTRAVENOUS; SUBCUTANEOUS
Status: DISCONTINUED | OUTPATIENT
Start: 2025-06-30 | End: 2025-06-30 | Stop reason: SDUPTHER

## 2025-06-30 RX ORDER — MIDAZOLAM HYDROCHLORIDE 2 MG/2ML
2 INJECTION, SOLUTION INTRAMUSCULAR; INTRAVENOUS PRN
Status: DISCONTINUED | OUTPATIENT
Start: 2025-06-30 | End: 2025-06-30 | Stop reason: HOSPADM

## 2025-06-30 RX ORDER — NALOXONE HYDROCHLORIDE 0.4 MG/ML
INJECTION, SOLUTION INTRAMUSCULAR; INTRAVENOUS; SUBCUTANEOUS PRN
Status: DISCONTINUED | OUTPATIENT
Start: 2025-06-30 | End: 2025-06-30 | Stop reason: HOSPADM

## 2025-06-30 RX ORDER — FENTANYL CITRATE 50 UG/ML
INJECTION, SOLUTION INTRAMUSCULAR; INTRAVENOUS
Status: DISCONTINUED | OUTPATIENT
Start: 2025-06-30 | End: 2025-06-30 | Stop reason: SDUPTHER

## 2025-06-30 RX ADMIN — ROCURONIUM BROMIDE 10 MG: 10 INJECTION, SOLUTION INTRAVENOUS at 12:32

## 2025-06-30 RX ADMIN — SUGAMMADEX 200 MG: 100 INJECTION, SOLUTION INTRAVENOUS at 14:24

## 2025-06-30 RX ADMIN — PROPOFOL 50 MG: 10 INJECTION, EMULSION INTRAVENOUS at 12:33

## 2025-06-30 RX ADMIN — SODIUM CHLORIDE: 9 INJECTION, SOLUTION INTRAVENOUS at 13:49

## 2025-06-30 RX ADMIN — ROCURONIUM BROMIDE 10 MG: 10 INJECTION, SOLUTION INTRAVENOUS at 13:47

## 2025-06-30 RX ADMIN — PROPOFOL 100 MG: 10 INJECTION, EMULSION INTRAVENOUS at 12:32

## 2025-06-30 RX ADMIN — PHENYLEPHRINE HYDROCHLORIDE 20 MCG/MIN: 10 INJECTION INTRAVENOUS at 12:36

## 2025-06-30 RX ADMIN — ONDANSETRON 4 MG: 2 INJECTION INTRAMUSCULAR; INTRAVENOUS at 12:42

## 2025-06-30 RX ADMIN — HEPARIN SODIUM 15000 UNITS: 1000 INJECTION, SOLUTION INTRAVENOUS; SUBCUTANEOUS at 13:05

## 2025-06-30 RX ADMIN — ESMOLOL HYDROCHLORIDE 10 MG: 10 INJECTION, SOLUTION INTRAVENOUS at 12:49

## 2025-06-30 RX ADMIN — Medication 40 MCG: at 12:32

## 2025-06-30 RX ADMIN — LIDOCAINE HYDROCHLORIDE 60 MG: 20 INJECTION, SOLUTION EPIDURAL; INFILTRATION; INTRACAUDAL; PERINEURAL at 12:32

## 2025-06-30 RX ADMIN — PROTAMINE SULFATE 50 MG: 10 INJECTION, SOLUTION INTRAVENOUS at 14:22

## 2025-06-30 RX ADMIN — SUGAMMADEX 200 MG: 100 INJECTION, SOLUTION INTRAVENOUS at 14:31

## 2025-06-30 RX ADMIN — DEXAMETHASONE SODIUM PHOSPHATE 4 MG: 4 INJECTION, SOLUTION INTRAMUSCULAR; INTRAVENOUS at 12:42

## 2025-06-30 RX ADMIN — SODIUM CHLORIDE: 9 INJECTION, SOLUTION INTRAVENOUS at 12:00

## 2025-06-30 RX ADMIN — ROCURONIUM BROMIDE 40 MG: 10 INJECTION, SOLUTION INTRAVENOUS at 12:57

## 2025-06-30 RX ADMIN — FENTANYL CITRATE 50 MCG: 50 INJECTION INTRAMUSCULAR; INTRAVENOUS at 12:57

## 2025-06-30 RX ADMIN — ROCURONIUM BROMIDE 10 MG: 10 INJECTION, SOLUTION INTRAVENOUS at 13:24

## 2025-06-30 RX ADMIN — FENTANYL CITRATE 50 MCG: 50 INJECTION INTRAMUSCULAR; INTRAVENOUS at 12:32

## 2025-06-30 RX ADMIN — Medication 140 MG: at 12:33

## 2025-06-30 ASSESSMENT — ENCOUNTER SYMPTOMS: SHORTNESS OF BREATH: 1

## 2025-06-30 NOTE — PROGRESS NOTES
Ambulated patient to the bathroom with a steady gait, voided without difficulty. Patient denies chest pain, shortness of breath, or dizziness. Returned to stretcher. Vital signs stable.     Procedural site is clean, dry, and intact. No bleeding, no hematoma.     Assisted patient in dressing/Patient dressed self.     Educated patient about their sedation precautions such as not driving, operating any machinery, or signing legal documents 24 hours post procedure.     Reviewed discharge instructions, including medications and site care using the teach back method.  Answered all questions. Verbalized understanding.     Removed peripheral IV.    Escorted to discharge area in a wheelchair with all of their belongings including Cell phone and purse.    Patient's daughter/Mya present to take patient home. Reviewed discharge instructions with patients' Daughter/Debbir, they verbalized understanding.

## 2025-06-30 NOTE — PROCEDURES
ATRIAL FIBRILLATION PULSED FIELD ABLATION    Procedure Date: 06/30/25  Lab Physician: Marcelina aJimes MD    Indications:  83 yo woman with a history of HTN, CAD s/p PCI, SUZAN, DM, HLD, AF s/p prior RF PVI, PWI, typical AFL ablation (9/18/23), s/p WM implantation (8/27/24) now with recurrent AF on Amiodarone now referred for Afib ablation.     SHEATH INSERTION  All sheaths were placed using the modified Seldinger technique with ultrasound guided assistance  Right Femoral Vein:   Left Femoral Vein: 8Fr and a 11F sheath    CATHETER INSERTION  Catheters were advanced to the following positions using fluoroscopic guidance:  Coronary Sinus: Biosense decapolar catheter  LA: BiosNewLeaf Symbiotics OctaRay Mapping Catheter  Ablation: SendRR PFA PulseSelect Loop Catheter; UserEvents QDOT Ablation Catheter  ICE in RA/RV: UserEvents Intracardiac Ultrasound    MONITORING  General anesthesia with intubation and mechanical ventilation was provided by Anesthesiology. The patient was prepped and draped in the usual sterile fashion. Members of the general anesthesia staff and the EP nursing staff provided appropriate continuous electrocardiographic, hemodynamic, respiratory monitoring throughout the procedure.    CARDIOVERSION  Patient was cardioverted at the beginning of the case with 300J of externalized synchronize energy into sinus rhythm to facilitate sinus voltage mapping.     PROCEDURE  After informed written consent, the patient was brought to the EP lab in the fasting, non-sedated state. The patient was prepped and draped in the usual sterile fashion. Femoral venous access was obtained using the modified Seldinger technique with a micropuncture needle. In the left femoral vein, 2 sheaths (8F short, 11F long) were inserted over guidewires. In the right femoral vein, one 11F short sheath was inserted over a guidewire. A deflectable decapolar catheter was advanced to the CS position. In addition to pacing and recording of the right atrium,

## 2025-06-30 NOTE — PROGRESS NOTES
EP LAB to Recovery Room Report    Procedure: A-Fib Ablation    MD: EFREM Jaimes MD  Pre-procedure heart rhythm: A-Fib Rhythm   Verbal Report given to Recovery Nurse on patient being transferred to Recovery Room for routine post-op. Patient stable upon transfer to .  Pt had general sedation with Anesthesia team, who managed MAR, vitals, and airway. Vitals, mental status, MAR, procedural summary discussed with recovery RN.     Patient cardioverted x2 during procedure.      Post-procedure heart rhythm: Normal Sinus  Rhythm      Sheaths:  Right femoral vein 14fr sheath removed at 1428 pm, closed with perclose.    Left femoral vein 8fr sheath removed at 1426    pm, closed with perclose.  Left femoral vein 11fr sheath removed at 1424 pm, closed with perclose.

## 2025-06-30 NOTE — PROGRESS NOTES
TRANSFER - IN REPORT:    Verbal report received from Christiana Ferrer CRNA on Adrienne To  being received from procedure area for routine progression of patient care. Report consisted of patient’s Situation, Background, Assessment and Recommendations(SBAR). Information from the following report(s)SBAR, Procedure Summary, MAR, Recent Results, and Cardiac Rhythm SB was reviewed with the receiving clinician. Opportunity for questions and clarification was provided. Assessment completed upon patient’s arrival to Cardiac Cath Lab RECOVERY AREA and care assumed.       Cardiac Cath Lab Recovery Arrival Note:    Adrienne To arrived to CCL recovery area.  Patient procedure= Afib Ablation. Patient on cardiac monitor, non-invasive blood pressure, SPO2 monitor. On  O2 @ 4 lpm via NC.  IV  of NS on pump at 50 ml/hr. Patient status doing well without problems. Patient is drowsy. Patient reports No Pain.    PROCEDURE SITE CHECK:    Procedure site:without any bleeding and No Hematoma, No pain/discomfort reported at procedure site.     No change in patient status. Continue to monitor patient and status.

## 2025-06-30 NOTE — PROGRESS NOTES
Pt's HR=43-46 1st degree AVB 12 lead EKG done.Nancy Jones NP updated. Pt asymptomatic.    Pt's daughter/ Mya updated on pt's post procedure and discharge status.

## 2025-06-30 NOTE — PROGRESS NOTES
Cardiac Cath Lab Recovery Arrival Note:      Adrienne To arrived to Cardiac Cath Lab, Recovery Area. Staff introduced to patient. Patient identifiers verified with NAME and DATE OF BIRTH. Procedure verified with patient. Consent forms reviewed and signed by patient or authorized representative and verified. Allergies verified.     Patient and family oriented to department. Patient and family informed of procedure and plan of care.     Questions answered with review. Patient prepped for procedure, per orders from physician, prior to arrival.    Patient on cardiac monitor, non-invasive blood pressure, SPO2 monitor. On Room air. Patient is A&Ox 4. Patient reports No Pain.     Patient in stretcher, in low position, with side rails up, call bell within reach, patient instructed to call if assistance as needed.    Patient prep in: [facility] Recovery Area, [place] FT 3.   Patient family : Mya/Daughter [phone]  Family in: Waiting Room .   Prep by: Karla REY

## 2025-06-30 NOTE — ANESTHESIA PRE PROCEDURE
\"POCHEMO\", \"POCHCT\" in the last 72 hours.      Coags:   Lab Results   Component Value Date/Time    PROTIME 14.4 07/14/2022 12:51 PM    INR 1.1 07/14/2022 12:51 PM       HCG (If Applicable): No results found for: \"PREGTESTUR\", \"PREGSERUM\", \"HCG\", \"HCGQUANT\"     ABGs: No results found for: \"PHART\", \"PO2ART\", \"THG4AHW\", \"EJS3PST\", \"BEART\", \"J5DDTRWP\"     Type & Screen (If Applicable):  No results found for: \"LABABO\"    Drug/Infectious Status (If Applicable):  No results found for: \"HIV\", \"HEPCAB\"    COVID-19 Screening (If Applicable): No results found for: \"COVID19\"        Anesthesia Evaluation  Patient summary reviewed and Nursing notes reviewed   no history of anesthetic complications:   Airway: Mallampati: IV  TM distance: >3 FB   Neck ROM: full  Mouth opening: > = 3 FB   Dental:    (+) upper dentures      Pulmonary:normal exam    (+)   shortness of breath:   sleep apnea:                                 ROS comment: Former smoker    Cardiovascular:  Exercise tolerance: poor (<4 METS)  (+) hypertension:, past MI: no interval change, CAD: no interval change, CABG/stent: no interval change, RANKIN:        Rhythm: irregular  Rate: normal                    Neuro/Psych:                ROS comment: Syncope  GI/Hepatic/Renal:   (+) renal disease: CRI, morbid obesity          Endo/Other:    (+) DiabetesType II DM, blood dyscrasia: anticoagulation therapy, arthritis:..                 Abdominal:             Vascular: negative vascular ROS.         Other Findings:             Anesthesia Plan      general     ASA 3       Induction: intravenous.    MIPS: Prophylactic antiemetics administered.  Anesthetic plan and risks discussed with patient.    Use of blood products discussed with patient whom consented to blood products.    Plan discussed with CRNA.                    Jacky Marie MD   6/30/2025

## 2025-06-30 NOTE — PROGRESS NOTES
Pt ambulated to bathroom with assist; gait steady.On return to stretcher,right groin with small amt of bloody drainage,no hematoma.. Manual pressure held for 5 min until hemostasis obtained;guauze with tegaderm to site.  Pt to remain supine for 30 min and then ambulate to assess right groin site for bleeding. Pt verbalized understanding.   Xolair Pregnancy And Lactation Text: This medication is Pregnancy Category B and is considered safe during pregnancy. This medication is excreted in breast milk.

## 2025-06-30 NOTE — ANESTHESIA POSTPROCEDURE EVALUATION
Department of Anesthesiology  Postprocedure Note    Patient: Adrienne To  MRN: [id number]  YOB: 1940  Date of evaluation: 6/30/2025    Procedure Summary       Date: 06/30/25 Room / Location: [facility] CATH LAB 3 / [facility] CARDIAC CATH LAB    Anesthesia Start: 1225 Anesthesia Stop: 1446    Procedures:       Ablation A-fib w complete ep study      Ep 3d mapping      Ultrasound guided vascular access      Intracardiac echocardiogram Diagnosis:       Persistent atrial fibrillation (HCC)      (Persistent atrial fibrillation (HCC) [I48.19])    Providers: Marcelina Jaimes MD Responsible Provider: Je Harris MD    Anesthesia Type: general ASA Status: 3            Anesthesia Type: No value filed.    Yefri Phase I: Yefri Score: 10    Yefri Phase II: Yefri Score: 10    Anesthesia Post Evaluation    Patient location during evaluation: PACU  Patient participation: complete - patient participated  Level of consciousness: awake  Airway patency: patent  Nausea & Vomiting: no nausea  Cardiovascular status: blood pressure returned to baseline and hemodynamically stable  Respiratory status: acceptable  Hydration status: stable  Multimodal analgesia pain management approach    There were no known notable events for this encounter.

## 2025-06-30 NOTE — PROGRESS NOTES
Cardiac Cath Lab Procedure Area Arrival Note:    Adrienne To arrived to Cardiac Cath Lab, Procedure Area. Patient identifiers verified with NAME and DATE OF BIRTH. Procedure verified with patient. Consent forms verified. Allergies verified. Patient informed of procedure and plan of care. Questions answered with review. Patient voiced understanding of procedure and plan of care.    Patient on cardiac monitor, non-invasive blood pressure, SPO2 monitor. On room air. Anesthesia team present during procedure to manage medications, fluids and airway.  Patient status doing well without problems. Patient is A&Ox 4. Patient reports no complaints.     Patient medicated during procedure with orders obtained and verified by Dr. Jaimes.    Refer to patients Cardiac Cath Lab PROCEDURE REPORT for vital signs, assessment, status, and response during procedure, printed at end of case. Printed report on chart or scanned into chart.

## 2025-07-01 ENCOUNTER — TELEPHONE (OUTPATIENT)
Age: 85
End: 2025-07-01

## 2025-07-01 NOTE — TELEPHONE ENCOUNTER
YVETTE and went over date/time/location of her 7/31  appt. W/dr. Jaimes. Patient verbalize information that she was given.

## 2025-07-01 NOTE — TELEPHONE ENCOUNTER
Verified patient with two types of identifiers. Patient is doing well post procedure. She denies any chest pain. She states she plans to remove groin bandages this afternoon. Verified that she decreased Toprol XL to 50 mg daily. Reminded of follow up with Dr. Jaimes. Notified patient to call with any questions prior to follow up. Patient verbalized understanding and will call with any other questions.      Future Appointments   Date Time Provider Department Center   7/15/2025  1:00 PM Nancy Miller MD [facility]   7/31/2025  9:40 AM Marcelina Jaimes MD [facility]   8/27/2025  3:40 PM Shaista Lyn MD [facility]   9/3/2025  2:20 PM Marcelina Jaimes MD [facility]   10/9/2025  1:20 PM Daniel Castillo MD [facility]

## 2025-07-07 DIAGNOSIS — R60.0 LOWER EXTREMITY EDEMA: ICD-10-CM

## 2025-07-07 RX ORDER — BUMETANIDE 0.5 MG/1
0.5 TABLET ORAL DAILY
Qty: 90 TABLET | Refills: 3 | OUTPATIENT
Start: 2025-07-07

## 2025-07-07 NOTE — TELEPHONE ENCOUNTER
Ordered Per  Verbal Order.     Last appt: 5/28/2025   Future Appointments   Date Time Provider Department Center   7/15/2025  1:00 PM Nancy Miller MD [facility]   7/31/2025  9:40 AM Marcelina Jaimes MD [facility]   8/27/2025  3:40 PM Shaista Lyn MD [facility]   9/3/2025  2:20 PM Marcelina Jaimes MD [facility]   10/9/2025  1:20 PM Daniel Castillo MD [facility]       Requested Prescriptions     Pending Prescriptions Disp Refills    bumetanide (BUMEX) 0.5 MG tablet 90 tablet 3     Sig: Take 1 tablet by mouth daily         Prior labs and Blood pressures:  BP Readings from Last 3 Encounters:   06/30/25 (!) 158/47   05/28/25 130/60   04/15/25 (!) 124/54     Lab Results   Component Value Date/Time     06/30/2025 10:07 AM    K 4.9 06/30/2025 10:07 AM     06/30/2025 10:07 AM    CO2 27 06/30/2025 10:07 AM    BUN 42 06/30/2025 10:07 AM    GFRAA 51 06/09/2022 10:45 AM     Lab Results   Component Value Date/Time    IXM0XHUA 7.4 12/17/2024 01:38 PM     Lab Results   Component Value Date/Time    CHOL 189 04/15/2025 12:20 PM    HDL 59 04/15/2025 12:20 PM    .4 04/15/2025 12:20 PM    LDL 84 03/08/2024 04:06 PM    VLDL 19.6 04/15/2025 12:20 PM    VLDL 42 01/06/2022 12:00 AM     No results found for: \"VITD3\"    Lab Results   Component Value Date/Time    TSH 1.60 02/27/2025 03:34 PM

## 2025-07-18 SDOH — HEALTH STABILITY: PHYSICAL HEALTH: ON AVERAGE, HOW MANY MINUTES DO YOU ENGAGE IN EXERCISE AT THIS LEVEL?: 30 MIN

## 2025-07-18 SDOH — HEALTH STABILITY: PHYSICAL HEALTH: ON AVERAGE, HOW MANY DAYS PER WEEK DO YOU ENGAGE IN MODERATE TO STRENUOUS EXERCISE (LIKE A BRISK WALK)?: 2 DAYS

## 2025-07-18 ASSESSMENT — PATIENT HEALTH QUESTIONNAIRE - PHQ9
1. LITTLE INTEREST OR PLEASURE IN DOING THINGS: NOT AT ALL
SUM OF ALL RESPONSES TO PHQ QUESTIONS 1-9: 0
2. FEELING DOWN, DEPRESSED OR HOPELESS: NOT AT ALL
SUM OF ALL RESPONSES TO PHQ QUESTIONS 1-9: 0

## 2025-07-18 ASSESSMENT — LIFESTYLE VARIABLES
HOW OFTEN DO YOU HAVE A DRINK CONTAINING ALCOHOL: NEVER
HOW OFTEN DO YOU HAVE A DRINK CONTAINING ALCOHOL: 1
HOW OFTEN DO YOU HAVE SIX OR MORE DRINKS ON ONE OCCASION: 1
HOW MANY STANDARD DRINKS CONTAINING ALCOHOL DO YOU HAVE ON A TYPICAL DAY: 0
HOW MANY STANDARD DRINKS CONTAINING ALCOHOL DO YOU HAVE ON A TYPICAL DAY: PATIENT DOES NOT DRINK

## 2025-07-22 ENCOUNTER — OFFICE VISIT (OUTPATIENT)
Facility: CLINIC | Age: 85
End: 2025-07-22

## 2025-07-22 VITALS
BODY MASS INDEX: 40.86 KG/M2 | HEART RATE: 59 BPM | RESPIRATION RATE: 17 BRPM | SYSTOLIC BLOOD PRESSURE: 134 MMHG | OXYGEN SATURATION: 95 % | HEIGHT: 63 IN | TEMPERATURE: 98.3 F | DIASTOLIC BLOOD PRESSURE: 62 MMHG | WEIGHT: 230.6 LBS

## 2025-07-22 DIAGNOSIS — N18.30 STAGE 3 CHRONIC KIDNEY DISEASE, UNSPECIFIED WHETHER STAGE 3A OR 3B CKD (HCC): ICD-10-CM

## 2025-07-22 DIAGNOSIS — R06.09 DOE (DYSPNEA ON EXERTION): ICD-10-CM

## 2025-07-22 DIAGNOSIS — N18.30 TYPE 2 DIABETES MELLITUS WITH STAGE 3 CHRONIC KIDNEY DISEASE, WITH LONG-TERM CURRENT USE OF INSULIN, UNSPECIFIED WHETHER STAGE 3A OR 3B CKD (HCC): ICD-10-CM

## 2025-07-22 DIAGNOSIS — Z79.4 TYPE 2 DIABETES MELLITUS WITH STAGE 3 CHRONIC KIDNEY DISEASE, WITH LONG-TERM CURRENT USE OF INSULIN, UNSPECIFIED WHETHER STAGE 3A OR 3B CKD (HCC): ICD-10-CM

## 2025-07-22 DIAGNOSIS — R60.0 BILATERAL LEG EDEMA: ICD-10-CM

## 2025-07-22 DIAGNOSIS — I48.11 LONGSTANDING PERSISTENT ATRIAL FIBRILLATION (HCC): ICD-10-CM

## 2025-07-22 DIAGNOSIS — E11.22 TYPE 2 DIABETES MELLITUS WITH STAGE 3 CHRONIC KIDNEY DISEASE, WITH LONG-TERM CURRENT USE OF INSULIN, UNSPECIFIED WHETHER STAGE 3A OR 3B CKD (HCC): ICD-10-CM

## 2025-07-22 DIAGNOSIS — I10 ESSENTIAL (PRIMARY) HYPERTENSION: ICD-10-CM

## 2025-07-22 DIAGNOSIS — L30.4 INTERTRIGO: ICD-10-CM

## 2025-07-22 DIAGNOSIS — Z00.00 MEDICARE ANNUAL WELLNESS VISIT, SUBSEQUENT: Primary | ICD-10-CM

## 2025-07-22 RX ORDER — NYSTATIN 100000 [USP'U]/G
POWDER TOPICAL
Qty: 60 G | Refills: 1 | Status: SHIPPED | OUTPATIENT
Start: 2025-07-22

## 2025-07-22 ASSESSMENT — ENCOUNTER SYMPTOMS
NAUSEA: 0
ABDOMINAL PAIN: 0
COUGH: 0
RHINORRHEA: 0
VOMITING: 0

## 2025-07-22 NOTE — PROGRESS NOTES
dentified pt with two pt identifiers(name and ).    Chief Complaint   Patient presents with    Medicare AWV     Patient here for a Medicare Wellness visit.        Health Maintenance Due   Topic    DTaP/Tdap/Td vaccine (1 - Tdap)    Pneumococcal 50+ years Vaccine (1 of 2 - PCV)    Shingles vaccine (1 of 2)    DEXA (modify frequency per FRAX score)     Respiratory Syncytial Virus (RSV) Pregnant or age 60 yrs+ (1 - 1-dose 75+ series)    COVID-19 Vaccine ( season)    Annual Wellness Visit (Medicare)        Wt Readings from Last 3 Encounters:   25 101.6 kg (224 lb)   25 101.6 kg (224 lb)   04/15/25 98.9 kg (218 lb)     Temp Readings from Last 3 Encounters:   25 97.3 °F (36.3 °C) (Oral)   04/15/25 97.8 °F (36.6 °C) (Temporal)   24 97.9 °F (36.6 °C) (Temporal)     BP Readings from Last 3 Encounters:   25 (!) 158/47   25 130/60   04/15/25 (!) 124/54     Pulse Readings from Last 3 Encounters:   25 74   25 56   04/15/25 56           Coordination of Care Questionnaire:  :   1. \"Have you been to the ER, urgent care clinic since your last visit?  Hospitalized since your last visit?\" no    2. \"Have you seen or consulted any other health care providers outside of the [facility] since your last visit?\" no     3. For patients aged 45-75: Has the patient had a colonoscopy / FIT/ Cologuard? no      If the patient is female:    4. For patients aged 40-74: Has the patient had a mammogram within the past 2 years? no      5. For patients aged 21-65: Has the patient had a pap smear? no     3) Do you have an Advance Directive on file? no  Are you interested in receiving information about Advance Directives? no    Patient is accompanied by Daughter I have received verbal consent from Adrienne To to discuss any/all medical information while they are present in the room.   
cough.    Cardiovascular:  Positive for leg swelling (chronic). Negative for chest pain.   Gastrointestinal:  Negative for abdominal pain, nausea and vomiting.   Neurological:  Negative for dizziness, weakness, numbness and headaches.         Objective:     Vitals:    07/22/25 1459   BP: 134/62   Pulse: 59   Resp:    Temp:    SpO2:       /62   Pulse 59   Temp 98.3 °F (36.8 °C) (Temporal)   Resp 17   Ht 1.6 m (5' 3\")   Wt 104.6 kg (230 lb 9.6 oz)   SpO2 95%   BMI 40.85 kg/m²       Vitals and Nurse Documentation reviewed.     Physical Exam  Constitutional:       General: She is not in acute distress.     Appearance: Normal appearance. She is obese. She is not ill-appearing.   HENT:      Head: Normocephalic and atraumatic.   Eyes:      Conjunctiva/sclera: Conjunctivae normal.   Cardiovascular:      Rate and Rhythm: Normal rate and regular rhythm.      Heart sounds: Normal heart sounds. No murmur heard.     No friction rub. No gallop.   Pulmonary:      Effort: Pulmonary effort is normal. No respiratory distress.      Breath sounds: Normal breath sounds. No wheezing, rhonchi or rales.   Abdominal:      General: Bowel sounds are normal. There is no distension.      Palpations: Abdomen is soft.      Tenderness: There is no abdominal tenderness. There is no guarding or rebound.   Musculoskeletal:      Cervical back: Neck supple.      Right lower leg: Edema present.      Left lower leg: Edema present.      Comments: 1+ pitting edema noted in bilateral lower extremities   Skin:     Comments: Intertrigo noted in the groin   Neurological:      Mental Status: She is alert and oriented to person, place, and time.      Comments: Uses a rollator to assist with ambulation         No results found for this visit on 07/22/25.      Medicare Annual Wellness Visit    Adrienne BENTLEY Yousuf is here for Medicare AWV (Patient here for a Medicare Wellness visit.)    Assessment & Plan   Medicare annual wellness visit, subsequent  Type

## 2025-07-22 NOTE — PATIENT INSTRUCTIONS
Learning About Being Active as an Older Adult  Why is being active important as you get older?     Being active is one of the best things you can do for your health. And it's never too late to start. Being active--or getting active, if you aren't already--has definite benefits. It can:  Give you more energy,  Keep your mind sharp.  Improve balance to reduce your risk of falls.  Help you manage chronic illness with fewer medicines.  No matter how old you are, how fit you are, or what health problems you have, there is a form of activity that will work for you. And the more physical activity you can do, the better your overall health will be.  What kinds of activity can help you stay healthy?  Being more active will make your daily activities easier. Physical activity includes planned exercise and things you do in daily life. There are four types of activity:  Aerobic.  Doing aerobic activity makes your heart and lungs strong.  Includes walking, dancing, and gardening.  Aim for at least 2½ hours spread throughout the week.  It improves your energy and can help you sleep better.  Muscle-strengthening.  This type of activity can help maintain muscle and strengthen bones.  Includes climbing stairs, using resistance bands, and lifting or carrying heavy loads.  Aim for at least twice a week.  It can help protect the knees and other joints.  Stretching.  Stretching gives you better range of motion in joints and muscles.  Includes upper arm stretches, calf stretches, and gentle yoga.  Aim for at least twice a week, preferably after your muscles are warmed up from other activities.  It can help you function better in daily life.  Balancing.  This helps you stay coordinated and have good posture.  Includes heel-to-toe walking, ramos chi, and certain types of yoga.  Aim for at least 3 days a week.  It can reduce your risk of falling.  Even if you have a hard time meeting the recommendations, it's better to be more active

## 2025-07-23 RX ORDER — HYDROCHLOROTHIAZIDE 12.5 MG/1
CAPSULE ORAL
Qty: 6 EACH | Refills: 1 | Status: SHIPPED | OUTPATIENT
Start: 2025-07-23

## 2025-07-23 ASSESSMENT — ENCOUNTER SYMPTOMS: SHORTNESS OF BREATH: 1

## 2025-07-24 ENCOUNTER — TELEPHONE (OUTPATIENT)
Facility: CLINIC | Age: 85
End: 2025-07-24

## 2025-07-24 LAB
25(OH)D3 SERPL-MCNC: 35.3 NG/ML (ref 30–100)
ANION GAP SERPL CALC-SCNC: 5 MMOL/L (ref 2–12)
BUN SERPL-MCNC: 34 MG/DL (ref 6–20)
BUN/CREAT SERPL: 20 (ref 12–20)
CALCIUM SERPL-MCNC: 9.2 MG/DL (ref 8.5–10.1)
CHLORIDE SERPL-SCNC: 106 MMOL/L (ref 97–108)
CO2 SERPL-SCNC: 28 MMOL/L (ref 21–32)
CREAT SERPL-MCNC: 1.7 MG/DL (ref 0.55–1.02)
EST. AVERAGE GLUCOSE BLD GHB EST-MCNC: 148 MG/DL
GLUCOSE SERPL-MCNC: 138 MG/DL (ref 65–100)
HBA1C MFR BLD: 6.8 % (ref 4–5.6)
POTASSIUM SERPL-SCNC: 5 MMOL/L (ref 3.5–5.1)
SODIUM SERPL-SCNC: 139 MMOL/L (ref 136–145)

## 2025-07-27 ENCOUNTER — RESULTS FOLLOW-UP (OUTPATIENT)
Facility: CLINIC | Age: 85
End: 2025-07-27

## 2025-07-31 DIAGNOSIS — J30.89 ENVIRONMENTAL AND SEASONAL ALLERGIES: ICD-10-CM

## 2025-08-01 RX ORDER — LEVOCETIRIZINE DIHYDROCHLORIDE 5 MG/1
TABLET, FILM COATED ORAL
Qty: 45 TABLET | Refills: 1 | Status: SHIPPED | OUTPATIENT
Start: 2025-08-01

## 2025-08-12 ENCOUNTER — OFFICE VISIT (OUTPATIENT)
Age: 85
End: 2025-08-12
Payer: MEDICARE

## 2025-08-12 VITALS
BODY MASS INDEX: 38.73 KG/M2 | DIASTOLIC BLOOD PRESSURE: 62 MMHG | WEIGHT: 218.6 LBS | SYSTOLIC BLOOD PRESSURE: 116 MMHG | HEART RATE: 54 BPM | HEIGHT: 63 IN | OXYGEN SATURATION: 93 %

## 2025-08-12 DIAGNOSIS — Z95.5 HISTORY OF CORONARY ARTERY STENT PLACEMENT: ICD-10-CM

## 2025-08-12 DIAGNOSIS — Z95.818 PRESENCE OF WATCHMAN LEFT ATRIAL APPENDAGE CLOSURE DEVICE: ICD-10-CM

## 2025-08-12 DIAGNOSIS — I25.10 CORONARY ARTERY DISEASE INVOLVING NATIVE CORONARY ARTERY OF NATIVE HEART WITHOUT ANGINA PECTORIS: ICD-10-CM

## 2025-08-12 DIAGNOSIS — I10 ESSENTIAL (PRIMARY) HYPERTENSION: ICD-10-CM

## 2025-08-12 DIAGNOSIS — R60.0 LOWER EXTREMITY EDEMA: ICD-10-CM

## 2025-08-12 DIAGNOSIS — G47.33 OSA (OBSTRUCTIVE SLEEP APNEA): ICD-10-CM

## 2025-08-12 DIAGNOSIS — E66.01 CLASS 2 SEVERE OBESITY WITH SERIOUS COMORBIDITY AND BODY MASS INDEX (BMI) OF 38.0 TO 38.9 IN ADULT, UNSPECIFIED OBESITY TYPE (HCC): ICD-10-CM

## 2025-08-12 DIAGNOSIS — E66.812 CLASS 2 SEVERE OBESITY WITH SERIOUS COMORBIDITY AND BODY MASS INDEX (BMI) OF 38.0 TO 38.9 IN ADULT, UNSPECIFIED OBESITY TYPE (HCC): ICD-10-CM

## 2025-08-12 DIAGNOSIS — I48.19 PERSISTENT ATRIAL FIBRILLATION (HCC): Primary | ICD-10-CM

## 2025-08-12 PROCEDURE — 1036F TOBACCO NON-USER: CPT | Performed by: NURSE PRACTITIONER

## 2025-08-12 PROCEDURE — 1123F ACP DISCUSS/DSCN MKR DOCD: CPT | Performed by: NURSE PRACTITIONER

## 2025-08-12 PROCEDURE — 3078F DIAST BP <80 MM HG: CPT | Performed by: NURSE PRACTITIONER

## 2025-08-12 PROCEDURE — G8427 DOCREV CUR MEDS BY ELIG CLIN: HCPCS | Performed by: NURSE PRACTITIONER

## 2025-08-12 PROCEDURE — 99214 OFFICE O/P EST MOD 30 MIN: CPT | Performed by: NURSE PRACTITIONER

## 2025-08-12 PROCEDURE — G8400 PT W/DXA NO RESULTS DOC: HCPCS | Performed by: NURSE PRACTITIONER

## 2025-08-12 PROCEDURE — 1159F MED LIST DOCD IN RCRD: CPT | Performed by: NURSE PRACTITIONER

## 2025-08-12 PROCEDURE — 93000 ELECTROCARDIOGRAM COMPLETE: CPT | Performed by: NURSE PRACTITIONER

## 2025-08-12 PROCEDURE — 1090F PRES/ABSN URINE INCON ASSESS: CPT | Performed by: NURSE PRACTITIONER

## 2025-08-12 PROCEDURE — G8417 CALC BMI ABV UP PARAM F/U: HCPCS | Performed by: NURSE PRACTITIONER

## 2025-08-12 PROCEDURE — 3074F SYST BP LT 130 MM HG: CPT | Performed by: NURSE PRACTITIONER

## 2025-08-12 PROCEDURE — 1126F AMNT PAIN NOTED NONE PRSNT: CPT | Performed by: NURSE PRACTITIONER

## 2025-08-12 RX ORDER — PREDNISONE 10 MG/1
10 TABLET ORAL DAILY
COMMUNITY

## 2025-08-12 RX ORDER — GUAIFENESIN 600 MG/1
1200 TABLET, EXTENDED RELEASE ORAL 2 TIMES DAILY
COMMUNITY

## 2025-08-12 RX ORDER — PANTOPRAZOLE SODIUM 40 MG/1
40 TABLET, DELAYED RELEASE ORAL DAILY
COMMUNITY

## 2025-08-12 RX ORDER — LORATADINE 10 MG/1
10 CAPSULE, LIQUID FILLED ORAL EVERY OTHER DAY
COMMUNITY

## 2025-08-12 RX ORDER — FUROSEMIDE 40 MG/1
40 TABLET ORAL DAILY
COMMUNITY

## 2025-08-12 RX ORDER — SPIRONOLACTONE 25 MG/1
12.5 TABLET ORAL DAILY
COMMUNITY

## 2025-08-13 ENCOUNTER — CLINICAL DOCUMENTATION (OUTPATIENT)
Age: 85
End: 2025-08-13

## 2025-08-21 ENCOUNTER — OFFICE VISIT (OUTPATIENT)
Facility: CLINIC | Age: 85
End: 2025-08-21

## 2025-08-21 VITALS
WEIGHT: 217.2 LBS | HEIGHT: 63 IN | DIASTOLIC BLOOD PRESSURE: 80 MMHG | HEART RATE: 73 BPM | OXYGEN SATURATION: 100 % | RESPIRATION RATE: 17 BRPM | TEMPERATURE: 97.4 F | BODY MASS INDEX: 38.48 KG/M2 | SYSTOLIC BLOOD PRESSURE: 110 MMHG

## 2025-08-21 DIAGNOSIS — I50.9 CONGESTIVE HEART FAILURE, UNSPECIFIED HF CHRONICITY, UNSPECIFIED HEART FAILURE TYPE (HCC): ICD-10-CM

## 2025-08-21 DIAGNOSIS — Z79.4 TYPE 2 DIABETES MELLITUS WITH STAGE 3 CHRONIC KIDNEY DISEASE, WITH LONG-TERM CURRENT USE OF INSULIN, UNSPECIFIED WHETHER STAGE 3A OR 3B CKD (HCC): ICD-10-CM

## 2025-08-21 DIAGNOSIS — N18.30 TYPE 2 DIABETES MELLITUS WITH STAGE 3 CHRONIC KIDNEY DISEASE, WITH LONG-TERM CURRENT USE OF INSULIN, UNSPECIFIED WHETHER STAGE 3A OR 3B CKD (HCC): ICD-10-CM

## 2025-08-21 DIAGNOSIS — Z09 HOSPITAL DISCHARGE FOLLOW-UP: Primary | ICD-10-CM

## 2025-08-21 DIAGNOSIS — I10 ESSENTIAL (PRIMARY) HYPERTENSION: ICD-10-CM

## 2025-08-21 DIAGNOSIS — J45.40 MODERATE PERSISTENT ASTHMA WITHOUT COMPLICATION: ICD-10-CM

## 2025-08-21 DIAGNOSIS — R91.1 LUNG NODULE: ICD-10-CM

## 2025-08-21 DIAGNOSIS — E11.22 TYPE 2 DIABETES MELLITUS WITH STAGE 3 CHRONIC KIDNEY DISEASE, WITH LONG-TERM CURRENT USE OF INSULIN, UNSPECIFIED WHETHER STAGE 3A OR 3B CKD (HCC): ICD-10-CM

## 2025-08-21 LAB
ANION GAP SERPL CALC-SCNC: 14 MMOL/L (ref 2–14)
BUN SERPL-MCNC: 56 MG/DL (ref 8–23)
BUN/CREAT SERPL: 36 (ref 12–20)
CALCIUM SERPL-MCNC: 8.8 MG/DL (ref 8.8–10.2)
CHLORIDE SERPL-SCNC: 104 MMOL/L (ref 98–107)
CO2 SERPL-SCNC: 21 MMOL/L (ref 20–29)
CREAT SERPL-MCNC: 1.54 MG/DL (ref 0.6–1)
GLUCOSE SERPL-MCNC: 283 MG/DL (ref 65–100)
POTASSIUM SERPL-SCNC: 4.1 MMOL/L (ref 3.5–5.1)
SODIUM SERPL-SCNC: 139 MMOL/L (ref 136–145)

## 2025-08-21 RX ORDER — FUROSEMIDE 40 MG/1
40 TABLET ORAL DAILY
Qty: 30 TABLET | Refills: 0 | Status: SHIPPED | OUTPATIENT
Start: 2025-08-21

## 2025-08-21 RX ORDER — DAPAGLIFLOZIN 5 MG/1
5 TABLET, FILM COATED ORAL EVERY MORNING
Qty: 90 TABLET | Refills: 0 | Status: SHIPPED | OUTPATIENT
Start: 2025-08-21

## 2025-08-21 RX ORDER — BUMETANIDE 0.5 MG/1
0.5 TABLET ORAL DAILY
COMMUNITY
Start: 2025-03-27 | End: 2025-08-21

## 2025-08-21 RX ORDER — SPIRONOLACTONE 25 MG/1
12.5 TABLET ORAL DAILY
Qty: 45 TABLET | Refills: 0 | Status: SHIPPED | OUTPATIENT
Start: 2025-08-21

## 2025-08-21 RX ORDER — DAPAGLIFLOZIN 5 MG/1
5 TABLET, FILM COATED ORAL EVERY MORNING
COMMUNITY
Start: 2025-08-07 | End: 2025-08-21 | Stop reason: SDUPTHER

## 2025-08-21 RX ORDER — AMIODARONE HYDROCHLORIDE 200 MG/1
200 TABLET ORAL DAILY
COMMUNITY
Start: 2025-05-28 | End: 2025-08-21

## 2025-08-21 RX ORDER — ACETAMINOPHEN 325 MG/1
650 TABLET ORAL EVERY 4 HOURS PRN
COMMUNITY
Start: 2025-08-07

## 2025-08-21 ASSESSMENT — ENCOUNTER SYMPTOMS
RHINORRHEA: 0
COUGH: 0
VOMITING: 0
NAUSEA: 0
SHORTNESS OF BREATH: 0
ABDOMINAL PAIN: 0

## 2025-08-24 ENCOUNTER — RESULTS FOLLOW-UP (OUTPATIENT)
Facility: CLINIC | Age: 85
End: 2025-08-24

## 2025-08-27 ENCOUNTER — OFFICE VISIT (OUTPATIENT)
Age: 85
End: 2025-08-27
Payer: MEDICARE

## 2025-08-27 VITALS
DIASTOLIC BLOOD PRESSURE: 52 MMHG | WEIGHT: 218.2 LBS | OXYGEN SATURATION: 98 % | SYSTOLIC BLOOD PRESSURE: 98 MMHG | HEIGHT: 63 IN | BODY MASS INDEX: 38.66 KG/M2 | HEART RATE: 76 BPM

## 2025-08-27 DIAGNOSIS — I25.10 CORONARY ARTERY DISEASE INVOLVING NATIVE CORONARY ARTERY OF NATIVE HEART WITHOUT ANGINA PECTORIS: Primary | ICD-10-CM

## 2025-08-27 DIAGNOSIS — I42.9 CARDIOMYOPATHY, UNSPECIFIED TYPE (HCC): ICD-10-CM

## 2025-08-27 DIAGNOSIS — I48.91 ATRIAL FIBRILLATION, UNSPECIFIED TYPE (HCC): ICD-10-CM

## 2025-08-27 PROCEDURE — G2211 COMPLEX E/M VISIT ADD ON: HCPCS | Performed by: INTERNAL MEDICINE

## 2025-08-27 PROCEDURE — 1090F PRES/ABSN URINE INCON ASSESS: CPT | Performed by: INTERNAL MEDICINE

## 2025-08-27 PROCEDURE — 1126F AMNT PAIN NOTED NONE PRSNT: CPT | Performed by: INTERNAL MEDICINE

## 2025-08-27 PROCEDURE — 99214 OFFICE O/P EST MOD 30 MIN: CPT | Performed by: INTERNAL MEDICINE

## 2025-08-27 PROCEDURE — G8400 PT W/DXA NO RESULTS DOC: HCPCS | Performed by: INTERNAL MEDICINE

## 2025-08-27 PROCEDURE — 1036F TOBACCO NON-USER: CPT | Performed by: INTERNAL MEDICINE

## 2025-08-27 PROCEDURE — 3078F DIAST BP <80 MM HG: CPT | Performed by: INTERNAL MEDICINE

## 2025-08-27 PROCEDURE — 1159F MED LIST DOCD IN RCRD: CPT | Performed by: INTERNAL MEDICINE

## 2025-08-27 PROCEDURE — G8417 CALC BMI ABV UP PARAM F/U: HCPCS | Performed by: INTERNAL MEDICINE

## 2025-08-27 PROCEDURE — 1123F ACP DISCUSS/DSCN MKR DOCD: CPT | Performed by: INTERNAL MEDICINE

## 2025-08-27 PROCEDURE — 3074F SYST BP LT 130 MM HG: CPT | Performed by: INTERNAL MEDICINE

## 2025-08-27 PROCEDURE — G8427 DOCREV CUR MEDS BY ELIG CLIN: HCPCS | Performed by: INTERNAL MEDICINE

## 2025-08-27 ASSESSMENT — PATIENT HEALTH QUESTIONNAIRE - PHQ9
SUM OF ALL RESPONSES TO PHQ QUESTIONS 1-9: 0
1. LITTLE INTEREST OR PLEASURE IN DOING THINGS: NOT AT ALL
2. FEELING DOWN, DEPRESSED OR HOPELESS: NOT AT ALL
SUM OF ALL RESPONSES TO PHQ QUESTIONS 1-9: 0

## 2025-08-27 ASSESSMENT — LIFESTYLE VARIABLES
HOW MANY STANDARD DRINKS CONTAINING ALCOHOL DO YOU HAVE ON A TYPICAL DAY: PATIENT DOES NOT DRINK
HOW OFTEN DO YOU HAVE A DRINK CONTAINING ALCOHOL: NEVER

## 2025-08-28 ENCOUNTER — CLINICAL DOCUMENTATION (OUTPATIENT)
Facility: CLINIC | Age: 85
End: 2025-08-28

## 2025-09-03 ENCOUNTER — PATIENT MESSAGE (OUTPATIENT)
Age: 85
End: 2025-09-03

## 2025-09-03 RX ORDER — METOLAZONE 5 MG/1
5 TABLET ORAL DAILY
Qty: 5 TABLET | Refills: 0 | Status: SHIPPED | OUTPATIENT
Start: 2025-09-03

## 2025-09-03 RX ORDER — POTASSIUM CHLORIDE 1500 MG/1
20 TABLET, EXTENDED RELEASE ORAL DAILY
Qty: 5 TABLET | Refills: 0 | Status: SHIPPED | OUTPATIENT
Start: 2025-09-03

## (undated) DEVICE — PERCLOSE PROGLIDE™ SUTURE-MEDIATED CLOSURE SYSTEM: Brand: PERCLOSE PROGLIDE™

## (undated) DEVICE — ELECTRODE PT RET AD L9FT HI MOIST COND ADH HYDRGEL CORDED

## (undated) DEVICE — PINNACLE INTRODUCER SHEATH: Brand: PINNACLE

## (undated) DEVICE — DEVICE DRIVE ROTAWIRE FLOPPY

## (undated) DEVICE — TUBING PMP FOR CARTO SYS SMARTABLATE

## (undated) DEVICE — CATHETER IVL C2PLUS SHOCKWAVE 3.5MM X 12MM

## (undated) DEVICE — SUPPORT WRST AD W3.5XL9IN DIA14.5IN ART SFT ADJ HK AND LOOP

## (undated) DEVICE — 3M™ TEGADERM™ TRANSPARENT FILM DRESSING FRAME STYLE, 1626W, 4 IN X 4-3/4 IN (10 CM X 12 CM), 50/CT 4CT/CASE: Brand: 3M™ TEGADERM™

## (undated) DEVICE — PAD GROUNDING ADLT ADH FOIL 9FT CORD UNIV

## (undated) DEVICE — Device

## (undated) DEVICE — CATHETER GUID JR4 5 FRX100 CM SM ATRAUM SFT CONVEY

## (undated) DEVICE — KIT AT-X65 ANGIOTOUCH HAND CONTROLLER

## (undated) DEVICE — PERCLOSE™ PROSTYLE™ SUTURE-MEDIATED CLOSURE AND REPAIR SYSTEM: Brand: PERCLOSE™ PROSTYLE™

## (undated) DEVICE — HI-TORQUE VERSACORE MODIFIED J GUIDE WIRE SYSTEM 260 CM: Brand: HI-TORQUE VERSACORE

## (undated) DEVICE — PINNACLE PRECISION ACCESS SYSTEM INTRODUCER SHEATH: Brand: PINNACLE PRECISION ACCESS SYSTEM

## (undated) DEVICE — ROYAL SILK SURGICAL GOWN, XXL: Brand: CONVERTORS

## (undated) DEVICE — INTRODUCER SHTH 11FR CANN L23CM DIL TIP 45MM YEL W/O MINI

## (undated) DEVICE — COVER CATH ACUNAV ULTRASOUND 5X72IN ANTI STATIC

## (undated) DEVICE — CATHETER MAP D CRV 3-3-3-3-3 MM SPC GALAXY OCTARAY

## (undated) DEVICE — CATHETER EP 7FR L115CM 2-8-2MM SPC TIP 2MM 10 ELECTRD F L

## (undated) DEVICE — PACK PROCEDURE SURG HRT CATH

## (undated) DEVICE — PROVE COVER: Brand: UNBRANDED

## (undated) DEVICE — 1 X VERSACROSS TRANSSEPTAL SHEATH (INCLUDING  1 X J-TIP GUIDEWIRE); 1 X VERSACROSS RF WIRE (INCLUDING 1 X CONNECTOR CABLE (SINGLE USE)); 1 X DISPERSIVE ELECTRODE: Brand: VERSACROSS ACCESS SOLUTION

## (undated) DEVICE — CABLE CATHETER EXTENSION

## (undated) DEVICE — CATHETER US 8FR L90CM GRN TIP OVERLAY FOR GE-VIVID I VIVID

## (undated) DEVICE — TUBING PRSS MON L6IN PVC M FEM CONN

## (undated) DEVICE — FINECROSS MG CORONARY MICRO-GUIDE CATHETER: Brand: FINECROSS

## (undated) DEVICE — BAND COMPR L24CM REG CLR PLAS HEMSTAT EXT HK AND LOOP RETEN

## (undated) DEVICE — PRE-CONNECTED EXCHANGEABLE BURR CATHETER AND BURR ADVANCING DEVICE: Brand: ROTAPRO™

## (undated) DEVICE — DEVICE INFL 20ML 30ATM DGT FLD DISPNS SYR W ACCESSPLUS BLU

## (undated) DEVICE — Device: Brand: RFP-100A CONNECTOR CABLE

## (undated) DEVICE — Device: Brand: EAGLE EYE PLATINUM RX DIGITAL IVUS CATHETER

## (undated) DEVICE — CATH BLLN ANGIO 3X15MM SC EUPHORA RX

## (undated) DEVICE — HEART CATH-MRMC: Brand: MEDLINE INDUSTRIES, INC.

## (undated) DEVICE — ACCESS SHEATH WITH DILATOR: Brand: WATCHMAN FXD CURVE™ ACCESS SYSTEM

## (undated) DEVICE — CATHETER ABLATION QDOT MICRO DF CURVE BIDIRECTIONAL THERMOCOUPLE

## (undated) DEVICE — ELECTRODE,RADIOTRANSLUCENT,FOAM,5PK: Brand: MEDLINE

## (undated) DEVICE — GUIDE 6FR JR4 MEDTRONIC 100CM

## (undated) DEVICE — CATH BLLN ANGIO 3X15MM NC EUPHORIA RX

## (undated) DEVICE — CATHETER ABLAT 8FR L115CM 1-6-2MM SPC TIP 3.5MM DF CRV

## (undated) DEVICE — SHEATH INTRO 8.5FR L71CM 8.5FR DIL GWIRE L180CM DIA0.032IN

## (undated) DEVICE — CATHETER GUID 6FR DIA0.071IN SHFT NYL STD L JR 4 CRV ENH

## (undated) DEVICE — CANNULA NSL ORAL AD FOR CAPNOFLEX CO2 O2 AIRLFE

## (undated) DEVICE — MICROPUNCTURE INTRODUCER SET SILHOUETTE TRANSITIONLESS WITH STAINLESS STEEL WIRE GUIDE: Brand: MICROPUNCTURE

## (undated) DEVICE — HI-TORQUE BALANCE MIDDLEWEIGHT UNIVERSAL GUIDE WIRE .014 STRAIGHT TIP 3.0 CM X 300 CM: Brand: HI-TORQUE BALANCE MIDDLEWEIGHT UNIVERSAL

## (undated) DEVICE — COPILOT BLEEDBACK CONTROL VALVE: Brand: COPILOT

## (undated) DEVICE — GUIDEWIRE VASC L180CM DIA0.035IN 3MM PTFE J TIP EXCHG FIX

## (undated) DEVICE — CATHETER GUID 7FR 0.081IN COR STD JUDKINS R 4 MID

## (undated) DEVICE — ANGIOGRAPHIC CATHETER: Brand: IMPULSE™

## (undated) DEVICE — GUIDEWIRE, VIPERWIRE ADAVNCE CORONARY FLOPPY, .012: DIA, .014" SPRING TIP, 1 PACK 325 CM: Brand: DIAMONDBACK CORONARY

## (undated) DEVICE — MTS LEFT HEART KIT ST MARY'S RICHMOND: Brand: NAMIC

## (undated) DEVICE — CATHETER REPROC ELCTRPHSLGY 10FR DIA 90CML DGNSTC ULTRSND F

## (undated) DEVICE — WASTE KIT - ST MARY: Brand: MEDLINE INDUSTRIES, INC.

## (undated) DEVICE — CABLE CATH L10FT BLU CONN 34-34 PIN ELECTROGRAM CONDUCTION

## (undated) DEVICE — HI-TORQUE IRON MAN GUIDE WIRE .014 STRAIGHT TIP 3.0 CM X 190 CM: Brand: HI-TORQUE IRON MAN

## (undated) DEVICE — GLIDESHEATH SLENDER ACCESS KIT: Brand: GLIDESHEATH SLENDER

## (undated) DEVICE — CATH LITHOPLSTY 4X12MM SHOCKWAVE

## (undated) DEVICE — SYRINGE 20ML LL S/C 50

## (undated) DEVICE — CATHETER ABLATION PFA LOOP STRL DISP PULSESELECT

## (undated) DEVICE — CATH BLLN ANGIO 2.50X15MM NC EUPHORIA RX

## (undated) DEVICE — GLIDESHEATH SLENDER STAINLESS STEEL KIT: Brand: GLIDESHEATH SLENDER

## (undated) DEVICE — RUNTHROUGH NS EXTRA FLOPPY PTCA GUIDEWIRE: Brand: RUNTHROUGH

## (undated) DEVICE — SYRINGE MED 10ML LUERLOCK TIP W/O SFTY DISP

## (undated) DEVICE — Device: Brand: ASAHI SION BLUE

## (undated) DEVICE — MEDI-TRACE CADENCE ADULT, DEFIBRILLATION ELECTRODE -RTS  (10 PR/PK) - PHYSIO-CONTROL: Brand: MEDI-TRACE CADENCE

## (undated) DEVICE — KENDALL DL ECG DUAL CONNECT RADIOLUCENT LEAD WIRES, 5-LEAD, SINGLE PATIENT USE: Brand: KENDALL

## (undated) DEVICE — TOWEL SURG W17XL27IN STD BLU COT NONFENESTRATED PREWASHED

## (undated) DEVICE — CATH BLLN ANGIO 3.50X15MM NC EUPHORA RX

## (undated) DEVICE — GUIDEWIRE .035X210CM PTFE 3MMJ: Brand: MEDLINE INDUSTRIES, INC.

## (undated) DEVICE — PRESSURE MONITORING SET: Brand: TRUWAVE

## (undated) DEVICE — DIAMONDBACK CORONARY, CLASSIC CROWN, 1.25MM, 135CM SHAFT: Brand: DIAMONDBACK CORONARY

## (undated) DEVICE — Device: Brand: NRG TRANSSEPTAL NEEDLE

## (undated) DEVICE — MICROSURGICAL DILATATION DEVICE: Brand: WOLVERINE CORONARY CUTTING BALLOON

## (undated) DEVICE — 40CC-15ATM VOLUMINOUS INFLATION DEVICE: Brand: VOLUMINOUS™

## (undated) DEVICE — LEFT ATRIAL APPENDAGE CLOSURE DEVICE WITH DELIVERY SYSTEM
Type: IMPLANTABLE DEVICE | Status: NON-FUNCTIONAL
Brand: WATCHMAN FLX™ PRO
Removed: 2024-08-27

## (undated) DEVICE — SPECIAL PROCEDURE DRAPE 32" X 34": Brand: SPECIAL PROCEDURE DRAPE

## (undated) DEVICE — HI-TORQUE WIGGLE GUIDE WIRE .014 STRAIGHT TIP 2.0 CM X 300 CM: Brand: HI-TORQUE WIGGLE

## (undated) DEVICE — PATCH REF EXT FOR CARTO 3 SYS (EA = 6 PACKS)

## (undated) DEVICE — COVER PROBE ULTRASOUND ULTRACOVER 6IN X 48IN

## (undated) DEVICE — CONNECTOR FLD DISPNS FOR FILL UD SYRINGES

## (undated) DEVICE — CATHETER ART THERMODILUTION 6 FRX110 CM 4 LUMEN SWAN

## (undated) DEVICE — CABLE CATH L10FT BLU CONN 10-34 PIN ELECTROGRAM CONDUCTION

## (undated) DEVICE — VALVE HEMSTAT 8FR INNR LUMN CRSS SLT SEAL DSGN WATCHDOG

## (undated) DEVICE — VALVE IV REFLX W/ M/F LUER LCK UNIV CAP STRL DISP

## (undated) DEVICE — HEART CATH-SFMC: Brand: MEDLINE INDUSTRIES, INC.

## (undated) DEVICE — CATHETER DIAG 6FR L100CM LUMN ID0.056IN JL4 CRV 0 SIDE H

## (undated) DEVICE — SHEATH CATH L 65.5 CM DIA10 FR SZ 13 MM STEER STRL DISP

## (undated) DEVICE — TR BAND RADIAL ARTERY COMPRESSION DEVICE: Brand: TR BAND

## (undated) DEVICE — CUSTOM KT PTCA INFL DEV K05 00052M

## (undated) DEVICE — DOC GUIDE WIRE EXTENSION: Brand: DOC

## (undated) DEVICE — ANGIO-SEAL VIP VASCULAR CLOSURE DEVICE: Brand: ANGIO-SEAL

## (undated) DEVICE — NEEDLE ANGIO 18GAX7CM SECURELOC

## (undated) DEVICE — CATH BLLN ANGIO 2X12MM NC EUPHORIA RX

## (undated) DEVICE — CATHETER ETER DIAG L110CM OD6FR VASC PGTL W SIDE H COR W OUT

## (undated) DEVICE — PADPRO DEFIBRILLATION/PACING/CARDIOVERSION/MONITORING ELECTRODES, ADULT/CHILD GREATER THAN 10 KG RADIOTRANSPARENT ELECTRODE, PHYSIO-CONTROL QUIK-COMBO (M) 60" (152 CM): Brand: PADPRO

## (undated) DEVICE — CATHETER COR DIAG PIGTAILS PIG 155 CRV 6FR 110CM 6 SIDE H

## (undated) DEVICE — 5F (1.0MM ID) X 9CM5F (1.0MM ID) REGULAR MICRO-STICK® INTRODUCER SETINTRODUCER SET WITH NITINOL GUIDEWIREWITH NITIN WITH RADIOPAQUE TIPWITH RADIOPAQ: Brand: MICRO-STICK SETMICRO-STICK SET

## (undated) DEVICE — SYSTEM RETENTION PANNUS ADH PD HK LOOP STRP

## (undated) DEVICE — CATHETER EP 7FR L115CM 2-8-2MM SPC TIP 2MM DF CRV ADV COMPR

## (undated) DEVICE — CABLE CATH L10FT RED PIN CONN 34-34 FOR THERMOCOOL

## (undated) DEVICE — CABLE CATH L2.7M CONNECTS TO CARTO 3 SYS PIU FOR LASSO ECO

## (undated) DEVICE — CATHETER GUID 6FR 0.071IN COR STD JUDKINS R 4 SIDE H MID